# Patient Record
Sex: FEMALE | Race: WHITE | NOT HISPANIC OR LATINO | Employment: PART TIME | URBAN - METROPOLITAN AREA
[De-identification: names, ages, dates, MRNs, and addresses within clinical notes are randomized per-mention and may not be internally consistent; named-entity substitution may affect disease eponyms.]

---

## 2017-01-02 ENCOUNTER — HOSPITAL ENCOUNTER (EMERGENCY)
Facility: HOSPITAL | Age: 37
Discharge: HOME | End: 2017-01-02
Admitting: EMERGENCY MEDICINE
Payer: COMMERCIAL

## 2017-01-02 ENCOUNTER — APPOINTMENT (EMERGENCY)
Dept: CT IMAGING | Facility: HOSPITAL | Age: 37
End: 2017-01-02
Payer: COMMERCIAL

## 2017-01-02 VITALS
WEIGHT: 293 LBS | SYSTOLIC BLOOD PRESSURE: 121 MMHG | HEART RATE: 76 BPM | DIASTOLIC BLOOD PRESSURE: 60 MMHG | TEMPERATURE: 97.6 F | OXYGEN SATURATION: 96 % | RESPIRATION RATE: 16 BRPM

## 2017-01-02 DIAGNOSIS — K29.00 ACUTE GASTRITIS, PRESENCE OF BLEEDING UNSPECIFIED, UNSPECIFIED GASTRITIS TYPE: Primary | ICD-10-CM

## 2017-01-02 LAB
ALBUMIN SERPL BCP-MCNC: 3.1 G/DL (ref 3.5–5)
ALP SERPL-CCNC: 96 U/L (ref 46–116)
ALT SERPL W P-5'-P-CCNC: 30 U/L (ref 12–78)
ANION GAP SERPL CALCULATED.3IONS-SCNC: 8 MMOL/L (ref 4–13)
AST SERPL W P-5'-P-CCNC: 19 U/L (ref 5–45)
BASOPHILS # BLD MANUAL: 0 THOUSAND/UL (ref 0–0.1)
BASOPHILS NFR MAR MANUAL: 0 % (ref 0–1)
BILIRUB SERPL-MCNC: 0.3 MG/DL (ref 0.2–1)
BILIRUB UR QL STRIP: NEGATIVE
BUN SERPL-MCNC: 13 MG/DL (ref 5–25)
CALCIUM SERPL-MCNC: 8.6 MG/DL (ref 8.3–10.1)
CHLORIDE SERPL-SCNC: 104 MMOL/L (ref 100–108)
CLARITY UR: CLEAR
CO2 SERPL-SCNC: 27 MMOL/L (ref 21–32)
COLOR UR: YELLOW
COLOR, POC: YELLOW
CREAT SERPL-MCNC: 0.75 MG/DL (ref 0.6–1.3)
EOSINOPHIL # BLD MANUAL: 0.15 THOUSAND/UL (ref 0–0.4)
EOSINOPHIL NFR BLD MANUAL: 2 % (ref 0–6)
ERYTHROCYTE [DISTWIDTH] IN BLOOD BY AUTOMATED COUNT: 13.9 % (ref 11.6–15.1)
GFR SERPL CREATININE-BSD FRML MDRD: >60 ML/MIN/1.73SQ M
GLUCOSE SERPL-MCNC: 93 MG/DL (ref 65–140)
GLUCOSE UR STRIP-MCNC: NEGATIVE MG/DL
HCG UR QL: NEGATIVE
HCT VFR BLD AUTO: 42.2 % (ref 34.8–46.1)
HGB BLD-MCNC: 13.9 G/DL (ref 11.5–15.4)
HGB UR QL STRIP.AUTO: NEGATIVE
KETONES UR STRIP-MCNC: NEGATIVE MG/DL
LEUKOCYTE ESTERASE UR QL STRIP: NEGATIVE
LIPASE SERPL-CCNC: 86 U/L (ref 73–393)
LYMPHOCYTES # BLD AUTO: 0.98 THOUSAND/UL (ref 0.6–4.47)
LYMPHOCYTES # BLD AUTO: 13 % (ref 14–44)
MCH RBC QN AUTO: 29.2 PG (ref 26.8–34.3)
MCHC RBC AUTO-ENTMCNC: 32.9 G/DL (ref 31.4–37.4)
MCV RBC AUTO: 89 FL (ref 82–98)
MONOCYTES # BLD AUTO: 0.3 THOUSAND/UL (ref 0–1.22)
MONOCYTES NFR BLD: 4 % (ref 4–12)
NEUTROPHILS # BLD MANUAL: 6.12 THOUSAND/UL (ref 1.85–7.62)
NEUTS BAND NFR BLD MANUAL: 1 % (ref 0–8)
NEUTS SEG NFR BLD AUTO: 80 % (ref 43–75)
NITRITE UR QL STRIP: NEGATIVE
NRBC BLD AUTO-RTO: 0 /100 WBCS
PH UR STRIP.AUTO: 6.5 [PH] (ref 4.5–8)
PLATELET # BLD AUTO: 270 THOUSANDS/UL (ref 149–390)
PLATELET BLD QL SMEAR: ADEQUATE
PMV BLD AUTO: 11.2 FL (ref 8.9–12.7)
POTASSIUM SERPL-SCNC: 4.3 MMOL/L (ref 3.5–5.3)
PROT SERPL-MCNC: 6.5 G/DL (ref 6.4–8.2)
PROT UR STRIP-MCNC: NEGATIVE MG/DL
RBC # BLD AUTO: 4.76 MILLION/UL (ref 3.81–5.12)
SODIUM SERPL-SCNC: 139 MMOL/L (ref 136–145)
SP GR UR STRIP.AUTO: 1.02 (ref 1–1.03)
TOTAL CELLS COUNTED SPEC: 100
UROBILINOGEN UR QL STRIP.AUTO: 0.2 E.U./DL
WBC # BLD AUTO: 7.56 THOUSAND/UL (ref 4.31–10.16)

## 2017-01-02 PROCEDURE — 85007 BL SMEAR W/DIFF WBC COUNT: CPT | Performed by: PHYSICIAN ASSISTANT

## 2017-01-02 PROCEDURE — 74177 CT ABD & PELVIS W/CONTRAST: CPT

## 2017-01-02 PROCEDURE — 96374 THER/PROPH/DIAG INJ IV PUSH: CPT

## 2017-01-02 PROCEDURE — 81003 URINALYSIS AUTO W/O SCOPE: CPT

## 2017-01-02 PROCEDURE — 99284 EMERGENCY DEPT VISIT MOD MDM: CPT

## 2017-01-02 PROCEDURE — 83690 ASSAY OF LIPASE: CPT | Performed by: PHYSICIAN ASSISTANT

## 2017-01-02 PROCEDURE — 96361 HYDRATE IV INFUSION ADD-ON: CPT

## 2017-01-02 PROCEDURE — 81025 URINE PREGNANCY TEST: CPT

## 2017-01-02 PROCEDURE — 36415 COLL VENOUS BLD VENIPUNCTURE: CPT | Performed by: PHYSICIAN ASSISTANT

## 2017-01-02 PROCEDURE — 81002 URINALYSIS NONAUTO W/O SCOPE: CPT

## 2017-01-02 PROCEDURE — 80053 COMPREHEN METABOLIC PANEL: CPT | Performed by: PHYSICIAN ASSISTANT

## 2017-01-02 PROCEDURE — 85027 COMPLETE CBC AUTOMATED: CPT | Performed by: PHYSICIAN ASSISTANT

## 2017-01-02 RX ORDER — ONDANSETRON 2 MG/ML
4 INJECTION INTRAMUSCULAR; INTRAVENOUS ONCE
Status: COMPLETED | OUTPATIENT
Start: 2017-01-02 | End: 2017-01-02

## 2017-01-02 RX ORDER — PANTOPRAZOLE SODIUM 40 MG/1
40 TABLET, DELAYED RELEASE ORAL DAILY
Qty: 30 TABLET | Refills: 0 | Status: SHIPPED | OUTPATIENT
Start: 2017-01-02 | End: 2018-02-01 | Stop reason: SDDI

## 2017-01-02 RX ORDER — MAGNESIUM HYDROXIDE/ALUMINUM HYDROXICE/SIMETHICONE 120; 1200; 1200 MG/30ML; MG/30ML; MG/30ML
30 SUSPENSION ORAL EVERY 4 HOURS PRN
Status: DISCONTINUED | OUTPATIENT
Start: 2017-01-02 | End: 2017-01-02 | Stop reason: HOSPADM

## 2017-01-02 RX ADMIN — ALUMINUM HYDROXIDE, MAGNESIUM HYDROXIDE, AND SIMETHICONE 30 ML: 200; 200; 20 SUSPENSION ORAL at 12:12

## 2017-01-02 RX ADMIN — IOHEXOL 100 ML: 350 INJECTION, SOLUTION INTRAVENOUS at 14:03

## 2017-01-02 RX ADMIN — LIDOCAINE HYDROCHLORIDE 15 ML: 20 SOLUTION ORAL; TOPICAL at 12:12

## 2017-01-02 RX ADMIN — ONDANSETRON 4 MG: 2 INJECTION INTRAMUSCULAR; INTRAVENOUS at 12:09

## 2017-01-02 RX ADMIN — IOHEXOL 50 ML: 240 INJECTION, SOLUTION INTRATHECAL; INTRAVASCULAR; INTRAVENOUS; ORAL at 14:03

## 2017-01-02 RX ADMIN — SODIUM CHLORIDE 1000 ML: 0.9 INJECTION, SOLUTION INTRAVENOUS at 12:09

## 2017-06-28 ENCOUNTER — ALLSCRIPTS OFFICE VISIT (OUTPATIENT)
Dept: OTHER | Facility: OTHER | Age: 37
End: 2017-06-28

## 2017-09-17 ENCOUNTER — OFFICE VISIT (OUTPATIENT)
Dept: URGENT CARE | Facility: CLINIC | Age: 37
End: 2017-09-17
Payer: COMMERCIAL

## 2017-09-17 ENCOUNTER — APPOINTMENT (OUTPATIENT)
Dept: RADIOLOGY | Facility: CLINIC | Age: 37
End: 2017-09-17
Payer: COMMERCIAL

## 2017-09-17 DIAGNOSIS — R20.2 PARESTHESIA OF SKIN: ICD-10-CM

## 2017-09-17 DIAGNOSIS — M79.672 PAIN OF LEFT FOOT: ICD-10-CM

## 2017-09-17 PROCEDURE — 99284 EMERGENCY DEPT VISIT MOD MDM: CPT

## 2017-09-17 PROCEDURE — 73630 X-RAY EXAM OF FOOT: CPT

## 2017-09-17 PROCEDURE — 72040 X-RAY EXAM NECK SPINE 2-3 VW: CPT

## 2017-09-17 PROCEDURE — 96372 THER/PROPH/DIAG INJ SC/IM: CPT

## 2017-09-17 PROCEDURE — G0383 LEV 4 HOSP TYPE B ED VISIT: HCPCS

## 2018-01-13 VITALS
BODY MASS INDEX: 51.91 KG/M2 | RESPIRATION RATE: 20 BRPM | HEIGHT: 63 IN | DIASTOLIC BLOOD PRESSURE: 80 MMHG | SYSTOLIC BLOOD PRESSURE: 122 MMHG | TEMPERATURE: 98.1 F | WEIGHT: 293 LBS | HEART RATE: 82 BPM

## 2018-02-01 ENCOUNTER — OFFICE VISIT (OUTPATIENT)
Dept: URGENT CARE | Facility: CLINIC | Age: 38
End: 2018-02-01
Payer: COMMERCIAL

## 2018-02-01 VITALS
SYSTOLIC BLOOD PRESSURE: 141 MMHG | RESPIRATION RATE: 22 BRPM | OXYGEN SATURATION: 97 % | HEART RATE: 86 BPM | DIASTOLIC BLOOD PRESSURE: 71 MMHG | TEMPERATURE: 97.4 F

## 2018-02-01 DIAGNOSIS — J20.9 ACUTE BRONCHITIS, UNSPECIFIED ORGANISM: ICD-10-CM

## 2018-02-01 DIAGNOSIS — H66.91 RIGHT OTITIS MEDIA, UNSPECIFIED OTITIS MEDIA TYPE: Primary | ICD-10-CM

## 2018-02-01 PROCEDURE — 99283 EMERGENCY DEPT VISIT LOW MDM: CPT | Performed by: NURSE PRACTITIONER

## 2018-02-01 PROCEDURE — G0382 LEV 3 HOSP TYPE B ED VISIT: HCPCS | Performed by: NURSE PRACTITIONER

## 2018-02-01 RX ORDER — AZITHROMYCIN 250 MG/1
250 TABLET, FILM COATED ORAL DAILY
Qty: 6 TABLET | Refills: 0 | Status: SHIPPED | OUTPATIENT
Start: 2018-02-01 | End: 2018-02-06

## 2018-02-01 NOTE — PROGRESS NOTES
Assessment/Plan:      Diagnoses and all orders for this visit:    Right otitis media, unspecified otitis media type  -     azithromycin (ZITHROMAX) 250 mg tablet; Take 1 tablet (250 mg total) by mouth daily for 5 days 2 pills today, then one daily for 4 more days  -     dextromethorphan-guaifenesin (MUCINEX DM)  MG per 12 hr tablet; Take 1 tablet by mouth every 12 (twelve) hours    Acute bronchitis, unspecified organism  -     azithromycin (ZITHROMAX) 250 mg tablet; Take 1 tablet (250 mg total) by mouth daily for 5 days 2 pills today, then one daily for 4 more days  -     dextromethorphan-guaifenesin (MUCINEX DM)  MG per 12 hr tablet; Take 1 tablet by mouth every 12 (twelve) hours          Subjective:     Patient ID: Med Koch is a 44 y o  female  Sore Throat    The current episode started yesterday  The problem has been unchanged  Associated symptoms include congestion, coughing, headaches, a hoarse voice, a plugged ear sensation and shortness of breath  Review of Systems   Constitutional: Negative  HENT: Positive for congestion, hoarse voice and sore throat  Eyes: Negative  Respiratory: Positive for cough and shortness of breath  Cardiovascular: Negative  Gastrointestinal: Negative  Genitourinary: Negative  Musculoskeletal: Negative  Neurological: Positive for headaches  Objective:  /71   Pulse 86   Temp (!) 97 4 °F (36 3 °C)   Resp 22   SpO2 97%      Physical Exam   Constitutional: She is oriented to person, place, and time  She appears well-developed and well-nourished  HENT:   Head: Normocephalic and atraumatic  Right Ear: Tympanic membrane is erythematous and bulging  Nose: Mucosal edema and rhinorrhea present  Mouth/Throat: Posterior oropharyngeal erythema present  No oropharyngeal exudate  Neck: Normal range of motion  Cardiovascular: Normal rate, regular rhythm and normal heart sounds      Pulmonary/Chest: Effort normal and breath sounds normal    Abdominal: Soft  Bowel sounds are normal    Lymphadenopathy:     She has cervical adenopathy  Neurological: She is alert and oriented to person, place, and time  She has normal reflexes  Skin: Skin is warm and dry  No rash noted  Psychiatric: She has a normal mood and affect  Nursing note and vitals reviewed  Patient Instructions   Follow up with PCP in 48-72 hours if symptoms do not improve or if they worsen

## 2018-04-03 ENCOUNTER — OFFICE VISIT (OUTPATIENT)
Dept: URGENT CARE | Facility: CLINIC | Age: 38
End: 2018-04-03
Payer: COMMERCIAL

## 2018-04-03 VITALS
HEART RATE: 108 BPM | RESPIRATION RATE: 18 BRPM | OXYGEN SATURATION: 97 % | TEMPERATURE: 98.1 F | DIASTOLIC BLOOD PRESSURE: 92 MMHG | SYSTOLIC BLOOD PRESSURE: 145 MMHG

## 2018-04-03 DIAGNOSIS — R60.0 LEG EDEMA, LEFT: Primary | ICD-10-CM

## 2018-04-03 DIAGNOSIS — M54.41 ACUTE BILATERAL LOW BACK PAIN WITH RIGHT-SIDED SCIATICA: ICD-10-CM

## 2018-04-03 PROCEDURE — 99283 EMERGENCY DEPT VISIT LOW MDM: CPT | Performed by: PHYSICIAN ASSISTANT

## 2018-04-03 PROCEDURE — G0382 LEV 3 HOSP TYPE B ED VISIT: HCPCS | Performed by: PHYSICIAN ASSISTANT

## 2018-04-03 NOTE — PROGRESS NOTES
[facility]    NAME: Silverio Dias is a 44 y o  female  : 1980    MRN: [id number]  DATE: April 3, 2018  TIME: 2:15 PM    Assessment and Plan   Leg edema, left [R60 0]  1  Leg edema, left     2  Acute bilateral low back pain with right-sided sciatica         Patient Instructions     Patient Instructions   Patient referred to emergency room for further evaluation and to rule out DVT  Will go to [address] [place] [address] ED  Chief Complaint     Chief Complaint   Patient presents with    Back Pain     Pt c/o lower back pain since yesterday  History of Present Illness   39-year-old female here with low back pain that started last night  Patient denies any injury  Pain in her lower back that radiates down her right lower extremity  Denies any related numbness, tingling  Nothing has helped to relieve her pain  Pain is worse with trying to stand up and walk  Denies any bowel or bladder symptoms  Patient noticed this morning that her left leg is swollen up past her knee  Denies any pain in her leg except for tightness due to the swelling  No redness or increased warmth of the leg  Review of Systems   Review of Systems   Constitutional: Negative for activity change, appetite change, chills, diaphoresis, fatigue, fever and unexpected weight change  HENT: Negative for congestion, dental problem, hearing loss, sinus pressure, sneezing, sore throat, tinnitus, trouble swallowing and voice change  Eyes: Negative for photophobia, redness and visual disturbance  Respiratory: Negative for apnea, cough, chest tightness, shortness of breath, wheezing and stridor  Cardiovascular: Positive for leg swelling (Left leg)  Negative for chest pain and palpitations  Gastrointestinal: Negative for abdominal distention, abdominal pain, blood in stool, constipation, diarrhea, nausea and vomiting  Endocrine: Negative for cold intolerance, heat intolerance, polydipsia, polyphagia and polyuria  Genitourinary: Negative for difficulty urinating, dysuria, flank pain, frequency, hematuria and urgency  Musculoskeletal: Positive for back pain (With radicular pain down the right leg) and gait problem  Negative for arthralgias, joint swelling, myalgias, neck pain and neck stiffness  Skin: Negative for pallor, rash and wound  Neurological: Negative for dizziness, tremors, seizures, speech difficulty, weakness and headaches  Hematological: Negative for adenopathy  Does not bruise/bleed easily  Psychiatric/Behavioral: Negative for agitation, confusion, dysphoric mood and sleep disturbance  The patient is not nervous/anxious  All other systems reviewed and are negative  Current Medications   No current outpatient prescriptions on file  Current Allergies     Allergies as of 04/03/2018 - Reviewed 04/03/2018   Allergen Reaction Noted    Penicillins Anaphylaxis 03/14/2016    Clindamycin/lincomycin  03/14/2016    Levaquin [levofloxacin] Facial Swelling 02/01/2018          The following portions of the patient's history were reviewed and updated as appropriate: allergies, current medications, past family history, past medical history, past social history, past surgical history and problem list      Objective   /92   Pulse (!) 108   Temp 98 1 °F (36 7 °C)   Resp 18   SpO2 97%      Physical Exam   Physical Exam   Constitutional: She appears well-developed and well-nourished  No distress  Morbidly obese   HENT:   Head: Normocephalic  Right Ear: External ear normal    Left Ear: External ear normal    Nose: Nose normal    Mouth/Throat: Oropharynx is clear and moist  No oropharyngeal exudate  Neck: Normal range of motion  Neck supple  Cardiovascular: Normal rate, regular rhythm, normal heart sounds and intact distal pulses  No murmur heard  Left leg edema noted  Swelling of entire left lower leg up past her left knee  Negative calf pain  Negative Homans    No redness or color changes  Pulmonary/Chest: Effort normal and breath sounds normal  No respiratory distress  She has no wheezes  She has no rales  Abdominal: Soft  Bowel sounds are normal  There is no tenderness  Musculoskeletal:        Lumbar back: She exhibits decreased range of motion (significant lordosis noted ), tenderness, pain and spasm  Lymphadenopathy:     She has no cervical adenopathy  Skin: Skin is warm  No rash noted

## 2018-04-03 NOTE — PATIENT INSTRUCTIONS
Patient referred to emergency room for further evaluation and to rule out DVT  Will go to [address] [place] [address]

## 2018-04-06 ENCOUNTER — OFFICE VISIT (OUTPATIENT)
Dept: URGENT CARE | Facility: CLINIC | Age: 38
End: 2018-04-06
Payer: COMMERCIAL

## 2018-04-06 VITALS
HEART RATE: 126 BPM | TEMPERATURE: 97.8 F | OXYGEN SATURATION: 97 % | DIASTOLIC BLOOD PRESSURE: 83 MMHG | SYSTOLIC BLOOD PRESSURE: 155 MMHG

## 2018-04-06 DIAGNOSIS — J01.40 ACUTE PANSINUSITIS, RECURRENCE NOT SPECIFIED: Primary | ICD-10-CM

## 2018-04-06 PROCEDURE — G0382 LEV 3 HOSP TYPE B ED VISIT: HCPCS | Performed by: FAMILY MEDICINE

## 2018-04-06 PROCEDURE — 99283 EMERGENCY DEPT VISIT LOW MDM: CPT | Performed by: FAMILY MEDICINE

## 2018-04-06 RX ORDER — AZITHROMYCIN 250 MG/1
TABLET, FILM COATED ORAL
Qty: 6 TABLET | Refills: 0 | Status: SHIPPED | OUTPATIENT
Start: 2018-04-06 | End: 2018-04-10

## 2018-04-06 RX ORDER — NAPROXEN 250 MG/1
250 TABLET ORAL 2 TIMES DAILY WITH MEALS
COMMUNITY
End: 2018-08-20

## 2018-04-06 RX ORDER — CYCLOBENZAPRINE HCL 10 MG
10 TABLET ORAL 3 TIMES DAILY PRN
COMMUNITY
End: 2018-08-20

## 2018-04-06 NOTE — PROGRESS NOTES
[facility] - Patient Visit Note  Ethan Seymour 44 y o  female MRN: [id number]      Assessment / Plan:  Acute pansinusitis, recurrence not specified [J01 40]  1  Acute pansinusitis, recurrence not specified  azithromycin (ZITHROMAX) 250 mg tablet     Reason For Visit / Chief Complaint  Chief Complaint   Patient presents with    Facial Pain     pressure with "yellowish-brown" discharge x 2-3 days             Azucena Beaver Discussion:  Patient is aware that she will be given azithromycin which she has tolerated in the past given her multiple antibiotic allergies  She was instructed to continue to use all of the medication  HPI:  Ethan Seymour is a 44 y o  female Patient           Who  Presents with sinus pressure and green brownish drainage anteriorly and the a postnasal drip for several days  She has had no fever per se she is allergic to penicillin clindamycin and Levaquin  She denies sore throat or ear pain at the present time  She has been taking over-the-counter anti-inflammatory with Tylenol with no relief  Historical Information   No past medical history on file  Past Surgical History:   Procedure Laterality Date    ABDOMINAL SURGERY       Social History   History   Alcohol Use No     History   Drug Use No     History   Smoking Status    Former Smoker    Types: Cigarettes   Smokeless Tobacco    Not on file     No family history on file  ALLERGIES:         Allergies   Allergen Reactions    Penicillins Anaphylaxis    Clindamycin/Lincomycin      Mouth ulcers severe  But, tolerates azithromycin      Levaquin [Levofloxacin] Facial Swelling       MEDS:    Current Outpatient Prescriptions:     cyclobenzaprine (FLEXERIL) 10 mg tablet, Take 10 mg by mouth 3 (three) times a day as needed for muscle spasms, Disp: , Rfl:     naproxen (NAPROSYN) 250 mg tablet, Take 250 mg by mouth 2 (two) times a day with meals, Disp: , Rfl:     azithromycin (ZITHROMAX) 250 mg tablet, Take 2 tablets today then 1 tablet daily x 4 days, Disp: 6 tablet, Rfl: 0    FACILITY ADMINISTERED MEDS:        REVIEW OF SYSTEMS    GENERAL: NEGATIVE for:  Generalized Fatigue                             Chills                              Fever                             Myalgias     OPTHALMIC: NEGATIVE for:  Diplopia                            Scotomata                            Visual Changes                            Blurred Vision     ENT:  EARS NEGATIVE for:  Hearing Difficulty                            Tinnitus                            Vertigo                            Dizziness                            Ear Pain                            Ear Drainage               NOSE Positive for:  Nasal Congestion                            Nasal Discharge                            Sinus Pain / Pressure               THROAT NEGATIVE for:  Sore Throat / Throat Pain                            Difficulty Swallowing     RESPIRATORY: NEGATIVE for:  Cough                            Wheezing                            Sputum Production                            Sob / Tachypnea                            Hemoptysis     CARDIOVASCULAR: NEGATIVE for:  Chest Pain                             SOB (cardiac Related)                             Dyspnea on Exertion                             Orthopnea                             PND                             Leg Edema                             Palpitations                               Irregularities/rythym                       CURRENT VITALS:   Blood Pressure: 155/83 (04/06/18 1049)  Pulse: (!) 126 (04/06/18 1049)  Temperature: 97 8 °F (36 6 °C) (04/06/18 1049)  SpO2: 97 % (04/06/18 1049)  /83   Pulse (!) 126   Temp 97 8 °F (36 6 °C)   LMP 03/14/2018 (Exact Date)   SpO2 97%       PHYSICAL EXAM:         General Appearance:    Alert, cooperative, no apparent distress, appears stated age     Oriented x3    Head:    Normocephalic, without obvious abnormality, atraumatic   Eyes:      EOM's intact,      AMELIA,        conjunctiva/corneas clear,          fundi not visualized well   Ears:     Normal external ear canals     Tm right side  Normal     Tm left side    Normal       Nose:    Frontal sinuses are mildly tender however maxillary sinuses are exquisitely tender to light touch   Throat:   Lips, mucosa, and tongue normal       Anterior pharynx   Normal      Posterior pharynx with large amount of postnasal drip which is greenish gray in color  No exudate obvious       Neck:   Supple, symmetrical, trachea midline and moveable    Normal thyroid click present    No carotid bruits appreciated        Lymphatics:     Adenopathy in anterior cervical chain  Normal    Adenopathy in posterior cervical chain   Normal     Lungs:     Clear to auscultation bilaterally    No rales    No ronchi    No wheeze     Heart[de-identified]    Regular rate and rhythm, S1 and S2 normal,     No S3, S4, audible    No murmurs, rubs      Extremities:     Extremities grossly normal     atraumatic,     no cyanosis or edema        Skin:     Skin color, texture, turgor normal, no rashes or lesions                         Follow up at primary care in 2  days    Counseling / Coordination of Care  Total clinic time spent today  15  minutes  Greater than 50% of total time was spent with the patient and / or family counseling and / or coordination of care  Portions of the record may have been created with voice recognition software   Occasional wrong word or "sound a like" substitutions may have occurred due to the inherent limitations of voice recognition software   Read the chart carefully and recognize, using context, where substitutions have occurred

## 2018-04-06 NOTE — PATIENT INSTRUCTIONS
Sinusitis   WHAT YOU NEED TO KNOW:   FOLLOW UP WITH YOUR PRIMARY CARE PHYSICIAN WITHIN 1-2 DAYS    Sinusitis is inflammation or infection of your sinuses  It is most often caused by a virus  Acute sinusitis may last up to 12 weeks  Chronic sinusitis lasts longer than 12 weeks  Recurrent sinusitis means you have 4 or more times in 1 year  DISCHARGE INSTRUCTIONS:   Return to the emergency department if:   · Your eye and eyelid are red, swollen, and painful  · You cannot open your eye  · You have vision changes, such as double vision  · Your eyeball bulges out or you cannot move your eye  · You are more sleepy than normal, or you notice changes in your ability to think, move, or talk  · You have a stiff neck, a fever, or a bad headache  · You have swelling of your forehead or scalp  Contact your healthcare provider if:   · Your symptoms do not improve after 3 days  · Your symptoms do not go away after 10 days  · You have nausea and are vomiting  · Your nose is bleeding  · You have questions or concerns about your condition or care  Medicines: Your symptoms may go away on their own  Your healthcare provider may recommend watchful waiting for up to 10 days before starting antibiotics  You may  need any of the following:  · Acetaminophen  decreases pain and fever  It is available without a doctor's order  Ask how much to take and how often to take it  Follow directions  Read the labels of all other medicines you are using to see if they also contain acetaminophen, or ask your doctor or pharmacist  Acetaminophen can cause liver damage if not taken correctly  Do not use more than 4 grams (4,000 milligrams) total of acetaminophen in one day  · NSAIDs , such as ibuprofen, help decrease swelling, pain, and fever  This medicine is available with or without a doctor's order  NSAIDs can cause stomach bleeding or kidney problems in certain people   If you take blood thinner medicine, always ask your healthcare provider if NSAIDs are safe for you  Always read the medicine label and follow directions  · Nasal steroid sprays  may help decrease inflammation in your nose and sinuses  · Decongestants  help reduce swelling and drain mucus in the nose and sinuses  They may help you breathe easier  · Antihistamines  help dry mucus in the nose and relieve sneezing  · Antibiotics  help treat or prevent a bacterial infection  · Take your medicine as directed  Contact your healthcare provider if you think your medicine is not helping or if you have side effects  Tell him or her if you are allergic to any medicine  Keep a list of the medicines, vitamins, and herbs you take  Include the amounts, and when and why you take them  Bring the list or the pill bottles to follow-up visits  Carry your medicine list with you in case of an emergency  Self-care:   · Rinse your sinuses  Use a sinus rinse device to rinse your nasal passages with a saline (salt water) solution or distilled water  Do not use tap water  This will help thin the mucus in your nose and rinse away pollen and dirt  It will also help reduce swelling so you can breathe normally  Ask your healthcare provider how often to do this  · Breathe in steam   Heat a bowl of water until you see steam  Lean over the bowl and make a tent over your head with a large towel  Breathe deeply for about 20 minutes  Be careful not to get too close to the steam or burn yourself  Do this 3 times a day  You can also breathe deeply when you take a hot shower  · Sleep with your head elevated  Place an extra pillow under your head before you go to sleep to help your sinuses drain  · Drink liquids as directed  Ask your healthcare provider how much liquid to drink each day and which liquids are best for you  Liquids will thin the mucus in your nose and help it drain  Avoid drinks that contain alcohol or caffeine       · Do not smoke, and avoid secondhand smoke  Nicotine and other chemicals in cigarettes and cigars can make your symptoms worse  Ask your healthcare provider for information if you currently smoke and need help to quit  E-cigarettes or smokeless tobacco still contain nicotine  Talk to your healthcare provider before you use these products  Prevent the spread of germs that cause sinusitis:  Wash your hands often with soap and water  Wash your hands after you use the bathroom, change a child's diaper, or sneeze  Wash your hands before you prepare or eat food  Follow up with your healthcare provider as directed: You may be referred to an ear, nose, and throat specialist  Write down your questions so you remember to ask them during your visits  © 2017 [address] [facility] [employer] [facility] [employer] Information is for End User's use only and may not be sold, redistributed or otherwise used for commercial purposes  All illustrations and images included in CareNotes® are the copyrighted property of [employer]  or [address]  The above information is an  only  It is not intended as medical advice for individual conditions or treatments  Talk to your doctor, nurse or pharmacist before following any medical regimen to see if it is safe and effective for you

## 2018-04-30 ENCOUNTER — TELEPHONE (OUTPATIENT)
Dept: INTERNAL MEDICINE CLINIC | Facility: CLINIC | Age: 38
End: 2018-04-30

## 2018-04-30 DIAGNOSIS — S80.00XA PATELLAR CONTUSION, UNSPECIFIED LATERALITY, INITIAL ENCOUNTER: Primary | ICD-10-CM

## 2018-06-19 ENCOUNTER — APPOINTMENT (OUTPATIENT)
Dept: PHYSICAL THERAPY | Facility: CLINIC | Age: 38
End: 2018-06-19
Payer: COMMERCIAL

## 2018-06-19 PROCEDURE — G8979 MOBILITY GOAL STATUS: HCPCS | Performed by: PHYSICAL THERAPIST

## 2018-06-19 PROCEDURE — G8978 MOBILITY CURRENT STATUS: HCPCS | Performed by: PHYSICAL THERAPIST

## 2018-06-21 ENCOUNTER — APPOINTMENT (OUTPATIENT)
Dept: PHYSICAL THERAPY | Facility: CLINIC | Age: 38
End: 2018-06-21
Payer: COMMERCIAL

## 2018-06-25 ENCOUNTER — APPOINTMENT (OUTPATIENT)
Dept: PHYSICAL THERAPY | Facility: CLINIC | Age: 38
End: 2018-06-25
Payer: COMMERCIAL

## 2018-06-26 ENCOUNTER — OFFICE VISIT (OUTPATIENT)
Dept: PHYSICAL THERAPY | Facility: CLINIC | Age: 38
End: 2018-06-26
Payer: COMMERCIAL

## 2018-06-26 DIAGNOSIS — S82.101D CLOSED FRACTURE OF PROXIMAL END OF RIGHT TIBIA WITH ROUTINE HEALING, UNSPECIFIED FRACTURE MORPHOLOGY, SUBSEQUENT ENCOUNTER: Primary | ICD-10-CM

## 2018-06-26 PROCEDURE — 97110 THERAPEUTIC EXERCISES: CPT

## 2018-06-26 NOTE — PROGRESS NOTES
Daily Note     Today's date: 2018  Patient name: Keny Anderson  : 1980  MRN: [id number]  Referring provider: Madhuri Griffiths DO  Dx:   Encounter Diagnosis     ICD-10-CM    1  Closed fracture of proximal end of right tibia with routine healing, unspecified fracture morphology, subsequent encounter S82 101D                   Subjective: Patient rates pain 2/10 today  She reports doing her exercises regularly at home  She is hoping her weight bearing status changes at her next MD visit  Objective: See treatment diary below  Precautions: 50% WB RLE (? Till )  RE EVAL:2018  Specialty Daily Treatment Diary         Exercise Diary  18       nustep s=9 L2x6 min       Sit hs stretch 30 sec x 3       Mirror amb with crutches 25 ft x 2       Up/down stairs with crutches        strap calf stretch R 30 sec hold x3       QS roll knee x20       QS roll heel x20       Heel slide flex x20       Heel slide abd x20       clam shells R x10       SLR flex 2x10       Patella mobs x5 each       Ball squeezes x10       Hip sweeps x10                                                           Patient educated on additional exercises without difficulty  Patient's HEP updated, handout given and explained  Assessment: Tolerated treatment well  Patient exhibited good technique with therapeutic exercises, but continues with need of verbal cues for correct positioning at times  Plan: Continue per plan of care  Progress treatment as tolerated

## 2018-06-28 ENCOUNTER — OFFICE VISIT (OUTPATIENT)
Dept: PHYSICAL THERAPY | Facility: CLINIC | Age: 38
End: 2018-06-28
Payer: COMMERCIAL

## 2018-06-28 DIAGNOSIS — S82.101D CLOSED FRACTURE OF PROXIMAL END OF RIGHT TIBIA WITH ROUTINE HEALING, UNSPECIFIED FRACTURE MORPHOLOGY, SUBSEQUENT ENCOUNTER: Primary | ICD-10-CM

## 2018-06-28 PROCEDURE — 97110 THERAPEUTIC EXERCISES: CPT | Performed by: PHYSICAL THERAPIST

## 2018-06-28 NOTE — PROGRESS NOTES
Daily Note     Today's date: 2018  Patient name: Luz Marina Murillo  : 1980  MRN: [id number]  Referring provider: Jasbir Keith DO  Dx:   Encounter Diagnosis     ICD-10-CM    1  Closed fracture of proximal end of right tibia with routine healing, unspecified fracture morphology, subsequent encounter S89 450D                   Subjective: The patient denies having any pain at her R leg presently  The patient does report neck and R arm pain related to a chronic neck pain and straightening of the lordosis  The patient reports doing well with her exercises at home  The patient admits to having difficulty kneeling on the bed with R knee and also having difficulty getting in/out of their vehicle(expedition)  Objective: See treatment diary below  Precautions: 50% WB RLE (? Till )  RE EVAL:2018  Specialty Daily Treatment Diary         Exercise Diary  18      nustep s=9 L2x6 min L2 x 8 mins      Sit hs stretch 30 sec x 3 3 x :30      Mirror amb with crutches 25 ft x 2       Up/down stairs with crutches        strap calf stretch R 30 sec hold x3 :30 x3      QS roll knee x20 x25      QS roll heel x20 x25      Heel slide flex x20 x25      Heel slide abd x20 x25      clam shells R x10 x25      SLR flex 2x10 x25      Patella mobs x5 each x5 each      Ball squeezes x10 2x10      Hip sweeps x10 2x10      Heelslide strap stretch  4x:20                                                  Assessment:  The patient's posture while walking with her B/L axillary crutches is much improved today  She no longer flexes forward and her step lengths are more equal   Patient was given education on proper exercise technique and activity pacing once she is allowed to [facility]  Patient would benefit from continued PT      Plan: Will add some closed chained activities next session if patient is cleared to [address] [place] [address]

## 2018-07-02 ENCOUNTER — OFFICE VISIT (OUTPATIENT)
Dept: PHYSICAL THERAPY | Facility: CLINIC | Age: 38
End: 2018-07-02
Payer: COMMERCIAL

## 2018-07-02 DIAGNOSIS — S82.101D CLOSED FRACTURE OF PROXIMAL END OF RIGHT TIBIA WITH ROUTINE HEALING, UNSPECIFIED FRACTURE MORPHOLOGY, SUBSEQUENT ENCOUNTER: Primary | ICD-10-CM

## 2018-07-02 PROCEDURE — 97530 THERAPEUTIC ACTIVITIES: CPT | Performed by: PHYSICAL THERAPIST

## 2018-07-02 PROCEDURE — 97110 THERAPEUTIC EXERCISES: CPT | Performed by: PHYSICAL THERAPIST

## 2018-07-02 NOTE — PROGRESS NOTES
Daily Note     Today's date: 2018  Patient name: Sierra Horan  : 1980  MRN: [id number]  Referring provider: Daron Arredondo DO  Dx:   Encounter Diagnosis     ICD-10-CM    1  Closed fracture of proximal end of right tibia with routine healing, unspecified fracture morphology, subsequent encounter S82 179D                   Subjective: Patient saw MD earlier today and was released to B of R LE  X-rays taken, "all looks great"  Patient entered clinic today with 1 axillary crutch  C/o 6/10 pain in right knee today after going up the stairs yesterday without crutches  NDV Aug  13th  Objective: See treatment diary below    Precautions: FWB    RE EVAL:2018  Specialty Daily Treatment Diary      Exercise Diary  18       nustep s=9 L2x6 min L2 x 8 mins  L2 x10 min       Sit hs stretch 30 sec x 3 3 x :30  3 x :30       Mirror amb with crutches 25 ft x 2    np       Up/down stairs with crutches      np       strap calf stretch R 30 sec hold x3 :30 x3  3 x :30       QS roll knee x20 x25 x25       QS roll heel x20 x25 x25       Heel slide flex x20 x25 x25       Heel slide abd x20 x25 x25       clam shells R x10 x25 x25       SLR flex 2x10 x25 x25        Patella mobs x5 each x5 each x5 each       Ball squeezes x10 2x10 x25       Hip sweeps x10 2x10 3x10       Heelslide strap stretch   4x:20 4x :20       Weight Shifts fwd, lat     30x each       standing hip abd     10x                                                       Assessment: Tolerated treatment well  Noted continued antalgic gait with decreased stance time on RLE due to pain  Needs to work on single limb WB for normalization of gait  Educated patient on axillary crutch use with L UE  Patient would benefit from continued PT      Plan: Continue per plan of care

## 2018-07-05 ENCOUNTER — OFFICE VISIT (OUTPATIENT)
Dept: PHYSICAL THERAPY | Facility: CLINIC | Age: 38
End: 2018-07-05
Payer: COMMERCIAL

## 2018-07-05 DIAGNOSIS — S82.101D CLOSED FRACTURE OF PROXIMAL END OF RIGHT TIBIA WITH ROUTINE HEALING, UNSPECIFIED FRACTURE MORPHOLOGY, SUBSEQUENT ENCOUNTER: Primary | ICD-10-CM

## 2018-07-05 PROCEDURE — 97110 THERAPEUTIC EXERCISES: CPT

## 2018-07-05 NOTE — PROGRESS NOTES
Daily Note     Today's date: 2018  Patient name: Silverio Dias  : 1980  MRN: [id number]  Referring provider: Annalise Meyer DO  Dx:   Encounter Diagnosis     ICD-10-CM    1  Closed fracture of proximal end of right tibia with routine healing, unspecified fracture morphology, subsequent encounter S83 795D                   Subjective: Patient reports no pain, just stiffness in her foot today  Patient feels at night is the worst of her discomfort from being on her foot all day  Patient has not had to use any assistive device since change of weight bearing status  Objective: See treatment diary below  Precautions: FWB    RE EVAL:2018  Specialty Daily Treatment Diary      Exercise Diary  18     nustep s=9 L2x6 min L2 x 8 mins  L2 x10 min  L2 x 10 min     Sit hs stretch 30 sec x 3 3 x :30  3 x :30  3 x:30     Mirror amb with crutches 25 ft x 2    np  w/o crutches  25ft x 4     Up/down stairs with crutches      np  w/o crutches x 2     strap calf stretch R 30 sec hold x3 :30 x3  3 x :30  *wedge   3x :30     QS roll knee x20 x25 x25  home ex     QS roll heel x20 x25 x25  home ex     Heel slide flex/abd x20 x25 x25  home ex     Heel-toe amb     10 ft x 4 bars  out bars   clam shells R x10 x25 x25  x30     SLR flex 2x10 x25 x25   x30     Patella mobs x5 each x5 each x5 each(home ex)  *Mini wall squats x10     Ball squeezes x10 2x10 x25  x30     Hip sweeps x10 2x10 3x10  1# x20     Heelslide strap stretch   4x:20 4x :20  3 x :30     Weight Shifts fwd, lat     30x each  *NBS stand EO/EC :30 x2  bl foam   standing hip abd     10x  3 x10      step ups 4"        x10 ea      sidestepping        25 ft x 2      fitter        x 10 ea       Patient was instructed on new  Balance activities  Handout given and explained to update home exercise program     Assessment: Tolerated treatment well   Patient exhibited good technique with therapeutic exercises and was able to perform new exercises without difficulty  Plan: Continue per plan of care  Progress treatment as tolerated

## 2018-07-10 ENCOUNTER — OFFICE VISIT (OUTPATIENT)
Dept: PHYSICAL THERAPY | Facility: CLINIC | Age: 38
End: 2018-07-10
Payer: COMMERCIAL

## 2018-07-10 DIAGNOSIS — S82.101D CLOSED FRACTURE OF PROXIMAL END OF RIGHT TIBIA WITH ROUTINE HEALING, UNSPECIFIED FRACTURE MORPHOLOGY, SUBSEQUENT ENCOUNTER: Primary | ICD-10-CM

## 2018-07-10 PROCEDURE — 97110 THERAPEUTIC EXERCISES: CPT

## 2018-07-10 NOTE — PROGRESS NOTES
Daily Note     Today's date: 7/10/2018  Patient name: Leanne Brown  : 1980  MRN: [id number]  Referring provider: April Smith DO  Dx:   Encounter Diagnosis     ICD-10-CM    1  Closed fracture of proximal end of right tibia with routine healing, unspecified fracture morphology, subsequent encounter S87 025D                   Subjective: Patient rates her pain 6/10 in her right knee (sharp) today because she stepped in a hole outside last night at her mother's and landed on her knee when she fell  She feels that her knee will be fine after a few days  Patient reported heat flashes during exercises, but knee pain stayed the same  She is going home to ice her knee after shopping today        Objective: See treatment diary below  Precautions: [place] RUSHING EVAL:2018  Specialty Daily Treatment Diary      Exercise Diary  6/26/18 6/28/18  7/2/18 7/5/18  7/10/18   nustep s=9 L2x6 min L2 x 8 mins  L2 x10 min  L2 x 10 min  L2 x 10min   Sit hs stretch 30 sec x 3 3 x :30  3 x :30  3 x:30  3x :30   Mirror amb with crutches 25 ft x 2    np  w/o crutches  25ft x 4  25 ft x 4   Up/down stairs with crutches      np  w/o crutches x 2  x 4 sos   strap calf stretch R 30 sec hold x3 :30 x3  3 x :30  *wedge   3x :30  wedge 3x :30   QS roll knee x20 x25 x25  home ex  -   QS roll heel x20 x25 x25  home ex  -   Heel slide flex/abd x20 x25 x25  home ex  -   Heel-toe amb     10 ft x 4 bars  out bars 25x2   clam shells R x10 x25 x25  x30  x30   SLR flex 2x10 x25 x25   x30  1# x20   Patella mobs x5 each x5 each x5 each(home ex)  *Mini wall squats x10  x10   Ball squeezes x10 2x10 x25  x30 x30   Hip sweeps x10 2x10 3x10  1# x20  1# x 30   Heelslide strap stretch   4x:20 4x :20  3 x :30  3x :30   Weight Shifts fwd, lat     30x each  *NBS stand EO/EC :30 x2  bl foam EO/EC :30x2   standing hip abd     10x  3 x10  3x10    step ups 4"        x10 ea  x10 ea    sidestepping        25 ft x 2  25 ft x4    fitter        x 10 ea  x10 ea Patient instructed to see MD if her knee does not improve  Reviewed proper ice and rest technique for pain relief and healing  Assessment: Tolerated treatment fair  Patient demonstrated fatigue post treatment, exhibited good technique with therapeutic exercises and would benefit from continued PT for improved balance  Plan: Continue per plan of care  Progress treatment as tolerated

## 2018-07-12 ENCOUNTER — OFFICE VISIT (OUTPATIENT)
Dept: PHYSICAL THERAPY | Facility: CLINIC | Age: 38
End: 2018-07-12
Payer: COMMERCIAL

## 2018-07-12 DIAGNOSIS — S82.101D CLOSED FRACTURE OF PROXIMAL END OF RIGHT TIBIA WITH ROUTINE HEALING, UNSPECIFIED FRACTURE MORPHOLOGY, SUBSEQUENT ENCOUNTER: Primary | ICD-10-CM

## 2018-07-12 PROCEDURE — 97110 THERAPEUTIC EXERCISES: CPT

## 2018-07-12 NOTE — PROGRESS NOTES
Daily Note     Today's date: 2018  Patient name: Sandi Saini  : 1980  MRN: [id number]  Referring provider: Marcus Augustine DO  Dx:   Encounter Diagnosis     ICD-10-CM    1  Closed fracture of proximal end of right tibia with routine healing, unspecified fracture morphology, subsequent encounter S82 101D                   Subjective: Patient reports her knee pain has subsided and she has no pain today  Patient states she does not know if her balance will ever recover  Objective: See treatment diary below  RE EVAL:2018  Specialty Daily Treatment Diary      Exercise Diary  18       nustep s=9 L3 x10 mins        Sit hs stretch 3x :30        Mirror amb  25 ft x 4        Up/down stairs  X 4 sos        wedge calf stretch  :30 x4                 Wall squats X 20        Hurdles 25 ft x 2        Heel-toe amb 25 ft x 2        clam shells R Green T-band x 20       SLR flex 1# x30        SL stand :15x 2        Ball squeezes x30       Hip sweeps 1# x30        Heelslide strap stretch 3x:30         BL foam EO/EC :30x2        standing hip abd 1# x 20         step ups 4" X 15 ea         sidestepping 25 ft x 4        fitter x15 each          Patient's HEP updated with additional exercises and green theraband issued  Assessment: Tolerated treatment well  Patient exhibited good technique with therapeutic exercises and would benefit from continued PT for improved balance  Plan: Continue per plan of care  Progress treatment as tolerated

## 2018-07-16 ENCOUNTER — EVALUATION (OUTPATIENT)
Dept: PHYSICAL THERAPY | Facility: CLINIC | Age: 38
End: 2018-07-16
Payer: COMMERCIAL

## 2018-07-16 DIAGNOSIS — S82.101D CLOSED FRACTURE OF PROXIMAL END OF RIGHT TIBIA WITH ROUTINE HEALING, UNSPECIFIED FRACTURE MORPHOLOGY, SUBSEQUENT ENCOUNTER: Primary | ICD-10-CM

## 2018-07-16 PROCEDURE — G8979 MOBILITY GOAL STATUS: HCPCS | Performed by: PHYSICAL THERAPIST

## 2018-07-16 PROCEDURE — G8980 MOBILITY D/C STATUS: HCPCS | Performed by: PHYSICAL THERAPIST

## 2018-07-16 PROCEDURE — 97110 THERAPEUTIC EXERCISES: CPT | Performed by: PHYSICAL THERAPIST

## 2018-07-16 NOTE — LETTER
2018    Aarti Kapoor   [address]  [address] [facility] [address]    Patient: Merlin Mealing   YOB: 1980   Date of Visit: 2018     Encounter Diagnosis     ICD-10-CM    1  Closed fracture of proximal end of right tibia with routine healing, unspecified fracture morphology, subsequent encounter S82 101D        Dear Dr Libby Osorio Recipients:    Please review the attached Plan of Care from [address] [place] [address] recent visit  Please verify that you agree therapy should continue by signing the attached document and sending it back to our office  If you have any questions or concerns, please don't hesitate to call  Sincerely,    Mireille Sanon, PT      Referring Provider:      I certify that I have read the below Plan of Care and certify the need for these services furnished under this plan of treatment while under my care  Aarti Kapoor   [address]  [address]  [facility] [address]  VIA Facsimile: [phone]          PT Re-Evaluation  and PT Discharge    Today's date: 2018  Patient name: Merlin Mealing  : 1980  MRN: [id number]  Referring provider: Dr Aarti Kapoor  Dx:   Encounter Diagnosis     ICD-10-CM    1  Closed fracture of proximal end of right tibia with routine healing, unspecified fracture morphology, subsequent encounter S82 101D                   Assessment  Impairments: abnormal gait, abnormal or restricted ROM, activity intolerance, impaired balance, impaired physical strength, lacks appropriate home exercise program, pain with function and weight-bearing intolerance    Assessment details: Merlin Mealing is a 44 y o  female that presents with signs/symptoms consistent with R lateral tibial plateau fracture  The patient has made functional gains since starting therapy  The patient is now able to walk without assistive device without deviation    The patient is back to driving a vehicle and able to walk up/down the steps with step over step techniques  The patient's only impairment at this time is some R quadriceps weakness  This impairment was addressed during today's session with a new home exercise propgram   The patient will be discharged from PT to independent home exercise plan  Understanding of Dx/Px/POC: good   Prognosis: good    Goals  STG: Achieve in 4 weeks  1   R knee/thigh pain @ worst 1-2/10 to improve gait  ACHIEVED  2   R knee flexion > 105 degrees AROM to improve up/down steps  3   R iliopsoas, Quads, Hip abduct, Gastroc > 4+/5 to improve walking tolerance  LTG:  Achieve in 8 weeks  1  Knee FOTO score > 80% function  PARTIALLY ACHIEVED(78%)  2  Patient ambulate up/down the steps without difficulty to sleep upstairs  ACHIEVED  3  Patient return to driving without difficulty  ACHIEVED  4  Independent with home exercises  Plan  Plan details: Discharge outpatient PT at this time to independent home exercise plan  Subjective Evaluation    History of Present Illness  Date of onset: 2018  Mechanism of injury: Please refer to paper chart for mechanism of injury  Patient reports "twisting" her R knee while walking down a gravel walkway, a person ran out in front of the patient causing her to skid/twist knee  She denies any pain presently - just "feels stiff "  Quality of life: excellent    Pain  Current pain ratin  At best pain ratin  At worst pain ratin  Location: R anterior lateral knee joitn  Quality: sharp  Relieving factors: rest and relaxation  Progression: improved    Treatments  Previous treatment: physical therapy  Patient Goals  Patient goals for therapy: increased strength, decreased pain, increased motion, independence with ADLs/IADLs, return to sport/leisure activities and improved balance          Objective     Palpation     Additional Palpation Details  Patient has no areas of tenderness at her R LE      Active Range of Motion     Right Knee   Flexion: 116 degrees Strength/Myotome Testing     Left Hip     Isolated Muscles   Iliopsoas: 5    Right Hip   Planes of Motion   Abduction: 5    Isolated Muscles   Iliopsoas: 5    Right Knee   Flexion: 4+  Extension: 4  Quadriceps contraction: fair    Additional Strength Details  The patient was given new quadriceps strengthening exercises to address this impairment  Patient admits to not being compliant with home quadriceps strengthening  General Comments     Knee Comments  The patient's gait is now WNL without deviation        Flowsheet Rows      Most Recent Value   PT/OT G-Codes   Current Score  78   FOTO information reviewed  Yes   Assessment Type  Discharge   G code set  Mobility: Walking & Moving Around   Mobility: Walking and Moving Around Goal Status ()  CI   Mobility: Walking and Moving Around Discharge Status ()  CI          Precautions: none    RE EVAL:7/17/2018  Specialty Daily Treatment Diary      Exercise Diary  7/12/18 7/16/18      nustep s=9 L3 x10 mins L3 x 10mins       Sit hs stretch 3x :30 3x:30       Mirror amb  25 ft x 4        Up/down stairs  X 4 sos  x 2 sos       wedge calf stretch  :30 x4 :30 x 4       LAQ slow eccentric  #2 x 15       Wall squats X 20 2x10       Hurdles 25 ft x 2 25ft x 2       Heel-toe amb 25 ft x 2 25ft x 2       clam shells R Green T-band x 20       SLR flex 1# x30        SL stand :15x 2 :20 x 2       Ball squeezes x30       Hip sweeps 1# x30        Heelslide strap stretch 3x:30         BL foam EO/EC :30x2        standing hip abd 1# x 20 Quad Sets x15 ea        step ups 4" X 15 ea x25 ea        sidestepping 25 ft x 4 25ft x 2       fitter x15 each

## 2018-07-16 NOTE — PROGRESS NOTES
PT Re-Evaluation  and PT Discharge    Today's date: 2018  Patient name: Merlin Mealing  : 1980  MRN: [id number]  Referring provider: Dr Aarti Kapoor  Dx:   Encounter Diagnosis     ICD-10-CM    1  Closed fracture of proximal end of right tibia with routine healing, unspecified fracture morphology, subsequent encounter S82 101D                   Assessment  Impairments: abnormal gait, abnormal or restricted ROM, activity intolerance, impaired balance, impaired physical strength, lacks appropriate home exercise program, pain with function and weight-bearing intolerance    Assessment details: Merlin Mealing is a 44 y o  female that presents with signs/symptoms consistent with R lateral tibial plateau fracture  The patient has made functional gains since starting therapy  The patient is now able to walk without assistive device without deviation  The patient is back to driving a vehicle and able to walk up/down the steps with step over step techniques  The patient's only impairment at this time is some R quadriceps weakness  This impairment was addressed during today's session with a new home exercise propgram   The patient will be discharged from PT to independent home exercise plan  Understanding of Dx/Px/POC: good   Prognosis: good    Goals  STG: Achieve in 4 weeks  1   R knee/thigh pain @ worst 1-2/10 to improve gait  ACHIEVED  2   R knee flexion > 105 degrees AROM to improve up/down steps  3   R iliopsoas, Quads, Hip abduct, Gastroc > 4+/5 to improve walking tolerance  LTG:  Achieve in 8 weeks  1  Knee FOTO score > 80% function  PARTIALLY ACHIEVED(78%)  2  Patient ambulate up/down the steps without difficulty to sleep upstairs  ACHIEVED  3  Patient return to driving without difficulty  ACHIEVED  4  Independent with home exercises  Plan  Plan details: Discharge outpatient PT at this time to independent home exercise plan          Subjective Evaluation    History of Present Illness  Date of onset: 2018  Mechanism of injury: Please refer to paper chart for mechanism of injury  Patient reports "twisting" her R knee while walking down a gravel walkway, a person ran out in front of the patient causing her to skid/twist knee  She denies any pain presently - just "feels stiff "  Quality of life: excellent    Pain  Current pain ratin  At best pain ratin  At worst pain ratin  Location: R anterior lateral knee joitn  Quality: sharp  Relieving factors: rest and relaxation  Progression: improved    Treatments  Previous treatment: physical therapy  Patient Goals  Patient goals for therapy: increased strength, decreased pain, increased motion, independence with ADLs/IADLs, return to sport/leisure activities and improved balance          Objective     Palpation     Additional Palpation Details  Patient has no areas of tenderness at her R LE  Active Range of Motion     Right Knee   Flexion: 116 degrees     Strength/Myotome Testing     Left Hip     Isolated Muscles   Iliopsoas: 5    Right Hip   Planes of Motion   Abduction: 5    Isolated Muscles   Iliopsoas: 5    Right Knee   Flexion: 4+  Extension: 4  Quadriceps contraction: fair    Additional Strength Details  The patient was given new quadriceps strengthening exercises to address this impairment  Patient admits to not being compliant with home quadriceps strengthening  General Comments     Knee Comments  The patient's gait is now WNL without deviation        Flowsheet Rows      Most Recent Value   PT/OT G-Codes   Current Score  78   FOTO information reviewed  Yes   Assessment Type  Discharge   G code set  Mobility: Walking & Moving Around   Mobility: Walking and Moving Around Goal Status ()  CI   Mobility: Walking and Moving Around Discharge Status ()  CI          Precautions: none    RE EVAL:2018  Specialty Daily Treatment Diary      Exercise Diary  18      nustep s=9 L3 x10 mins L3 x 10mins     Sit hs stretch 3x :30 3x:30       Mirror amb  25 ft x 4        Up/down stairs  X 4 sos  x 2 sos       wedge calf stretch  :30 x4 :30 x 4       LAQ slow eccentric  #2 x 15       Wall squats X 20 2x10       Hurdles 25 ft x 2 25ft x 2       Heel-toe amb 25 ft x 2 25ft x 2       clam shells R Green T-band x 20       SLR flex 1# x30        SL stand :15x 2 :20 x 2       Ball squeezes x30       Hip sweeps 1# x30        Heelslide strap stretch 3x:30         BL foam EO/EC :30x2        standing hip abd 1# x 20 Quad Sets x15 ea        step ups 4" X 15 ea x25 ea        sidestepping 25 ft x 4 25ft x 2       fitter x15 each

## 2018-07-30 ENCOUNTER — TRANSCRIBE ORDERS (OUTPATIENT)
Dept: PHYSICAL THERAPY | Facility: CLINIC | Age: 38
End: 2018-07-30

## 2018-08-20 ENCOUNTER — OFFICE VISIT (OUTPATIENT)
Dept: INTERNAL MEDICINE CLINIC | Facility: CLINIC | Age: 38
End: 2018-08-20
Payer: COMMERCIAL

## 2018-08-20 VITALS
HEIGHT: 63 IN | DIASTOLIC BLOOD PRESSURE: 72 MMHG | WEIGHT: 293 LBS | SYSTOLIC BLOOD PRESSURE: 126 MMHG | HEART RATE: 107 BPM | BODY MASS INDEX: 51.91 KG/M2 | OXYGEN SATURATION: 93 % | RESPIRATION RATE: 20 BRPM | TEMPERATURE: 98.4 F

## 2018-08-20 DIAGNOSIS — Z02.89 ENCOUNTER FOR PHYSICAL EXAMINATION RELATED TO EMPLOYMENT: Primary | ICD-10-CM

## 2018-08-20 DIAGNOSIS — Z87.891 FORMER SMOKER: ICD-10-CM

## 2018-08-20 DIAGNOSIS — Z00.00 WELL ADULT EXAM: ICD-10-CM

## 2018-08-20 DIAGNOSIS — E66.01 CLASS 3 SEVERE OBESITY DUE TO EXCESS CALORIES WITHOUT SERIOUS COMORBIDITY WITH BODY MASS INDEX (BMI) OF 60.0 TO 69.9 IN ADULT (HCC): ICD-10-CM

## 2018-08-20 DIAGNOSIS — Z23 NEED FOR VACCINATION: ICD-10-CM

## 2018-08-20 PROCEDURE — 86580 TB INTRADERMAL TEST: CPT

## 2018-08-20 PROCEDURE — 99395 PREV VISIT EST AGE 18-39: CPT | Performed by: INTERNAL MEDICINE

## 2018-08-20 NOTE — PATIENT INSTRUCTIONS

## 2018-08-20 NOTE — PROGRESS NOTES
Assessment/Plan:    No problem-specific Assessment & Plan notes found for this encounter  Diagnoses and all orders for this visit:    Encounter for physical examination related to employment  -     TB Skin Test    Well adult exam  -     CBC and differential; Future  -     Comprehensive metabolic panel; Future  -     Lipid Panel with Direct LDL reflex; Future  -     TSH, 3rd generation with Free T4 reflex; Future    Need for vaccination    Class 3 severe obesity due to excess calories without serious comorbidity with body mass index (BMI) of 60 0 to 69 9 in adult [facility])    Former smoker      A/P: Pt and co-morbidities are stable  PPD applied and pending  No mental or physical restrictions, but recommend wt loss  Will check her routine labs  No evidence of communicable diseases with PPD pending  Papers filed  RTC six months for routine  Subjective:      Patient ID: Matt Bedolla is a 43 y o  female  WF presents for well exam/work PE  Pt doing well and no c/o's  No recent illnesses  No fever, chills, or sweats  No unexplained wt change  No CP, SOB, or palpitations  No sz or syncope  No changes in bowel or bladder habits  No recent travel  Planning on working as a   PMH unchanged except for a right fx tibia due to trauma that is now healed  No change in PSH, All, Meds, or FHX  SH unchanged except that the pt is off tobacco for two years  Overdue for labs  Denies depression and no suicidal or violent ideations  The following portions of the patient's history were reviewed and updated as appropriate:   She  has a past medical history of Obesity (4/15/2013)  She   Patient Active Problem List    Diagnosis Date Noted    Former smoker 2015    Obesity 04/15/2013     She  has a past surgical history that includes Abdominal surgery;  section; Mouth surgery; and Tubal ligation    Her family history includes Depression in her mother; Diverticulitis in her mother; Hypertension in her father and mother; Irritable bowel syndrome in her mother; Lung disease in her brother; Multiple sclerosis in her brother  She  reports that she has quit smoking  Her smoking use included Cigarettes  She has never used smokeless tobacco  She reports that she does not drink alcohol or use drugs  No current outpatient prescriptions on file  No current facility-administered medications for this visit  Current Outpatient Prescriptions on File Prior to Visit   Medication Sig    [DISCONTINUED] cyclobenzaprine (FLEXERIL) 10 mg tablet Take 10 mg by mouth 3 (three) times a day as needed for muscle spasms    [DISCONTINUED] naproxen (NAPROSYN) 250 mg tablet Take 250 mg by mouth 2 (two) times a day with meals     No current facility-administered medications on file prior to visit  She is allergic to penicillins; clindamycin/lincomycin; and levaquin [levofloxacin]       Review of Systems   Constitutional: Negative for activity change, chills, diaphoresis, fatigue and fever  HENT: Negative  Eyes: Negative for visual disturbance  Respiratory: Negative for cough, chest tightness, shortness of breath and wheezing  Cardiovascular: Negative for chest pain, palpitations and leg swelling  Gastrointestinal: Negative for abdominal pain, constipation, diarrhea, nausea and vomiting  Endocrine: Negative for cold intolerance and heat intolerance  Genitourinary: Negative for difficulty urinating, dysuria and frequency  Musculoskeletal: Negative for arthralgias, gait problem and myalgias  Neurological: Negative for dizziness, seizures, syncope, weakness, light-headedness and headaches  Psychiatric/Behavioral: Negative for confusion, dysphoric mood, sleep disturbance and suicidal ideas  The patient is not nervous/anxious            Objective:      /72 (BP Location: Left arm, Patient Position: Sitting, Cuff Size: Large)   Pulse (!) 107   Temp 98 4 °F (36 9 °C) (Tympanic)   Resp 20   Ht 5' 3" (1 6 m)   Wt (!) 168 kg (370 lb)   SpO2 93%   BMI 65 54 kg/m²          Physical Exam   Constitutional: She is oriented to person, place, and time  She appears well-developed and well-nourished  No distress  HENT:   Head: Normocephalic and atraumatic  Right Ear: External ear normal    Left Ear: External ear normal    Mouth/Throat: Oropharynx is clear and moist    Hearing ok with 15 foot whisper test     Eyes: Conjunctivae and EOM are normal  Pupils are equal, round, and reactive to light  Neck: Normal range of motion  Neck supple  No JVD present  No tracheal deviation present  No thyromegaly present  Cardiovascular: Normal rate and regular rhythm  No murmur heard  Pulmonary/Chest: Effort normal and breath sounds normal  No respiratory distress  She has no wheezes  Abdominal: Soft  Bowel sounds are normal  She exhibits no distension  There is no tenderness  Musculoskeletal: She exhibits edema (Trace bilat leg edema 0-1/4)  Lymphadenopathy:     She has no cervical adenopathy  Neurological: She is alert and oriented to person, place, and time  She has normal reflexes  No cranial nerve deficit  Coordination normal    Skin:   Multiple tats  Psychiatric: She has a normal mood and affect  Her behavior is normal  Judgment and thought content normal    Nursing note and vitals reviewed

## 2018-08-22 ENCOUNTER — APPOINTMENT (OUTPATIENT)
Dept: LAB | Facility: CLINIC | Age: 38
End: 2018-08-22
Payer: COMMERCIAL

## 2018-08-22 ENCOUNTER — CLINICAL SUPPORT (OUTPATIENT)
Dept: INTERNAL MEDICINE CLINIC | Facility: CLINIC | Age: 38
End: 2018-08-22

## 2018-08-22 DIAGNOSIS — Z11.1 ENCOUNTER FOR PPD SKIN TEST READING: Primary | ICD-10-CM

## 2018-08-22 DIAGNOSIS — Z00.00 WELL ADULT EXAM: ICD-10-CM

## 2018-08-22 LAB
ALBUMIN SERPL BCP-MCNC: 3.3 G/DL (ref 3.5–5)
ALP SERPL-CCNC: 85 U/L (ref 46–116)
ALT SERPL W P-5'-P-CCNC: 22 U/L (ref 12–78)
ANION GAP SERPL CALCULATED.3IONS-SCNC: 7 MMOL/L (ref 4–13)
AST SERPL W P-5'-P-CCNC: 13 U/L (ref 5–45)
BASOPHILS # BLD AUTO: 0.04 THOUSANDS/ΜL (ref 0–0.1)
BASOPHILS NFR BLD AUTO: 1 % (ref 0–1)
BILIRUB SERPL-MCNC: 0.39 MG/DL (ref 0.2–1)
BUN SERPL-MCNC: 12 MG/DL (ref 5–25)
CALCIUM SERPL-MCNC: 8.8 MG/DL (ref 8.3–10.1)
CHLORIDE SERPL-SCNC: 104 MMOL/L (ref 100–108)
CHOLEST SERPL-MCNC: 135 MG/DL (ref 50–200)
CO2 SERPL-SCNC: 27 MMOL/L (ref 21–32)
CREAT SERPL-MCNC: 0.71 MG/DL (ref 0.6–1.3)
EOSINOPHIL # BLD AUTO: 0.2 THOUSAND/ΜL (ref 0–0.61)
EOSINOPHIL NFR BLD AUTO: 3 % (ref 0–6)
ERYTHROCYTE [DISTWIDTH] IN BLOOD BY AUTOMATED COUNT: 15 % (ref 11.6–15.1)
GFR SERPL CREATININE-BSD FRML MDRD: 108 ML/MIN/1.73SQ M
GLUCOSE P FAST SERPL-MCNC: 100 MG/DL (ref 65–99)
HCT VFR BLD AUTO: 40.3 % (ref 34.8–46.1)
HDLC SERPL-MCNC: 21 MG/DL (ref 40–60)
HGB BLD-MCNC: 12.3 G/DL (ref 11.5–15.4)
IMM GRANULOCYTES # BLD AUTO: 0.03 THOUSAND/UL (ref 0–0.2)
IMM GRANULOCYTES NFR BLD AUTO: 0 % (ref 0–2)
INDURATION: NORMAL MM
LDLC SERPL CALC-MCNC: 75 MG/DL (ref 0–100)
LYMPHOCYTES # BLD AUTO: 2 THOUSANDS/ΜL (ref 0.6–4.47)
LYMPHOCYTES NFR BLD AUTO: 25 % (ref 14–44)
MCH RBC QN AUTO: 28.9 PG (ref 26.8–34.3)
MCHC RBC AUTO-ENTMCNC: 30.5 G/DL (ref 31.4–37.4)
MCV RBC AUTO: 95 FL (ref 82–98)
MONOCYTES # BLD AUTO: 0.51 THOUSAND/ΜL (ref 0.17–1.22)
MONOCYTES NFR BLD AUTO: 6 % (ref 4–12)
NEUTROPHILS # BLD AUTO: 5.15 THOUSANDS/ΜL (ref 1.85–7.62)
NEUTS SEG NFR BLD AUTO: 65 % (ref 43–75)
NRBC BLD AUTO-RTO: 0 /100 WBCS
PLATELET # BLD AUTO: 289 THOUSANDS/UL (ref 149–390)
PMV BLD AUTO: 12 FL (ref 8.9–12.7)
POTASSIUM SERPL-SCNC: 4.5 MMOL/L (ref 3.5–5.3)
PROT SERPL-MCNC: 7 G/DL (ref 6.4–8.2)
RBC # BLD AUTO: 4.25 MILLION/UL (ref 3.81–5.12)
SODIUM SERPL-SCNC: 138 MMOL/L (ref 136–145)
T4 FREE SERPL-MCNC: 0.88 NG/DL (ref 0.76–1.46)
TB SKIN TEST: NEGATIVE
TRIGL SERPL-MCNC: 197 MG/DL
TSH SERPL DL<=0.05 MIU/L-ACNC: 4.97 UIU/ML (ref 0.36–3.74)
WBC # BLD AUTO: 7.93 THOUSAND/UL (ref 4.31–10.16)

## 2018-08-22 PROCEDURE — 36415 COLL VENOUS BLD VENIPUNCTURE: CPT

## 2018-08-22 PROCEDURE — 80053 COMPREHEN METABOLIC PANEL: CPT

## 2018-08-22 PROCEDURE — 84443 ASSAY THYROID STIM HORMONE: CPT

## 2018-08-22 PROCEDURE — 85025 COMPLETE CBC W/AUTO DIFF WBC: CPT

## 2018-08-22 PROCEDURE — 84439 ASSAY OF FREE THYROXINE: CPT

## 2018-08-22 PROCEDURE — 80061 LIPID PANEL: CPT

## 2018-08-31 ENCOUNTER — OFFICE VISIT (OUTPATIENT)
Dept: INTERNAL MEDICINE CLINIC | Facility: CLINIC | Age: 38
End: 2018-08-31
Payer: COMMERCIAL

## 2018-08-31 VITALS
HEART RATE: 94 BPM | HEIGHT: 63 IN | WEIGHT: 293 LBS | SYSTOLIC BLOOD PRESSURE: 138 MMHG | RESPIRATION RATE: 20 BRPM | DIASTOLIC BLOOD PRESSURE: 82 MMHG | OXYGEN SATURATION: 98 % | TEMPERATURE: 97.9 F | BODY MASS INDEX: 51.91 KG/M2

## 2018-08-31 DIAGNOSIS — M54.50 ACUTE BILATERAL LOW BACK PAIN WITHOUT SCIATICA: ICD-10-CM

## 2018-08-31 DIAGNOSIS — E88.09 SERUM ALBUMIN DECREASED: ICD-10-CM

## 2018-08-31 DIAGNOSIS — E66.01 CLASS 3 SEVERE OBESITY DUE TO EXCESS CALORIES WITHOUT SERIOUS COMORBIDITY WITH BODY MASS INDEX (BMI) OF 60.0 TO 69.9 IN ADULT (HCC): ICD-10-CM

## 2018-08-31 DIAGNOSIS — E78.1 HYPERTRIGLYCERIDEMIA: ICD-10-CM

## 2018-08-31 DIAGNOSIS — R79.89 ABNORMAL THYROID BLOOD TEST: Primary | ICD-10-CM

## 2018-08-31 PROCEDURE — 99214 OFFICE O/P EST MOD 30 MIN: CPT | Performed by: INTERNAL MEDICINE

## 2018-08-31 PROCEDURE — 96372 THER/PROPH/DIAG INJ SC/IM: CPT | Performed by: INTERNAL MEDICINE

## 2018-08-31 RX ORDER — KETOROLAC TROMETHAMINE 30 MG/ML
60 INJECTION, SOLUTION INTRAMUSCULAR; INTRAVENOUS ONCE
Status: COMPLETED | OUTPATIENT
Start: 2018-08-31 | End: 2018-08-31

## 2018-08-31 RX ORDER — CYCLOBENZAPRINE HCL 10 MG
10 TABLET ORAL 3 TIMES DAILY PRN
Qty: 30 TABLET | Refills: 0 | Status: SHIPPED | OUTPATIENT
Start: 2018-08-31 | End: 2018-10-10 | Stop reason: SDUPTHER

## 2018-08-31 RX ORDER — MELOXICAM 15 MG/1
15 TABLET ORAL DAILY
Qty: 30 TABLET | Refills: 0 | Status: SHIPPED | OUTPATIENT
Start: 2018-08-31 | End: 2018-10-10 | Stop reason: SDUPTHER

## 2018-08-31 RX ADMIN — KETOROLAC TROMETHAMINE 60 MG: 30 INJECTION, SOLUTION INTRAMUSCULAR; INTRAVENOUS at 13:32

## 2018-08-31 NOTE — PROGRESS NOTES
Assessment/Plan:    No problem-specific Assessment & Plan notes found for this encounter  Diagnoses and all orders for this visit:    Abnormal thyroid blood test  -     TSH, 3rd generation; Future  -     T4, free; Future  -     T3; Future  -     Thyroid Antibodies Panel; Future    Serum albumin decreased    Hypertriglyceridemia    Class 3 severe obesity due to excess calories without serious comorbidity with body mass index (BMI) of 60 0 to 69 9 in adult [facility])  -     Ambulatory referral to Bariatric Surgery; Future    Acute bilateral low back pain without sciatica  -     Ambulatory referral to Physical Therapy; Future  -     ketorolac (TORADOL) 60 mg/2 mL IM injection 60 mg; Inject 2 mL (60 mg total) into a muscle once   -     cyclobenzaprine (FLEXERIL) 10 mg tablet; Take 1 tablet (10 mg total) by mouth 3 (three) times a day as needed for muscle spasms  -     meloxicam (MOBIC) 15 mg tablet; Take 1 tablet (15 mg total) by mouth daily      A/P: Rest and warm compresses  Will start NSAID's and muscle relaxants  Refer to PT  Discussed labs and will watch her diet for the TG and albumin  Will recheck thyroid labs in six weeks  Discussed at length the requirements for bariatric sx and will refer  RTC two weeks for f/u  Subjective:      Patient ID: Lauretha Najjar is a 43 y o  female  WF presents with a several month h/o progressive LBP  No radiation  No prior problems  Pain worsen since starting to work as a  and standing a lot  No bowel or bladder issues  Pain is a constant 7/10  Recent labs with decrease albumin, elevated TG, and borderline TSH  No h/o thyroid issues  Also, wants to see about bariatric sx  The following portions of the patient's history were reviewed and updated as appropriate:   She  has a past medical history of Obesity (4/15/2013)    She   Patient Active Problem List    Diagnosis Date Noted    Former smoker 06/05/2015    Obesity 04/15/2013     She  has a past surgical history that includes Abdominal surgery;  section; Mouth surgery; Tubal ligation; and Myringotomy  Her family history includes COPD in her family; Depression in her mother; Diverticulitis in her mother; Emphysema in her family; Heart murmur in her mother; Hypertension in her father and mother; Irritable bowel syndrome in her mother; Lung disease in her brother; Multiple sclerosis in her brother; Spina bifida in her father  She  reports that she has quit smoking  Her smoking use included Cigarettes  She has never used smokeless tobacco  She reports that she does not drink alcohol or use drugs  Current Outpatient Prescriptions   Medication Sig Dispense Refill    cyclobenzaprine (FLEXERIL) 10 mg tablet Take 1 tablet (10 mg total) by mouth 3 (three) times a day as needed for muscle spasms 30 tablet 0    meloxicam (MOBIC) 15 mg tablet Take 1 tablet (15 mg total) by mouth daily 30 tablet 0     No current facility-administered medications for this visit  No current outpatient prescriptions on file prior to visit  No current facility-administered medications on file prior to visit  She is allergic to penicillins; clindamycin/lincomycin; and levaquin [levofloxacin]       Review of Systems   Constitutional: Negative for activity change, chills, diaphoresis, fatigue and fever  Respiratory: Negative for cough, chest tightness, shortness of breath and wheezing  Cardiovascular: Negative for chest pain, palpitations and leg swelling  Gastrointestinal: Negative for abdominal pain, constipation, diarrhea, nausea and vomiting  Genitourinary: Negative for difficulty urinating, dysuria and frequency  Musculoskeletal: Positive for back pain and gait problem  Negative for arthralgias and myalgias  Neurological: Negative for light-headedness and headaches  Psychiatric/Behavioral: Negative for confusion  The patient is not nervous/anxious            Objective:      /82 (BP Location: Left arm, Patient Position: Sitting, Cuff Size: Large)   Pulse 94   Temp 97 9 °F (36 6 °C) (Tympanic)   Resp 20   Ht 5' 3" (1 6 m)   Wt (!) 166 kg (366 lb)   SpO2 98%   BMI 64 83 kg/m²          Physical Exam   Constitutional: She is oriented to person, place, and time  She appears well-developed and well-nourished  No distress  HENT:   Head: Normocephalic and atraumatic  Mouth/Throat: Oropharynx is clear and moist    Eyes: Conjunctivae and EOM are normal  Pupils are equal, round, and reactive to light  Neck: Neck supple  No thyromegaly present  Cardiovascular: Normal rate, regular rhythm and normal heart sounds  No murmur heard  Pulmonary/Chest: Effort normal and breath sounds normal  No respiratory distress  She has no wheezes  Musculoskeletal: She exhibits tenderness  She exhibits no deformity  LS spine w/o gross deformity, increase temp, erythema, or swelling  Tenderness with spasms midline L3-S1  ROM decreased mainly in flexion by 50%  LE strength 5/5 with tone/ROM wnl  DTR 2/4  Unable to walk on her toes  Neurological: She is alert and oriented to person, place, and time  Psychiatric: She has a normal mood and affect  Her behavior is normal  Judgment and thought content normal    Nursing note and vitals reviewed

## 2018-09-13 ENCOUNTER — EVALUATION (OUTPATIENT)
Dept: PHYSICAL THERAPY | Facility: CLINIC | Age: 38
End: 2018-09-13
Payer: COMMERCIAL

## 2018-09-13 DIAGNOSIS — M54.50 ACUTE BILATERAL LOW BACK PAIN WITHOUT SCIATICA: Primary | ICD-10-CM

## 2018-09-13 PROCEDURE — G8982 BODY POS GOAL STATUS: HCPCS

## 2018-09-13 PROCEDURE — 97162 PT EVAL MOD COMPLEX 30 MIN: CPT

## 2018-09-13 PROCEDURE — 97110 THERAPEUTIC EXERCISES: CPT

## 2018-09-13 PROCEDURE — G8981 BODY POS CURRENT STATUS: HCPCS

## 2018-09-13 NOTE — PROGRESS NOTES
PT Evaluation     Today's date: 2018  Patient name: Colt Gerard  : 1980  MRN: [id number]  Referring provider: Sanchez De Souza DO  Dx:   Encounter Diagnosis     ICD-10-CM    1  Acute bilateral low back pain without sciatica M54 5                   Assessment  Impairments: abnormal gait, abnormal or restricted ROM, activity intolerance, impaired physical strength, lacks appropriate home exercise program and pain with function  Functional limitations: cannot vacuum sta,nding for dishes,making bed, diff getting OOB, laundry,  Assessment details: Colt Gerard is a 43 y o  female presenting to outpatient physical therapy with diagnosis of Acute bilateral low back pain without sciatica    Patient presents with B low back  Pain which extends into B buttocks, reduced trunk  range of motion, reduced strength of trunk and LEs, reduced postural awareness, and reduced functional activity tolerance  Patient to benefit from skilled outpatient physical therapy to reduce pain, maximize pain free range of motion of trunk, increase strength and stability, and improve functional mobility/functional activity in order to maximize return to prior level of function with reduced limitations  Pt will benefit from weight reduction interventions in order to decrease stress of weight on back and LEs  Thank you for your referral     Barriers to therapy: Obesity, poor activity ara  Understanding of Dx/Px/POC: good   Prognosis: fair    Goals  STGs to be achieved in 4 weeks:  1  Pt to demonstrate reduced subjective pain rating "at worst" by at least 2-3 points from Initial Eval in order to allow for reduced pain with ADLs and improved functional activity tolerance  2  Pt to demonstrate increased AROM of trunk flex  And ext by at least 5-10 degrees in order to allow for greater ease and independence with ADLs and functional mobility     3  Pt to demonstrate increased MMT of R knee ext and B hip flex and abd  by at least 1/2-1 grade in order to improve safety and stability with ADLs and functional mobility  LTGs to be achieved in 6-8 weeks:  1  Pt will be I with HEP in order to continue to improve quality of life and independence and reduce risk for re-injury  2  Pt to demonstrate return to painfree standing and amb without limitations or restrictions  3  Pt to demonstrate improved function as noted by achieving or exceeding predicted score on FOTO outcomes assessment tool  Plan  Patient would benefit from: skilled physical therapy  Planned therapy interventions: therapeutic exercise, strengthening, stretching, home exercise program and self care  Frequency: 2x week  Duration in visits: 16  Duration in weeks: 8  Plan of Care beginning date: 2018  Plan of Care expiration date: 10/25/2018  Treatment plan discussed with: patient        Re-eval Date:10/25/18  Date 18       Visit Count 1       FOTO yes       Pain In 8       Pain Out 8             Subjective Evaluation    History of Present Illness  Date of onset: 2018  Mechanism of injury: Pt with several mo hx of LBP which has been progressively worsening since starting work as a   Constant pain,no rad of sx  Pain is in B buttocks  Pt is unable to play with her 6 children or walk any distances  Diff getting OOB in AM Pt is sched for bariatric surg consult 10/23/18  Not a recurrent problem   Quality of life: poor    Pain  Current pain ratin  At best pain ratin  At worst pain rating: 10  Location: B low back  Quality: sharp and tight (stabbing)  Relieving factors: relaxation, rest and medications  Aggravating factors: standing, walking, sitting and stair climbing  Progression: worsening    Social Support  Steps to enter house: yes (7)  Stairs in house: yes (18)   Lives in: multiple-level home  Lives with: spouse and young children    Employment status: working (, 2 hrs/day)  Hand dominance: left  Exercise history: none  Life stress: 6 children    Treatments  Current treatment: medication and physical therapy  Current treatment comments: ms relaxants, anti inflam      Patient Goals  Patient goals for therapy: decreased pain, increased strength and increased motion          Objective     Postural Observations  Seated posture: poor  Standing posture: fair    Additional Postural Observation Details  Morbidly obese female leans forward , backwd and to the side to relieve pressure on back  Sits with LEs abducted to accomodate large abdomen  Pelvis tiltedd ant in standing  hoolowing of lower thoracic spine  AROM of B hip flex limited by tissue approximation in sitting  Palpation   Left   Tenderness of the lumbar paraspinals  Right Tenderness of the lumbar paraspinals       Neurological Testing     Sensation     Lumbar   Left   Intact: light touch, hot/cold discrimination and proprioception    Right   Intact: hot/cold discrimination and proprioception  Diminished: light touch    Reflexes   Left   Patellar (L4): normal (2+)    Right   Patellar (L4): normal (2+)    Active Range of Motion     Lumbar   Flexion: 30 degrees with pain  Extension: 20 degrees with pain  Left lateral flexion: 17 degrees with pain  Right lateral flexion: 20 degrees with pain  Left rotation: 30 degrees   Right rotation: 30 degrees     Strength/Myotome Testing     Left Hip   Planes of Motion   Flexion: 4-  Extension: 4-  Abduction: 4-  Adduction: 4    Right Hip   Planes of Motion   Flexion: 4-  Extension: 4-  Abduction: 4-  Adduction: 4    Left Knee   Flexion: 4+  Extension: 5    Right Knee   Flexion: 4+  Extension: 4-    Left Ankle/Foot   Dorsiflexion: 5  Plantar flexion: 5    Right Ankle/Foot   Dorsiflexion: 5  Plantar flexion: 5      Flowsheet Rows      Most Recent Value   PT/OT G-Codes   Current Score  40   Projected Score  60   FOTO information reviewed  Yes   Assessment Type  Evaluation   G code set  Changing & Maintaining Body Position   Changing and Maintaining Body Position Current Status ()  CL   Changing and Maintaining Body Position Goal Status ()  CK          Precautions: morbid obesity, holds breath during ex    Daily Treatment Diary     Manual  9/13                                                                                 Exercise Diary  9/13            Standing hip ext 20 B            Standing hip ABD 20 B            Standing trunk ext 10x5 sec            Standing hip flex 20 B            Seated ham stretch B 3 x 20"            Nustep L1 10 min            LTRs             PPT             bridges             SKTC             Standing UBE             Leg press                                                                                                                         Modalities

## 2018-09-18 ENCOUNTER — OFFICE VISIT (OUTPATIENT)
Dept: PHYSICAL THERAPY | Facility: CLINIC | Age: 38
End: 2018-09-18
Payer: COMMERCIAL

## 2018-09-18 DIAGNOSIS — M54.50 ACUTE BILATERAL LOW BACK PAIN WITHOUT SCIATICA: Primary | ICD-10-CM

## 2018-09-18 PROCEDURE — 97110 THERAPEUTIC EXERCISES: CPT

## 2018-09-18 NOTE — PROGRESS NOTES
Daily Note     Today's date: 2018  Patient name: Sandi Saini  : 1980  MRN: [id number]  Referring provider: Jhony Daly DO  Dx:   Encounter Diagnosis     ICD-10-CM    1  Acute bilateral low back pain without sciatica M54 5                 Re-eval Date:10/25/18  Date 18      Visit Count 1 2      FOTO yes       Pain In 8 8      Pain Out 8 8          Subjective: Pt states she has been standing at work for the last 3 hours and pain level is 8/10 due to same  Objective: See treatment diary below      Assessment: Tolerated treatment well but is limited in ex she can perform due to morbid obesity    Patient would benefit from continued PT, appeared fatigued after tx  Plan: Progress treatment as tolerated        Precautions: morbid obesity, holds breath during ex    Daily Treatment Diary     Manual                                                                                   Exercise Diary             Standing hip ext 20 B 30 B           Standing hip ABD 20 B 30 B           Standing trunk ext 10x5 sec 10x5"           Standing hip flex 20 B 30           Seated ham stretch B 3 x 20" 3x20"           Nustep L1 10 min L1 10 min           LTRs  20           PPT  10x5"           bridges  10x3"           SKTC  5x10"           Standing UBE  1 0  5min           Leg press  75#  30                                                                                                                       Modalities

## 2018-09-20 ENCOUNTER — APPOINTMENT (OUTPATIENT)
Dept: LAB | Facility: CLINIC | Age: 38
End: 2018-09-20
Payer: COMMERCIAL

## 2018-09-20 DIAGNOSIS — R79.89 ABNORMAL THYROID BLOOD TEST: ICD-10-CM

## 2018-09-20 LAB
T3 SERPL-MCNC: 1.5 NG/ML (ref 0.6–1.8)
T4 FREE SERPL-MCNC: 0.92 NG/DL (ref 0.76–1.46)
TSH SERPL DL<=0.05 MIU/L-ACNC: 3.62 UIU/ML (ref 0.36–3.74)

## 2018-09-20 PROCEDURE — 86800 THYROGLOBULIN ANTIBODY: CPT

## 2018-09-20 PROCEDURE — 84443 ASSAY THYROID STIM HORMONE: CPT

## 2018-09-20 PROCEDURE — 36415 COLL VENOUS BLD VENIPUNCTURE: CPT

## 2018-09-20 PROCEDURE — 84439 ASSAY OF FREE THYROXINE: CPT

## 2018-09-20 PROCEDURE — 84480 ASSAY TRIIODOTHYRONINE (T3): CPT

## 2018-09-20 PROCEDURE — 86376 MICROSOMAL ANTIBODY EACH: CPT

## 2018-09-21 ENCOUNTER — APPOINTMENT (OUTPATIENT)
Dept: PHYSICAL THERAPY | Facility: CLINIC | Age: 38
End: 2018-09-21
Payer: COMMERCIAL

## 2018-09-21 LAB
THYROGLOB AB SERPL-ACNC: <1 IU/ML (ref 0–0.9)
THYROPEROXIDASE AB SERPL-ACNC: 10 IU/ML (ref 0–34)

## 2018-09-24 ENCOUNTER — OFFICE VISIT (OUTPATIENT)
Dept: URGENT CARE | Facility: CLINIC | Age: 38
End: 2018-09-24
Payer: COMMERCIAL

## 2018-09-24 VITALS
OXYGEN SATURATION: 95 % | BODY MASS INDEX: 51.91 KG/M2 | SYSTOLIC BLOOD PRESSURE: 135 MMHG | TEMPERATURE: 98.1 F | DIASTOLIC BLOOD PRESSURE: 67 MMHG | RESPIRATION RATE: 20 BRPM | WEIGHT: 293 LBS | HEIGHT: 63 IN | HEART RATE: 67 BPM

## 2018-09-24 DIAGNOSIS — J01.01 ACUTE RECURRENT MAXILLARY SINUSITIS: Primary | ICD-10-CM

## 2018-09-24 PROCEDURE — 99283 EMERGENCY DEPT VISIT LOW MDM: CPT | Performed by: NURSE PRACTITIONER

## 2018-09-24 PROCEDURE — G0382 LEV 3 HOSP TYPE B ED VISIT: HCPCS | Performed by: NURSE PRACTITIONER

## 2018-09-24 RX ORDER — AZITHROMYCIN 250 MG/1
TABLET, FILM COATED ORAL
Qty: 6 TABLET | Refills: 0 | Status: SHIPPED | OUTPATIENT
Start: 2018-09-24 | End: 2018-09-29

## 2018-09-24 NOTE — PATIENT INSTRUCTIONS
Discussed viral vs bacterial infection  Zithromax as directed  Continue OTC cough/cold medications as directed  Call or return for problems/concerns

## 2018-09-24 NOTE — PROGRESS NOTES
[facility]        NAME: Al Cevallos is a 43 y o  female  : 1980    MRN: [id number]  DATE: 2018  TIME: 5:34 PM    Assessment and Plan   Acute recurrent maxillary sinusitis [J01 01]  1  Acute recurrent maxillary sinusitis  azithromycin (ZITHROMAX) 250 mg tablet         Patient Instructions     Patient Instructions   Discussed viral vs bacterial infection  Zithromax as directed  Continue OTC cough/cold medications as directed  Call or return for problems/concerns        Follow up with PCP in 3-5 days  Proceed to  ER if symptoms worsen  Chief Complaint     Chief Complaint   Patient presents with    Cold Like Symptoms     cough and nasal congestion x 1week         History of Present Illness       Cough and sinus congestion x 1 week, getting worse  Review of Systems   Review of Systems   Constitutional: Negative for activity change, chills, fatigue and fever  HENT: Positive for congestion, postnasal drip, rhinorrhea, sinus pressure and voice change  Negative for ear pain and sore throat  Eyes: Negative for pain, discharge and redness  Respiratory: Positive for cough  Negative for wheezing  Cardiovascular: Negative for chest pain  Gastrointestinal: Negative for constipation, diarrhea, nausea and vomiting  Musculoskeletal: Negative for myalgias  Skin: Negative for rash  Neurological: Positive for headaches  Negative for dizziness           Current Medications       Current Outpatient Prescriptions:     cyclobenzaprine (FLEXERIL) 10 mg tablet, Take 1 tablet (10 mg total) by mouth 3 (three) times a day as needed for muscle spasms, Disp: 30 tablet, Rfl: 0    meloxicam (MOBIC) 15 mg tablet, Take 1 tablet (15 mg total) by mouth daily, Disp: 30 tablet, Rfl: 0    azithromycin (ZITHROMAX) 250 mg tablet, 2 tablets day 1, then 1 tablet days 2-5, Disp: 6 tablet, Rfl: 0    Current Allergies     Allergies as of 2018 - Reviewed 2018   Allergen Reaction Noted  Penicillins Anaphylaxis 2016    Clindamycin/lincomycin  2016    Levaquin [levofloxacin] Facial Swelling 2018            The following portions of the patient's history were reviewed and updated as appropriate: allergies, current medications, past family history, past medical history, past social history, past surgical history and problem list      Past Medical History:   Diagnosis Date    Obesity 4/15/2013       Past Surgical History:   Procedure Laterality Date    ABDOMINAL SURGERY       SECTION      MOUTH SURGERY      MYRINGOTOMY      TUBAL LIGATION         Family History   Problem Relation Age of Onset    Hypertension Mother     Irritable bowel syndrome Mother     Diverticulitis Mother     Depression Mother     Heart murmur Mother     Hypertension Father     Spina bifida Father     Multiple sclerosis Brother     Lung disease Brother     COPD Family     Emphysema Family          Medications have been verified  Objective   /67   Pulse 67   Temp 98 1 °F (36 7 °C)   Resp 20   Ht 5' 3" (1 6 m)   Wt (!) 161 kg (356 lb)   SpO2 95%   BMI 63 06 kg/m²        Physical Exam     Physical Exam   Constitutional: She is oriented to person, place, and time  She appears well-developed and well-nourished  No distress  HENT:   Head: Normocephalic and atraumatic  Right Ear: Tympanic membrane, external ear and ear canal normal    Left Ear: Tympanic membrane, external ear and ear canal normal    Nose: Rhinorrhea present  No epistaxis  Right sinus exhibits maxillary sinus tenderness  Left sinus exhibits maxillary sinus tenderness  Mouth/Throat: Uvula is midline, oropharynx is clear and moist and mucous membranes are normal    Cardiovascular: Normal rate, regular rhythm and normal heart sounds  Exam reveals no gallop and no friction rub  No murmur heard  Pulmonary/Chest: Effort normal and breath sounds normal  No respiratory distress  She has no wheezes   She has no rales  Abdominal: She exhibits no distension  Musculoskeletal: Normal range of motion  Neurological: She is alert and oriented to person, place, and time  Skin: She is not diaphoretic  Psychiatric: She has a normal mood and affect  Her behavior is normal  Judgment and thought content normal    Nursing note and vitals reviewed

## 2018-09-25 ENCOUNTER — APPOINTMENT (OUTPATIENT)
Dept: PHYSICAL THERAPY | Facility: CLINIC | Age: 38
End: 2018-09-25
Payer: COMMERCIAL

## 2018-09-26 ENCOUNTER — OFFICE VISIT (OUTPATIENT)
Dept: INTERNAL MEDICINE CLINIC | Facility: CLINIC | Age: 38
End: 2018-09-26
Payer: COMMERCIAL

## 2018-09-26 VITALS
WEIGHT: 293 LBS | TEMPERATURE: 97.7 F | BODY MASS INDEX: 51.91 KG/M2 | SYSTOLIC BLOOD PRESSURE: 120 MMHG | HEIGHT: 63 IN | RESPIRATION RATE: 18 BRPM | OXYGEN SATURATION: 93 % | HEART RATE: 114 BPM | DIASTOLIC BLOOD PRESSURE: 82 MMHG

## 2018-09-26 DIAGNOSIS — M54.50 ACUTE BILATERAL LOW BACK PAIN WITHOUT SCIATICA: Primary | ICD-10-CM

## 2018-09-26 DIAGNOSIS — E78.2 MIXED HYPERLIPIDEMIA: ICD-10-CM

## 2018-09-26 DIAGNOSIS — R79.89 ABNORMAL THYROID BLOOD TEST: ICD-10-CM

## 2018-09-26 PROCEDURE — 3008F BODY MASS INDEX DOCD: CPT | Performed by: INTERNAL MEDICINE

## 2018-09-26 PROCEDURE — 96372 THER/PROPH/DIAG INJ SC/IM: CPT | Performed by: INTERNAL MEDICINE

## 2018-09-26 PROCEDURE — 99214 OFFICE O/P EST MOD 30 MIN: CPT | Performed by: INTERNAL MEDICINE

## 2018-09-26 RX ORDER — DEXAMETHASONE SODIUM PHOSPHATE 4 MG/ML
4 INJECTION, SOLUTION INTRA-ARTICULAR; INTRALESIONAL; INTRAMUSCULAR; INTRAVENOUS; SOFT TISSUE ONCE
Status: COMPLETED | OUTPATIENT
Start: 2018-09-26 | End: 2018-09-26

## 2018-09-26 RX ORDER — PREDNISONE 10 MG/1
TABLET ORAL
Qty: 50 TABLET | Refills: 0 | Status: SHIPPED | OUTPATIENT
Start: 2018-09-26 | End: 2018-10-10

## 2018-09-26 RX ADMIN — DEXAMETHASONE SODIUM PHOSPHATE 4 MG: 4 INJECTION, SOLUTION INTRA-ARTICULAR; INTRALESIONAL; INTRAMUSCULAR; INTRAVENOUS; SOFT TISSUE at 16:12

## 2018-09-26 NOTE — PATIENT INSTRUCTIONS
Hyperlipidemia   WHAT YOU NEED TO KNOW:   What is hyperlipidemia? Hyperlipidemia is a high level of lipids (fats) in your blood  These lipids include cholesterol or triglycerides  Lipids are made by your body  They also come from the foods you eat  Your body needs lipids to work properly, but high levels increase your risk for heart disease, heart attack, and stroke  What increases my risk for hyperlipidemia? · Family history of high lipid levels    · Diet high in saturated fats, cholesterol, or calories     · High alcohol intake or smoking    · Lack of regular physical activity    · Medical conditions such as hypothyroidism, obesity, or type 2 diabetes    · Certain medicines, such as blood pressure medicines, hormones, and steroids  How is hyperlipidemia managed and treated? Your healthcare provider may first recommend that you make lifestyle changes to help decrease your lipid levels  You may also need to take medicine to lower your lipid levels  Some of the lifestyle changes you may need to make include the following:  · Maintain a healthy weight  Ask your healthcare provider how much you should weigh  Ask him or her to help you create a weight loss plan if you are overweight  Weight loss can decrease your cholesterol and triglyceride levels  · Exercise as directed  Exercise lowers your cholesterol levels and helps you maintain a healthy weight  Get 40 minutes or more of moderate exercise 3 to 4 days each week  You can split your exercise into four 10-minute workouts instead of 40 minutes at one time  Examples of moderate exercises include walking briskly, swimming, or riding a bike  Work with your healthcare provider to plan the best exercise program for you  · Do not smoke  Nicotine and other chemicals in cigarettes and cigars can increase your risk for a heart attack and stroke  Ask your healthcare provider for information if you currently smoke and need help to quit   E-cigarettes or smokeless tobacco still contain nicotine  Talk to your healthcare provider before you use these products  · Eat heart-healthy foods  Talk to your dietitian about a heart-healthy diet  The following will help you manage hyperlipidemia:     ¨ Decrease the total amount of fat you eat  Choose lean meats, fat-free or 1% fat milk, and low-fat dairy products, such as yogurt and cheese  Limit or do not eat red meat  Red meats are high in fat and cholesterol  ¨ Replace unhealthy fats with healthy fats  Unhealthy fats include saturated fat, trans fat, and cholesterol  Choose soft margarines that are low in saturated fat and have little or no trans fat  Monounsaturated fats are healthy fats  These are found in olive oil, canola oil, avocado, and nuts  Polyunsaturated fats are also healthy  These are found in fish, flaxseed, walnuts, and soybeans  ¨ Eat fruits and vegetables every day  They are low in calories and fat and a good source of essential vitamins  Include dark green, red, and orange vegetables  Examples include spinach, kale, broccoli, and carrots  ¨ Eat foods high in fiber  Choose whole grain, high-fiber foods  Good choices include whole-wheat breads or cereals, beans, peas, fruits, and vegetables  · Ask your healthcare provider if it is safe for you to drink alcohol  Alcohol can increase your cholesterol and triglyceride levels  A drink of alcohol is 12 ounces of beer, 5 ounces of wine, or 1½ ounces of liquor    Call 911 for any of the following:   · You have any of the following signs of a heart attack:      ¨ Squeezing, pressure, or pain in your chest that lasts longer than 5 minutes or returns    ¨ Discomfort or pain in your back, neck, jaw, stomach, or arm     ¨ Trouble breathing    ¨ Nausea or vomiting    ¨ Lightheadedness or a sudden cold sweat, especially with chest pain or trouble breathing    · You have any of the following signs of a stroke:      ¨ Numbness or drooping on one side of your face     ¨ Weakness in an arm or leg    ¨ Confusion or difficulty speaking    ¨ Dizziness, a severe headache, or vision loss  When should I contact my healthcare provider? · You have questions or concerns about your condition or care  CARE AGREEMENT:   You have the right to help plan your care  Learn about your health condition and how it may be treated  Discuss treatment options with your caregivers to decide what care you want to receive  You always have the right to refuse treatment  The above information is an  only  It is not intended as medical advice for individual conditions or treatments  Talk to your doctor, nurse or pharmacist before following any medical regimen to see if it is safe and effective for you  © 2017 [facility] Information is for End User's use only and may not be sold, redistributed or otherwise used for commercial purposes  All illustrations and images included in CareNotes® are the copyrighted property of A [employer]  or [employer] Goodrich

## 2018-09-26 NOTE — PROGRESS NOTES
Assessment/Plan:    No problem-specific Assessment & Plan notes found for this encounter  Diagnoses and all orders for this visit:    Acute bilateral low back pain without sciatica  -     dexamethasone (DECADRON) injection 4 mg; Inject 1 mL (4 mg total) into a muscle once   -     predniSONE 10 mg tablet; 40mg po q d x 5, then 30mg po q d x 5, then 20mg po q d x 5, then 10mg po q d x 5, then d/c  Mixed hyperlipidemia    Abnormal thyroid blood test      A/P: Will try steroid wean and continue muscle relaxant and PT  May need imaging if fails to improve  Told to stop any NSAID's  Discussed TG and declines meds and will try diet  Repeat Thyroid labs were good and will monitor  Encouraged to loose wt and f/u with bariatric department  Declined the flu vaccine  Subjective:      Patient ID: Kingston Stroud is a 43 y o  female  WF RTC for f/u acute LBP with right radicular s/s  Started NSAID, muscle relaxants, and PT, but only a few sessions due to time constraints  Little improvement  Pain still a constant 7/10 with flares depending on the ROM  No bowel or bladder changes  Radicular s/s the same  Labs done and TG are elevated  Repeat thryroid labs were good  The following portions of the patient's history were reviewed and updated as appropriate:   She  has a past medical history of Obesity (4/15/2013)  She   Patient Active Problem List    Diagnosis Date Noted    Mixed hyperlipidemia 2018    Former smoker 2015    Obesity 04/15/2013     She  has a past surgical history that includes Abdominal surgery;  section; Mouth surgery; Tubal ligation; and Myringotomy  Her family history includes COPD in her family; Depression in her mother; Diverticulitis in her mother; Emphysema in her family;  Heart murmur in her mother; Hypertension in her father and mother; Irritable bowel syndrome in her mother; Lung disease in her brother; Multiple sclerosis in her brother; Spina bifida in her father  She  reports that she has quit smoking  Her smoking use included Cigarettes  She has never used smokeless tobacco  She reports that she does not drink alcohol or use drugs  Current Outpatient Prescriptions   Medication Sig Dispense Refill    azithromycin (ZITHROMAX) 250 mg tablet 2 tablets day 1, then 1 tablet days 2-5 6 tablet 0    cyclobenzaprine (FLEXERIL) 10 mg tablet Take 1 tablet (10 mg total) by mouth 3 (three) times a day as needed for muscle spasms 30 tablet 0    meloxicam (MOBIC) 15 mg tablet Take 1 tablet (15 mg total) by mouth daily 30 tablet 0    predniSONE 10 mg tablet 40mg po q d x 5, then 30mg po q d x 5, then 20mg po q d x 5, then 10mg po q d x 5, then d/c  50 tablet 0     Current Facility-Administered Medications   Medication Dose Route Frequency Provider Last Rate Last Dose    dexamethasone (DECADRON) injection 4 mg  4 mg Intramuscular Once Chela Suarez, DO         Current Outpatient Prescriptions on File Prior to Visit   Medication Sig    azithromycin (ZITHROMAX) 250 mg tablet 2 tablets day 1, then 1 tablet days 2-5    cyclobenzaprine (FLEXERIL) 10 mg tablet Take 1 tablet (10 mg total) by mouth 3 (three) times a day as needed for muscle spasms    meloxicam (MOBIC) 15 mg tablet Take 1 tablet (15 mg total) by mouth daily     No current facility-administered medications on file prior to visit  She is allergic to penicillins; clindamycin/lincomycin; and levaquin [levofloxacin]       Review of Systems   Constitutional: Negative for activity change, chills, diaphoresis, fatigue and fever  Respiratory: Negative for cough, chest tightness, shortness of breath and wheezing  Cardiovascular: Negative for chest pain, palpitations and leg swelling  Gastrointestinal: Negative for abdominal pain, constipation, diarrhea, nausea and vomiting  Genitourinary: Negative for difficulty urinating, dysuria and frequency  Musculoskeletal: Positive for back pain and gait problem   Negative for arthralgias and myalgias  Neurological: Negative for light-headedness and headaches  Psychiatric/Behavioral: Negative for confusion  The patient is not nervous/anxious  Objective:      /82 (BP Location: Left arm, Patient Position: Sitting, Cuff Size: Large)   Pulse (!) 114   Temp 97 7 °F (36 5 °C) (Tympanic)   Resp 18   Ht 5' 3" (1 6 m)   Wt (!) 164 kg (362 lb)   SpO2 93%   BMI 64 13 kg/m²          Physical Exam   Constitutional: She is oriented to person, place, and time  She appears well-developed and well-nourished  No distress  HENT:   Head: Normocephalic and atraumatic  Mouth/Throat: Oropharynx is clear and moist    Eyes: Conjunctivae and EOM are normal  Pupils are equal, round, and reactive to light  Musculoskeletal: She exhibits tenderness  LS Spine w/o gross deformity, increase temp, erythema, or swelling  Tenderness midline with spasms L2-S1 ROM decreased 50% all planes  LE strength 5/5 with tone/rom wnl  DTR 2/4  No gait abnormality with walking on toes or heels  Neurological: She is alert and oriented to person, place, and time  Psychiatric: She has a normal mood and affect  Her behavior is normal  Judgment and thought content normal    Nursing note and vitals reviewed

## 2018-09-28 ENCOUNTER — APPOINTMENT (OUTPATIENT)
Dept: PHYSICAL THERAPY | Facility: CLINIC | Age: 38
End: 2018-09-28
Payer: COMMERCIAL

## 2018-10-10 ENCOUNTER — OFFICE VISIT (OUTPATIENT)
Dept: INTERNAL MEDICINE CLINIC | Facility: CLINIC | Age: 38
End: 2018-10-10
Payer: COMMERCIAL

## 2018-10-10 VITALS
WEIGHT: 293 LBS | RESPIRATION RATE: 20 BRPM | TEMPERATURE: 97.8 F | HEART RATE: 90 BPM | SYSTOLIC BLOOD PRESSURE: 128 MMHG | HEIGHT: 63 IN | DIASTOLIC BLOOD PRESSURE: 82 MMHG | BODY MASS INDEX: 51.91 KG/M2

## 2018-10-10 DIAGNOSIS — M54.50 ACUTE BILATERAL LOW BACK PAIN WITHOUT SCIATICA: ICD-10-CM

## 2018-10-10 PROCEDURE — 1036F TOBACCO NON-USER: CPT | Performed by: INTERNAL MEDICINE

## 2018-10-10 PROCEDURE — 99213 OFFICE O/P EST LOW 20 MIN: CPT | Performed by: INTERNAL MEDICINE

## 2018-10-10 RX ORDER — CYCLOBENZAPRINE HCL 10 MG
10 TABLET ORAL 3 TIMES DAILY PRN
Qty: 30 TABLET | Refills: 3 | Status: SHIPPED | OUTPATIENT
Start: 2018-10-10 | End: 2019-12-18

## 2018-10-10 RX ORDER — MELOXICAM 15 MG/1
15 TABLET ORAL DAILY
Qty: 30 TABLET | Refills: 3 | Status: SHIPPED | OUTPATIENT
Start: 2018-10-10 | End: 2019-12-18

## 2018-10-10 NOTE — PROGRESS NOTES
Assessment/Plan:    No problem-specific Assessment & Plan notes found for this encounter  Diagnoses and all orders for this visit:    Acute bilateral low back pain without sciatica  -     cyclobenzaprine (FLEXERIL) 10 mg tablet; Take 1 tablet (10 mg total) by mouth 3 (three) times a day as needed for muscle spasms  -     meloxicam (MOBIC) 15 mg tablet; Take 1 tablet (15 mg total) by mouth daily      A/P: Unchanged, but not going to PT  Finish meds and get back into PT  May  Need imaging or pain management  Refusing flu vaccine  RTC thee weeks for re-eval     Subjective:      Patient ID: Lobito Yuen is a 43 y o  female  WF RTC for f/u LBP with right radicular s/s  Started on muscle relaxants and steroids  Almost done with the steroids and only went to three PT sessions, with the last one about two weeks ago  Minimal improvement with pain still a 7/10 and still goes down the right leg  NO bowel or bladder issues  Back Pain   Pertinent negatives include no abdominal pain, chest pain, dysuria, fever, headaches or weakness  The following portions of the patient's history were reviewed and updated as appropriate:   She  has a past medical history of Obesity (4/15/2013)  She   Patient Active Problem List    Diagnosis Date Noted    Mixed hyperlipidemia 2018    Former smoker 2015    Obesity 04/15/2013     She  has a past surgical history that includes Abdominal surgery;  section; Mouth surgery; Tubal ligation; and Myringotomy  Her family history includes COPD in her family; Depression in her mother; Diverticulitis in her mother; Emphysema in her family; Heart murmur in her mother; Hypertension in her father and mother; Irritable bowel syndrome in her mother; Lung disease in her brother; Multiple sclerosis in her brother; Spina bifida in her father  She  reports that she has quit smoking  Her smoking use included Cigarettes   She has never used smokeless tobacco  She reports that she does not drink alcohol or use drugs  Current Outpatient Prescriptions   Medication Sig Dispense Refill    cyclobenzaprine (FLEXERIL) 10 mg tablet Take 1 tablet (10 mg total) by mouth 3 (three) times a day as needed for muscle spasms 30 tablet 3    meloxicam (MOBIC) 15 mg tablet Take 1 tablet (15 mg total) by mouth daily 30 tablet 3     No current facility-administered medications for this visit  Current Outpatient Prescriptions on File Prior to Visit   Medication Sig    [DISCONTINUED] cyclobenzaprine (FLEXERIL) 10 mg tablet Take 1 tablet (10 mg total) by mouth 3 (three) times a day as needed for muscle spasms    [DISCONTINUED] meloxicam (MOBIC) 15 mg tablet Take 1 tablet (15 mg total) by mouth daily    [DISCONTINUED] predniSONE 10 mg tablet 40mg po q d x 5, then 30mg po q d x 5, then 20mg po q d x 5, then 10mg po q d x 5, then d/c  No current facility-administered medications on file prior to visit  She is allergic to penicillins; clindamycin/lincomycin; and levaquin [levofloxacin]       Review of Systems   Constitutional: Negative for activity change, chills, diaphoresis, fatigue and fever  Respiratory: Negative for cough, chest tightness, shortness of breath and wheezing  Cardiovascular: Negative for chest pain, palpitations and leg swelling  Gastrointestinal: Negative for abdominal pain, constipation, diarrhea, nausea and vomiting  Genitourinary: Negative for difficulty urinating, dysuria and frequency  Musculoskeletal: Positive for back pain and gait problem  Negative for arthralgias, joint swelling and myalgias  Neurological: Negative for weakness, light-headedness and headaches  Psychiatric/Behavioral: Negative for confusion  The patient is not nervous/anxious            Objective:      /82   Pulse 90   Temp 97 8 °F (36 6 °C) (Tympanic)   Resp 20   Ht 5' 3" (1 6 m)   Wt (!) 164 kg (362 lb)   BMI 64 13 kg/m²          Physical Exam   Constitutional: She is oriented to person, place, and time  She appears well-developed and well-nourished  No distress  HENT:   Head: Normocephalic and atraumatic  Mouth/Throat: Oropharynx is clear and moist    Pulmonary/Chest: Effort normal    Abdominal: Soft  Bowel sounds are normal    Musculoskeletal: She exhibits tenderness  She exhibits no deformity  LS spine w/o gross deformity, increase temp, erythema, or swelling  Tenderness right PVM L3-S1  Spasms noted  ROM decreased in flexion by50% and rest of planes decreased 30%  LE strength 5/5 with tone/rom wnl  No gait abnormality  Neurological: She is alert and oriented to person, place, and time  Psychiatric: She has a normal mood and affect  Her behavior is normal  Judgment and thought content normal    Nursing note and vitals reviewed

## 2018-11-20 NOTE — PATIENT INSTRUCTIONS
Follow up with PCP in 48-72 hours if symptoms do not improve or if they worsen  Hospital course: 11/19/2018-elective repeat c/section      Interval History:     She is doing well this morning. She is tolerating a regular diet without nausea or vomiting. She is voiding spontaneously. She is ambulating. She has passed flatus, and has not a BM. Vaginal bleeding is mild. She denies fever or chills. Abdominal pain is mild and controlled with oral medications. She is breastfeeding and supplementing with formula for baby's blood sugars. She desires circumcision for her male baby: yes.    Objective:     Vital Signs (Most Recent):  Temp: 97.8 °F (36.6 °C) (11/20/18 0500)  Pulse: 85 (11/20/18 0500)  Resp: 18 (11/20/18 0500)  BP: 100/67 (11/20/18 0500)  SpO2: 98 % (11/19/18 0900) Vital Signs (24h Range):  Temp:  [97.6 °F (36.4 °C)-98.3 °F (36.8 °C)] 97.8 °F (36.6 °C)  Pulse:  [56-98] 85  Resp:  [18] 18  SpO2:  [98 %-100 %] 98 %  BP: ()/(55-86) 100/67     Weight: 98.9 kg (218 lb)  Body mass index is 38.62 kg/m².      Intake/Output Summary (Last 24 hours) at 11/20/2018 0759  Last data filed at 11/20/2018 0545  Gross per 24 hour   Intake --   Output 3020 ml   Net -3020 ml       Significant Labs:  Lab Results   Component Value Date    GROUPTRH A POS 11/19/2018    HEPBSAG Negative 04/04/2018    STREPBCULT No Group B Streptococcus isolated 10/18/2018     Recent Labs   Lab 11/19/18  0540   HGB 10.8*   HCT 34.9*       I have personallly reviewed all pertinent lab results from the last 24 hours.    Physical Exam:   Constitutional: She is oriented to person, place, and time. She appears well-developed and well-nourished.    HENT:   Head: Normocephalic.     Neck: Normal range of motion. No thyromegaly present.    Cardiovascular: Normal rate and regular rhythm.     Pulmonary/Chest: Effort normal and breath sounds normal.        Abdominal: Soft. Bowel sounds are normal. She exhibits abdominal incision (aquasel dressing clean dry & intact).     Genitourinary:   Genitourinary Comments: Fundus nontender near the  umbilicus             Musculoskeletal: Normal range of motion and moves all extremeties. She exhibits edema (1+ edema bilateral lower extremities).       Neurological: She is alert and oriented to person, place, and time.    Skin: Skin is warm.    Psychiatric: She has a normal mood and affect.

## 2019-12-07 ENCOUNTER — OFFICE VISIT (OUTPATIENT)
Dept: URGENT CARE | Facility: CLINIC | Age: 39
End: 2019-12-07
Payer: COMMERCIAL

## 2019-12-07 VITALS
RESPIRATION RATE: 18 BRPM | SYSTOLIC BLOOD PRESSURE: 147 MMHG | HEART RATE: 82 BPM | TEMPERATURE: 97.9 F | OXYGEN SATURATION: 98 % | DIASTOLIC BLOOD PRESSURE: 70 MMHG

## 2019-12-07 DIAGNOSIS — J01.90 ACUTE SINUSITIS, RECURRENCE NOT SPECIFIED, UNSPECIFIED LOCATION: Primary | ICD-10-CM

## 2019-12-07 PROCEDURE — G0382 LEV 3 HOSP TYPE B ED VISIT: HCPCS | Performed by: PHYSICIAN ASSISTANT

## 2019-12-07 RX ORDER — AZITHROMYCIN 250 MG/1
TABLET, FILM COATED ORAL
Qty: 6 TABLET | Refills: 0 | Status: SHIPPED | OUTPATIENT
Start: 2019-12-07 | End: 2019-12-12

## 2019-12-07 NOTE — PATIENT INSTRUCTIONS
Hydration and rest  Humidifier at night  Flonase OTC  Cold medications as needed  Nasal saline rinses

## 2019-12-07 NOTE — PROGRESS NOTES
[facility]        NAME: Starr Patrick is a 35 y o  female  : 1980    MRN: [id number]  DATE: 2019  TIME: 1:45 PM    Assessment and Plan   Acute sinusitis, recurrence not specified, unspecified location [J01 90]  1  Acute sinusitis, recurrence not specified, unspecified location  azithromycin (ZITHROMAX) 250 mg tablet         Patient Instructions   Hydration and rest  Humidifier at night  Flonase OTC  Cold medications as needed  Nasal saline rinses  Follow up with PCP in 3-5 days  Proceed to  ER if symptoms worsen  Chief Complaint     Chief Complaint   Patient presents with    Sinus Problem     Pt c/o sinus congestion a week ago  History of Present Illness       41yo female presents to clinic with sinus congestion x 1 week  Reports increased sinus pressure and ear pain for the past day  Reports yellow productive sputum  No fever, sob, cp  Tolerating oral hydration and food  Taking otc tylenol sinus with little relief  Review of Systems   Review of Systems   Constitutional: Positive for fatigue  Negative for fever  HENT: Positive for congestion, ear pain, postnasal drip and sinus pressure  Negative for sore throat  Respiratory: Positive for cough (productive)  Negative for shortness of breath and wheezing  Cardiovascular: Positive for chest pain  Gastrointestinal: Negative for diarrhea, nausea and vomiting  Musculoskeletal: Negative for myalgias  Skin: Negative for rash  Neurological: Positive for headaches           Current Medications       Current Outpatient Medications:     azithromycin (ZITHROMAX) 250 mg tablet, Take 2 tablets first day, then 1 tablet daily for 5 days, Disp: 6 tablet, Rfl: 0    cyclobenzaprine (FLEXERIL) 10 mg tablet, Take 1 tablet (10 mg total) by mouth 3 (three) times a day as needed for muscle spasms, Disp: 30 tablet, Rfl: 3    meloxicam (MOBIC) 15 mg tablet, Take 1 tablet (15 mg total) by mouth daily, Disp: 30 tablet, Rfl: 3    Current Allergies     Allergies as of 2019 - Reviewed 2019   Allergen Reaction Noted    Penicillins Anaphylaxis 2016    Clindamycin/lincomycin  2016    Levaquin [levofloxacin] Facial Swelling 2018            The following portions of the patient's history were reviewed and updated as appropriate: allergies, current medications, past family history, past medical history, past social history, past surgical history and problem list      Past Medical History:   Diagnosis Date    Obesity 4/15/2013       Past Surgical History:   Procedure Laterality Date    ABDOMINAL SURGERY       SECTION      MOUTH SURGERY      MYRINGOTOMY      TUBAL LIGATION         Family History   Problem Relation Age of Onset    Hypertension Mother     Irritable bowel syndrome Mother     Diverticulitis Mother     Depression Mother     Heart murmur Mother     Hypertension Father     Spina bifida Father     Multiple sclerosis Brother     Lung disease Brother     COPD Family     Emphysema Family          Medications have been verified  Objective   /70   Pulse 82   Temp 97 9 °F (36 6 °C)   Resp 18   SpO2 98%        Physical Exam     Physical Exam   Constitutional: She appears well-developed and well-nourished  HENT:   Head: Normocephalic and atraumatic  Right Ear: Tympanic membrane and ear canal normal    Left Ear: Tympanic membrane and ear canal normal    Nose: Mucosal edema present  No rhinorrhea  Right sinus exhibits maxillary sinus tenderness and frontal sinus tenderness  Left sinus exhibits maxillary sinus tenderness and frontal sinus tenderness  Mouth/Throat: Uvula is midline and oropharynx is clear and moist  No tonsillar exudate  Eyes: Pupils are equal, round, and reactive to light  Conjunctivae are normal    Cardiovascular: Normal rate, regular rhythm and normal heart sounds     Pulmonary/Chest: Effort normal and breath sounds normal  No respiratory distress  She has no wheezes  Lymphadenopathy:     She has no cervical adenopathy  Skin: Skin is warm and dry  No rash noted

## 2019-12-18 ENCOUNTER — OFFICE VISIT (OUTPATIENT)
Dept: INTERNAL MEDICINE CLINIC | Facility: CLINIC | Age: 39
End: 2019-12-18
Payer: COMMERCIAL

## 2019-12-18 VITALS
DIASTOLIC BLOOD PRESSURE: 82 MMHG | SYSTOLIC BLOOD PRESSURE: 124 MMHG | BODY MASS INDEX: 51.91 KG/M2 | OXYGEN SATURATION: 97 % | WEIGHT: 293 LBS | HEART RATE: 100 BPM | TEMPERATURE: 99.1 F | HEIGHT: 63 IN | RESPIRATION RATE: 20 BRPM

## 2019-12-18 DIAGNOSIS — Z72.0 TOBACCO ABUSE: ICD-10-CM

## 2019-12-18 DIAGNOSIS — J01.11 ACUTE RECURRENT FRONTAL SINUSITIS: Primary | ICD-10-CM

## 2019-12-18 PROCEDURE — 3725F SCREEN DEPRESSION PERFORMED: CPT | Performed by: INTERNAL MEDICINE

## 2019-12-18 PROCEDURE — 3008F BODY MASS INDEX DOCD: CPT | Performed by: INTERNAL MEDICINE

## 2019-12-18 PROCEDURE — 99213 OFFICE O/P EST LOW 20 MIN: CPT | Performed by: INTERNAL MEDICINE

## 2019-12-18 RX ORDER — GUAIFENESIN 600 MG
600 TABLET, EXTENDED RELEASE 12 HR ORAL EVERY 12 HOURS SCHEDULED
Qty: 20 TABLET | Refills: 0 | Status: SHIPPED | OUTPATIENT
Start: 2019-12-18 | End: 2020-01-17 | Stop reason: ALTCHOICE

## 2019-12-18 RX ORDER — SULFAMETHOXAZOLE AND TRIMETHOPRIM 800; 160 MG/1; MG/1
1 TABLET ORAL EVERY 12 HOURS SCHEDULED
Qty: 20 TABLET | Refills: 0 | Status: SHIPPED | OUTPATIENT
Start: 2019-12-18 | End: 2019-12-28

## 2019-12-18 RX ORDER — FLUTICASONE PROPIONATE 50 MCG
1 SPRAY, SUSPENSION (ML) NASAL DAILY
Qty: 16 G | Refills: 0 | Status: SHIPPED | OUTPATIENT
Start: 2019-12-18 | End: 2020-06-15

## 2019-12-18 NOTE — PROGRESS NOTES
Assessment/Plan:  Problem List Items Addressed This Visit     None      Visit Diagnoses     Acute recurrent frontal sinusitis    -  Primary    Relevant Medications    sulfamethoxazole-trimethoprim (BACTRIM DS) 800-160 mg per tablet    guaiFENesin (MUCINEX) 600 mg 12 hr tablet    fluticasone (FLONASE) 50 mcg/act nasal spray    Other Relevant Orders    Mammo screening bilateral w 3d & cad    Tobacco abuse               Diagnoses and all orders for this visit:    Acute recurrent frontal sinusitis  -     Mammo screening bilateral w 3d & cad; Future  -     sulfamethoxazole-trimethoprim (BACTRIM DS) 800-160 mg per tablet; Take 1 tablet by mouth every 12 (twelve) hours for 10 days  -     guaiFENesin (MUCINEX) 600 mg 12 hr tablet; Take 1 tablet (600 mg total) by mouth every 12 (twelve) hours  -     fluticasone (FLONASE) 50 mcg/act nasal spray; 1 spray into each nostril daily    Tobacco abuse        No problem-specific Assessment & Plan notes found for this encounter  A/P: Rest and increase po fluids  OTC PRN motrin or tylenol  Will start abx, INS, and mucinex  RTC as scheduled or PRN  Subjective:  Daily smoking WF presents for a several day h/o URI s/s  Was seen at the  several weeks ago and dx with a sinus infection and given zithromax  No better    No travel  No one else ill  Slight fever/chills  Slight sore throat  Notes PND, nasal congestion, headache, and no cough  No SOB  No wheezing  Patient ID: Niki Perez is a 35 y o  female  HPI    The following portions of the patient's history were reviewed and updated as appropriate:   She has a past medical history of Obesity (4/15/2013)  ,  does not have any pertinent problems on file  ,   has a past surgical history that includes Abdominal surgery;  section; Mouth surgery; Tubal ligation; and Myringotomy  ,  family history includes COPD in her family; Depression in her mother; Diverticulitis in her mother; Emphysema in her family;  Heart murmur in her mother; Hypertension in her father and mother; Irritable bowel syndrome in her mother; Lung disease in her brother; Multiple sclerosis in her brother; Spina bifida in her father  ,   reports that she has been smoking cigarettes  She has never used smokeless tobacco  She reports that she does not drink alcohol or use drugs  ,  is allergic to penicillins; clindamycin/lincomycin; and levaquin [levofloxacin]     Current Outpatient Medications   Medication Sig Dispense Refill    fluticasone (FLONASE) 50 mcg/act nasal spray 1 spray into each nostril daily 16 g 0    guaiFENesin (MUCINEX) 600 mg 12 hr tablet Take 1 tablet (600 mg total) by mouth every 12 (twelve) hours 20 tablet 0    sulfamethoxazole-trimethoprim (BACTRIM DS) 800-160 mg per tablet Take 1 tablet by mouth every 12 (twelve) hours for 10 days 20 tablet 0     No current facility-administered medications for this visit  Review of Systems   Constitutional: Negative for activity change, chills, diaphoresis, fatigue and fever  HENT: Positive for congestion, postnasal drip, rhinorrhea and sinus pressure  Negative for ear discharge, ear pain, facial swelling, sinus pain and sore throat  Respiratory: Negative for cough, chest tightness, shortness of breath and wheezing  Cardiovascular: Negative for chest pain, palpitations and leg swelling  Gastrointestinal: Negative for abdominal pain, constipation, diarrhea, nausea and vomiting  Genitourinary: Negative for difficulty urinating, dysuria and frequency  Musculoskeletal: Negative for arthralgias, gait problem and myalgias  Neurological: Positive for headaches  Negative for light-headedness  Psychiatric/Behavioral: Negative for confusion  The patient is not nervous/anxious          PHQ-9 Depression Screening    PHQ-9:    Frequency of the following problems over the past two weeks:       Little interest or pleasure in doing things:  0 - not at all  Feeling down, depressed, or hopeless: 0 - not at all  PHQ-2 Score:  0        Objective:  Vitals:    12/18/19 1104   BP: 124/82   BP Location: Right arm   Patient Position: Sitting   Cuff Size: Extra-Large   Pulse: 100   Resp: 20   Temp: 99 1 °F (37 3 °C)   SpO2: 97%   Weight: (!) 148 kg (326 lb 9 6 oz)   Height: 5' 3" (1 6 m)     Body mass index is 57 85 kg/m²  Physical Exam   Constitutional: She is oriented to person, place, and time  She appears well-developed and well-nourished  No distress  HENT:   Head: Normocephalic and atraumatic  Mouth/Throat: Oropharynx is clear and moist    Eyes: Pupils are equal, round, and reactive to light  Conjunctivae and EOM are normal    Neck: Neck supple  No JVD present  Cardiovascular: Normal rate, regular rhythm and normal heart sounds  Pulmonary/Chest: Effort normal and breath sounds normal  No respiratory distress  She has no wheezes  She has no rales  Abdominal: Soft  Bowel sounds are normal  She exhibits no distension  There is no tenderness  Neurological: She is alert and oriented to person, place, and time  Psychiatric: She has a normal mood and affect  Her behavior is normal  Judgment and thought content normal    Nursing note and vitals reviewed

## 2019-12-18 NOTE — PATIENT INSTRUCTIONS
Sinusitis   AMBULATORY CARE:   Sinusitis  is inflammation or infection of your sinuses  It is most often caused by a virus  Acute sinusitis may last up to 12 weeks  Chronic sinusitis lasts longer than 12 weeks  Recurrent sinusitis means you have 4 or more times in 1 year  Common symptoms include the following:   · Fever    · Pain, pressure, redness, or swelling around the forehead, cheeks, or eyes    · Thick yellow or green discharge from your nose    · Tenderness when you touch your face over your sinuses    · Dry cough that happens mostly at night or when you lie down    · Headache and face pain that is worse when you lean forward    · Tooth pain, or pain when you chew  Seek care immediately if:   · Your eye and eyelid are red, swollen, and painful  · You cannot open your eye  · You have vision changes, such as double vision  · Your eyeball bulges out or you cannot move your eye  · You are more sleepy than normal, or you notice changes in your ability to think, move, or talk  · You have a stiff neck, a fever, or a bad headache  · You have swelling of your forehead or scalp  Contact your healthcare provider if:   · Your symptoms do not improve after 3 days  · Your symptoms do not go away after 10 days  · You have nausea and are vomiting  · Your nose is bleeding  · You have questions or concerns about your condition or care  Treatment for sinusitis:  Your symptoms may go away on their own  Your healthcare provider may recommend watchful waiting for up to 10 days before starting antibiotics  You may  need any of the following:  · Acetaminophen  decreases pain and fever  It is available without a doctor's order  Ask how much to take and how often to take it  Follow directions  Read the labels of all other medicines you are using to see if they also contain acetaminophen, or ask your doctor or pharmacist  Acetaminophen can cause liver damage if not taken correctly   Do not use more than 4 grams (4,000 milligrams) total of acetaminophen in one day  · NSAIDs , such as ibuprofen, help decrease swelling, pain, and fever  This medicine is available with or without a doctor's order  NSAIDs can cause stomach bleeding or kidney problems in certain people  If you take blood thinner medicine, always ask your healthcare provider if NSAIDs are safe for you  Always read the medicine label and follow directions  · Nasal steroid sprays  may help decrease inflammation in your nose and sinuses  · Decongestants  help reduce swelling and drain mucus in the nose and sinuses  They may help you breathe easier  · Antihistamines  help dry mucus in the nose and relieve sneezing  · Antibiotics  help treat or prevent a bacterial infection  · Take your medicine as directed  Contact your healthcare provider if you think your medicine is not helping or if you have side effects  Tell him or her if you are allergic to any medicine  Keep a list of the medicines, vitamins, and herbs you take  Include the amounts, and when and why you take them  Bring the list or the pill bottles to follow-up visits  Carry your medicine list with you in case of an emergency  Self-care:   · Rinse your sinuses  Use a sinus rinse device to rinse your nasal passages with a saline (salt water) solution or distilled water  Do not use tap water  This will help thin the mucus in your nose and rinse away pollen and dirt  It will also help reduce swelling so you can breathe normally  Ask your healthcare provider how often to do this  · Breathe in steam   Heat a bowl of water until you see steam  Lean over the bowl and make a tent over your head with a large towel  Breathe deeply for about 20 minutes  Be careful not to get too close to the steam or burn yourself  Do this 3 times a day  You can also breathe deeply when you take a hot shower  · Sleep with your head elevated    Place an extra pillow under your head before you go to sleep to help your sinuses drain  · Drink liquids as directed  Ask your healthcare provider how much liquid to drink each day and which liquids are best for you  Liquids will thin the mucus in your nose and help it drain  Avoid drinks that contain alcohol or caffeine  · Do not smoke, and avoid secondhand smoke  Nicotine and other chemicals in cigarettes and cigars can make your symptoms worse  Ask your healthcare provider for information if you currently smoke and need help to quit  E-cigarettes or smokeless tobacco still contain nicotine  Talk to your healthcare provider before you use these products  Prevent the spread of germs that cause sinusitis:  Wash your hands often with soap and water  Wash your hands after you use the bathroom, change a child's diaper, or sneeze  Wash your hands before you prepare or eat food  Follow up with your healthcare provider as directed: You may be referred to an ear, nose, and throat specialist  Write down your questions so you remember to ask them during your visits  © 2017 [facility] Information is for End User's use only and may not be sold, redistributed or otherwise used for commercial purposes  All illustrations and images included in CareNotes® are the copyrighted property of [email or web address] , [employer]  or Rocco Goodrich  The above information is an  only  It is not intended as medical advice for individual conditions or treatments  Talk to your doctor, nurse or pharmacist before following any medical regimen to see if it is safe and effective for you

## 2020-01-14 ENCOUNTER — OFFICE VISIT (OUTPATIENT)
Dept: URGENT CARE | Facility: CLINIC | Age: 40
End: 2020-01-14
Payer: COMMERCIAL

## 2020-01-14 VITALS
OXYGEN SATURATION: 97 % | BODY MASS INDEX: 51.91 KG/M2 | WEIGHT: 293 LBS | HEART RATE: 88 BPM | DIASTOLIC BLOOD PRESSURE: 58 MMHG | HEIGHT: 63 IN | RESPIRATION RATE: 18 BRPM | TEMPERATURE: 97.6 F | SYSTOLIC BLOOD PRESSURE: 123 MMHG

## 2020-01-14 DIAGNOSIS — B34.9 VIRAL ILLNESS: Primary | ICD-10-CM

## 2020-01-14 PROCEDURE — 99283 EMERGENCY DEPT VISIT LOW MDM: CPT | Performed by: PHYSICIAN ASSISTANT

## 2020-01-14 PROCEDURE — G0382 LEV 3 HOSP TYPE B ED VISIT: HCPCS | Performed by: PHYSICIAN ASSISTANT

## 2020-01-14 PROCEDURE — 99213 OFFICE O/P EST LOW 20 MIN: CPT | Performed by: PHYSICIAN ASSISTANT

## 2020-01-14 RX ORDER — BENZONATATE 200 MG/1
200 CAPSULE ORAL 3 TIMES DAILY PRN
Qty: 20 CAPSULE | Refills: 0 | Status: SHIPPED | OUTPATIENT
Start: 2020-01-14 | End: 2020-06-15

## 2020-01-14 NOTE — PROGRESS NOTES
[facility]    NAME: Kevin Aviles is a 35 y o  female  : 1980    MRN: [id number]  DATE: 2020  TIME: 2:46 PM    Assessment and Plan   Viral illness [B34 9]  1  Viral illness  benzonatate (TESSALON) 200 MG capsule       Patient Instructions     Patient Instructions   Stay hydrated by taking small sips of water through out the day  Avoid large amounts of water at one time  Advance diet as tolerated starting with crackers, toast   Can use imodium for diarrhea  If you are unable to hold down fluids and feel dehydrated, go to the emergency room  Can take tylenol or ibuprofen as needed for headache, pain, fever  Probiotics which can be found over the counter in pill form or in yogurt, such as activia, would be beneficial to increase normal gut shanae and help with diarrhea  If symptoms worsen go to the emergency room  Tessalon perles as needed for cough  Chief Complaint     Chief Complaint   Patient presents with    Cough     today    Diarrhea     x 4 days    Vomiting     x 4 days    Fever     x 3 days       History of Present Illness   50-year-old female here with complaint vomiting that started 4 days ago  Is still feeling a little nauseous and had diarrhea since then  Is able to hold down fluids  Low-grade fever  Yesterday started with a dry tickle in her throat and cough  Review of Systems   Review of Systems   Constitutional: Positive for fever  Negative for activity change, appetite change, chills and fatigue  HENT: Negative for congestion, ear pain, postnasal drip, sinus pressure and sore throat  Respiratory: Positive for cough  Cardiovascular: Negative for chest pain  Gastrointestinal: Positive for diarrhea and nausea  Negative for abdominal pain, constipation and vomiting  Genitourinary: Negative for difficulty urinating, dysuria, flank pain, frequency, hematuria and urgency  Musculoskeletal: Negative for back pain and myalgias     All other systems reviewed and are negative        Current Medications     Current Outpatient Medications:     benzonatate (TESSALON) 200 MG capsule, Take 1 capsule (200 mg total) by mouth 3 (three) times a day as needed for cough, Disp: 20 capsule, Rfl: 0    fluticasone (FLONASE) 50 mcg/act nasal spray, 1 spray into each nostril daily (Patient not taking: Reported on 2020), Disp: 16 g, Rfl: 0    guaiFENesin (MUCINEX) 600 mg 12 hr tablet, Take 1 tablet (600 mg total) by mouth every 12 (twelve) hours (Patient not taking: Reported on 2020), Disp: 20 tablet, Rfl: 0    Current Allergies     Allergies as of 2020 - Reviewed 2020   Allergen Reaction Noted    Penicillins Anaphylaxis 2016    Clindamycin/lincomycin  2016    Levaquin [levofloxacin] Facial Swelling 2018          The following portions of the patient's history were reviewed and updated as appropriate: allergies, current medications, past family history, past medical history, past social history, past surgical history and problem list    Past Medical History:   Diagnosis Date    Obesity 4/15/2013     Past Surgical History:   Procedure Laterality Date    ABDOMINAL SURGERY       SECTION      MOUTH SURGERY      MYRINGOTOMY      TUBAL LIGATION       Family History   Problem Relation Age of Onset    Hypertension Mother     Irritable bowel syndrome Mother     Diverticulitis Mother     Depression Mother     Heart murmur Mother     Hypertension Father     Spina bifida Father     Multiple sclerosis Brother     Lung disease Brother     COPD Family     Emphysema Family      Social History     Socioeconomic History    Marital status: /Civil Union     Spouse name: Not on file    Number of children: Not on file    Years of education: Not on file    Highest education level: Not on file   Occupational History    Occupation:    Social Needs    Financial resource strain: Not on file   Iain-Ruthann insecurity:     Worry: Not on file     Inability: Not on file    Transportation needs:     Medical: Not on file     Non-medical: Not on file   Tobacco Use    Smoking status: Current Every Day Smoker     Types: Cigarettes    Smokeless tobacco: Never Used   Substance and Sexual Activity    Alcohol use: No    Drug use: No    Sexual activity: Not on file   Lifestyle    Physical activity:     Days per week: Not on file     Minutes per session: Not on file    Stress: Not on file   Relationships    Social connections:     Talks on phone: Not on file     Gets together: Not on file     Attends Yazidism service: Not on file     Active member of club or organization: Not on file     Attends meetings of clubs or organizations: Not on file     Relationship status: Not on file    Intimate partner violence:     Fear of current or ex partner: Not on file     Emotionally abused: Not on file     Physically abused: Not on file     Forced sexual activity: Not on file   Other Topics Concern    Not on file   Social History Narrative    fhx not asked intriage    EMPLOYED     Medications have been verified  Objective   /58   Pulse 88   Temp 97 6 °F (36 4 °C)   Resp 18   Ht 5' 3" (1 6 m)   Wt (!) 148 kg (326 lb)   SpO2 97%   BMI 57 75 kg/m²      Physical Exam   Physical Exam   Constitutional: She appears well-developed and well-nourished  No distress  HENT:   Head: Normocephalic and atraumatic  Right Ear: Tympanic membrane normal    Left Ear: Tympanic membrane normal    Nose: Nose normal  No mucosal edema or rhinorrhea  Right sinus exhibits no maxillary sinus tenderness and no frontal sinus tenderness  Left sinus exhibits no maxillary sinus tenderness and no frontal sinus tenderness  Mouth/Throat: Oropharynx is clear and moist  No oropharyngeal exudate, posterior oropharyngeal edema or posterior oropharyngeal erythema     Eyes: Conjunctivae are normal    Cardiovascular: Normal rate, regular rhythm and normal heart sounds  No murmur heard  Pulmonary/Chest: Effort normal and breath sounds normal  No respiratory distress  Abdominal: Normal appearance and bowel sounds are normal  There is no tenderness  There is no CVA tenderness  Nursing note and vitals reviewed

## 2020-01-14 NOTE — LETTER
January 14, 2020     Patient: Arpita Fang   YOB: 1980   Date of Visit: 1/14/2020       To Whom It May Concern: It is my medical opinion that Arpita Fang shouldn't return to work until 1/17/2020  If you have any questions or concerns, please don't hesitate to call           Sincerely,        Yann Domínugez PA-C    CC: Arpita Annalisa

## 2020-01-14 NOTE — PATIENT INSTRUCTIONS
Stay hydrated by taking small sips of water through out the day  Avoid large amounts of water at one time  Advance diet as tolerated starting with crackers, toast   Can use imodium for diarrhea  If you are unable to hold down fluids and feel dehydrated, go to the emergency room  Can take tylenol or ibuprofen as needed for headache, pain, fever  Probiotics which can be found over the counter in pill form or in yogurt, such as activia, would be beneficial to increase normal gut shanae and help with diarrhea  If symptoms worsen go to the emergency room  Tessalon perles as needed for cough

## 2020-01-17 ENCOUNTER — OFFICE VISIT (OUTPATIENT)
Dept: INTERNAL MEDICINE CLINIC | Facility: CLINIC | Age: 40
End: 2020-01-17
Payer: COMMERCIAL

## 2020-01-17 VITALS
BODY MASS INDEX: 51.91 KG/M2 | SYSTOLIC BLOOD PRESSURE: 102 MMHG | DIASTOLIC BLOOD PRESSURE: 74 MMHG | WEIGHT: 293 LBS | HEIGHT: 63 IN | OXYGEN SATURATION: 98 % | RESPIRATION RATE: 18 BRPM | TEMPERATURE: 97 F | HEART RATE: 86 BPM

## 2020-01-17 DIAGNOSIS — Z13.29 SCREENING FOR THYROID DISORDER: ICD-10-CM

## 2020-01-17 DIAGNOSIS — M15.9 PRIMARY OSTEOARTHRITIS INVOLVING MULTIPLE JOINTS: ICD-10-CM

## 2020-01-17 DIAGNOSIS — Z13.0 SCREENING FOR DEFICIENCY ANEMIA: ICD-10-CM

## 2020-01-17 DIAGNOSIS — Z11.4 SCREENING FOR HIV (HUMAN IMMUNODEFICIENCY VIRUS): ICD-10-CM

## 2020-01-17 DIAGNOSIS — E78.2 MIXED HYPERLIPIDEMIA: Primary | ICD-10-CM

## 2020-01-17 DIAGNOSIS — Z72.0 TOBACCO ABUSE: ICD-10-CM

## 2020-01-17 DIAGNOSIS — E66.01 MORBID OBESITY WITH BMI OF 50.0-59.9, ADULT (HCC): ICD-10-CM

## 2020-01-17 DIAGNOSIS — R79.89 ABNORMAL THYROID BLOOD TEST: ICD-10-CM

## 2020-01-17 DIAGNOSIS — Z13.1 SCREENING FOR DIABETES MELLITUS (DM): ICD-10-CM

## 2020-01-17 DIAGNOSIS — Z13.220 SCREENING FOR LIPID DISORDERS: ICD-10-CM

## 2020-01-17 DIAGNOSIS — M54.12 CHRONIC CERVICAL RADICULOPATHY: ICD-10-CM

## 2020-01-17 DIAGNOSIS — M54.2 CHRONIC CERVICAL PAIN: ICD-10-CM

## 2020-01-17 DIAGNOSIS — G89.29 CHRONIC CERVICAL PAIN: ICD-10-CM

## 2020-01-17 PROCEDURE — 3008F BODY MASS INDEX DOCD: CPT | Performed by: INTERNAL MEDICINE

## 2020-01-17 PROCEDURE — 99214 OFFICE O/P EST MOD 30 MIN: CPT | Performed by: INTERNAL MEDICINE

## 2020-01-17 RX ORDER — CYCLOBENZAPRINE HCL 10 MG
10 TABLET ORAL 3 TIMES DAILY PRN
Qty: 30 TABLET | Refills: 0 | Status: SHIPPED | OUTPATIENT
Start: 2020-01-17 | End: 2021-08-20

## 2020-01-17 RX ORDER — MELOXICAM 15 MG/1
15 TABLET ORAL DAILY
Qty: 30 TABLET | Refills: 1 | Status: SHIPPED | OUTPATIENT
Start: 2020-01-17 | End: 2021-08-20

## 2020-01-17 NOTE — PROGRESS NOTES
BMI Counseling: Body mass index is 57 89 kg/m²  The BMI is above normal  Nutrition recommendations include decreasing portion sizes, increasing intake of lean protein and reducing intake of saturated and trans fat  Exercise recommendations include moderate physical activity 150 minutes/week  No pharmacotherapy was ordered  Assessment/Plan:  Problem List Items Addressed This Visit        Musculoskeletal and Integument    Primary osteoarthritis involving multiple joints       Other    Mixed hyperlipidemia - Primary    Relevant Orders    Comprehensive metabolic panel    Lipid Panel with Direct LDL reflex    Tobacco abuse    Abnormal thyroid blood test      Other Visit Diagnoses     Screening for diabetes mellitus (DM)        Screening for lipid disorders        Relevant Orders    Lipid Panel with Direct LDL reflex    Screening for thyroid disorder        Relevant Orders    TSH, 3rd generation with Free T4 reflex    Screening for deficiency anemia        Relevant Orders    CBC and differential    Screening for HIV (human immunodeficiency virus)        Relevant Orders    [facility] HIV 1/2 AG-AB combo    Morbid obesity with BMI of 50 0-59 9, adult (HCC)        Chronic cervical radiculopathy        Relevant Medications    meloxicam (MOBIC) 15 mg tablet    cyclobenzaprine (FLEXERIL) 10 mg tablet    Other Relevant Orders    Ambulatory referral to Physical Therapy    Chronic cervical pain        Relevant Medications    meloxicam (MOBIC) 15 mg tablet    cyclobenzaprine (FLEXERIL) 10 mg tablet    Other Relevant Orders    Ambulatory referral to Physical Therapy           Diagnoses and all orders for this visit:    Mixed hyperlipidemia  -     Comprehensive metabolic panel; Future  -     Lipid Panel with Direct LDL reflex;  Future    Tobacco abuse    Abnormal thyroid blood test    Primary osteoarthritis involving multiple joints    Screening for diabetes mellitus (DM)    Screening for lipid disorders  -     Lipid Panel with Direct LDL reflex; Future    Screening for thyroid disorder  -     TSH, 3rd generation with Free T4 reflex; Future    Screening for deficiency anemia  -     CBC and differential; Future    Screening for HIV (human immunodeficiency virus)  -     [facility] HIV 1/2 AG-AB combo; Future    Morbid obesity with BMI of 50 0-59 9, adult (HCC)    Chronic cervical radiculopathy  -     meloxicam (MOBIC) 15 mg tablet; Take 1 tablet (15 mg total) by mouth daily  -     cyclobenzaprine (FLEXERIL) 10 mg tablet; Take 1 tablet (10 mg total) by mouth 3 (three) times a day as needed for muscle spasms  -     Ambulatory referral to Physical Therapy; Future    Chronic cervical pain  -     meloxicam (MOBIC) 15 mg tablet; Take 1 tablet (15 mg total) by mouth daily  -     cyclobenzaprine (FLEXERIL) 10 mg tablet; Take 1 tablet (10 mg total) by mouth 3 (three) times a day as needed for muscle spasms  -     Ambulatory referral to Physical Therapy; Future        No problem-specific Assessment & Plan notes found for this encounter  A/P: Doing ok and will check labs  Discussed BMI and will give information on diet and exercise  Wean tobacco Will start PT, NSAID's, and muscle relaxants for the neck  Discussed flu vaccine and refuses  Continue current treatment and RTC two months for f/u labs and neck  Subjective:      Patient ID: Knox Lesch is a 35 y o  female  WF RTC for f/u hyperlipidemia, tobacco use, etc  Doing ok and no new issues, but chronic C spine pain and right upper arm tingling worsening over the past several months  Was suppose to go to PT, but lost her insurance  Remains active otherwise and no falls  Still smoking  Recent URI that resolving  Due for labs and vaccines  The following portions of the patient's history were reviewed and updated as appropriate:   She has a past medical history of Obesity (4/15/2013) and Primary osteoarthritis involving multiple joints (1/17/2020)  ,  does not have any pertinent problems on file  ,   has a past surgical history that includes Abdominal surgery;  section; Mouth surgery; Tubal ligation; and Myringotomy  ,  family history includes COPD in her family; Depression in her mother; Diverticulitis in her mother; Emphysema in her family; Heart murmur in her mother; Hypertension in her father and mother; Irritable bowel syndrome in her mother; Lung disease in her brother; Multiple sclerosis in her brother; Spina bifida in her father  ,   reports that she has been smoking cigarettes  She has never used smokeless tobacco  She reports that she does not drink alcohol or use drugs  ,  is allergic to penicillins; clindamycin/lincomycin; and levaquin [levofloxacin]     Current Outpatient Medications   Medication Sig Dispense Refill    benzonatate (TESSALON) 200 MG capsule Take 1 capsule (200 mg total) by mouth 3 (three) times a day as needed for cough 20 capsule 0    fluticasone (FLONASE) 50 mcg/act nasal spray 1 spray into each nostril daily 16 g 0    cyclobenzaprine (FLEXERIL) 10 mg tablet Take 1 tablet (10 mg total) by mouth 3 (three) times a day as needed for muscle spasms 30 tablet 0    meloxicam (MOBIC) 15 mg tablet Take 1 tablet (15 mg total) by mouth daily 30 tablet 1     No current facility-administered medications for this visit  Review of Systems   Constitutional: Negative for activity change, chills, diaphoresis, fatigue and fever  HENT: Negative  Eyes: Negative for visual disturbance  Respiratory: Negative for cough, chest tightness, shortness of breath and wheezing  Cardiovascular: Negative for chest pain, palpitations and leg swelling  Gastrointestinal: Negative for abdominal pain, constipation, diarrhea, nausea and vomiting  Endocrine: Negative for cold intolerance and heat intolerance  Genitourinary: Negative for difficulty urinating, dysuria and frequency  Musculoskeletal: Positive for neck pain and neck stiffness   Negative for arthralgias, gait problem and myalgias  Neurological: Positive for numbness  Negative for dizziness, seizures, syncope, weakness, light-headedness and headaches  Psychiatric/Behavioral: Negative for confusion, dysphoric mood and sleep disturbance  The patient is not nervous/anxious  PHQ-9 Depression Screening    PHQ-9:    Frequency of the following problems over the past two weeks:       Little interest or pleasure in doing things:  0 - not at all  Feeling down, depressed, or hopeless:  0 - not at all  PHQ-2 Score:  0        Objective:  Vitals:    01/17/20 0826   BP: 102/74   BP Location: Left arm   Patient Position: Sitting   Cuff Size: Extra-Large   Pulse: 86   Resp: 18   Temp: (!) 97 °F (36 1 °C)   TempSrc: Tympanic   SpO2: 98%   Weight: (!) 148 kg (326 lb 12 8 oz)   Height: 5' 3" (1 6 m)     Body mass index is 57 89 kg/m²  Physical Exam   Constitutional: She is oriented to person, place, and time  She appears well-developed and well-nourished  No distress  HENT:   Head: Normocephalic and atraumatic  Mouth/Throat: Oropharynx is clear and moist    Eyes: Pupils are equal, round, and reactive to light  Conjunctivae and EOM are normal    Neck: Neck supple  No JVD present  Cardiovascular: Normal rate, regular rhythm and normal heart sounds  Pulmonary/Chest: Effort normal and breath sounds normal  No respiratory distress  She has no wheezes  She has no rales  Abdominal: Soft  Bowel sounds are normal  She exhibits no distension  There is no tenderness  Musculoskeletal: She exhibits no edema  C Spine w/o gross deformities, increase temp, erythema, swelling, or lesions  Tenderness over the midline C3-C6 and along the right trap  ROM wnl  Spasms noted  UE strength 5/5 with tone/ROM WNL  DTR 2/4  Neurological: She is alert and oriented to person, place, and time  Psychiatric: She has a normal mood and affect   Her behavior is normal  Judgment and thought content normal    Nursing note and vitals reviewed  BMI Counseling: Body mass index is 57 89 kg/m²  The BMI is above normal  Nutrition recommendations include reducing portion sizes, decreasing overall calorie intake, reducing intake of saturated fat and trans fat and reducing intake of cholesterol  Exercise recommendations include moderate aerobic physical activity for 150 minutes/week  Tobacco Cessation Counseling: Tobacco cessation counseling and education was provided  The patient is sincerely urged to quit consumption of tobacco  She is not ready to quit tobacco  The numerous health risks of tobacco consumption were discussed  If she decides to quit, there are a number of helpful adjunctive aids, and she can see me to discuss nicotine replacement therapy, chantix, or bupropion anytime in the future

## 2020-01-17 NOTE — PATIENT INSTRUCTIONS

## 2020-01-21 ENCOUNTER — EVALUATION (OUTPATIENT)
Dept: PHYSICAL THERAPY | Facility: CLINIC | Age: 40
End: 2020-01-21
Payer: COMMERCIAL

## 2020-01-21 DIAGNOSIS — G89.29 CHRONIC CERVICAL PAIN: ICD-10-CM

## 2020-01-21 DIAGNOSIS — M54.2 CHRONIC CERVICAL PAIN: ICD-10-CM

## 2020-01-21 DIAGNOSIS — M54.12 CHRONIC CERVICAL RADICULOPATHY: ICD-10-CM

## 2020-01-21 PROCEDURE — 97162 PT EVAL MOD COMPLEX 30 MIN: CPT

## 2020-01-21 NOTE — PROGRESS NOTES
PT Evaluation     Today's date: 2020  Patient name: Yolanda Tobar  : 1980  MRN: [id number]  Referring provider: Rahat Vasquez DO  Dx:   Encounter Diagnosis     ICD-10-CM    1  Chronic cervical radiculopathy M54 12 Ambulatory referral to Physical Therapy   2  Chronic cervical pain M54 2 Ambulatory referral to Physical Therapy    G89 29                   Assessment  Assessment details: Yolanda Tobar is a 35 y o  female presenting to outpatient physical therapy with diagnosis of Chronic cervical radiculopathy  Chronic cervical pain  Patient's current impairments include cerv and upper thoracic pain, impaired soft tissue mobility, reduced cerv and B shld  range of motion, reduced cerv and B shld  strength, reduced postural awareness, and reduced activity tolerance  Patient's present functional limitations include difficulty with ADLs, reliance on medication and/or modalities for pain relief, poor tolerance for functional mobility and activity, and difficulty completing work/HH  responsibilities  Patient to benefit from skilled outpatient physical therapy 2x/week for 6  weeks in order to reduce pain, maximize pain free range of motion, increase strength and stability, and improve functional mobility/functional activity in order to maximize return to prior level of function with reduced limitations  Thank you for your referral     Impairments: abnormal or restricted ROM, activity intolerance, impaired physical strength and lacks appropriate home exercise program    Symptom irritability: highBarriers to therapy: Significantly obese  Understanding of Dx/Px/POC: good   Prognosis: fair    Goals  STGs to be achieved in 4 weeks:  1  Pt to demonstrate reduced subjective pain rating "at worst" by at least 2-3 points from Initial Eval in order to allow for reduced pain with ADLs and improved functional activity tolerance     2  Pt to demonstrate increased AROM of c-spine  by at least 5-10 degrees in order to allow for greater ease and independence with ADLs and functional mobility  3  Pt to demonstrate full PROM of c-spine in order to maximize joint mobility and function and allow for progression of exercise program and achievement of goals  4  Pt to demonstrate increased MMT of neck and B shldrs by at least 1/2-1 grade in order to improve safety and stability with ADLs and functional mobility  LTGs to be achieved in 6-8 weeks:  1  Pt will be I with HEP in order to continue to improve quality of life and independence and reduce risk for re-injury  2  Pt to demonstrate return to [facility] chores and  Driving   without limitations or restrictions  3  Pt to demonstrate improved function as noted by achieving or exceeding predicted score on FOTO outcomes assessment tool  Plan  Patient would benefit from: skilled physical therapy  Planned modality interventions: thermotherapy: hydrocollator packs  Planned therapy interventions: manual therapy, strengthening, stretching, therapeutic exercise and home exercise program  Frequency: 2x week  Duration in weeks: 6  Plan of Care beginning date: 1/21/2020  Plan of Care expiration date: 3/3/2020  Treatment plan discussed with: patient        Subjective Evaluation    History of Present Illness  Mechanism of injury: Pt states she has had cerv pain for 5-6 years and has been told she has straightening of the cerv pain  Pt states she gets shock sensation down the R arm and neck and at its worst her R arm "gets paralyzed"  Current symptoms include rhomboid pain up into neck, numbness and tingling down arm into 2nd thru 5th digits, also has pressure posterior neck and head  Symptoms are relieved by meds at night and allow her to sleep, but does not take meds during the day bc the,y make her groggy so so symptoms are worse during the day  Pain limits pt from playing with kids, cannot reach above head for more than several seconds, diff lifting, diff driving,diff moving furniture to clean, effects sleep-wakes 3-4x per night w/o meds, diff carrying groceries  Recurrent probem    Quality of life: fair    Pain  Current pain ratin  At best pain ratin  At worst pain rating: 10  Quality: sharp, radiating, pressure, dull ache, burning and needle-like  Relieving factors: medications, relaxation, rest, change in position and support  Aggravating factors: lifting and overhead activity (turning head too quick, staying in one position for too long)  Progression: worsening    Social Support  Steps to enter house: yes  Stairs in house: yes   Lives in: multiple-level home  Lives with: spouse and young children    Employment status: working ( at [employer], substitute list as )  Hand dominance: left    Treatments  Previous treatment: physical therapy and medication  Current treatment: medication and physical therapy  Patient Goals  Patient goals for therapy: decreased pain, increased motion and increased strength  Patient goal: reduce pain level        Objective     Concurrent Complaints  Positive for disturbed sleep, dizziness, headaches and visual change (eye mvmts when looking at something quickly  ?  nystagmus)  Postural Observations  Seated posture: good  Standing posture: good    Additional Postural Observation Details  Slight fwd head, pt reports pain increases with slouching    Palpation   Left   Tenderness of the cervical interspinals, cervical paraspinals, levator scapulae, rhomboids, sternocleidomastoid, suboccipitals and upper trapezius  Right   Tenderness of the cervical interspinals, cervical paraspinals, levator scapulae, rhomboids, sternocleidomastoid, suboccipitals and upper trapezius  Tenderness   Cervical Spine   Tenderness in the spinous process and right scapula       Neurological Testing     Sensation   Cervical/Thoracic   Left   Intact: light touch, hot/cold discrimination and proprioception    Right   Intact: hot/cold discrimination and proprioception  Paresthesia: light touch    Active Range of Motion   Cervical/Thoracic Spine       Cervical    Flexion: 20 degrees  Restriction level: moderate  Extension: 25 degrees     Restriction level: moderate  Left lateral flexion: 17 degrees     Restriction level: moderate  Right lateral flexion: 25 degrees     Restriction level moderate  Left rotation: 50 degrees Restriction level: moderate  Right rotation: 45 degrees    Restriction level: moderate  Left Shoulder   Flexion: 140 degrees   Abduction: 110 degrees   External rotation 0°: WFL  Internal rotation 0°: WFL    Right Shoulder   Flexion: 140 degrees   Abduction: 110 degrees   External rotation 0°: WFL  Internal rotation 0°: WFL  Mechanical Assessment    Cervical    Seated Flexion:  repeated movements  Pain location: peripheralized  Seated Extension: repeated movements  Pain location: no change  Pain intensity: worse    Thoracic      Lumbar      Strength/Myotome Testing   Cervical Spine   Neck extension: 3+  Neck flexion: 3+    Left   Neck lateral flexion (C3): 4    Right   Neck lateral flexion (C3): 4    Left Shoulder     Planes of Motion   Flexion: 4+   Extension: 4+   Abduction: 4+   External rotation at 0°: 5   Internal rotation at 0°: 5     Right Shoulder     Planes of Motion   Flexion: 4+   Extension: 4+   Abduction: 4+   External rotation at 0°: 5   Internal rotation at 0°: 5     Left Elbow   Flexion: 5    Right Elbow   Flexion: 5    Left Wrist/Hand   Wrist extension: 5  Wrist flexion: 5    Right Wrist/Hand   Wrist extension: 5  Wrist flexion: 5    Tests   Cervical   Positive vertical compression and lumbar distraction test     Right Shoulder   Positive Jesus  Lumbar   Positive vertical compression   General Comments:    Upper quarter screen   Elbow: unremarkable  Hand/wrist: unremarkable    Shoulder Comments   End range shldr limitations due to pain  Neuro Exam:     Headaches   Patient reports headaches: Yes  Precautions: none      Re-eval Date:     Date 1/21       Visit Count 1       FOTO completed       Pain In        Pain Out              Manual              Manual txn             MFR             cerv PROM                                         Date            Aerobic            UT stretch            Levator stretch            Cervical AROM -> end range stretch            Chin tucks -> chick tuck stabilizer            Deep neck flexor endurance            Scap retraction            Shoulder raises with cervical stabilization            B ER with cervical stabilization            Wipers            Cervical isometrics             Scap clocks             MTP/LTP            BUCKY cervical extension with Tband            Prayer stretch            Prone I-T-Y            Serratus Punch            Thoracic extension            Bent over rows            Pec Stretch            Cat-n-camel            Scalene Stretch            Wall push ups            Iron Neck/Iron Helmet            Self Snags            Postural correction activities            Prone cervical extension with chin tuck                    Modalities              HP to c-spine

## 2020-01-22 ENCOUNTER — OFFICE VISIT (OUTPATIENT)
Dept: PHYSICAL THERAPY | Facility: CLINIC | Age: 40
End: 2020-01-22
Payer: COMMERCIAL

## 2020-01-22 DIAGNOSIS — M54.2 CHRONIC CERVICAL PAIN: ICD-10-CM

## 2020-01-22 DIAGNOSIS — G89.29 CHRONIC CERVICAL PAIN: ICD-10-CM

## 2020-01-22 DIAGNOSIS — M54.12 CHRONIC CERVICAL RADICULOPATHY: Primary | ICD-10-CM

## 2020-01-22 PROCEDURE — 97140 MANUAL THERAPY 1/> REGIONS: CPT

## 2020-01-22 PROCEDURE — 97110 THERAPEUTIC EXERCISES: CPT

## 2020-01-22 NOTE — PROGRESS NOTES
Daily Note     Today's date: 2020  Patient name: Milton Gonzalez  : 1980  MRN: [id number]  Referring provider: Leonidas Valero DO  Dx:   Encounter Diagnosis     ICD-10-CM    1  Chronic cervical radiculopathy M54 12    2  Chronic cervical pain M54 2     G89 29                   Subjective: Pt reports 7/10 pain level upon arrival and reported same pain level upon departure  Objective: See treatment diary below      Assessment: Tolerated treatment well  Patient exhibited good technique with therapeutic exercises and would benefit from continued PT  Pt noted soreness and some discomfort with introduction of ther ex and manual therapy  Plan: Continue per plan of care        Precautions: none      Re-eval Date:     Date       Visit Count 1 2      FOTO completed       Pain In  7      Pain Out  7            Manual         Manual txn  10x10"      MFR  8'      cerv PROM  5'              total  15 min        Date       Aerobic   UBE w/MHP        UT stretch        Levator stretch        Cervical AROM -> end range stretch  5x5"      Chin tucks -> chick tuck stabilizer  10x5"      shldr shrugs/rolls  20 ea      Scap retraction        Shoulder raises with cervical stabilization        B ER with cervical stabilization        Wipers        Cervical isometrics         Scap clocks         MTP/LTP        BUCKY cervical extension with Tband        Prayer stretch        Prone I-T-Y        Serratus Punch        Thoracic extension        Bent over rows        Pec Stretch        Cat-n-camel        Scalene Stretch        Wall push ups        Iron Neck/Iron Helmet        Self Snags        Postural correction activities        Prone cervical extension with chin tuck                Modalities         HP to c-spine  10' on NS

## 2020-01-28 ENCOUNTER — OFFICE VISIT (OUTPATIENT)
Dept: PHYSICAL THERAPY | Facility: CLINIC | Age: 40
End: 2020-01-28
Payer: COMMERCIAL

## 2020-01-28 DIAGNOSIS — M54.12 CHRONIC CERVICAL RADICULOPATHY: Primary | ICD-10-CM

## 2020-01-28 PROCEDURE — 97110 THERAPEUTIC EXERCISES: CPT

## 2020-01-28 PROCEDURE — 97140 MANUAL THERAPY 1/> REGIONS: CPT

## 2020-01-28 NOTE — PROGRESS NOTES
Daily Note     Today's date: 2020  Patient name: Loco Daly  : 1980  MRN: [id number]  Referring provider: Alfredo Tolliver DO  Dx:   Encounter Diagnosis     ICD-10-CM    1  Chronic cervical radiculopathy M54 12                   Subjective:  Pt  Reports "6"/10 pain level today  States she did not tolerate manual therapy well last treatment session  Says it was too intense, and feels maybe she was not relaxed enough  Objective: See treatment diary below      Assessment: Tolerated treatment Fair+ to Fairly Well with performance of ther exer, and Fair/Fair+ with tolerance to manual therapy even with a light, gentle, and gingerly applic  Plan: Continue per plan of care           Precautions: none      Re-eval Date:     Date  20     Visit Count 1 2 3     FOTO completed       Pain In  7 6/10     Pain Out  7 5-6/10           Manual    20     Manual txn  10x10" 5x/5"  Light, gentle     MFR  8' 6 min  Light, gentle, gingerly       cerv PROM  5' 5 min  Light, gentle, gingerly               total  15 min        Date  20     Aerobic   UBE w/MHP   NV     UT stretch        Levator stretch        Cervical AROM -> end range stretch  5x5" 5x5"     Chin tucks -> chick tuck stabilizer  10x5" 10x5"     shldr shrugs/rolls  20 ea 20 ea     Scap retraction        Shoulder raises with cervical stabilization   **10x     B ER with cervical stabilization        Wipers        Cervical isometrics         Scap clocks         MTP/LTP        BUCKY cervical extension with Tband        Prayer stretch        Prone I-T-Y        Serratus Punch        Thoracic extension        Bent over rows        Pec Stretch        Cat-n-camel        Scalene Stretch        Wall push ups   NV     Iron Neck/Iron Helmet        Self Snags        Postural correction activities        Prone cervical extension with chin tuck                Modalities    20     HP to c-spine  10' on NS 10 min pre-ex

## 2020-01-29 ENCOUNTER — APPOINTMENT (OUTPATIENT)
Dept: PHYSICAL THERAPY | Facility: CLINIC | Age: 40
End: 2020-01-29
Payer: COMMERCIAL

## 2020-02-08 ENCOUNTER — OFFICE VISIT (OUTPATIENT)
Dept: URGENT CARE | Facility: CLINIC | Age: 40
End: 2020-02-08
Payer: COMMERCIAL

## 2020-02-08 VITALS
OXYGEN SATURATION: 98 % | TEMPERATURE: 97.4 F | RESPIRATION RATE: 18 BRPM | HEIGHT: 63 IN | BODY MASS INDEX: 51.91 KG/M2 | HEART RATE: 74 BPM | DIASTOLIC BLOOD PRESSURE: 92 MMHG | WEIGHT: 293 LBS | SYSTOLIC BLOOD PRESSURE: 169 MMHG

## 2020-02-08 DIAGNOSIS — J04.0 ACUTE LARYNGITIS: ICD-10-CM

## 2020-02-08 DIAGNOSIS — J02.9 ACUTE PHARYNGITIS, UNSPECIFIED ETIOLOGY: Primary | ICD-10-CM

## 2020-02-08 PROCEDURE — 99214 OFFICE O/P EST MOD 30 MIN: CPT | Performed by: NURSE PRACTITIONER

## 2020-02-08 PROCEDURE — 99284 EMERGENCY DEPT VISIT MOD MDM: CPT | Performed by: NURSE PRACTITIONER

## 2020-02-08 PROCEDURE — G0383 LEV 4 HOSP TYPE B ED VISIT: HCPCS | Performed by: NURSE PRACTITIONER

## 2020-02-08 RX ORDER — AZITHROMYCIN 250 MG/1
TABLET, FILM COATED ORAL
Qty: 6 TABLET | Refills: 0 | Status: SHIPPED | OUTPATIENT
Start: 2020-02-08 | End: 2020-02-12

## 2020-02-08 NOTE — PROGRESS NOTES
[facility]        NAME: Milton Gonzalez is a 35 y o  female  : 1980    MRN: [id number]  DATE: 2020  TIME: 1:14 PM    Assessment and Plan   Acute pharyngitis, unspecified etiology [J02 9]  1  Acute pharyngitis, unspecified etiology  POCT rapid strepA    azithromycin (ZITHROMAX) 250 mg tablet   2  Acute laryngitis  POCT rapid strepA    azithromycin (ZITHROMAX) 250 mg tablet         Patient Instructions       Follow up with PCP in 3-5 days  Proceed to  ER if symptoms worsen  You have been prescribed azithromycin - take as prescsribed  Take tylenol or motrin as needed for pain or fever  You are to continue warm salt water gargles  Suck on lozenges  You are to follow up with your PCP  If symptoms continue see ENT  Go to the ED if symptoms worsen          Chief Complaint     Chief Complaint   Patient presents with    Sore Throat     x 10 days         History of Present Illness       This is a 35year old morbidly obese female who states for the last 10 days has had laryngitis with it now being worse  She states it feels like her throat is on fire  She states she had done SWS w/o relief and using cough drops  Denies fevers, chills, n/v/d  Sore Throat    Associated symptoms include trouble swallowing  Review of Systems   Review of Systems   HENT: Positive for sore throat, trouble swallowing and voice change  All other systems reviewed and are negative          Current Medications       Current Outpatient Medications:     cyclobenzaprine (FLEXERIL) 10 mg tablet, Take 1 tablet (10 mg total) by mouth 3 (three) times a day as needed for muscle spasms, Disp: 30 tablet, Rfl: 0    meloxicam (MOBIC) 15 mg tablet, Take 1 tablet (15 mg total) by mouth daily, Disp: 30 tablet, Rfl: 1    azithromycin (ZITHROMAX) 250 mg tablet, Take 2 tablets today then 1 tablet daily x 4 days, Disp: 6 tablet, Rfl: 0    benzonatate (TESSALON) 200 MG capsule, Take 1 capsule (200 mg total) by mouth 3 (three) times a day as needed for cough (Patient not taking: Reported on 2020), Disp: 20 capsule, Rfl: 0    fluticasone (FLONASE) 50 mcg/act nasal spray, 1 spray into each nostril daily (Patient not taking: Reported on 2020), Disp: 16 g, Rfl: 0    Current Allergies     Allergies as of 2020 - Reviewed 2020   Allergen Reaction Noted    Penicillins Anaphylaxis 2016    Clindamycin/lincomycin  2016    Levaquin [levofloxacin] Facial Swelling 2018            The following portions of the patient's history were reviewed and updated as appropriate: allergies, current medications, past family history, past medical history, past social history, past surgical history and problem list      Past Medical History:   Diagnosis Date    Obesity 4/15/2013    Primary osteoarthritis involving multiple joints 2020       Past Surgical History:   Procedure Laterality Date    ABDOMINAL SURGERY       SECTION      MOUTH SURGERY      MYRINGOTOMY      TUBAL LIGATION         Family History   Problem Relation Age of Onset    Hypertension Mother     Irritable bowel syndrome Mother     Diverticulitis Mother     Depression Mother     Heart murmur Mother     Hypertension Father     Spina bifida Father     Multiple sclerosis Brother     Lung disease Brother     COPD Family     Emphysema Family          Medications have been verified  Objective   /92   Pulse 74   Temp (!) 97 4 °F (36 3 °C)   Resp 18   Ht 5' 3" (1 6 m)   Wt (!) 148 kg (326 lb)   SpO2 98%   BMI 57 75 kg/m²        Physical Exam     Physical Exam   Constitutional: She is oriented to person, place, and time  She appears well-developed and well-nourished  Non-toxic appearance  She does not appear ill  No distress  HENT:   Head: Normocephalic and atraumatic  Right Ear: Hearing normal    Left Ear: Hearing normal    Mouth/Throat: Mucous membranes are normal  Uvula swelling present   Posterior oropharyngeal edema and posterior oropharyngeal erythema present  No oropharyngeal exudate or tonsillar abscesses  Tonsils are 2+ on the right  Tonsils are 2+ on the left  Eyes: Pupils are equal, round, and reactive to light  EOM are normal    Neck: Normal range of motion  Cardiovascular: Normal rate, regular rhythm and normal heart sounds  Pulmonary/Chest: Effort normal and breath sounds normal    Abdominal: Soft  Bowel sounds are normal    Neurological: She is alert and oriented to person, place, and time  Skin: Skin is warm and dry  Capillary refill takes less than 2 seconds  Psychiatric: She has a normal mood and affect  Her behavior is normal    Nursing note and vitals reviewed      Rapid a strep negative

## 2020-02-08 NOTE — PATIENT INSTRUCTIONS
You have been prescribed azithromycin - take as prescsribed  Take tylenol or motrin as needed for pain or fever  You are to continue warm salt water gargles  Suck on lozenges  You are to follow up with your PCP  If symptoms continue see ENT  Go to the ED if symptoms worsen    Laryngitis   WHAT YOU NEED TO KNOW:   Laryngitis is when your larynx is swollen because of an infection or irritation  The larynx is also called the voice box because it contains your vocal cords  Your vocal cords also swell and change shape, which can cause your voice to sound different  DISCHARGE INSTRUCTIONS:   Take your medicine as directed  Contact your healthcare provider if you think your medicine is not helping or if you have side effects  Tell him of her if you are allergic to any medicine  Keep a list of the medicines, vitamins, and herbs you take  Include the amounts, and when and why you take them  Bring the list or the pill bottles to follow-up visits  Carry your medicine list with you in case of an emergency  Prevent laryngitis:   · Rest your voice:  Do not shout or scream if you get laryngitis often  This will help prevent swelling and irritation of your larynx  · Avoid irritants and harmful substances:  Do not breathe in chemicals or allergens, such as pollen  Alcohol and tobacco can also irritate your larynx  · Avoid foods and liquids that can cause acid reflux: These may include carbonated drinks, spicy foods and sauces, citrus fruit, peppermint, and chocolate  · Avoid the spread of germs:        [facility] your hands often with soap and water  Carry germ-killing gel with you  You can use the gel to clean your hands when there is no soap and water available  ¨ Do not touch your eyes, nose, or mouth unless you have washed your hands first     ¨ Always cover your mouth when you cough  Cough into a tissue or your shirtsleeve so you do not spread germs from your hands      ¨ Try to avoid people who have a cold or the flu  If you are sick, stay away from others as much as possible  Follow up with your healthcare provider as directed:  Write down your questions so you remember to ask them during your visits  Contact your healthcare provider if:   · You have a fever  · You feel large, tender lumps in your neck  · You are hoarse for more than 7 days  · You have new or increased throat pain  · You have questions about your condition or care  Return to the emergency department if:   · Your throat is bleeding  · You are hoarse for more than 7 days and your chest feels tight  · You are drooling and have trouble swallowing  · You have trouble breathing  © 2017 [employer]  Information is for End User's use only and may not be sold, redistributed or otherwise used for commercial purposes  All illustrations and images included in CareNotes® are the copyrighted property of [employer]  or [employer]  The above information is an  only  It is not intended as medical advice for individual conditions or treatments  Talk to your doctor, nurse or pharmacist before following any medical regimen to see if it is safe and effective for you  Pharyngitis   WHAT YOU NEED TO KNOW:   Pharyngitis, or sore throat, is inflammation of the tissues and structures in your pharynx (throat)  Pharyngitis is most often caused by bacteria  It may also be caused by a cold or flu virus  Other causes include smoking, allergies, or acid reflux  DISCHARGE INSTRUCTIONS:   Call 911 for any of the following:   · You have trouble breathing or swallowing because your throat is swollen or sore  Return to the emergency department if:   · You are drooling because it hurts too much to swallow  · Your fever is higher than 102? F (39?C) or lasts longer than 3 days  · You are confused  · You taste blood in your throat  Contact your healthcare provider if:   · Your throat pain gets worse      · You have a painful lump in your throat that does not go away after 5 days  · Your symptoms do not improve after 5 days  · You have questions or concerns about your condition or care  Medicines:  Viral pharyngitis will go away on its own without treatment  Your sore throat should start to feel better in 3 to 5 days for both viral and bacterial infections  You may need any of the following:  · Antibiotics  treat a bacterial infection  · NSAIDs , such as ibuprofen, help decrease swelling, pain, and fever  NSAIDs can cause stomach bleeding or kidney problems in certain people  If you take blood thinner medicine, always ask your healthcare provider if NSAIDs are safe for you  Always read the medicine label and follow directions  · Acetaminophen  decreases pain and fever  It is available without a doctor's order  Ask how much to take and how often to take it  Follow directions  Acetaminophen can cause liver damage if not taken correctly  · Take your medicine as directed  Contact your healthcare provider if you think your medicine is not helping or if you have side effects  Tell him or her if you are allergic to any medicine  Keep a list of the medicines, vitamins, and herbs you take  Include the amounts, and when and why you take them  Bring the list or the pill bottles to follow-up visits  Carry your medicine list with you in case of an emergency  Manage your symptoms:   · Gargle salt water  Mix ¼ teaspoon salt in an 8 ounce glass of warm water and gargle  This may help decrease swelling in your throat  · Drink liquids as directed  You may need to drink more liquids than usual  Liquids may help soothe your throat and prevent dehydration  Ask how much liquid to drink each day and which liquids are best for you  · Use a cool-steam humidifier  to help moisten the air in your room and calm your cough  · Soothe your throat  with cough drops, ice, soft foods, or popsicles    Prevent the spread of pharyngitis:  Cover your mouth and nose when you cough or sneeze  Do not share food or drinks  Wash your hands often  Use soap and water  If soap and water are unavailable, use an alcohol based hand   Follow up with your healthcare provider as directed:  Write down your questions so you remember to ask them during your visits  © 2017 [employer] Information is for End User's use only and may not be sold, redistributed or otherwise used for commercial purposes  All illustrations and images included in CareNotes® are the copyrighted property of [employer]  or [employer]  The above information is an  only  It is not intended as medical advice for individual conditions or treatments  Talk to your doctor, nurse or pharmacist before following any medical regimen to see if it is safe and effective for you

## 2020-03-08 ENCOUNTER — OFFICE VISIT (OUTPATIENT)
Dept: URGENT CARE | Facility: CLINIC | Age: 40
End: 2020-03-08
Payer: COMMERCIAL

## 2020-03-08 VITALS
HEART RATE: 109 BPM | BODY MASS INDEX: 51.91 KG/M2 | WEIGHT: 293 LBS | TEMPERATURE: 97 F | OXYGEN SATURATION: 97 % | SYSTOLIC BLOOD PRESSURE: 124 MMHG | DIASTOLIC BLOOD PRESSURE: 71 MMHG | HEIGHT: 63 IN | RESPIRATION RATE: 20 BRPM

## 2020-03-08 DIAGNOSIS — J20.8 ACUTE BACTERIAL BRONCHITIS: Primary | ICD-10-CM

## 2020-03-08 DIAGNOSIS — B96.89 ACUTE BACTERIAL BRONCHITIS: Primary | ICD-10-CM

## 2020-03-08 DIAGNOSIS — R49.1 LOSS OF VOICE: ICD-10-CM

## 2020-03-08 PROCEDURE — 99203 OFFICE O/P NEW LOW 30 MIN: CPT | Performed by: NURSE PRACTITIONER

## 2020-03-08 PROCEDURE — 99283 EMERGENCY DEPT VISIT LOW MDM: CPT | Performed by: NURSE PRACTITIONER

## 2020-03-08 PROCEDURE — G0382 LEV 3 HOSP TYPE B ED VISIT: HCPCS | Performed by: NURSE PRACTITIONER

## 2020-03-08 RX ORDER — METHYLPREDNISOLONE 4 MG/1
TABLET ORAL
Qty: 21 TABLET | Refills: 0 | Status: SHIPPED | OUTPATIENT
Start: 2020-03-08 | End: 2020-06-15

## 2020-03-08 RX ORDER — DOXYCYCLINE 100 MG/1
100 TABLET ORAL 2 TIMES DAILY
Qty: 20 TABLET | Refills: 0 | Status: SHIPPED | OUTPATIENT
Start: 2020-03-08 | End: 2020-03-18

## 2020-03-08 NOTE — PROGRESS NOTES
[facility]        NAME: Roe Salazar is a 35 y o  female  : 1980    MRN: [id number]  DATE: 2020  TIME: 11:24 AM    Assessment and Plan   Acute bacterial bronchitis [J20 8, B96 89]  1  Acute bacterial bronchitis  methylprednisolone (MEDROL) 4 mg tablet    doxycycline (ADOXA) 100 MG tablet   2  Loss of voice  Ambulatory Referral to Otolaryngology         Patient Instructions     Patient Instructions     Rest and drink extra fluids  Start antibiotic  Take probiotic  Medrol dose pack as prescribed  This may also help with you laryngitis  If no improvement with voice I would recommend you follow up with ENT  Referral placed  Tylenol as needed for pain  Cool mist humidification can be helpful  Follow up with PCP if no improvement  Go to ER with worsening symptoms, chest pain, difficultly breathing  Chief Complaint     Chief Complaint   Patient presents with    Earache     Both ears blocked     Sore Throat     since last night    Facial Pain         History of Present Illness   Roe Salazar presents to the clinic c/o    This is a 41-year-old female here today with complaints of earache, sore throat, facial pressure, sinus congestion, chest tightness  She states she has had loss of voice for about month and half  She states she usually treated with a Z-Reed for pharyngitis and her voice never returned  She states 5 days ago she did start cough  She has not noticed any wheezing  She has had some chest tightness and some heaviness  Pain is worse with coughing  She states yesterday she started with a severe sore throat  She feels as though she swelling needles  She has been eating and drinking  She has not had her influenza vaccine  She has not had any recent travel  She is a smoker  Review of Systems   Review of Systems   Constitutional: Positive for activity change, chills and fatigue  Negative for fever     HENT: Positive for congestion, sinus pressure, sinus pain, sore throat and voice change  Respiratory: Positive for cough  Cardiovascular: Negative  Gastrointestinal: Negative  Skin: Negative  Neurological: Negative  Psychiatric/Behavioral: Negative  Current Medications     Long-Term Medications   Medication Sig Dispense Refill    cyclobenzaprine (FLEXERIL) 10 mg tablet Take 1 tablet (10 mg total) by mouth 3 (three) times a day as needed for muscle spasms 30 tablet 0    fluticasone (FLONASE) 50 mcg/act nasal spray 1 spray into each nostril daily 16 g 0    meloxicam (MOBIC) 15 mg tablet Take 1 tablet (15 mg total) by mouth daily 30 tablet 1       Current Allergies     Allergies as of 03/08/2020 - Reviewed 03/08/2020   Allergen Reaction Noted    Penicillins Anaphylaxis 03/14/2016    Clindamycin/lincomycin  03/14/2016    Levaquin [levofloxacin] Facial Swelling 02/01/2018            The following portions of the patient's history were reviewed and updated as appropriate: allergies, current medications, past family history, past medical history, past social history, past surgical history and problem list     Objective   /71   Pulse (!) 109   Temp (!) 97 °F (36 1 °C) (Tympanic)   Resp 20   Ht 5' 3" (1 6 m)   Wt (!) 151 kg (332 lb 6 4 oz)   SpO2 97%   BMI 58 88 kg/m²        Physical Exam     Physical Exam   Constitutional: She is oriented to person, place, and time  She appears well-developed and well-nourished  She does not appear ill  HENT:   Right Ear: Tympanic membrane normal    Left Ear: Tympanic membrane normal    Mouth/Throat: Uvula is midline and oropharynx is clear and moist  No oropharyngeal exudate, posterior oropharyngeal edema or posterior oropharyngeal erythema  Tonsils are 1+ on the right  Tonsils are 1+ on the left  Neck: Normal range of motion  Neck supple  Pulmonary/Chest: Effort normal and breath sounds normal  No stridor  No respiratory distress  She has no wheezes  She has no rhonchi   She has no rales  She exhibits no tenderness  Neurological: She is alert and oriented to person, place, and time  Psychiatric: She has a normal mood and affect  Her behavior is normal    Nursing note and vitals reviewed

## 2020-03-12 NOTE — PROGRESS NOTES
PT Discharge    Today's date: 3/12/2020  Patient name: Arpita Fang  : 1980  MRN: [id number]  Referring provider: Shanta Correia DO  Dx:   Encounter Diagnosis     ICD-10-CM    1  Chronic cervical radiculopathy M54 12        Start Time: 0800  Stop Time: 0850  Total time in clinic (min): 50 minutes    Assessment  Assessment details: Arpita Fang is a 35 y o  female presenting to outpatient physical therapy with diagnosis of Chronic cervical radiculopathy  Chronic cervical pain  Patient's current impairments include cerv and upper thoracic pain, impaired soft tissue mobility, reduced cerv and B shld  range of motion, reduced cerv and B shld  strength, reduced postural awareness, and reduced activity tolerance  Patient's present functional limitations include difficulty with ADLs, reliance on medication and/or modalities for pain relief, poor tolerance for functional mobility and activity, and difficulty completing work/HH  responsibilities  Patient to benefit from skilled outpatient physical therapy 2x/week for 6  weeks in order to reduce pain, maximize pain free range of motion, increase strength and stability, and improve functional mobility/functional activity in order to maximize return to prior level of function with reduced limitations  Thank you for your referral   3/12/20 UPDATE: Pt attended only 3 PT sessions and then self discharged  Pt did not achieve goals or show progress due to brevity of course of PT  Will DC PT at this time  Impairments: abnormal or restricted ROM, activity intolerance, impaired physical strength and lacks appropriate home exercise program    Symptom irritability: highBarriers to therapy: Significantly obese  Understanding of Dx/Px/POC: good   Prognosis: fair    Goals  STGs to be achieved in 4 weeks:  1   Pt to demonstrate reduced subjective pain rating "at worst" by at least 2-3 points from Initial Eval in order to allow for reduced pain with ADLs and improved functional activity tolerance  2  Pt to demonstrate increased AROM of c-spine  by at least 5-10 degrees in order to allow for greater ease and independence with ADLs and functional mobility  3  Pt to demonstrate full PROM of c-spine in order to maximize joint mobility and function and allow for progression of exercise program and achievement of goals  4  Pt to demonstrate increased MMT of neck and B shldrs by at least 1/2-1 grade in order to improve safety and stability with ADLs and functional mobility  LTGs to be achieved in 6-8 weeks:  1  Pt will be I with HEP in order to continue to improve quality of life and independence and reduce risk for re-injury  2  Pt to demonstrate return to [facility] chores and  Driving   without limitations or restrictions  3  Pt to demonstrate improved function as noted by achieving or exceeding predicted score on FOTO outcomes assessment tool  3/12/20: Pt did not achieve goals due to self DC after only 3 visits  Plan  Plan details: 3/12/20  DC PT        Subjective Evaluation    History of Present Illness  Mechanism of injury: Pt states she has had cerv pain for 5-6 years and has been told she has straightening of the cerv pain  Pt states she gets shock sensation down the R arm and neck and at its worst her R arm "gets paralyzed"  Current symptoms include rhomboid pain up into neck, numbness and tingling down arm into 2nd thru 5th digits, also has pressure posterior neck and head  Symptoms are relieved by meds at night and allow her to sleep, but does not take meds during the day bc the,y make her groggy so so symptoms are worse during the day  Pain limits pt from playing with kids, cannot reach above head for more than several seconds, diff lifting, diff driving,diff moving furniture to clean, effects sleep-wakes 3-4x per night w/o meds, diff carrying groceries            Recurrent probem    Quality of life: fair    Pain  Current pain ratin  At best pain rating: 4  At worst pain rating: 10  Quality: sharp, radiating, pressure, dull ache, burning and needle-like  Relieving factors: medications, relaxation, rest, change in position and support  Aggravating factors: lifting and overhead activity (turning head too quick, staying in one position for too long)  Progression: worsening    Social Support  Steps to enter house: yes  Stairs in house: yes   Lives in: multiple-level home  Lives with: spouse and young children    Employment status: working ( at [employer], substitute list as )  Hand dominance: left    Treatments  Previous treatment: physical therapy and medication  Current treatment: medication and physical therapy  Patient Goals  Patient goals for therapy: decreased pain, increased motion and increased strength  Patient goal: reduce pain level        Objective     Concurrent Complaints  Positive for disturbed sleep, dizziness, headaches and visual change (eye mvmts when looking at something quickly  ?  nystagmus)  Postural Observations  Seated posture: good  Standing posture: good    Additional Postural Observation Details  Slight fwd head, pt reports pain increases with slouching    Palpation   Left   Tenderness of the cervical interspinals, cervical paraspinals, levator scapulae, rhomboids, sternocleidomastoid, suboccipitals and upper trapezius  Right   Tenderness of the cervical interspinals, cervical paraspinals, levator scapulae, rhomboids, sternocleidomastoid, suboccipitals and upper trapezius  Tenderness   Cervical Spine   Tenderness in the spinous process and right scapula       Neurological Testing     Sensation   Cervical/Thoracic   Left   Intact: light touch, hot/cold discrimination and proprioception    Right   Intact: hot/cold discrimination and proprioception  Paresthesia: light touch    Active Range of Motion   Cervical/Thoracic Spine       Cervical    Flexion: 20 degrees  Restriction level: moderate  Extension: 25 degrees     Restriction level: moderate  Left lateral flexion: 17 degrees     Restriction level: moderate  Right lateral flexion: 25 degrees     Restriction level moderate  Left rotation: 50 degrees Restriction level: moderate  Right rotation: 45 degrees    Restriction level: moderate  Left Shoulder   Flexion: 140 degrees   Abduction: 110 degrees   External rotation 0°: WFL  Internal rotation 0°: WFL    Right Shoulder   Flexion: 140 degrees   Abduction: 110 degrees   External rotation 0°: WFL  Internal rotation 0°: WFL  Mechanical Assessment    Cervical    Seated Flexion:  repeated movements  Pain location: peripheralized  Seated Extension: repeated movements  Pain location: no change  Pain intensity: worse    Thoracic      Lumbar      Strength/Myotome Testing   Cervical Spine   Neck extension: 3+  Neck flexion: 3+    Left   Neck lateral flexion (C3): 4    Right   Neck lateral flexion (C3): 4    Left Shoulder     Planes of Motion   Flexion: 4+   Extension: 4+   Abduction: 4+   External rotation at 0°: 5   Internal rotation at 0°: 5     Right Shoulder     Planes of Motion   Flexion: 4+   Extension: 4+   Abduction: 4+   External rotation at 0°: 5   Internal rotation at 0°: 5     Left Elbow   Flexion: 5    Right Elbow   Flexion: 5    Left Wrist/Hand   Wrist extension: 5  Wrist flexion: 5    Right Wrist/Hand   Wrist extension: 5  Wrist flexion: 5    Tests   Cervical   Positive vertical compression and lumbar distraction test     Right Shoulder   Positive Jesus  Lumbar   Positive vertical compression   General Comments:    Upper quarter screen   Elbow: unremarkable  Hand/wrist: unremarkable    Shoulder Comments   End range shldr limitations due to pain  Neuro Exam:     Headaches   Patient reports headaches: Yes         Flowsheet Rows      Most Recent Value   PT/OT G-Codes   Current Score  39   Projected Score  53             Precautions: none      Re-eval Date:     Date 1/21       Visit Count 1       FOTO completed Pain In        Pain Out              Manual              Manual txn             MFR             cerv PROM                                         Date            Aerobic            UT stretch            Levator stretch            Cervical AROM -> end range stretch            Chin tucks -> chick tuck stabilizer            Deep neck flexor endurance            Scap retraction            Shoulder raises with cervical stabilization            B ER with cervical stabilization            Wipers            Cervical isometrics             Scap clocks             MTP/LTP            BUCKY cervical extension with Tband            Prayer stretch            Prone I-T-Y            Serratus Punch            Thoracic extension            Bent over rows            Pec Stretch            Cat-n-camel            Scalene Stretch            Wall push ups            Iron Neck/Iron Helmet            Self Snags            Postural correction activities            Prone cervical extension with chin tuck                    Modalities              HP to c-spine

## 2020-06-15 ENCOUNTER — OFFICE VISIT (OUTPATIENT)
Dept: INTERNAL MEDICINE CLINIC | Facility: CLINIC | Age: 40
End: 2020-06-15
Payer: COMMERCIAL

## 2020-06-15 VITALS
WEIGHT: 293 LBS | DIASTOLIC BLOOD PRESSURE: 88 MMHG | HEIGHT: 63 IN | HEART RATE: 108 BPM | BODY MASS INDEX: 51.91 KG/M2 | OXYGEN SATURATION: 96 % | SYSTOLIC BLOOD PRESSURE: 120 MMHG | RESPIRATION RATE: 18 BRPM | TEMPERATURE: 98.2 F

## 2020-06-15 DIAGNOSIS — M15.9 PRIMARY OSTEOARTHRITIS INVOLVING MULTIPLE JOINTS: ICD-10-CM

## 2020-06-15 DIAGNOSIS — R79.89 ABNORMAL THYROID BLOOD TEST: ICD-10-CM

## 2020-06-15 DIAGNOSIS — E78.2 MIXED HYPERLIPIDEMIA: Primary | ICD-10-CM

## 2020-06-15 DIAGNOSIS — N91.2 AMENORRHEA: ICD-10-CM

## 2020-06-15 DIAGNOSIS — Z72.0 TOBACCO ABUSE: ICD-10-CM

## 2020-06-15 DIAGNOSIS — Z11.4 SCREENING FOR HIV (HUMAN IMMUNODEFICIENCY VIRUS): ICD-10-CM

## 2020-06-15 PROCEDURE — 4004F PT TOBACCO SCREEN RCVD TLK: CPT | Performed by: INTERNAL MEDICINE

## 2020-06-15 PROCEDURE — 99214 OFFICE O/P EST MOD 30 MIN: CPT | Performed by: INTERNAL MEDICINE

## 2020-06-15 PROCEDURE — 3008F BODY MASS INDEX DOCD: CPT | Performed by: INTERNAL MEDICINE

## 2020-06-16 ENCOUNTER — APPOINTMENT (OUTPATIENT)
Dept: LAB | Facility: CLINIC | Age: 40
End: 2020-06-16
Payer: COMMERCIAL

## 2020-06-16 DIAGNOSIS — E78.2 MIXED HYPERLIPIDEMIA: ICD-10-CM

## 2020-06-16 DIAGNOSIS — R79.89 ABNORMAL THYROID BLOOD TEST: ICD-10-CM

## 2020-06-16 DIAGNOSIS — Z11.4 SCREENING FOR HIV (HUMAN IMMUNODEFICIENCY VIRUS): ICD-10-CM

## 2020-06-16 DIAGNOSIS — N91.2 AMENORRHEA: ICD-10-CM

## 2020-06-16 LAB
ALBUMIN SERPL BCP-MCNC: 3 G/DL (ref 3.5–5)
ALP SERPL-CCNC: 83 U/L (ref 46–116)
ALT SERPL W P-5'-P-CCNC: 22 U/L (ref 12–78)
ANION GAP SERPL CALCULATED.3IONS-SCNC: 5 MMOL/L (ref 4–13)
AST SERPL W P-5'-P-CCNC: 11 U/L (ref 5–45)
B-HCG SERPL-ACNC: <2 MIU/ML
BASOPHILS # BLD AUTO: 0.04 THOUSANDS/ΜL (ref 0–0.1)
BASOPHILS NFR BLD AUTO: 1 % (ref 0–1)
BILIRUB SERPL-MCNC: 0.31 MG/DL (ref 0.2–1)
BUN SERPL-MCNC: 11 MG/DL (ref 5–25)
CALCIUM SERPL-MCNC: 9.1 MG/DL (ref 8.3–10.1)
CHLORIDE SERPL-SCNC: 107 MMOL/L (ref 100–108)
CHOLEST SERPL-MCNC: 142 MG/DL (ref 50–200)
CO2 SERPL-SCNC: 26 MMOL/L (ref 21–32)
CREAT SERPL-MCNC: 0.72 MG/DL (ref 0.6–1.3)
EOSINOPHIL # BLD AUTO: 0.25 THOUSAND/ΜL (ref 0–0.61)
EOSINOPHIL NFR BLD AUTO: 4 % (ref 0–6)
ERYTHROCYTE [DISTWIDTH] IN BLOOD BY AUTOMATED COUNT: 16.7 % (ref 11.6–15.1)
FSH SERPL-ACNC: 1.2 MIU/ML
GFR SERPL CREATININE-BSD FRML MDRD: 105 ML/MIN/1.73SQ M
GLUCOSE P FAST SERPL-MCNC: 90 MG/DL (ref 65–99)
HCT VFR BLD AUTO: 41.4 % (ref 34.8–46.1)
HDLC SERPL-MCNC: 26 MG/DL
HGB BLD-MCNC: 13.1 G/DL (ref 11.5–15.4)
IMM GRANULOCYTES # BLD AUTO: 0.04 THOUSAND/UL (ref 0–0.2)
IMM GRANULOCYTES NFR BLD AUTO: 1 % (ref 0–2)
LDLC SERPL CALC-MCNC: 86 MG/DL (ref 0–100)
LYMPHOCYTES # BLD AUTO: 1.41 THOUSANDS/ΜL (ref 0.6–4.47)
LYMPHOCYTES NFR BLD AUTO: 21 % (ref 14–44)
MCH RBC QN AUTO: 28.2 PG (ref 26.8–34.3)
MCHC RBC AUTO-ENTMCNC: 31.6 G/DL (ref 31.4–37.4)
MCV RBC AUTO: 89 FL (ref 82–98)
MONOCYTES # BLD AUTO: 0.54 THOUSAND/ΜL (ref 0.17–1.22)
MONOCYTES NFR BLD AUTO: 8 % (ref 4–12)
NEUTROPHILS # BLD AUTO: 4.5 THOUSANDS/ΜL (ref 1.85–7.62)
NEUTS SEG NFR BLD AUTO: 65 % (ref 43–75)
NRBC BLD AUTO-RTO: 0 /100 WBCS
PLATELET # BLD AUTO: 240 THOUSANDS/UL (ref 149–390)
PMV BLD AUTO: 12.5 FL (ref 8.9–12.7)
POTASSIUM SERPL-SCNC: 4.3 MMOL/L (ref 3.5–5.3)
PROLACTIN SERPL-MCNC: 12.6 NG/ML
PROT SERPL-MCNC: 6.6 G/DL (ref 6.4–8.2)
RBC # BLD AUTO: 4.64 MILLION/UL (ref 3.81–5.12)
SODIUM SERPL-SCNC: 138 MMOL/L (ref 136–145)
TRIGL SERPL-MCNC: 149 MG/DL
TSH SERPL DL<=0.05 MIU/L-ACNC: 3.36 UIU/ML (ref 0.36–3.74)
WBC # BLD AUTO: 6.78 THOUSAND/UL (ref 4.31–10.16)

## 2020-06-16 PROCEDURE — 84702 CHORIONIC GONADOTROPIN TEST: CPT

## 2020-06-16 PROCEDURE — 83520 IMMUNOASSAY QUANT NOS NONAB: CPT

## 2020-06-16 PROCEDURE — 84403 ASSAY OF TOTAL TESTOSTERONE: CPT

## 2020-06-16 PROCEDURE — 82672 ASSAY OF ESTROGEN: CPT

## 2020-06-16 PROCEDURE — 80053 COMPREHEN METABOLIC PANEL: CPT

## 2020-06-16 PROCEDURE — 84402 ASSAY OF FREE TESTOSTERONE: CPT

## 2020-06-16 PROCEDURE — 84146 ASSAY OF PROLACTIN: CPT

## 2020-06-16 PROCEDURE — 84443 ASSAY THYROID STIM HORMONE: CPT

## 2020-06-16 PROCEDURE — 83001 ASSAY OF GONADOTROPIN (FSH): CPT

## 2020-06-16 PROCEDURE — 80061 LIPID PANEL: CPT

## 2020-06-16 PROCEDURE — 87389 HIV-1 AG W/HIV-1&-2 AB AG IA: CPT

## 2020-06-16 PROCEDURE — 36415 COLL VENOUS BLD VENIPUNCTURE: CPT

## 2020-06-16 PROCEDURE — 85025 COMPLETE CBC W/AUTO DIFF WBC: CPT

## 2020-06-17 LAB
HIV 1+2 AB+HIV1 P24 AG SERPL QL IA: NORMAL
TESTOST FREE SERPL-MCNC: 2.2 PG/ML (ref 0–4.2)
TESTOST SERPL-MCNC: 24 NG/DL (ref 8–48)

## 2020-06-18 LAB
ESTROGEN SERPL-MCNC: 283 PG/ML
TSH RECEP AB SER-ACNC: <1.1 IU/L (ref 0–1.75)

## 2020-07-15 ENCOUNTER — OFFICE VISIT (OUTPATIENT)
Dept: INTERNAL MEDICINE CLINIC | Facility: CLINIC | Age: 40
End: 2020-07-15
Payer: COMMERCIAL

## 2020-07-15 VITALS
HEIGHT: 63 IN | HEART RATE: 104 BPM | OXYGEN SATURATION: 97 % | WEIGHT: 293 LBS | RESPIRATION RATE: 18 BRPM | BODY MASS INDEX: 51.91 KG/M2 | DIASTOLIC BLOOD PRESSURE: 80 MMHG | TEMPERATURE: 97.7 F | SYSTOLIC BLOOD PRESSURE: 122 MMHG

## 2020-07-15 DIAGNOSIS — N92.6 ABNORMAL MENSTRUAL CYCLE: ICD-10-CM

## 2020-07-15 DIAGNOSIS — Z12.39 SCREENING FOR BREAST CANCER: ICD-10-CM

## 2020-07-15 DIAGNOSIS — N91.2 AMENORRHEA: Primary | ICD-10-CM

## 2020-07-15 PROCEDURE — 3008F BODY MASS INDEX DOCD: CPT | Performed by: INTERNAL MEDICINE

## 2020-07-15 PROCEDURE — 99213 OFFICE O/P EST LOW 20 MIN: CPT | Performed by: INTERNAL MEDICINE

## 2020-07-15 PROCEDURE — 4004F PT TOBACCO SCREEN RCVD TLK: CPT | Performed by: INTERNAL MEDICINE

## 2020-07-15 NOTE — PATIENT INSTRUCTIONS
Amenorrhea   WHAT YOU NEED TO KNOW:   What is amenorrhea? Amenorrhea is the absence of menstruation (your monthly period)  Primary amenorrhea means your period did not start by age 12  This is usually because of a lack of reproductive organs, such as a uterus  Breasts and other signs of puberty that usually start to develop by age 15 do not develop  Secondary amenorrhea means you stopped having regular periods for at least 3 months or irregular periods for 6 months  Amenorrhea may be a sign of a serious medical problem that needs to be treated  What causes amenorrhea? Your period may stop while you are breastfeeding or under more stress than usual  Birth control pills may prevent your period from starting for a few months after you stop taking them  The following may also cause amenorrhea:  · Scarring in your uterus, an abnormally shaped vagina, or a narrow cervix    · Medicines such as those used to treat depression, high blood pressure, or pain    · A hormone imbalance from a condition such as PCOS, thyroid problems, or a pituitary tumor    · Premature menopause, or ovarian failure from treatments such as chemotherapy    · Low body weight or too much exercise    · Not enough calories because of an eating disorder, such as anorexia, or a low-calorie diet    · Bulimia (bingeing and purging to control weight)    · Large amount of weight gained or lost  What other signs or symptoms may I have with amenorrhea? · Hair growth on your face or over your breastbone, or bald patches    · Acne    · Pelvic pain    · Headaches    · Vision changes    · Bruises or patches of dark skin  How is the cause of amenorrhea diagnosed and treated? · Your healthcare provider may use blood tests, an ultrasound, or an MRI to find the cause  Do not enter the MRI room with anything metal  Metal can cause serious damage  Tell the healthcare provider if you have any metal in or on your body   Your healthcare provider may use a scope to check your uterus and cervix during a procedure called hysteroscopy  · Treatment will depend on what is causing your amenorrhea  You may be given birth control pills to restart and regulate your periods  You may need medicines to treat medical conditions such as a thyroid or pituitary disorder, or PCOS  Surgery may fix a problem that is preventing blood from flowing through your vagina, or to remove a tumor  What can I do to prevent or manage amenorrhea? · Maintain a healthy weight  Low body weight, overweight, or obesity can all affect your period  Your healthcare provider can help you create a plan to reach and maintain a healthy weight  · Eat healthy foods  Healthy foods include fruits, vegetables, whole-grain breads, low-fat dairy products, beans, lean meats, and fish  You may need to have more calcium and vitamin D if your amenorrhea is caused by low estrogen levels  Low estrogen can affect bone strength  Calcium and vitamin D work together to help build bone  Your healthcare provider or a dietitian can help you create a meal plan that has the right number of calories and nutrients for you  · Exercise as directed  Exercise can help you build or maintain bone  Exercise can also help you lose weight if you are overweight  Ask your healthcare provider how much to exercise and which exercises are best for you  Do not exercise more than your healthcare provider recommends  Too much exercise can cause your period to stop  · Keep a record of your monthly periods  Record the dates your period started and stopped  Also record any pain or other problems during your period  · Manage stress  Try new ways to relax, such as deep breathing  Ask your healthcare provider for more information on stress management  What are the risks of amenorrhea? Amenorrhea may lead to infertility (not able to have children)  You may also develop osteoporosis   Osteoporosis is a serious condition that causes bones to become weak, brittle, and easily fractured  When should I contact my healthcare provider? · You notice changes in your menstrual cycle, such as periods that start and stop a few times  · Your female child is 15, has not started menstruating, and is not developing signs of puberty  · Your female child is 12 and has developed signs of puberty, but she has not started menstruating  · You have questions or concerns about your condition or care  CARE AGREEMENT:   You have the right to help plan your care  Learn about your health condition and how it may be treated  Discuss treatment options with your caregivers to decide what care you want to receive  You always have the right to refuse treatment  The above information is an  only  It is not intended as medical advice for individual conditions or treatments  Talk to your doctor, nurse or pharmacist before following any medical regimen to see if it is safe and effective for you  © 2017 [employer] Information is for End User's use only and may not be sold, redistributed or otherwise used for commercial purposes  All illustrations and images included in CareNotes® are the copyrighted property of [employer]  or [employer]

## 2020-07-15 NOTE — PROGRESS NOTES
Assessment/Plan:  Problem List Items Addressed This Visit     None      Visit Diagnoses     Amenorrhea    -  Primary    Abnormal menstrual cycle        Relevant Orders    Ambulatory referral to Obstetrics / Gynecology    Screening for breast cancer        Relevant Orders    Mammo screening bilateral w 3d & cad           Diagnoses and all orders for this visit:    Amenorrhea    Abnormal menstrual cycle  -     Ambulatory referral to Obstetrics / Gynecology; Future    Screening for breast cancer  -     Mammo screening bilateral w 3d & cad; Future        No problem-specific Assessment & Plan notes found for this encounter  A/P: Doing better, but uncertain if she will become regular  Due for a pap and mammo  Will refer to gyn  Discussed labs  Has a h/o ovarian cysts  ??PCOS or early agustin  Continue current treatment and RTC six months for routine  Subjective:      Patient ID: Alba Starr is a 35 y o  female  WF RTC for f/u labs and amenorrhea of uncertain etiology  Labs were good  Finally got her menses after three months  No new issues  The following portions of the patient's history were reviewed and updated as appropriate:   She has a past medical history of Obesity (4/15/2013) and Primary osteoarthritis involving multiple joints (2020)  ,  does not have any pertinent problems on file  ,   has a past surgical history that includes Abdominal surgery;  section; Mouth surgery; Tubal ligation; and Myringotomy  ,  family history includes COPD in her family; Depression in her mother; Diverticulitis in her mother; Emphysema in her family; Heart murmur in her mother; Hypertension in her father and mother; Irritable bowel syndrome in her mother; Lung disease in her brother; Multiple sclerosis in her brother; Spina bifida in her father  ,   reports that she has been smoking cigarettes  She has never used smokeless tobacco  She reports that she does not drink alcohol or use drugs  ,  is allergic to penicillins; clindamycin/lincomycin; and levaquin [levofloxacin]     Current Outpatient Medications   Medication Sig Dispense Refill    cyclobenzaprine (FLEXERIL) 10 mg tablet Take 1 tablet (10 mg total) by mouth 3 (three) times a day as needed for muscle spasms 30 tablet 0    meloxicam (MOBIC) 15 mg tablet Take 1 tablet (15 mg total) by mouth daily 30 tablet 1     No current facility-administered medications for this visit  Review of Systems   Constitutional: Negative for activity change, chills, diaphoresis, fatigue and fever  HENT: Negative  Eyes: Negative for visual disturbance  Respiratory: Negative for cough, chest tightness, shortness of breath and wheezing  Cardiovascular: Negative for chest pain, palpitations and leg swelling  Gastrointestinal: Negative for abdominal pain, constipation, diarrhea, nausea and vomiting  Endocrine: Negative for cold intolerance and heat intolerance  Genitourinary: Positive for menstrual problem  Negative for difficulty urinating, dysuria, frequency, pelvic pain, vaginal bleeding, vaginal discharge and vaginal pain  Musculoskeletal: Positive for arthralgias  Negative for gait problem and myalgias  Neurological: Negative for dizziness, light-headedness and headaches  Psychiatric/Behavioral: Negative for confusion, dysphoric mood and sleep disturbance  The patient is not nervous/anxious  PHQ-9 Depression Screening    PHQ-9:    Frequency of the following problems over the past two weeks:             Objective:  Vitals:    07/15/20 1256   BP: 122/80   BP Location: Left arm   Patient Position: Sitting   Cuff Size: Large   Pulse: 104   Resp: 18   Temp: 97 7 °F (36 5 °C)   TempSrc: Temporal   SpO2: 97%   Weight: (!) 158 kg (349 lb)   Height: 5' 3" (1 6 m)     Body mass index is 61 82 kg/m²  Physical Exam   Constitutional: She is oriented to person, place, and time  She appears well-developed and well-nourished  No distress     HENT:   Head: Normocephalic and atraumatic  Mouth/Throat: Oropharynx is clear and moist    Eyes: Pupils are equal, round, and reactive to light  Conjunctivae and EOM are normal    Neck: Neck supple  No JVD present  Cardiovascular: Normal rate, regular rhythm and normal heart sounds  Pulmonary/Chest: Effort normal and breath sounds normal  No respiratory distress  She has no wheezes  She has no rales  Abdominal: Soft  Bowel sounds are normal  She exhibits no distension  There is no tenderness  Musculoskeletal: She exhibits no edema  Neurological: She is alert and oriented to person, place, and time  Psychiatric: She has a normal mood and affect  Her behavior is normal  Judgment and thought content normal    Nursing note and vitals reviewed

## 2020-07-23 ENCOUNTER — HOSPITAL ENCOUNTER (OUTPATIENT)
Dept: MAMMOGRAPHY | Facility: HOSPITAL | Age: 40
Discharge: HOME | End: 2020-07-23
Payer: COMMERCIAL

## 2020-07-23 VITALS — HEIGHT: 63 IN | WEIGHT: 293 LBS | BODY MASS INDEX: 51.91 KG/M2

## 2020-07-23 DIAGNOSIS — Z12.39 SCREENING FOR BREAST CANCER: ICD-10-CM

## 2020-07-23 PROCEDURE — 77063 BREAST TOMOSYNTHESIS BI: CPT

## 2020-07-23 PROCEDURE — 77067 SCR MAMMO BI INCL CAD: CPT

## 2020-08-26 ENCOUNTER — OFFICE VISIT (OUTPATIENT)
Dept: OBGYN CLINIC | Facility: MEDICAL CENTER | Age: 40
End: 2020-08-26
Payer: COMMERCIAL

## 2020-08-26 VITALS — SYSTOLIC BLOOD PRESSURE: 118 MMHG | BODY MASS INDEX: 61.42 KG/M2 | WEIGHT: 293 LBS | DIASTOLIC BLOOD PRESSURE: 74 MMHG

## 2020-08-26 DIAGNOSIS — N93.9 ABNORMAL UTERINE BLEEDING: Primary | ICD-10-CM

## 2020-08-26 PROCEDURE — 88342 IMHCHEM/IMCYTCHM 1ST ANTB: CPT | Performed by: PATHOLOGY

## 2020-08-26 PROCEDURE — 88305 TISSUE EXAM BY PATHOLOGIST: CPT | Performed by: PATHOLOGY

## 2020-08-26 PROCEDURE — 58100 BIOPSY OF UTERUS LINING: CPT | Performed by: STUDENT IN AN ORGANIZED HEALTH CARE EDUCATION/TRAINING PROGRAM

## 2020-08-26 PROCEDURE — 4004F PT TOBACCO SCREEN RCVD TLK: CPT | Performed by: STUDENT IN AN ORGANIZED HEALTH CARE EDUCATION/TRAINING PROGRAM

## 2020-08-26 PROCEDURE — G0145 SCR C/V CYTO,THINLAYER,RESCR: HCPCS | Performed by: STUDENT IN AN ORGANIZED HEALTH CARE EDUCATION/TRAINING PROGRAM

## 2020-08-26 PROCEDURE — 87624 HPV HI-RISK TYP POOLED RSLT: CPT | Performed by: STUDENT IN AN ORGANIZED HEALTH CARE EDUCATION/TRAINING PROGRAM

## 2020-08-26 PROCEDURE — 99203 OFFICE O/P NEW LOW 30 MIN: CPT | Performed by: STUDENT IN AN ORGANIZED HEALTH CARE EDUCATION/TRAINING PROGRAM

## 2020-08-26 NOTE — PROGRESS NOTES
Endometrial biopsy    Date/Time: 8/26/2020 11:33 AM  Performed by: Prakash Marques MD  Authorized by: Prakash Marques MD     Consent:     Consent obtained:  Written    Consent given by:  Patient    Procedural risks discussed:  Bleeding, damage to other organs and repeat procedure    Patient questions answered: yes      Patient agrees, verbalizes understanding, and wants to proceed: yes      Educational handouts given: yes      Instructions and paperwork completed: yes    Indication:     Indications:  Other disorder of menstruation and other abnormal bleeding from female genital tract    Procedure:     Procedure: endometrial biopsy with Pipelle      A bivalve speculum was placed in the vagina: yes      Cervix cleaned and prepped: yes      A paracervical block was performed: yes      An intracervical block was performed: yes      The cervix was dilated: yes      Uterus sounded: yes      Uterus sound depth (cm):  8    Specimen collected: specimen collected and sent to pathology      Patient tolerated procedure well with no complications: yes    Findings:     Uterus size:  Non-gravid    Cervix: normal      Adnexa: normal        Marisa Monson MD  [address] [place] [address]  8/26/2020 11:35 AM

## 2020-08-26 NOTE — ASSESSMENT & PLAN NOTE
- Today we discussed diagnosis and management of abnormal uterine bleeding   - Endometrial biopsy and Pap performed today  - Complete Pelvic Ultrasound ordered  - Return to clinic in 3 weeks for follow up and further discussion of management of abnormal uterine bleeding  - Patient Education: Provided [employer] pamphlets on AUB

## 2020-08-26 NOTE — PROGRESS NOTES
[facility] of [address] [place] [address]    Assessment/Plan:  Abnormal uterine bleeding  - Today we discussed diagnosis and management of abnormal uterine bleeding   - Endometrial biopsy and Pap performed today  - Complete Pelvic Ultrasound ordered  - Return to clinic in 3 weeks for follow up and further discussion of management of abnormal uterine bleeding  - Patient Education: Provided [employer] pamphlets on AUB  Diagnoses and all orders for this visit:    Abnormal uterine bleeding  -     US pelvis complete non OB; Future  -     Tissue Exam  -     Liquid-based pap, screening          Subjective:   Patient ID: Hector March is a 38 y o   female  She presents today for management of abnormal uterine bleeding  She admits that she hasn't seen a gynecologist in quite sometime (~8 years) and has had increasingly heavy periods over that time  Periods are often monthly, but tend to last from 7-12 days, often passing large clots, and changing a large maxi-pad every hour  She denies dizziness, lightheadedness, fatigue, or cold intolerance  She hasn't had a pap smear in several years, reports a remote history of abnormal and the most recent was normal   She desires definitive surgical management of abnormal uterine bleeding and is not      The following portions of the patient's history were reviewed and updated as appropriate: allergies, current medications, past family history, past medical history, obstetric history, gynecologic history, past social history, past surgical history and problem list     Review of Systems   Constitutional: Negative for appetite change, fatigue, fever and unexpected weight change  HENT: Negative for congestion and rhinorrhea  Eyes: Negative for visual disturbance  Respiratory: Negative for cough and shortness of breath  Cardiovascular: Negative for chest pain and palpitations     Gastrointestinal: Negative for abdominal distention, abdominal pain, blood in stool, constipation, diarrhea, nausea and vomiting  Endocrine: Negative for cold intolerance and heat intolerance  Genitourinary: Positive for menstrual problem, pelvic pain and vaginal bleeding  Negative for dysuria, flank pain, hematuria, urgency and vaginal discharge  Musculoskeletal: Negative for back pain and myalgias  Skin: Negative for rash  Neurological: Negative for weakness and headaches  Hematological: Negative for adenopathy  Does not bruise/bleed easily  Psychiatric/Behavioral: Negative for dysphoric mood and suicidal ideas  The patient is not nervous/anxious  Objective:  /74   Wt (!) 157 kg (346 lb 11 2 oz)   LMP 08/25/2020 (Exact Date)   BMI 61 42 kg/m²    Physical Exam  Exam conducted with a chaperone present  Constitutional:       Appearance: Normal appearance  HENT:      Head: Normocephalic and atraumatic  Cardiovascular:      Rate and Rhythm: Normal rate  Pulmonary:      Effort: Pulmonary effort is normal    Abdominal:      General: There is no distension  Tenderness: There is no abdominal tenderness  There is no guarding  Genitourinary:     Exam position: Lithotomy position  Pubic Area: No rash  Labia:         Right: No rash, tenderness or lesion  Left: No rash, tenderness or lesion  Urethra: No prolapse, urethral swelling or urethral lesion  Vagina: Bleeding present  No vaginal discharge, erythema, tenderness or lesions  Cervix: No cervical motion tenderness, discharge, friability or erythema  Uterus: Not enlarged, not tender and no uterine prolapse  Adnexa:         Right: No mass, tenderness or fullness  Left: No mass, tenderness or fullness  Lymphadenopathy:      Lower Body: No right inguinal adenopathy  No left inguinal adenopathy  Neurological:      Mental Status: She is alert             Ying Parks MD  [facility]  [address] [place] [address] Network  8/26/2020 11:32 AM

## 2020-08-27 LAB
HPV HR 12 DNA CVX QL NAA+PROBE: NEGATIVE
HPV16 DNA CVX QL NAA+PROBE: NEGATIVE
HPV18 DNA CVX QL NAA+PROBE: NEGATIVE

## 2020-09-02 LAB
LAB AP GYN PRIMARY INTERPRETATION: NORMAL
LAB AP LMP: NORMAL
Lab: NORMAL

## 2020-09-16 NOTE — PROGRESS NOTES
[facility] [address] [place] [address]    Assessment/Plan:  Abnormal uterine bleeding  - Today we discussed the results of her endometrial biopsy and her Pap  - She is scheduled for pelvic ultrasound today  - We again discussed the conservative, medical, and surgical options for her abnormal bleeding  The patient is sure of her decision to proceed with hysterectomy  - We discussed the mode of surgery; I recommend Da anamaria assisted total laparoscopic hysterectomy with bilateral salpingectomy and cystoscopy with ovarian conservation (pending normal ovaries on Pelvic US today)  I recommend Da anamaria because of her BMI 61 and her prior CS x 3  The patient is amenable to this  - We discussed the risks of hysterectomy including bleeding, damage to pelvic/abdominal nearby structures, ureteral injury, bladder injury, bowel injury, anesthesia complications, and vaginal cuff complications  - We discussed the anticipated recovery; including that the procedure is typically a same day outpatient surgery but is still consider major surgery and the recommendation to take a few weeks off work  - We discussed the recommended lifting restrictions after surgery and the recommendation to avoid sexual intercourse for 8 weeks postop  - Pre-operative physical examination completed today today   - The patient gave written informed consent to proceed with surgery  - I sent a request for prior authorization and surgical scheduling request for Da anamaria assisted [facility] BS cysto on Dr Berlin Carballo time (as previously discussed with him)      Diagnoses and all orders for this visit:    Abnormal uterine bleeding    History of 3  sections    BMI 60 0-69 9, adult ([facility] [address] )          Subjective:   Kory Franco is a 38 y o   female  CC: Heavy menstrual bleeding     HPI: She was last seen by me on 20, where she presented as a new patient for management of abnormal uterine bleeding   At that time she admited that she hasn't seen a gynecologist in quite sometime (~8 years) and has had increasingly heavy periods over that time  Periods are often monthly, but tend to last from 7-12 days, often passing large clots, and changing a large maxi-pad every hour  She denies dizziness, lightheadedness, fatigue, or cold intolerance  She hasn't had a pap smear in several years, reports a remote history of abnormal and the most recent was normal   She desires definitive surgical management of abnormal uterine bleeding and is not interested in hormonal therapy or IUD  She had an EMB in my office that same day which showed the following:  A  Endometrium, Biopsy:  - Inactive to proliferative endometrium with extensive stromal and glandular breakdown   - No endometrial hyperplasia or carcinoma identified, supported by p53 immunostain  A Pap with co-testing was also completed and was normal     A pelvic ultrasound is scheduled for this afternoon  She presents today for follow up to discuss planning for definitive surgical management  She reports no changes to her symptoms  ROS: Review of Systems   Constitutional: Negative for appetite change, fatigue, fever and unexpected weight change  HENT: Negative for congestion and rhinorrhea  Eyes: Negative for visual disturbance  Respiratory: Negative for cough and shortness of breath  Cardiovascular: Negative for chest pain and palpitations  Gastrointestinal: Negative for abdominal distention, abdominal pain, blood in stool, constipation, diarrhea, nausea and vomiting  Endocrine: Negative for cold intolerance and heat intolerance  Genitourinary: Positive for menstrual problem, pelvic pain (with intercourse) and vaginal bleeding  Negative for dysuria, flank pain, hematuria, urgency and vaginal discharge  Musculoskeletal: Negative for back pain and myalgias  Skin: Negative for rash  Neurological: Negative for weakness and headaches  Hematological: Negative for adenopathy  Does not bruise/bleed easily  Psychiatric/Behavioral: Negative for dysphoric mood and suicidal ideas  The patient is not nervous/anxious  PFSH: The following portions of the patient's history were reviewed and updated as appropriate: allergies, current medications, past family history, past medical history, obstetric history, gynecologic history, past social history, past surgical history and problem list        Objective:  /80   Ht 5' 3" (1 6 m)   Wt (!) 157 kg (347 lb)   LMP 08/25/2020 (Exact Date)   BMI 61 47 kg/m²    Physical Exam  Constitutional:       Appearance: Normal appearance  She is obese  HENT:      Head: Normocephalic and atraumatic  Mouth/Throat:      Mouth: Mucous membranes are moist       Pharynx: Oropharynx is clear  No oropharyngeal exudate or posterior oropharyngeal erythema  Neck:      Musculoskeletal: Normal range of motion and neck supple  No muscular tenderness  Cardiovascular:      Rate and Rhythm: Normal rate and regular rhythm  Pulses: Normal pulses  Heart sounds: Normal heart sounds  No murmur  No friction rub  No gallop  Pulmonary:      Effort: Pulmonary effort is normal  No respiratory distress  Breath sounds: Normal breath sounds  Abdominal:      General: Abdomen is flat  Genitourinary:     Comments: Deferred  See pelvic exam from previous encounter  Musculoskeletal: Normal range of motion  Skin:     General: Skin is warm and dry  Neurological:      General: No focal deficit present  Mental Status: She is alert and oriented to person, place, and time     Psychiatric:         Mood and Affect: Mood normal          Behavior: Behavior normal            Olinda Parker MD  [address] [place] [address]  9/17/2020 10:03 AM

## 2020-09-17 ENCOUNTER — PREP FOR PROCEDURE (OUTPATIENT)
Dept: OBGYN CLINIC | Facility: MEDICAL CENTER | Age: 40
End: 2020-09-17

## 2020-09-17 ENCOUNTER — OFFICE VISIT (OUTPATIENT)
Dept: OBGYN CLINIC | Facility: MEDICAL CENTER | Age: 40
End: 2020-09-17
Payer: COMMERCIAL

## 2020-09-17 ENCOUNTER — TELEPHONE (OUTPATIENT)
Dept: OBGYN CLINIC | Facility: MEDICAL CENTER | Age: 40
End: 2020-09-17

## 2020-09-17 ENCOUNTER — HOSPITAL ENCOUNTER (OUTPATIENT)
Dept: ULTRASOUND IMAGING | Facility: HOSPITAL | Age: 40
Discharge: HOME | End: 2020-09-17
Payer: COMMERCIAL

## 2020-09-17 VITALS
WEIGHT: 293 LBS | SYSTOLIC BLOOD PRESSURE: 110 MMHG | DIASTOLIC BLOOD PRESSURE: 80 MMHG | BODY MASS INDEX: 51.91 KG/M2 | HEIGHT: 63 IN

## 2020-09-17 DIAGNOSIS — Z98.891 HISTORY OF 3 CESAREAN SECTIONS: ICD-10-CM

## 2020-09-17 DIAGNOSIS — N93.9 ABNORMAL UTERINE BLEEDING: Primary | ICD-10-CM

## 2020-09-17 DIAGNOSIS — N93.9 ABNORMAL UTERINE BLEEDING: ICD-10-CM

## 2020-09-17 PROCEDURE — 99214 OFFICE O/P EST MOD 30 MIN: CPT | Performed by: STUDENT IN AN ORGANIZED HEALTH CARE EDUCATION/TRAINING PROGRAM

## 2020-09-17 PROCEDURE — 4004F PT TOBACCO SCREEN RCVD TLK: CPT | Performed by: STUDENT IN AN ORGANIZED HEALTH CARE EDUCATION/TRAINING PROGRAM

## 2020-09-17 PROCEDURE — 76856 US EXAM PELVIC COMPLETE: CPT

## 2020-09-17 PROCEDURE — 76830 TRANSVAGINAL US NON-OB: CPT

## 2020-09-17 RX ORDER — CLINDAMYCIN PHOSPHATE 900 MG/50ML
900 INJECTION INTRAVENOUS ONCE
Status: CANCELLED | OUTPATIENT
Start: 2020-09-17 | End: 2020-09-17

## 2020-09-17 RX ORDER — GENTAMICIN SULFATE 80 MG/50ML
1.5 INJECTION, SOLUTION INTRAVENOUS ONCE
Status: CANCELLED | OUTPATIENT
Start: 2020-09-17 | End: 2020-09-17

## 2020-09-17 NOTE — ASSESSMENT & PLAN NOTE
- Today we discussed the results of her endometrial biopsy and her Pap  - She is scheduled for pelvic ultrasound today  - We again discussed the conservative, medical, and surgical options for her abnormal bleeding  The patient is sure of her decision to proceed with hysterectomy  - We discussed the mode of surgery; I recommend Da anamaria assisted total laparoscopic hysterectomy with bilateral salpingectomy and cystoscopy with ovarian conservation (pending normal ovaries on Pelvic US today)  I recommend Da anamaria because of her BMI 61 and her prior CS x 3  The patient is amenable to this  - We discussed the risks of hysterectomy including bleeding, damage to pelvic/abdominal nearby structures, ureteral injury, bladder injury, bowel injury, anesthesia complications, and vaginal cuff complications  - We discussed the anticipated recovery; including that the procedure is typically a same day outpatient surgery but is still consider major surgery and the recommendation to take a few weeks off work    - We discussed the recommended lifting restrictions after surgery and the recommendation to avoid sexual intercourse for 8 weeks postop  - Pre-operative physical examination completed today today   - The patient gave written informed consent to proceed with surgery  - I sent a request for prior authorization and surgical scheduling request for Da anamaria assisted [facility] BS cysto on Dr Veronica Bernabe time (as previously discussed with him)

## 2020-09-17 NOTE — TELEPHONE ENCOUNTER
----- Message from Kelsi Ram sent at 9/17/2020  3:35 PM EDT -----  Regarding: RE: Surgery Request  Spoke with Nancy Gerber at Dr Nimco Burch office  Pt is scheduled for 10/8/20 at 0730   Pt is aware    ----- Message -----  From: Edd Tesfaye MD  Sent: 9/17/2020   8:21 AM EDT  To: Kelsi Ram  Subject: Surgery Request                                  Surgery Request:    Procedure: [place] Purchase assisted total laparoscopic hysterectomy with bilateral salpingectomy and cystoscopy  Time: 3 hours or whatever is Dr Alfred Arrington standard block time for Yoel Purchase -- Dr Nayeli Saucedo said I could book it on his block time any time      Chacha Ramachandran MD  [address] [place] [address]  9/17/2020 8:22 AM

## 2020-09-17 NOTE — H&P
[facility] [address] [place] [address]    Assessment/Plan:  Abnormal uterine bleeding  - Today we discussed the results of her endometrial biopsy and her Pap  - She is scheduled for pelvic ultrasound today  - We again discussed the conservative, medical, and surgical options for her abnormal bleeding  The patient is sure of her decision to proceed with hysterectomy  - We discussed the mode of surgery; I recommend Da anamaria assisted total laparoscopic hysterectomy with bilateral salpingectomy and cystoscopy with ovarian conservation (pending normal ovaries on Pelvic US today)  I recommend Da anamaria because of her BMI 61 and her prior CS x 3  The patient is amenable to this  - We discussed the risks of hysterectomy including bleeding, damage to pelvic/abdominal nearby structures, ureteral injury, bladder injury, bowel injury, anesthesia complications, and vaginal cuff complications  - We discussed the anticipated recovery; including that the procedure is typically a same day outpatient surgery but is still consider major surgery and the recommendation to take a few weeks off work  - We discussed the recommended lifting restrictions after surgery and the recommendation to avoid sexual intercourse for 8 weeks postop  - Pre-operative physical examination completed today today   - The patient gave written informed consent to proceed with surgery  - I sent a request for prior authorization and surgical scheduling request for Da anamaria assisted [facility] BS cysto on Dr Nimco Burch time (as previously discussed with him)      Diagnoses and all orders for this visit:    Abnormal uterine bleeding    History of 3  sections    BMI 60 0-69 9, adult ([facility] [address] )          Subjective:   Kaiden Pichardo is a 38 y o   female  CC: Heavy menstrual bleeding     HPI: She was last seen by me on 20, where she presented as a new patient for management of abnormal uterine bleeding   At that time she admited that she hasn't seen a gynecologist in quite sometime (~8 years) and has had increasingly heavy periods over that time  Periods are often monthly, but tend to last from 7-12 days, often passing large clots, and changing a large maxi-pad every hour  She denies dizziness, lightheadedness, fatigue, or cold intolerance  She hasn't had a pap smear in several years, reports a remote history of abnormal and the most recent was normal   She desires definitive surgical management of abnormal uterine bleeding and is not interested in hormonal therapy or IUD  She had an EMB in my office that same day which showed the following:  A  Endometrium, Biopsy:  - Inactive to proliferative endometrium with extensive stromal and glandular breakdown   - No endometrial hyperplasia or carcinoma identified, supported by p53 immunostain  A Pap with co-testing was also completed and was normal     A pelvic ultrasound is scheduled for this afternoon  She presents today for follow up to discuss planning for definitive surgical management  She reports no changes to her symptoms  ROS: Review of Systems   Constitutional: Negative for appetite change, fatigue, fever and unexpected weight change  HENT: Negative for congestion and rhinorrhea  Eyes: Negative for visual disturbance  Respiratory: Negative for cough and shortness of breath  Cardiovascular: Negative for chest pain and palpitations  Gastrointestinal: Negative for abdominal distention, abdominal pain, blood in stool, constipation, diarrhea, nausea and vomiting  Endocrine: Negative for cold intolerance and heat intolerance  Genitourinary: Positive for menstrual problem, pelvic pain (with intercourse) and vaginal bleeding  Negative for dysuria, flank pain, hematuria, urgency and vaginal discharge  Musculoskeletal: Negative for back pain and myalgias  Skin: Negative for rash  Neurological: Negative for weakness and headaches  Hematological: Negative for adenopathy  Does not bruise/bleed easily  Psychiatric/Behavioral: Negative for dysphoric mood and suicidal ideas  The patient is not nervous/anxious  PFSH: The following portions of the patient's history were reviewed and updated as appropriate: allergies, current medications, past family history, past medical history, obstetric history, gynecologic history, past social history, past surgical history and problem list        Objective:  /80   Ht 5' 3" (1 6 m)   Wt (!) 157 kg (347 lb)   LMP 08/25/2020 (Exact Date)   BMI 61 47 kg/m²    Physical Exam  Constitutional:       Appearance: Normal appearance  She is obese  HENT:      Head: Normocephalic and atraumatic  Mouth/Throat:      Mouth: Mucous membranes are moist       Pharynx: Oropharynx is clear  No oropharyngeal exudate or posterior oropharyngeal erythema  Neck:      Musculoskeletal: Normal range of motion and neck supple  No muscular tenderness  Cardiovascular:      Rate and Rhythm: Normal rate and regular rhythm  Pulses: Normal pulses  Heart sounds: Normal heart sounds  No murmur  No friction rub  No gallop  Pulmonary:      Effort: Pulmonary effort is normal  No respiratory distress  Breath sounds: Normal breath sounds  Abdominal:      General: Abdomen is flat  Genitourinary:     Comments: Deferred  See pelvic exam from previous encounter  Musculoskeletal: Normal range of motion  Skin:     General: Skin is warm and dry  Neurological:      General: No focal deficit present  Mental Status: She is alert and oriented to person, place, and time     Psychiatric:         Mood and Affect: Mood normal          Behavior: Behavior normal            Marisa Monson MD  [address] [place] [address]  9/17/2020 10:28 AM

## 2020-09-18 ENCOUNTER — TELEPHONE (OUTPATIENT)
Dept: OBGYN CLINIC | Facility: MEDICAL CENTER | Age: 40
End: 2020-09-18

## 2020-09-29 ENCOUNTER — TELEPHONE (OUTPATIENT)
Dept: OBGYN CLINIC | Facility: MEDICAL CENTER | Age: 40
End: 2020-09-29

## 2020-09-29 NOTE — TELEPHONE ENCOUNTER
----- Message from Lonnie Gil sent at 9/29/2020  8:27 AM EDT -----  PA faxed   ----- Message -----  From: Lonnie Gil  Sent: 9/22/2020   9:39 AM EDT  To: Maria Victoria Paul    [id number] needs a PA through [employer]  Please submit on 9/28 as Ángel Gray was showing non par

## 2020-10-05 ENCOUNTER — APPOINTMENT (OUTPATIENT)
Dept: LAB | Facility: CLINIC | Age: 40
End: 2020-10-05
Payer: COMMERCIAL

## 2020-10-05 ENCOUNTER — ANESTHESIA EVENT (OUTPATIENT)
Dept: PERIOP | Facility: HOSPITAL | Age: 40
End: 2020-10-05
Payer: COMMERCIAL

## 2020-10-05 ENCOUNTER — TELEPHONE (OUTPATIENT)
Dept: OBGYN CLINIC | Facility: MEDICAL CENTER | Age: 40
End: 2020-10-05

## 2020-10-05 ENCOUNTER — TRANSCRIBE ORDERS (OUTPATIENT)
Dept: LAB | Facility: CLINIC | Age: 40
End: 2020-10-05

## 2020-10-05 DIAGNOSIS — Z01.818 PREOPERATIVE CLEARANCE: ICD-10-CM

## 2020-10-05 DIAGNOSIS — Z01.818 PREOPERATIVE CLEARANCE: Primary | ICD-10-CM

## 2020-10-05 DIAGNOSIS — Z91.89 RISK FACTORS FOR OBSTRUCTIVE SLEEP APNEA: Primary | ICD-10-CM

## 2020-10-05 DIAGNOSIS — E66.01 MORBID OBESITY (HCC): ICD-10-CM

## 2020-10-05 LAB
ALBUMIN SERPL BCP-MCNC: 3.6 G/DL (ref 3.5–5)
ALP SERPL-CCNC: 91 U/L (ref 46–116)
ALT SERPL W P-5'-P-CCNC: 30 U/L (ref 12–78)
ANION GAP SERPL CALCULATED.3IONS-SCNC: 4 MMOL/L (ref 4–13)
AST SERPL W P-5'-P-CCNC: 11 U/L (ref 5–45)
BILIRUB SERPL-MCNC: 0.2 MG/DL (ref 0.2–1)
BUN SERPL-MCNC: 14 MG/DL (ref 5–25)
CALCIUM SERPL-MCNC: 9.5 MG/DL (ref 8.3–10.1)
CHLORIDE SERPL-SCNC: 108 MMOL/L (ref 100–108)
CO2 SERPL-SCNC: 29 MMOL/L (ref 21–32)
CREAT SERPL-MCNC: 0.82 MG/DL (ref 0.6–1.3)
ERYTHROCYTE [DISTWIDTH] IN BLOOD BY AUTOMATED COUNT: 14.9 % (ref 11.6–15.1)
EST. AVERAGE GLUCOSE BLD GHB EST-MCNC: 111 MG/DL
GFR SERPL CREATININE-BSD FRML MDRD: 89 ML/MIN/1.73SQ M
GLUCOSE SERPL-MCNC: 91 MG/DL (ref 65–140)
HBA1C MFR BLD: 5.5 %
HCT VFR BLD AUTO: 43.4 % (ref 34.8–46.1)
HGB BLD-MCNC: 14.1 G/DL (ref 11.5–15.4)
MCH RBC QN AUTO: 29.1 PG (ref 26.8–34.3)
MCHC RBC AUTO-ENTMCNC: 32.5 G/DL (ref 31.4–37.4)
MCV RBC AUTO: 90 FL (ref 82–98)
PLATELET # BLD AUTO: 322 THOUSANDS/UL (ref 149–390)
PMV BLD AUTO: 12 FL (ref 8.9–12.7)
POTASSIUM SERPL-SCNC: 4.4 MMOL/L (ref 3.5–5.3)
PROT SERPL-MCNC: 7.3 G/DL (ref 6.4–8.2)
RBC # BLD AUTO: 4.84 MILLION/UL (ref 3.81–5.12)
SODIUM SERPL-SCNC: 141 MMOL/L (ref 136–145)
WBC # BLD AUTO: 10.76 THOUSAND/UL (ref 4.31–10.16)

## 2020-10-05 PROCEDURE — 36415 COLL VENOUS BLD VENIPUNCTURE: CPT

## 2020-10-05 PROCEDURE — 83036 HEMOGLOBIN GLYCOSYLATED A1C: CPT

## 2020-10-05 PROCEDURE — 80053 COMPREHEN METABOLIC PANEL: CPT

## 2020-10-05 PROCEDURE — 85027 COMPLETE CBC AUTOMATED: CPT

## 2020-10-05 RX ORDER — SODIUM CHLORIDE, SODIUM LACTATE, POTASSIUM CHLORIDE, CALCIUM CHLORIDE 600; 310; 30; 20 MG/100ML; MG/100ML; MG/100ML; MG/100ML
50 INJECTION, SOLUTION INTRAVENOUS CONTINUOUS
Status: CANCELLED | OUTPATIENT
Start: 2020-10-05

## 2020-10-08 ENCOUNTER — ANESTHESIA (OUTPATIENT)
Dept: PERIOP | Facility: HOSPITAL | Age: 40
End: 2020-10-08
Payer: COMMERCIAL

## 2020-10-08 ENCOUNTER — HOSPITAL ENCOUNTER (OUTPATIENT)
Facility: HOSPITAL | Age: 40
Setting detail: OUTPATIENT SURGERY
Discharge: HOME | End: 2020-10-08
Attending: STUDENT IN AN ORGANIZED HEALTH CARE EDUCATION/TRAINING PROGRAM | Admitting: STUDENT IN AN ORGANIZED HEALTH CARE EDUCATION/TRAINING PROGRAM
Payer: COMMERCIAL

## 2020-10-08 VITALS
WEIGHT: 293 LBS | RESPIRATION RATE: 18 BRPM | OXYGEN SATURATION: 93 % | HEIGHT: 63 IN | TEMPERATURE: 98.2 F | DIASTOLIC BLOOD PRESSURE: 84 MMHG | SYSTOLIC BLOOD PRESSURE: 139 MMHG | BODY MASS INDEX: 51.91 KG/M2 | HEART RATE: 92 BPM

## 2020-10-08 VITALS — HEART RATE: 94 BPM

## 2020-10-08 DIAGNOSIS — Z90.710 S/P HYSTERECTOMY: Primary | ICD-10-CM

## 2020-10-08 DIAGNOSIS — N93.9 ABNORMAL UTERINE BLEEDING: ICD-10-CM

## 2020-10-08 DIAGNOSIS — Z98.891 HISTORY OF 3 CESAREAN SECTIONS: ICD-10-CM

## 2020-10-08 PROBLEM — E66.01 MORBID OBESITY WITH BODY MASS INDEX (BMI) OF 60.0 TO 69.9 IN ADULT (HCC): Status: ACTIVE | Noted: 2020-10-08

## 2020-10-08 LAB
ABO GROUP BLD: NORMAL
EXT PREGNANCY TEST URINE: NEGATIVE
EXT. CONTROL: NORMAL
RH BLD: POSITIVE

## 2020-10-08 PROCEDURE — 88307 TISSUE EXAM BY PATHOLOGIST: CPT | Performed by: PATHOLOGY

## 2020-10-08 PROCEDURE — 58571 TLH W/T/O 250 G OR LESS: CPT | Performed by: STUDENT IN AN ORGANIZED HEALTH CARE EDUCATION/TRAINING PROGRAM

## 2020-10-08 PROCEDURE — NC001 PR NO CHARGE: Performed by: PHYSICIAN ASSISTANT

## 2020-10-08 PROCEDURE — 81025 URINE PREGNANCY TEST: CPT | Performed by: ANESTHESIOLOGY

## 2020-10-08 RX ORDER — SODIUM CHLORIDE 9 MG/ML
125 INJECTION, SOLUTION INTRAVENOUS CONTINUOUS
Status: DISCONTINUED | OUTPATIENT
Start: 2020-10-08 | End: 2020-10-08

## 2020-10-08 RX ORDER — SODIUM CHLORIDE, SODIUM LACTATE, POTASSIUM CHLORIDE, CALCIUM CHLORIDE 600; 310; 30; 20 MG/100ML; MG/100ML; MG/100ML; MG/100ML
INJECTION, SOLUTION INTRAVENOUS CONTINUOUS PRN
Status: DISCONTINUED | OUTPATIENT
Start: 2020-10-08 | End: 2020-10-08

## 2020-10-08 RX ORDER — MIDAZOLAM HYDROCHLORIDE 2 MG/2ML
INJECTION, SOLUTION INTRAMUSCULAR; INTRAVENOUS AS NEEDED
Status: DISCONTINUED | OUTPATIENT
Start: 2020-10-08 | End: 2020-10-08

## 2020-10-08 RX ORDER — LIDOCAINE HYDROCHLORIDE 10 MG/ML
INJECTION, SOLUTION EPIDURAL; INFILTRATION; INTRACAUDAL; PERINEURAL AS NEEDED
Status: DISCONTINUED | OUTPATIENT
Start: 2020-10-08 | End: 2020-10-08

## 2020-10-08 RX ORDER — SUCCINYLCHOLINE/SOD CL,ISO/PF 100 MG/5ML
SYRINGE (ML) INTRAVENOUS AS NEEDED
Status: DISCONTINUED | OUTPATIENT
Start: 2020-10-08 | End: 2020-10-08

## 2020-10-08 RX ORDER — ACETAMINOPHEN 325 MG/1
975 TABLET ORAL EVERY 6 HOURS PRN
Status: DISCONTINUED | OUTPATIENT
Start: 2020-10-08 | End: 2020-10-08 | Stop reason: HOSPADM

## 2020-10-08 RX ORDER — OXYCODONE HYDROCHLORIDE 5 MG/1
5 TABLET ORAL EVERY 4 HOURS PRN
Status: DISCONTINUED | OUTPATIENT
Start: 2020-10-08 | End: 2020-10-08 | Stop reason: HOSPADM

## 2020-10-08 RX ORDER — ESMOLOL HYDROCHLORIDE 10 MG/ML
INJECTION INTRAVENOUS AS NEEDED
Status: DISCONTINUED | OUTPATIENT
Start: 2020-10-08 | End: 2020-10-08

## 2020-10-08 RX ORDER — ONDANSETRON 2 MG/ML
4 INJECTION INTRAMUSCULAR; INTRAVENOUS ONCE AS NEEDED
Status: DISCONTINUED | OUTPATIENT
Start: 2020-10-08 | End: 2020-10-08 | Stop reason: HOSPADM

## 2020-10-08 RX ORDER — DEXAMETHASONE SODIUM PHOSPHATE 10 MG/ML
INJECTION, SOLUTION INTRAMUSCULAR; INTRAVENOUS AS NEEDED
Status: DISCONTINUED | OUTPATIENT
Start: 2020-10-08 | End: 2020-10-08

## 2020-10-08 RX ORDER — HEPARIN SODIUM 5000 [USP'U]/ML
5000 INJECTION, SOLUTION INTRAVENOUS; SUBCUTANEOUS ONCE
Status: COMPLETED | OUTPATIENT
Start: 2020-10-08 | End: 2020-10-08

## 2020-10-08 RX ORDER — KETOROLAC TROMETHAMINE 30 MG/ML
INJECTION, SOLUTION INTRAMUSCULAR; INTRAVENOUS AS NEEDED
Status: DISCONTINUED | OUTPATIENT
Start: 2020-10-08 | End: 2020-10-08

## 2020-10-08 RX ORDER — ALBUTEROL SULFATE 90 UG/1
AEROSOL, METERED RESPIRATORY (INHALATION) AS NEEDED
Status: DISCONTINUED | OUTPATIENT
Start: 2020-10-08 | End: 2020-10-08

## 2020-10-08 RX ORDER — ONDANSETRON 2 MG/ML
INJECTION INTRAMUSCULAR; INTRAVENOUS AS NEEDED
Status: DISCONTINUED | OUTPATIENT
Start: 2020-10-08 | End: 2020-10-08

## 2020-10-08 RX ORDER — OXYCODONE HYDROCHLORIDE 5 MG/1
5 TABLET ORAL EVERY 4 HOURS PRN
Qty: 10 TABLET | Refills: 0 | Status: SHIPPED | OUTPATIENT
Start: 2020-10-08 | End: 2020-10-15

## 2020-10-08 RX ORDER — MAGNESIUM HYDROXIDE 1200 MG/15ML
LIQUID ORAL AS NEEDED
Status: DISCONTINUED | OUTPATIENT
Start: 2020-10-08 | End: 2020-10-08 | Stop reason: HOSPADM

## 2020-10-08 RX ORDER — HYDROMORPHONE HCL/PF 1 MG/ML
0.5 SYRINGE (ML) INJECTION
Status: DISCONTINUED | OUTPATIENT
Start: 2020-10-08 | End: 2020-10-08 | Stop reason: HOSPADM

## 2020-10-08 RX ORDER — IBUPROFEN 600 MG/1
600 TABLET ORAL EVERY 6 HOURS PRN
Qty: 30 TABLET | Refills: 0 | Status: SHIPPED | OUTPATIENT
Start: 2020-10-08 | End: 2021-08-20

## 2020-10-08 RX ORDER — FENTANYL CITRATE/PF 50 MCG/ML
50 SYRINGE (ML) INJECTION
Status: DISCONTINUED | OUTPATIENT
Start: 2020-10-08 | End: 2020-10-08 | Stop reason: HOSPADM

## 2020-10-08 RX ORDER — LIDOCAINE HYDROCHLORIDE 10 MG/ML
0.5 INJECTION, SOLUTION EPIDURAL; INFILTRATION; INTRACAUDAL; PERINEURAL ONCE AS NEEDED
Status: DISCONTINUED | OUTPATIENT
Start: 2020-10-08 | End: 2020-10-08

## 2020-10-08 RX ORDER — BUPIVACAINE HYDROCHLORIDE 5 MG/ML
INJECTION, SOLUTION EPIDURAL; INTRACAUDAL AS NEEDED
Status: DISCONTINUED | OUTPATIENT
Start: 2020-10-08 | End: 2020-10-08 | Stop reason: HOSPADM

## 2020-10-08 RX ORDER — ROCURONIUM BROMIDE 10 MG/ML
INJECTION, SOLUTION INTRAVENOUS AS NEEDED
Status: DISCONTINUED | OUTPATIENT
Start: 2020-10-08 | End: 2020-10-08

## 2020-10-08 RX ORDER — METOPROLOL TARTRATE 5 MG/5ML
INJECTION INTRAVENOUS AS NEEDED
Status: DISCONTINUED | OUTPATIENT
Start: 2020-10-08 | End: 2020-10-08

## 2020-10-08 RX ORDER — LABETALOL 20 MG/4 ML (5 MG/ML) INTRAVENOUS SYRINGE
10
Status: DISCONTINUED | OUTPATIENT
Start: 2020-10-08 | End: 2020-10-08 | Stop reason: HOSPADM

## 2020-10-08 RX ORDER — SODIUM CHLORIDE, SODIUM LACTATE, POTASSIUM CHLORIDE, CALCIUM CHLORIDE 600; 310; 30; 20 MG/100ML; MG/100ML; MG/100ML; MG/100ML
125 INJECTION, SOLUTION INTRAVENOUS CONTINUOUS
Status: DISCONTINUED | OUTPATIENT
Start: 2020-10-08 | End: 2020-10-08 | Stop reason: HOSPADM

## 2020-10-08 RX ORDER — PROPOFOL 10 MG/ML
INJECTION, EMULSION INTRAVENOUS AS NEEDED
Status: DISCONTINUED | OUTPATIENT
Start: 2020-10-08 | End: 2020-10-08

## 2020-10-08 RX ORDER — ONDANSETRON 2 MG/ML
4 INJECTION INTRAMUSCULAR; INTRAVENOUS EVERY 6 HOURS PRN
Status: DISCONTINUED | OUTPATIENT
Start: 2020-10-08 | End: 2020-10-08 | Stop reason: HOSPADM

## 2020-10-08 RX ORDER — SENNOSIDES 8.6 MG
650 CAPSULE ORAL EVERY 8 HOURS PRN
Qty: 30 TABLET | Refills: 0 | Status: SHIPPED | OUTPATIENT
Start: 2020-10-08

## 2020-10-08 RX ORDER — FENTANYL CITRATE 50 UG/ML
INJECTION, SOLUTION INTRAMUSCULAR; INTRAVENOUS AS NEEDED
Status: DISCONTINUED | OUTPATIENT
Start: 2020-10-08 | End: 2020-10-08

## 2020-10-08 RX ADMIN — FENTANYL CITRATE 50 MCG: 50 INJECTION, SOLUTION INTRAMUSCULAR; INTRAVENOUS at 12:50

## 2020-10-08 RX ADMIN — ROCURONIUM BROMIDE 45 MG: 10 INJECTION, SOLUTION INTRAVENOUS at 10:54

## 2020-10-08 RX ADMIN — KETOROLAC TROMETHAMINE 30 MG: 30 INJECTION, SOLUTION INTRAMUSCULAR at 15:00

## 2020-10-08 RX ADMIN — ROCURONIUM BROMIDE 50 MG: 10 INJECTION, SOLUTION INTRAVENOUS at 12:28

## 2020-10-08 RX ADMIN — FENTANYL CITRATE 50 MCG: 50 INJECTION, SOLUTION INTRAMUSCULAR; INTRAVENOUS at 14:30

## 2020-10-08 RX ADMIN — Medication 140 MG: at 10:50

## 2020-10-08 RX ADMIN — METOROPROLOL TARTRATE 1 MG: 5 INJECTION, SOLUTION INTRAVENOUS at 12:14

## 2020-10-08 RX ADMIN — HEPARIN SODIUM 5000 UNITS: 5000 INJECTION INTRAVENOUS; SUBCUTANEOUS at 10:05

## 2020-10-08 RX ADMIN — LIDOCAINE HYDROCHLORIDE 50 MG: 10 INJECTION, SOLUTION EPIDURAL; INFILTRATION; INTRACAUDAL; PERINEURAL at 14:39

## 2020-10-08 RX ADMIN — FENTANYL CITRATE 100 MCG: 50 INJECTION, SOLUTION INTRAMUSCULAR; INTRAVENOUS at 11:06

## 2020-10-08 RX ADMIN — ONDANSETRON 4 MG: 2 INJECTION INTRAMUSCULAR; INTRAVENOUS at 11:16

## 2020-10-08 RX ADMIN — FENTANYL CITRATE 50 MCG: 50 INJECTION, SOLUTION INTRAMUSCULAR; INTRAVENOUS at 10:49

## 2020-10-08 RX ADMIN — FENTANYL CITRATE 50 MCG: 50 INJECTION INTRAMUSCULAR; INTRAVENOUS at 15:31

## 2020-10-08 RX ADMIN — ESMOLOL HYDROCHLORIDE 50 MG: 10 INJECTION, SOLUTION INTRAVENOUS at 11:52

## 2020-10-08 RX ADMIN — FENTANYL CITRATE 50 MCG: 50 INJECTION, SOLUTION INTRAMUSCULAR; INTRAVENOUS at 10:55

## 2020-10-08 RX ADMIN — SODIUM CHLORIDE, SODIUM LACTATE, POTASSIUM CHLORIDE, AND CALCIUM CHLORIDE: .6; .31; .03; .02 INJECTION, SOLUTION INTRAVENOUS at 12:52

## 2020-10-08 RX ADMIN — ALBUTEROL SULFATE 2 PUFF: 90 AEROSOL, METERED RESPIRATORY (INHALATION) at 14:39

## 2020-10-08 RX ADMIN — MIDAZOLAM 2 MG: 1 INJECTION INTRAMUSCULAR; INTRAVENOUS at 10:43

## 2020-10-08 RX ADMIN — PHENYLEPHRINE HYDROCHLORIDE 100 MCG: 10 INJECTION INTRAVENOUS at 11:39

## 2020-10-08 RX ADMIN — FENTANYL CITRATE 50 MCG: 50 INJECTION, SOLUTION INTRAMUSCULAR; INTRAVENOUS at 14:28

## 2020-10-08 RX ADMIN — Medication 3000 MG: at 10:36

## 2020-10-08 RX ADMIN — METOROPROLOL TARTRATE 2 MG: 5 INJECTION, SOLUTION INTRAVENOUS at 12:18

## 2020-10-08 RX ADMIN — Medication 3000 MG: at 14:23

## 2020-10-08 RX ADMIN — ESMOLOL HYDROCHLORIDE 50 MG: 10 INJECTION, SOLUTION INTRAVENOUS at 11:30

## 2020-10-08 RX ADMIN — FENTANYL CITRATE 50 MCG: 50 INJECTION INTRAMUSCULAR; INTRAVENOUS at 15:52

## 2020-10-08 RX ADMIN — LIDOCAINE HYDROCHLORIDE 50 MG: 10 INJECTION, SOLUTION EPIDURAL; INFILTRATION; INTRACAUDAL; PERINEURAL at 10:49

## 2020-10-08 RX ADMIN — FENTANYL CITRATE 50 MCG: 50 INJECTION, SOLUTION INTRAMUSCULAR; INTRAVENOUS at 14:31

## 2020-10-08 RX ADMIN — SUGAMMADEX 630 MG: 100 INJECTION, SOLUTION INTRAVENOUS at 14:25

## 2020-10-08 RX ADMIN — METOROPROLOL TARTRATE 2 MG: 5 INJECTION, SOLUTION INTRAVENOUS at 11:06

## 2020-10-08 RX ADMIN — DEXAMETHASONE SODIUM PHOSPHATE 10 MG: 10 INJECTION, SOLUTION INTRAMUSCULAR; INTRAVENOUS at 11:16

## 2020-10-08 RX ADMIN — PROPOFOL 200 MG: 10 INJECTION, EMULSION INTRAVENOUS at 10:49

## 2020-10-08 RX ADMIN — ROCURONIUM BROMIDE 5 MG: 10 INJECTION, SOLUTION INTRAVENOUS at 10:49

## 2020-10-08 RX ADMIN — FENTANYL CITRATE 50 MCG: 50 INJECTION, SOLUTION INTRAMUSCULAR; INTRAVENOUS at 13:10

## 2020-10-08 RX ADMIN — FENTANYL CITRATE 50 MCG: 50 INJECTION, SOLUTION INTRAMUSCULAR; INTRAVENOUS at 14:32

## 2020-10-08 RX ADMIN — ROCURONIUM BROMIDE 50 MG: 10 INJECTION, SOLUTION INTRAVENOUS at 11:24

## 2020-10-08 RX ADMIN — SODIUM CHLORIDE 125 ML/HR: 0.9 INJECTION, SOLUTION INTRAVENOUS at 09:11

## 2020-10-08 RX ADMIN — LABETALOL 20 MG/4 ML (5 MG/ML) INTRAVENOUS SYRINGE 10 MG: at 15:07

## 2020-10-09 ENCOUNTER — TELEPHONE (OUTPATIENT)
Dept: OBGYN CLINIC | Facility: MEDICAL CENTER | Age: 40
End: 2020-10-09

## 2020-10-20 ENCOUNTER — OFFICE VISIT (OUTPATIENT)
Dept: OBGYN CLINIC | Facility: MEDICAL CENTER | Age: 40
End: 2020-10-20

## 2020-10-20 VITALS
SYSTOLIC BLOOD PRESSURE: 110 MMHG | BODY MASS INDEX: 51.91 KG/M2 | DIASTOLIC BLOOD PRESSURE: 70 MMHG | HEIGHT: 63 IN | TEMPERATURE: 97.3 F | WEIGHT: 293 LBS

## 2020-10-20 DIAGNOSIS — E66.01 MORBID OBESITY WITH BODY MASS INDEX (BMI) OF 60.0 TO 69.9 IN ADULT (HCC): ICD-10-CM

## 2020-10-20 DIAGNOSIS — Z90.710 S/P HYSTERECTOMY: Primary | ICD-10-CM

## 2020-10-20 PROBLEM — N93.9 ABNORMAL UTERINE BLEEDING: Status: RESOLVED | Noted: 2020-08-26 | Resolved: 2020-10-20

## 2020-10-20 PROCEDURE — 99024 POSTOP FOLLOW-UP VISIT: CPT | Performed by: STUDENT IN AN ORGANIZED HEALTH CARE EDUCATION/TRAINING PROGRAM

## 2020-10-26 ENCOUNTER — TELEPHONE (OUTPATIENT)
Dept: INTERNAL MEDICINE CLINIC | Facility: CLINIC | Age: 40
End: 2020-10-26

## 2020-10-26 DIAGNOSIS — G47.33 OSA (OBSTRUCTIVE SLEEP APNEA): Primary | ICD-10-CM

## 2020-10-28 ENCOUNTER — APPOINTMENT (OUTPATIENT)
Dept: LAB | Facility: CLINIC | Age: 40
End: 2020-10-28
Payer: COMMERCIAL

## 2020-10-28 ENCOUNTER — OFFICE VISIT (OUTPATIENT)
Dept: OBGYN CLINIC | Facility: CLINIC | Age: 40
End: 2020-10-28

## 2020-10-28 VITALS
SYSTOLIC BLOOD PRESSURE: 120 MMHG | DIASTOLIC BLOOD PRESSURE: 75 MMHG | BODY MASS INDEX: 51.91 KG/M2 | WEIGHT: 293 LBS | HEIGHT: 63 IN

## 2020-10-28 DIAGNOSIS — L03.311 CELLULITIS OF ABDOMINAL WALL: ICD-10-CM

## 2020-10-28 DIAGNOSIS — L03.311 CELLULITIS OF ABDOMINAL WALL: Primary | ICD-10-CM

## 2020-10-28 DIAGNOSIS — T81.89XA INCISIONAL IRRITATION, INITIAL ENCOUNTER: ICD-10-CM

## 2020-10-28 LAB
BASOPHILS # BLD AUTO: 0.07 THOUSANDS/ΜL (ref 0–0.1)
BASOPHILS NFR BLD AUTO: 1 % (ref 0–1)
EOSINOPHIL # BLD AUTO: 0.43 THOUSAND/ΜL (ref 0–0.61)
EOSINOPHIL NFR BLD AUTO: 4 % (ref 0–6)
ERYTHROCYTE [DISTWIDTH] IN BLOOD BY AUTOMATED COUNT: 14.9 % (ref 11.6–15.1)
HCT VFR BLD AUTO: 42.9 % (ref 34.8–46.1)
HGB BLD-MCNC: 13.7 G/DL (ref 11.5–15.4)
IMM GRANULOCYTES # BLD AUTO: 0.05 THOUSAND/UL (ref 0–0.2)
IMM GRANULOCYTES NFR BLD AUTO: 1 % (ref 0–2)
LYMPHOCYTES # BLD AUTO: 1.7 THOUSANDS/ΜL (ref 0.6–4.47)
LYMPHOCYTES NFR BLD AUTO: 17 % (ref 14–44)
MCH RBC QN AUTO: 29 PG (ref 26.8–34.3)
MCHC RBC AUTO-ENTMCNC: 31.9 G/DL (ref 31.4–37.4)
MCV RBC AUTO: 91 FL (ref 82–98)
MONOCYTES # BLD AUTO: 0.64 THOUSAND/ΜL (ref 0.17–1.22)
MONOCYTES NFR BLD AUTO: 7 % (ref 4–12)
NEUTROPHILS # BLD AUTO: 7.03 THOUSANDS/ΜL (ref 1.85–7.62)
NEUTS SEG NFR BLD AUTO: 70 % (ref 43–75)
NRBC BLD AUTO-RTO: 0 /100 WBCS
PLATELET # BLD AUTO: 347 THOUSANDS/UL (ref 149–390)
PMV BLD AUTO: 11.7 FL (ref 8.9–12.7)
RBC # BLD AUTO: 4.72 MILLION/UL (ref 3.81–5.12)
WBC # BLD AUTO: 9.92 THOUSAND/UL (ref 4.31–10.16)

## 2020-10-28 PROCEDURE — 99024 POSTOP FOLLOW-UP VISIT: CPT | Performed by: STUDENT IN AN ORGANIZED HEALTH CARE EDUCATION/TRAINING PROGRAM

## 2020-10-28 PROCEDURE — 36415 COLL VENOUS BLD VENIPUNCTURE: CPT

## 2020-10-28 PROCEDURE — 85025 COMPLETE CBC W/AUTO DIFF WBC: CPT

## 2020-10-28 RX ORDER — SULFAMETHOXAZOLE AND TRIMETHOPRIM 800; 160 MG/1; MG/1
1 TABLET ORAL EVERY 12 HOURS SCHEDULED
Qty: 10 TABLET | Refills: 0 | Status: SHIPPED | OUTPATIENT
Start: 2020-10-28 | End: 2020-10-30 | Stop reason: SDUPTHER

## 2020-10-28 RX ORDER — BACITRACIN, NEOMYCIN, POLYMYXIN B 400; 3.5; 5 [USP'U]/G; MG/G; [USP'U]/G
OINTMENT TOPICAL 2 TIMES DAILY
Qty: 15 G | Refills: 0 | Status: SHIPPED | OUTPATIENT
Start: 2020-10-28 | End: 2020-12-10 | Stop reason: ALTCHOICE

## 2020-10-29 ENCOUNTER — TRANSCRIBE ORDERS (OUTPATIENT)
Dept: SLEEP CENTER | Facility: CLINIC | Age: 40
End: 2020-10-29

## 2020-10-29 ENCOUNTER — OFFICE VISIT (OUTPATIENT)
Dept: SLEEP CENTER | Facility: CLINIC | Age: 40
End: 2020-10-29
Payer: COMMERCIAL

## 2020-10-29 VITALS
BODY MASS INDEX: 51.91 KG/M2 | WEIGHT: 293 LBS | SYSTOLIC BLOOD PRESSURE: 122 MMHG | TEMPERATURE: 97.4 F | HEIGHT: 63 IN | HEART RATE: 100 BPM | DIASTOLIC BLOOD PRESSURE: 78 MMHG | OXYGEN SATURATION: 97 %

## 2020-10-29 DIAGNOSIS — Z91.89 AT RISK FOR OBSTRUCTIVE SLEEP APNEA: ICD-10-CM

## 2020-10-29 DIAGNOSIS — E66.01 MORBID OBESITY WITH BODY MASS INDEX (BMI) OF 60.0 TO 69.9 IN ADULT (HCC): ICD-10-CM

## 2020-10-29 DIAGNOSIS — G47.33 OSA (OBSTRUCTIVE SLEEP APNEA): Primary | ICD-10-CM

## 2020-10-29 DIAGNOSIS — Z91.89 RISK FACTORS FOR OBSTRUCTIVE SLEEP APNEA: ICD-10-CM

## 2020-10-29 DIAGNOSIS — R06.83 LOUD SNORING: ICD-10-CM

## 2020-10-29 PROCEDURE — 99244 OFF/OP CNSLTJ NEW/EST MOD 40: CPT | Performed by: PSYCHIATRY & NEUROLOGY

## 2020-10-30 ENCOUNTER — HOSPITAL ENCOUNTER (EMERGENCY)
Facility: HOSPITAL | Age: 40
Discharge: HOME | End: 2020-10-30
Attending: EMERGENCY MEDICINE | Admitting: STUDENT IN AN ORGANIZED HEALTH CARE EDUCATION/TRAINING PROGRAM
Payer: COMMERCIAL

## 2020-10-30 ENCOUNTER — APPOINTMENT (EMERGENCY)
Dept: CT IMAGING | Facility: HOSPITAL | Age: 40
End: 2020-10-30
Payer: COMMERCIAL

## 2020-10-30 ENCOUNTER — TELEPHONE (OUTPATIENT)
Dept: OTHER | Facility: OTHER | Age: 40
End: 2020-10-30

## 2020-10-30 VITALS
BODY MASS INDEX: 59.09 KG/M2 | OXYGEN SATURATION: 96 % | TEMPERATURE: 97.5 F | WEIGHT: 293 LBS | RESPIRATION RATE: 18 BRPM | SYSTOLIC BLOOD PRESSURE: 123 MMHG | DIASTOLIC BLOOD PRESSURE: 65 MMHG | HEART RATE: 77 BPM

## 2020-10-30 DIAGNOSIS — L03.311 CELLULITIS OF ABDOMINAL WALL: ICD-10-CM

## 2020-10-30 DIAGNOSIS — S30.1XXA ABDOMINAL WALL SEROMA, INITIAL ENCOUNTER: Primary | ICD-10-CM

## 2020-10-30 LAB
ALBUMIN SERPL BCP-MCNC: 3.4 G/DL (ref 3.5–5)
ALP SERPL-CCNC: 98 U/L (ref 46–116)
ALT SERPL W P-5'-P-CCNC: 25 U/L (ref 12–78)
ANION GAP SERPL CALCULATED.3IONS-SCNC: 8 MMOL/L (ref 4–13)
AST SERPL W P-5'-P-CCNC: 13 U/L (ref 5–45)
BASOPHILS # BLD AUTO: 0.03 THOUSANDS/ΜL (ref 0–0.1)
BASOPHILS NFR BLD AUTO: 0 % (ref 0–1)
BILIRUB SERPL-MCNC: 0.49 MG/DL (ref 0.2–1)
BUN SERPL-MCNC: 12 MG/DL (ref 5–25)
CALCIUM ALBUM COR SERPL-MCNC: 10 MG/DL (ref 8.3–10.1)
CALCIUM SERPL-MCNC: 9.5 MG/DL (ref 8.3–10.1)
CHLORIDE SERPL-SCNC: 104 MMOL/L (ref 100–108)
CO2 SERPL-SCNC: 28 MMOL/L (ref 21–32)
CREAT SERPL-MCNC: 0.81 MG/DL (ref 0.6–1.3)
EOSINOPHIL # BLD AUTO: 0.4 THOUSAND/ΜL (ref 0–0.61)
EOSINOPHIL NFR BLD AUTO: 5 % (ref 0–6)
ERYTHROCYTE [DISTWIDTH] IN BLOOD BY AUTOMATED COUNT: 14.8 % (ref 11.6–15.1)
GFR SERPL CREATININE-BSD FRML MDRD: 90 ML/MIN/1.73SQ M
GLUCOSE SERPL-MCNC: 97 MG/DL (ref 65–140)
HCT VFR BLD AUTO: 42.4 % (ref 34.8–46.1)
HGB BLD-MCNC: 13.5 G/DL (ref 11.5–15.4)
IMM GRANULOCYTES # BLD AUTO: 0.02 THOUSAND/UL (ref 0–0.2)
IMM GRANULOCYTES NFR BLD AUTO: 0 % (ref 0–2)
LYMPHOCYTES # BLD AUTO: 1.36 THOUSANDS/ΜL (ref 0.6–4.47)
LYMPHOCYTES NFR BLD AUTO: 17 % (ref 14–44)
MCH RBC QN AUTO: 29.2 PG (ref 26.8–34.3)
MCHC RBC AUTO-ENTMCNC: 31.8 G/DL (ref 31.4–37.4)
MCV RBC AUTO: 92 FL (ref 82–98)
MONOCYTES # BLD AUTO: 0.52 THOUSAND/ΜL (ref 0.17–1.22)
MONOCYTES NFR BLD AUTO: 6 % (ref 4–12)
NEUTROPHILS # BLD AUTO: 5.75 THOUSANDS/ΜL (ref 1.85–7.62)
NEUTS SEG NFR BLD AUTO: 72 % (ref 43–75)
NRBC BLD AUTO-RTO: 0 /100 WBCS
PLATELET # BLD AUTO: 325 THOUSANDS/UL (ref 149–390)
PMV BLD AUTO: 11.8 FL (ref 8.9–12.7)
POTASSIUM SERPL-SCNC: 4.4 MMOL/L (ref 3.5–5.3)
PROT SERPL-MCNC: 7.4 G/DL (ref 6.4–8.2)
RBC # BLD AUTO: 4.63 MILLION/UL (ref 3.81–5.12)
SODIUM SERPL-SCNC: 140 MMOL/L (ref 136–145)
WBC # BLD AUTO: 8.08 THOUSAND/UL (ref 4.31–10.16)

## 2020-10-30 PROCEDURE — 87205 SMEAR GRAM STAIN: CPT | Performed by: OBSTETRICS & GYNECOLOGY

## 2020-10-30 PROCEDURE — 99024 POSTOP FOLLOW-UP VISIT: CPT | Performed by: STUDENT IN AN ORGANIZED HEALTH CARE EDUCATION/TRAINING PROGRAM

## 2020-10-30 PROCEDURE — 74177 CT ABD & PELVIS W/CONTRAST: CPT

## 2020-10-30 PROCEDURE — G1004 CDSM NDSC: HCPCS

## 2020-10-30 PROCEDURE — 80053 COMPREHEN METABOLIC PANEL: CPT | Performed by: OBSTETRICS & GYNECOLOGY

## 2020-10-30 PROCEDURE — 87186 SC STD MICRODIL/AGAR DIL: CPT | Performed by: OBSTETRICS & GYNECOLOGY

## 2020-10-30 PROCEDURE — 87147 CULTURE TYPE IMMUNOLOGIC: CPT | Performed by: OBSTETRICS & GYNECOLOGY

## 2020-10-30 PROCEDURE — 99284 EMERGENCY DEPT VISIT MOD MDM: CPT

## 2020-10-30 PROCEDURE — 96365 THER/PROPH/DIAG IV INF INIT: CPT

## 2020-10-30 PROCEDURE — 85025 COMPLETE CBC W/AUTO DIFF WBC: CPT | Performed by: OBSTETRICS & GYNECOLOGY

## 2020-10-30 PROCEDURE — 87070 CULTURE OTHR SPECIMN AEROBIC: CPT | Performed by: OBSTETRICS & GYNECOLOGY

## 2020-10-30 PROCEDURE — 96361 HYDRATE IV INFUSION ADD-ON: CPT

## 2020-10-30 PROCEDURE — 36415 COLL VENOUS BLD VENIPUNCTURE: CPT | Performed by: OBSTETRICS & GYNECOLOGY

## 2020-10-30 PROCEDURE — NC001 PR NO CHARGE

## 2020-10-30 PROCEDURE — 96366 THER/PROPH/DIAG IV INF ADDON: CPT

## 2020-10-30 RX ORDER — LIDOCAINE HYDROCHLORIDE 10 MG/ML
10 INJECTION, SOLUTION EPIDURAL; INFILTRATION; INTRACAUDAL; PERINEURAL ONCE
Status: COMPLETED | OUTPATIENT
Start: 2020-10-30 | End: 2020-10-30

## 2020-10-30 RX ORDER — SULFAMETHOXAZOLE AND TRIMETHOPRIM 800; 160 MG/1; MG/1
1 TABLET ORAL EVERY 12 HOURS SCHEDULED
Qty: 14 TABLET | Refills: 0 | Status: SHIPPED | OUTPATIENT
Start: 2020-10-30 | End: 2020-11-06

## 2020-10-30 RX ORDER — SODIUM CHLORIDE, SODIUM LACTATE, POTASSIUM CHLORIDE, CALCIUM CHLORIDE 600; 310; 30; 20 MG/100ML; MG/100ML; MG/100ML; MG/100ML
125 INJECTION, SOLUTION INTRAVENOUS CONTINUOUS
Status: DISCONTINUED | OUTPATIENT
Start: 2020-10-30 | End: 2020-10-30 | Stop reason: HOSPADM

## 2020-10-30 RX ADMIN — SODIUM CHLORIDE, SODIUM LACTATE, POTASSIUM CHLORIDE, AND CALCIUM CHLORIDE 125 ML/HR: .6; .31; .03; .02 INJECTION, SOLUTION INTRAVENOUS at 10:27

## 2020-10-30 RX ADMIN — LIDOCAINE HYDROCHLORIDE 10 ML: 10 INJECTION, SOLUTION EPIDURAL; INFILTRATION; INTRACAUDAL; PERINEURAL at 14:36

## 2020-10-30 RX ADMIN — IOHEXOL 100 ML: 350 INJECTION, SOLUTION INTRAVENOUS at 12:43

## 2020-10-30 RX ADMIN — VANCOMYCIN HYDROCHLORIDE 1750 MG: 1 INJECTION, POWDER, LYOPHILIZED, FOR SOLUTION INTRAVENOUS at 10:42

## 2020-11-02 ENCOUNTER — OFFICE VISIT (OUTPATIENT)
Dept: OBGYN CLINIC | Facility: MEDICAL CENTER | Age: 40
End: 2020-11-02

## 2020-11-02 VITALS
TEMPERATURE: 97.3 F | BODY MASS INDEX: 51.91 KG/M2 | DIASTOLIC BLOOD PRESSURE: 68 MMHG | WEIGHT: 293 LBS | HEIGHT: 63 IN | SYSTOLIC BLOOD PRESSURE: 108 MMHG

## 2020-11-02 DIAGNOSIS — L03.311 CELLULITIS OF ABDOMINAL WALL: ICD-10-CM

## 2020-11-02 DIAGNOSIS — S30.1XXD ABDOMINAL WALL SEROMA, SUBSEQUENT ENCOUNTER: Primary | ICD-10-CM

## 2020-11-02 LAB
BACTERIA WND AEROBE CULT: ABNORMAL
GRAM STN SPEC: ABNORMAL
GRAM STN SPEC: ABNORMAL

## 2020-11-02 PROCEDURE — 99024 POSTOP FOLLOW-UP VISIT: CPT | Performed by: STUDENT IN AN ORGANIZED HEALTH CARE EDUCATION/TRAINING PROGRAM

## 2020-11-04 ENCOUNTER — OFFICE VISIT (OUTPATIENT)
Dept: OBGYN CLINIC | Facility: CLINIC | Age: 40
End: 2020-11-04

## 2020-11-04 VITALS
TEMPERATURE: 98.9 F | DIASTOLIC BLOOD PRESSURE: 82 MMHG | HEIGHT: 63 IN | SYSTOLIC BLOOD PRESSURE: 120 MMHG | WEIGHT: 293 LBS | BODY MASS INDEX: 51.91 KG/M2

## 2020-11-04 DIAGNOSIS — L03.311 CELLULITIS OF ABDOMINAL WALL: ICD-10-CM

## 2020-11-04 DIAGNOSIS — S30.1XXD ABDOMINAL WALL SEROMA, SUBSEQUENT ENCOUNTER: Primary | ICD-10-CM

## 2020-11-04 PROCEDURE — 99024 POSTOP FOLLOW-UP VISIT: CPT | Performed by: STUDENT IN AN ORGANIZED HEALTH CARE EDUCATION/TRAINING PROGRAM

## 2020-11-05 ENCOUNTER — HOSPITAL ENCOUNTER (OUTPATIENT)
Dept: SLEEP CENTER | Facility: CLINIC | Age: 40
Discharge: HOME | End: 2020-11-05
Payer: COMMERCIAL

## 2020-11-05 ENCOUNTER — CONSULT (OUTPATIENT)
Dept: BARIATRICS | Facility: CLINIC | Age: 40
End: 2020-11-05
Payer: COMMERCIAL

## 2020-11-05 VITALS
BODY MASS INDEX: 51.91 KG/M2 | TEMPERATURE: 99.1 F | HEIGHT: 63 IN | DIASTOLIC BLOOD PRESSURE: 70 MMHG | SYSTOLIC BLOOD PRESSURE: 128 MMHG | RESPIRATION RATE: 20 BRPM | HEART RATE: 97 BPM | WEIGHT: 293 LBS

## 2020-11-05 DIAGNOSIS — G47.33 OSA (OBSTRUCTIVE SLEEP APNEA): ICD-10-CM

## 2020-11-05 DIAGNOSIS — E66.01 MORBID OBESITY (HCC): Primary | ICD-10-CM

## 2020-11-05 PROCEDURE — 99244 OFF/OP CNSLTJ NEW/EST MOD 40: CPT | Performed by: PHYSICIAN ASSISTANT

## 2020-11-05 PROCEDURE — 95810 POLYSOM 6/> YRS 4/> PARAM: CPT

## 2020-11-05 PROCEDURE — 4004F PT TOBACCO SCREEN RCVD TLK: CPT | Performed by: PHYSICIAN ASSISTANT

## 2020-11-06 ENCOUNTER — OFFICE VISIT (OUTPATIENT)
Dept: OBGYN CLINIC | Facility: MEDICAL CENTER | Age: 40
End: 2020-11-06

## 2020-11-06 VITALS
HEIGHT: 63 IN | SYSTOLIC BLOOD PRESSURE: 125 MMHG | TEMPERATURE: 97.8 F | BODY MASS INDEX: 51.91 KG/M2 | WEIGHT: 293 LBS | DIASTOLIC BLOOD PRESSURE: 70 MMHG

## 2020-11-06 DIAGNOSIS — S30.1XXD ABDOMINAL WALL SEROMA, SUBSEQUENT ENCOUNTER: Primary | ICD-10-CM

## 2020-11-06 DIAGNOSIS — G47.33 OSA (OBSTRUCTIVE SLEEP APNEA): Primary | ICD-10-CM

## 2020-11-06 PROCEDURE — 99024 POSTOP FOLLOW-UP VISIT: CPT | Performed by: STUDENT IN AN ORGANIZED HEALTH CARE EDUCATION/TRAINING PROGRAM

## 2020-11-13 ENCOUNTER — OFFICE VISIT (OUTPATIENT)
Dept: OBGYN CLINIC | Facility: MEDICAL CENTER | Age: 40
End: 2020-11-13

## 2020-11-13 ENCOUNTER — TELEPHONE (OUTPATIENT)
Dept: SLEEP CENTER | Facility: CLINIC | Age: 40
End: 2020-11-13

## 2020-11-13 VITALS
SYSTOLIC BLOOD PRESSURE: 118 MMHG | TEMPERATURE: 97.3 F | HEIGHT: 63 IN | DIASTOLIC BLOOD PRESSURE: 76 MMHG | WEIGHT: 293 LBS | BODY MASS INDEX: 51.91 KG/M2

## 2020-11-13 DIAGNOSIS — Z20.822 ENCOUNTER FOR PREPROCEDURE SCREENING LABORATORY TESTING FOR COVID-19: Primary | ICD-10-CM

## 2020-11-13 DIAGNOSIS — S30.1XXD ABDOMINAL WALL SEROMA, SUBSEQUENT ENCOUNTER: Primary | ICD-10-CM

## 2020-11-13 DIAGNOSIS — Z01.812 ENCOUNTER FOR PREPROCEDURE SCREENING LABORATORY TESTING FOR COVID-19: Primary | ICD-10-CM

## 2020-11-13 PROCEDURE — 3008F BODY MASS INDEX DOCD: CPT | Performed by: PHYSICIAN ASSISTANT

## 2020-11-13 PROCEDURE — 99024 POSTOP FOLLOW-UP VISIT: CPT | Performed by: STUDENT IN AN ORGANIZED HEALTH CARE EDUCATION/TRAINING PROGRAM

## 2020-11-16 ENCOUNTER — TELEPHONE (OUTPATIENT)
Dept: SLEEP CENTER | Facility: CLINIC | Age: 40
End: 2020-11-16

## 2020-12-04 ENCOUNTER — TELEPHONE (OUTPATIENT)
Dept: BARIATRICS | Facility: CLINIC | Age: 40
End: 2020-12-04

## 2020-12-10 ENCOUNTER — TELEMEDICINE (OUTPATIENT)
Dept: BARIATRICS | Facility: CLINIC | Age: 40
End: 2020-12-10
Payer: COMMERCIAL

## 2020-12-10 DIAGNOSIS — G47.33 OSA (OBSTRUCTIVE SLEEP APNEA): ICD-10-CM

## 2020-12-10 DIAGNOSIS — E66.01 MORBID OBESITY (HCC): Primary | ICD-10-CM

## 2020-12-10 PROCEDURE — 99214 OFFICE O/P EST MOD 30 MIN: CPT | Performed by: PHYSICIAN ASSISTANT

## 2020-12-18 DIAGNOSIS — Z01.812 ENCOUNTER FOR PREPROCEDURE SCREENING LABORATORY TESTING FOR COVID-19: ICD-10-CM

## 2020-12-18 DIAGNOSIS — Z20.822 ENCOUNTER FOR PREPROCEDURE SCREENING LABORATORY TESTING FOR COVID-19: ICD-10-CM

## 2020-12-18 PROCEDURE — U0003 INFECTIOUS AGENT DETECTION BY NUCLEIC ACID (DNA OR RNA); SEVERE ACUTE RESPIRATORY SYNDROME CORONAVIRUS 2 (SARS-COV-2) (CORONAVIRUS DISEASE [COVID-19]), AMPLIFIED PROBE TECHNIQUE, MAKING USE OF HIGH THROUGHPUT TECHNOLOGIES AS DESCRIBED BY CMS-2020-01-R: HCPCS | Performed by: INTERNAL MEDICINE

## 2020-12-20 LAB — SARS-COV-2 RNA SPEC QL NAA+PROBE: NOT DETECTED

## 2020-12-21 ENCOUNTER — HOSPITAL ENCOUNTER (OUTPATIENT)
Dept: SLEEP CENTER | Facility: CLINIC | Age: 40
Discharge: HOME | End: 2020-12-21
Payer: COMMERCIAL

## 2020-12-21 DIAGNOSIS — G47.33 OSA (OBSTRUCTIVE SLEEP APNEA): ICD-10-CM

## 2020-12-21 PROCEDURE — 95811 POLYSOM 6/>YRS CPAP 4/> PARM: CPT

## 2020-12-23 ENCOUNTER — TELEPHONE (OUTPATIENT)
Dept: SLEEP CENTER | Facility: CLINIC | Age: 40
End: 2020-12-23

## 2020-12-27 DIAGNOSIS — G47.33 OSA (OBSTRUCTIVE SLEEP APNEA): Primary | ICD-10-CM

## 2020-12-28 ENCOUNTER — TELEPHONE (OUTPATIENT)
Dept: SLEEP CENTER | Facility: CLINIC | Age: 40
End: 2020-12-28

## 2021-01-13 ENCOUNTER — TELEPHONE (OUTPATIENT)
Dept: SLEEP CENTER | Facility: CLINIC | Age: 41
End: 2021-01-13

## 2021-02-26 ENCOUNTER — LAB (OUTPATIENT)
Dept: LAB | Facility: CLINIC | Age: 41
End: 2021-02-26
Payer: COMMERCIAL

## 2021-02-26 ENCOUNTER — OFFICE VISIT (OUTPATIENT)
Dept: INTERNAL MEDICINE CLINIC | Facility: CLINIC | Age: 41
End: 2021-02-26
Payer: COMMERCIAL

## 2021-02-26 VITALS
WEIGHT: 293 LBS | BODY MASS INDEX: 51.91 KG/M2 | TEMPERATURE: 97.4 F | HEART RATE: 94 BPM | SYSTOLIC BLOOD PRESSURE: 128 MMHG | RESPIRATION RATE: 16 BRPM | HEIGHT: 63 IN | OXYGEN SATURATION: 98 % | DIASTOLIC BLOOD PRESSURE: 76 MMHG

## 2021-02-26 DIAGNOSIS — M54.2 CHRONIC CERVICAL PAIN: ICD-10-CM

## 2021-02-26 DIAGNOSIS — Z13.31 NEGATIVE DEPRESSION SCREENING: ICD-10-CM

## 2021-02-26 DIAGNOSIS — G47.33 OSA (OBSTRUCTIVE SLEEP APNEA): ICD-10-CM

## 2021-02-26 DIAGNOSIS — M15.9 PRIMARY OSTEOARTHRITIS INVOLVING MULTIPLE JOINTS: ICD-10-CM

## 2021-02-26 DIAGNOSIS — G89.4 CHRONIC PAIN SYNDROME: ICD-10-CM

## 2021-02-26 DIAGNOSIS — E78.2 MIXED HYPERLIPIDEMIA: Primary | ICD-10-CM

## 2021-02-26 DIAGNOSIS — G89.29 CHRONIC CERVICAL PAIN: ICD-10-CM

## 2021-02-26 DIAGNOSIS — R79.89 ABNORMAL THYROID BLOOD TEST: ICD-10-CM

## 2021-02-26 DIAGNOSIS — E78.2 MIXED HYPERLIPIDEMIA: ICD-10-CM

## 2021-02-26 PROBLEM — L03.311 CELLULITIS OF ABDOMINAL WALL: Status: RESOLVED | Noted: 2020-10-28 | Resolved: 2021-02-26

## 2021-02-26 PROBLEM — S30.1XXA ABDOMINAL WALL SEROMA: Status: RESOLVED | Noted: 2020-10-30 | Resolved: 2021-02-26

## 2021-02-26 PROBLEM — R06.83 LOUD SNORING: Status: RESOLVED | Noted: 2020-10-29 | Resolved: 2021-02-26

## 2021-02-26 LAB
LDLC SERPL DIRECT ASSAY-MCNC: 99 MG/DL (ref 0–100)
TRIGL SERPL-MCNC: 172 MG/DL
TSH SERPL DL<=0.05 MIU/L-ACNC: 3.51 UIU/ML (ref 0.36–3.74)

## 2021-02-26 PROCEDURE — 83721 ASSAY OF BLOOD LIPOPROTEIN: CPT

## 2021-02-26 PROCEDURE — 84478 ASSAY OF TRIGLYCERIDES: CPT

## 2021-02-26 PROCEDURE — 84443 ASSAY THYROID STIM HORMONE: CPT

## 2021-02-26 PROCEDURE — 36415 COLL VENOUS BLD VENIPUNCTURE: CPT

## 2021-02-26 PROCEDURE — 99214 OFFICE O/P EST MOD 30 MIN: CPT | Performed by: INTERNAL MEDICINE

## 2021-02-26 PROCEDURE — 3725F SCREEN DEPRESSION PERFORMED: CPT | Performed by: INTERNAL MEDICINE

## 2021-02-26 NOTE — PROGRESS NOTES
BMI Counseling: There is no height or weight on file to calculate BMI  The BMI is above normal  Nutrition recommendations include decreasing portion sizes and encouraging healthy choices of fruits and vegetables  Exercise recommendations include moderate physical activity 150 minutes/week  No pharmacotherapy was ordered  Assessment/Plan:  Problem List Items Addressed This Visit        Respiratory    CHESTER (obstructive sleep apnea)       Musculoskeletal and Integument    Primary osteoarthritis involving multiple joints    Relevant Orders    Ambulatory referral to Pain Management       Other    Abnormal thyroid blood test    Chronic cervical pain    Relevant Orders    Ambulatory referral to Pain Management    Chronic pain syndrome    Relevant Orders    Ambulatory referral to Pain Management    Mixed hyperlipidemia - Primary    Relevant Orders    LDL cholesterol, direct    Triglycerides    TSH, 3rd generation      Other Visit Diagnoses     Negative depression screening               Diagnoses and all orders for this visit:    Mixed hyperlipidemia  -     LDL cholesterol, direct; Future  -     Triglycerides; Future  -     TSH, 3rd generation; Future    Abnormal thyroid blood test    Primary osteoarthritis involving multiple joints  -     Ambulatory referral to Pain Management; Future    CHESTER (obstructive sleep apnea)    Chronic pain syndrome  -     Ambulatory referral to Pain Management; Future    Negative depression screening    Chronic cervical pain  -     Ambulatory referral to Pain Management; Future        No problem-specific Assessment & Plan notes found for this encounter  A/P: Doing well and will check labs  Will refer to pain management for the neck issues as PT didn't help and is having issues with the meds  Discussed BMI and will check labs  Continue current treatment and RTC six months for routine  Subjective:      Patient ID: Narendra Stauffer is a 38 y o  female      WF RTC for f/u hyperlipidemia, DJD, etc  Doing well and no new issues  Remains active w/o difficulty and no falls  Still smoking  CHESTER is controlled  DJD pain is manageable,but meds make her drowsey    Due for labs  The following portions of the patient's history were reviewed and updated as appropriate:   She has a past medical history of Kidney stone, Obesity (4/15/2013), Primary osteoarthritis involving multiple joints (2020), Wears dentures, and Wears glasses  ,  does not have any pertinent problems on file  ,   has a past surgical history that includes Mouth surgery;  section; Tubal ligation (); Back surgery; pr laparoscopy w tot hysterectuterus <=250 gram  w tube/ovary (N/A, 10/8/2020); pr cystourethroscopy (N/A, 10/8/2020); and Hysterectomy  ,  family history includes COPD in her family; Depression in her mother; Diverticulitis in her mother; Emphysema in her family; Heart murmur in her mother; Hypertension in her father and mother; Irritable bowel syndrome in her mother; Lung disease in her brother; Multiple sclerosis in her brother; No Known Problems in her daughter, daughter, daughter, maternal aunt, and maternal aunt; Obesity in her father, mother, and paternal grandmother; Spina bifida in her father; Stroke in her maternal grandmother and paternal grandmother  ,   reports that she has been smoking cigarettes  She has a 15 00 pack-year smoking history  She has never used smokeless tobacco  She reports current alcohol use  She reports that she does not use drugs  ,  is allergic to clindamycin/lincomycin; levaquin [levofloxacin]; and penicillins     Current Outpatient Medications   Medication Sig Dispense Refill    acetaminophen (TYLENOL) 650 mg CR tablet Take 1 tablet (650 mg total) by mouth every 8 (eight) hours as needed for mild pain 30 tablet 0    cyclobenzaprine (FLEXERIL) 10 mg tablet Take 1 tablet (10 mg total) by mouth 3 (three) times a day as needed for muscle spasms 30 tablet 0    meloxicam (MOBIC) 15 mg tablet Take 1 tablet (15 mg total) by mouth daily 30 tablet 1    ibuprofen (MOTRIN) 600 mg tablet Take 1 tablet (600 mg total) by mouth every 6 (six) hours as needed for moderate pain (Patient not taking: Reported on 2/26/2021) 30 tablet 0     No current facility-administered medications for this visit  Review of Systems   Constitutional: Negative for activity change, chills, diaphoresis, fatigue and fever  HENT: Negative  Eyes: Negative for visual disturbance  Respiratory: Negative for cough, chest tightness, shortness of breath and wheezing  Cardiovascular: Negative for chest pain, palpitations and leg swelling  Gastrointestinal: Negative for abdominal pain, constipation, diarrhea, nausea and vomiting  Endocrine: Negative for cold intolerance and heat intolerance  Genitourinary: Negative for difficulty urinating, dysuria and frequency  Musculoskeletal: Positive for neck pain and neck stiffness  Negative for arthralgias, gait problem and myalgias  Neurological: Negative for dizziness, seizures, syncope, weakness, light-headedness, numbness and headaches  Psychiatric/Behavioral: Negative for confusion and sleep disturbance  The patient is not nervous/anxious  PHQ-9 Depression Screening    PHQ-9:   Frequency of the following problems over the past two weeks:      Little interest or pleasure in doing things: 0 - not at all  Feeling down, depressed, or hopeless: 0 - not at all  PHQ-2 Score: 0        Objective:  Vitals:    02/26/21 0823   BP: 128/76   BP Location: Left arm   Patient Position: Sitting   Cuff Size: Extra-Large   Pulse: 94   Resp: 16   Temp: (!) 97 4 °F (36 3 °C)   SpO2: 98%   Weight: (!) 158 kg (348 lb 3 2 oz)   Height: 5' 3" (1 6 m)     Body mass index is 61 68 kg/m²  Physical Exam  Vitals signs and nursing note reviewed  Constitutional:       General: She is not in acute distress  Appearance: Normal appearance  She is not ill-appearing     HENT:      Head: Normocephalic and atraumatic  Eyes:      Extraocular Movements: Extraocular movements intact  Conjunctiva/sclera: Conjunctivae normal       Pupils: Pupils are equal, round, and reactive to light  Neck:      Musculoskeletal: Neck supple  Vascular: No carotid bruit  Cardiovascular:      Rate and Rhythm: Normal rate and regular rhythm  Heart sounds: Normal heart sounds  Pulmonary:      Effort: Pulmonary effort is normal  No respiratory distress  Breath sounds: Normal breath sounds  No wheezing or rales  Abdominal:      General: Bowel sounds are normal  There is no distension  Palpations: Abdomen is soft  Tenderness: There is no abdominal tenderness  Neurological:      General: No focal deficit present  Mental Status: She is alert and oriented to person, place, and time  Mental status is at baseline  Psychiatric:         Mood and Affect: Mood normal          Behavior: Behavior normal          Thought Content:  Thought content normal          Judgment: Judgment normal

## 2021-02-26 NOTE — PATIENT INSTRUCTIONS
Hyperlipidemia   WHAT YOU NEED TO KNOW:   What is hyperlipidemia? Hyperlipidemia is a high level of lipids (fats) in your blood  These lipids include cholesterol or triglycerides  Lipids are made by your body  They also come from the foods you eat  Your body needs lipids to work properly, but high levels increase your risk for heart disease, heart attack, and stroke  What increases my risk for hyperlipidemia? · Family history of high lipid levels    · Diet high in saturated fats, cholesterol, or calories     · High alcohol intake or smoking    · Lack of regular physical activity    · Medical conditions such as hypothyroidism, obesity, or type 2 diabetes    · Certain medicines, such as blood pressure medicines, hormones, and steroids    How is hyperlipidemia managed and treated? Your healthcare provider may first recommend that you make lifestyle changes to help decrease your lipid levels  You may also need to take medicine to lower your lipid levels  Some of the lifestyle changes you may need to make include the following:  · Maintain a healthy weight  Ask your healthcare provider how much you should weigh  Ask him or her to help you create a weight loss plan if you are overweight  Weight loss can decrease your cholesterol and triglyceride levels  · Exercise as directed  Exercise lowers your cholesterol levels and helps you maintain a healthy weight  Get 30 minutes or more of aerobic exercise 4 to 6 days each week  You can split your exercise into four 10-minute workouts instead of 30 minutes at one time  Examples of aerobic exercises include walking briskly, swimming, or riding a bike  Work with your healthcare provider to plan the best exercise program for you  · Do not smoke  Nicotine and other chemicals in cigarettes and cigars can increase your risk for a heart attack and stroke  Ask your healthcare provider for information if you currently smoke and need help to quit   E-cigarettes or smokeless tobacco still contain nicotine  Talk to your healthcare provider before you use these products  · Eat heart-healthy foods  Talk to your dietitian about a heart-healthy diet  The following will help you manage hyperlipidemia:     ? Decrease the total amount of fat you eat  Choose lean meats, fat-free or 1% fat milk, and low-fat dairy products, such as yogurt and cheese  Limit or do not eat red meat  Red meats are high in fat and cholesterol  ? Replace unhealthy fats with healthy fats  Unhealthy fats include saturated fat, trans fat, and cholesterol  Choose soft margarines that are low in saturated fat and have little or no trans fat  Monounsaturated fats are healthy fats  These are found in olive oil, canola oil, avocado, and nuts  Polyunsaturated fats are also healthy  These are found in fish, flaxseed, walnuts, and soybeans  ? Eat 5 or more servings of fruits and vegetables every day  They are low in calories and fat, and a good source of essential vitamins  Include dark green, red, and orange vegetables  Examples include spinach, kale, broccoli, and carrots  ? Eat foods high in fiber  Fiber can help lower your cholesterol levels  Choose whole grain, high-fiber foods  Good choices include whole-wheat breads or cereals, beans, peas, fruits, and vegetables  · Ask your healthcare provider if it is safe for you to drink alcohol  Alcohol can increase your cholesterol and triglyceride levels  A drink of alcohol is 12 ounces of beer, 5 ounces of wine, or 1½ ounces of liquor  Call 911 for any of the following:   · You have any of the following signs of a heart attack:      ? Squeezing, pressure, or pain in your chest    ? You may  also have any of the following:     ? Discomfort or pain in your back, neck, jaw, stomach, or arm    ? Shortness of breath    ? Nausea or vomiting    ?  Lightheadedness or a sudden cold sweat    · You have any of the following signs of a stroke:      ? Numbness or drooping on one side of your face     ? Weakness in an arm or leg    ? Confusion or difficulty speaking    ? Dizziness, a severe headache, or vision loss    When should I contact my healthcare provider? · You have questions or concerns about your condition or care  CARE AGREEMENT:   You have the right to help plan your care  Learn about your health condition and how it may be treated  Discuss treatment options with your healthcare providers to decide what care you want to receive  You always have the right to refuse treatment  The above information is an  only  It is not intended as medical advice for individual conditions or treatments  Talk to your doctor, nurse or pharmacist before following any medical regimen to see if it is safe and effective for you  © Copyright [address] Information is for End User's use only and may not be sold, redistributed or otherwise used for commercial purposes   All illustrations and images included in CareNotes® are the copyrighted property of [employer]  or [address] [place] [place] [address]

## 2021-03-08 ENCOUNTER — APPOINTMENT (OUTPATIENT)
Dept: RADIOLOGY | Facility: CLINIC | Age: 41
End: 2021-03-08
Payer: COMMERCIAL

## 2021-03-08 ENCOUNTER — CONSULT (OUTPATIENT)
Dept: PAIN MEDICINE | Facility: CLINIC | Age: 41
End: 2021-03-08
Payer: COMMERCIAL

## 2021-03-08 VITALS
HEIGHT: 63 IN | WEIGHT: 293 LBS | DIASTOLIC BLOOD PRESSURE: 86 MMHG | SYSTOLIC BLOOD PRESSURE: 124 MMHG | BODY MASS INDEX: 51.91 KG/M2

## 2021-03-08 DIAGNOSIS — M54.12 CERVICAL RADICULOPATHY: ICD-10-CM

## 2021-03-08 DIAGNOSIS — G89.4 CHRONIC PAIN SYNDROME: ICD-10-CM

## 2021-03-08 DIAGNOSIS — M54.9 MID BACK PAIN: ICD-10-CM

## 2021-03-08 DIAGNOSIS — M54.12 CERVICAL RADICULOPATHY: Primary | ICD-10-CM

## 2021-03-08 PROCEDURE — 72072 X-RAY EXAM THORAC SPINE 3VWS: CPT

## 2021-03-08 PROCEDURE — 99244 OFF/OP CNSLTJ NEW/EST MOD 40: CPT | Performed by: ANESTHESIOLOGY

## 2021-03-08 PROCEDURE — 72050 X-RAY EXAM NECK SPINE 4/5VWS: CPT

## 2021-03-08 RX ORDER — AMITRIPTYLINE HYDROCHLORIDE 25 MG/1
25 TABLET, FILM COATED ORAL
Qty: 30 TABLET | Refills: 1 | Status: SHIPPED | OUTPATIENT
Start: 2021-03-08 | End: 2021-04-21

## 2021-03-08 NOTE — PROGRESS NOTES
Assessment:  1  Cervical radiculopathy    2  Mid back pain    3  Chronic pain syndrome        Plan:  Patient is a 79-year-old female complaints of neck pain, bilateral arm pain, midback pain presents office for initial consultation  From patient history physical exam patient appears to have cervical radiculopathy however we need more diagnostic imaging to better assess the discogenic pathology correlate patient's current presentation  1  We will provide patient with physical therapy for cervical and thoracic spine strengthening exercises  2  We will trial amitriptyline 25 mg p o  q h s  for radicular symptoms  3  Patient will obtain x-rays of the cervical and thoracic spine to assess the degenerative changes that correlate patient's current presentation  4  We will follow up in 6 weeks for assessment to medication in addition to reviewing and diagnostic imaging    History of Present Illness: The patient is a 38 y o  female who presents for consultation in regards to Headache, Neck Pain, Shoulder Pain, Arm Pain, Hand Pain, Hip Pain, and Back Pain  Symptoms have been present for Six years  Symptoms began without any precipitating injury or trauma  Pain is reported to be 7 on the numeric rating scale  Symptoms are felt nearly constantly and worst in the no typical pattern  Symptoms are characterized as burning, shooting, sharp, cutting, numbing, tingling, pressure-like and throbbing  Symptoms are associated with bilateral arm weakness and bilateral leg weakness  Aggravating factors include standing, bending, leaning forward, leaning bckward, sitting, walking and exercise  Relieving factors include nothing  No change in symptoms with kneeling, lying down, relaxation, coughing/sneezing and bowel movements  Treatments that have been helpful include prior injections including TPI  physical therapy, home exercise and heat/ice have provided no relief    Medications to relieve symptoms include  Tylenol, ibuprofen, meloxicam, Flexeril  Review of Systems:    Review of Systems   Constitutional: Positive for unexpected weight change  Eyes: Positive for visual disturbance  Cardiovascular: Positive for leg swelling  Musculoskeletal: Positive for arthralgias and myalgias  Neurological: Positive for dizziness, numbness and headaches  Psychiatric/Behavioral: Positive for decreased concentration  The patient is nervous/anxious  All other systems reviewed and are negative          Past Medical History:   Diagnosis Date    Kidney stone     Obesity 4/15/2013    Primary osteoarthritis involving multiple joints 2020    Wears dentures     Wears glasses        Past Surgical History:   Procedure Laterality Date    BACK SURGERY      lump removed near shoulder on right side     SECTION      x3, 2005,,     HYSTERECTOMY      MOUTH SURGERY      21 teeth removed    CA CYSTOURETHROSCOPY N/A 10/8/2020    Procedure: CYSTOSCOPY;  Surgeon: Mai Tillman MD;  Location: [facility];  Service: Gynecology    CA LAPAROSCOPY W TOT HYSTERECTUTERUS <=250 Reita Albe  W TUBE/OVARY N/A 10/8/2020    Procedure: ROBOTIC TOTAL HYSTERECTOMY; BILATERAL SALPINGECTOMY;  Surgeon: Mai Tillman MD;  Location[facility];  Service: Gynecology    TUBAL LIGATION         Family History   Problem Relation Age of Onset    Hypertension Mother     Irritable bowel syndrome Mother     Diverticulitis Mother     Depression Mother     Heart murmur Mother     Obesity Mother     Hypertension Father     Spina bifida Father     Obesity Father     Multiple sclerosis Brother     Lung disease Brother     COPD Family     Emphysema Family     No Known Problems Daughter     Stroke Maternal Grandmother     Obesity Paternal Grandmother     Stroke Paternal Grandmother     No Known Problems Daughter     No Known Problems Daughter     No Known Problems Maternal Aunt     No Known Problems Maternal Aunt        Social History     Occupational History    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   Tobacco Use    Smoking status: Current Every Day Smoker     Packs/day: 0 50     Years: 30 00     Pack years: 15 00     Types: Cigarettes    Smokeless tobacco: Never Used   Substance and Sexual Activity    Alcohol use: Yes     Frequency: Monthly or less     Drinks per session: 1 or 2     Binge frequency: Never     Comment: 3x per year will have 1-2 drinks    Drug use: No    Sexual activity: Not Currently         Current Outpatient Medications:     acetaminophen (TYLENOL) 650 mg CR tablet, Take 1 tablet (650 mg total) by mouth every 8 (eight) hours as needed for mild pain, Disp: 30 tablet, Rfl: 0    cyclobenzaprine (FLEXERIL) 10 mg tablet, Take 1 tablet (10 mg total) by mouth 3 (three) times a day as needed for muscle spasms, Disp: 30 tablet, Rfl: 0    ibuprofen (MOTRIN) 600 mg tablet, Take 1 tablet (600 mg total) by mouth every 6 (six) hours as needed for moderate pain (Patient not taking: Reported on 2/26/2021), Disp: 30 tablet, Rfl: 0    meloxicam (MOBIC) 15 mg tablet, Take 1 tablet (15 mg total) by mouth daily, Disp: 30 tablet, Rfl: 1    Allergies   Allergen Reactions    Clindamycin/Lincomycin      Mouth ulcers severe  But, tolerates azithromycin   Levaquin [Levofloxacin] Throat Swelling     Facial swelling leading to throat swelling    Penicillins Anaphylaxis       Physical Exam:    /86 (BP Location: Other (Comment), Patient Position: Sitting, Cuff Size: Large) Comment (BP Location): Right Wrist  Ht 5' 3" (1 6 m)   Wt (!) 157 kg (345 lb 9 6 oz)   LMP 08/25/2020   BMI 61 22 kg/m²     Constitutional: normal, well developed, well nourished, alert, in no distress and non-toxic and no overt pain behavior   and obese  Eyes: anicteric  HEENT: grossly intact  Neck: supple, symmetric, trachea midline and no masses   Pulmonary:even and unlabored  Cardiovascular:No edema or pitting edema present  Skin:Normal without rashes or lesions and well hydrated  Psychiatric:Mood and affect appropriate  Neurologic:Cranial Nerves II-XII grossly intact  Musculoskeletal:antalgic     Cervical Spine examination demonstrates  Decreased ROM secondary to pain with lateral rotation to the left/right and bending to the left/right, in addition to neck flexion  4/5 upper extremity strength in all muscle groups bilaterally  Negative Spurling's maneuver to the b/l Ue, sensitivity to light touch intact b/l Ue  Lumbar/Sacral Spine examination demonstrates  Full range of motion lumbar spine with pain upon: flexion, lateral rotation to the left/right, and bending to the left/right  Bilateral lumbar paraspinals tender to palpation  Muscle spasms noted in the lumbar area bilaterally  4/5 lower extremity strength in all muscle groups bilaterally  Positive seated straight leg raise for bilateral lower extremities  Sensitivity to light touch intact bilateral lower extremities  2+ reflexes in the patella and Achilles  No ankle clonus         Imaging  No orders to display       No orders of the defined types were placed in this encounter

## 2021-03-08 NOTE — PATIENT INSTRUCTIONS
Amitriptyline (By mouth)   Amitriptyline ([id number]-TRIP-ti-yamileth)  Treats depression  This medicine is a TCA  Brand Name(s): Elavil   There may be other brand names for this medicine  When This Medicine Should Not Be Used: This medicine is not right for everyone  Do not use if you had an allergic reaction to amitriptyline  How to Use This Medicine:   Tablet  · Take your medicine as directed  Your dose may need to be changed several times to find what works best for you  · This medicine should come with a Medication Guide  Ask your pharmacist for a copy if you do not have one  · Store the medicine in a closed container at room temperature, away from heat, moisture, and direct light  · Missed dose: Take a dose as soon as you remember  If it is almost time for your next dose, wait until then and take a regular dose  Do not take extra medicine to make up for a missed dose  Drugs and Foods to Avoid:   Ask your doctor or pharmacist before using any other medicine, including over-the-counter medicines, vitamins, and herbal products  · Do not use this medicine with cisapride  Do not use this medicine and an MAO inhibitor (MAOI) within 14 days of each other  · Some medicines and foods can affect how amitriptyline works  Tell your doctor if you are using cimetidine, disulfiram, or guanethidine  Tell your doctor if you are using other medicine for depression (such as fluoxetine, paroxetine, sertraline), a phenothiazine medicine (such as promethazine, chlorpromazine), medicine for heart rhythm problems (such as flecainide, propafenone, quinidine), or thyroid medicine  · Tell your doctor if you use anything else that makes you sleepy  Some examples are allergy medicine, narcotic pain medicine, and alcohol    Warnings While Using This Medicine:   · Tell your doctor if you are pregnant or breastfeeding, or if you have liver disease, heart disease, diabetes, narrow-angle glaucoma, a seizure disorder, a thyroid problem, or trouble urinating  · For some children, teenagers, and young adults, this medicine may increase mental or emotional problems  This may lead to thoughts of suicide and violence  Talk with your doctor right away if you have any thoughts or behavior changes that concern you  Tell your doctor if you or anyone in your family has a history of bipolar disorder or suicide attempts  · This medicine may cause the following problems:   ? Heart rhythm problems  ? High or low blood sugar levels  · This medicine may make you dizzy or drowsy  Do not drive or do anything else that could be dangerous until you know how this medicine affects you  · Do not stop using this medicine suddenly  Your doctor will need to slowly decrease your dose before you stop it completely  · Keep all medicine out of the reach of children  Never share your medicine with anyone  Possible Side Effects While Using This Medicine:   Call your doctor right away if you notice any of these side effects:  · Allergic reaction: Itching or hives, swelling in your face or hands, swelling or tingling in your mouth or throat, chest tightness, trouble breathing  · Anxiety, restlessness, seeing or hearing things that are not there  · Chest pain, trouble breathing  · Fast, pounding, or uneven heartbeat  · Feeling more excited or energetic than usual, racing thoughts, trouble sleeping  · Lightheadedness, dizziness, or fainting  · Seizures  · Thoughts of hurting yourself or others, unusual behavior  · Unusual bleeding or bruising  If you notice these less serious side effects, talk with your doctor:   · Blurred vision, dry mouth, fever  · Change in how much or how often you urinate  · Constipation, diarrhea, nausea, vomiting  · Drowsiness, sleepiness  · Sexual problems  If you notice other side effects that you think are caused by this medicine, tell your doctor  Call your doctor for medical advice about side effects   You may report side effects to FDA at 1-800-FDA-1088  © Copyright [address] [place] [address] Information is for End User's use only and may not be sold, redistributed or otherwise used for commercial purposes  The above information is an  only  It is not intended as medical advice for individual conditions or treatments  Talk to your doctor, nurse or pharmacist before following any medical regimen to see if it is safe and effective for you

## 2021-03-10 NOTE — PROGRESS NOTES
PT Evaluation     Today's date: 3/11/21  Patient name: Emery Lamar  : 1980  MRN: [id number]  Referring provider: Dean Trujillo MD  Dx:   Encounter Diagnosis     ICD-10-CM    1  Cervical radiculopathy  M54 12    2  Mid back pain  M54 9    3  Chronic pain syndrome  G89 4                   Assessment  Assessment details: Emery Lamar is a 38 y o  female presenting to outpatient physical therapy with diagnosis of Cervical radiculopathy  (primary encounter diagnosis)  Mid back pain  Chronic pain syndrome  Patient's current impairments include B cerv and UT, MT pain, impaired soft tissue mobility, reduced  cerv range of motion, reduced cerv and RUE  strength, reduced postural awareness, and reduced activity tolerance  Patient's present functional limitations include difficulty with ADLs with increased need for assistance, reliance on medication and/or modalities for pain relief, poor tolerance for functional mobility and activity, and difficulty completing self care and [facility]  responsibilities  Patient to benefit from skilled outpatient physical therapy 2x/week for 4-6  weeks in order to reduce pain, maximize pain free range of motion, increase strength and stability, and improve functional mobility/functional activity in order to maximize return to prior level of function with reduced limitations  Thank you for your referral     Impairments: abnormal or restricted ROM, activity intolerance, impaired physical strength, lacks appropriate home exercise program and pain with function  Barriers to therapy: Chronicity of pain  Understanding of Dx/Px/POC: good   Prognosis: fair    Goals  STGs to be achieved in 4 weeks:  1  Pt to demonstrate reduced subjective pain rating "at worst" by at least 2-3 points from Initial Eval in order to allow for reduced pain with ADLs and improved functional activity tolerance     2  Pt to demonstrate increased AROM of cerv spine  by at least 5-10 degrees in order to allow for greater ease and independence with ADLs and functional mobility  3  Pt to demonstrate full PROM of c-spine in order to maximize joint mobility and function and allow for progression of exercise program and achievement of goals  4  Pt to demonstrate increased MMT of RUE  And c-spine by at least 1/2-1 grade in order to improve safety and stability with ADLs and functional mobility  LTGs to be achieved in 6-8 weeks:  1  Pt will be I with HEP in order to continue to improve quality of life and independence and reduce risk for re-injury  2  Pt to demonstrate return to [facility] chores and self care without limitations or restrictions  3  Pt to demonstrate improved function as noted by achieving or exceeding predicted score on FOTO outcomes assessment tool  Plan  Patient would benefit from: skilled physical therapy  Planned therapy interventions: manual therapy, patient education, therapeutic exercise, stretching, strengthening, home exercise program and neuromuscular re-education  Frequency: 2x week  Duration in weeks: 4  Plan of Care beginning date: 3/11/2021  Plan of Care expiration date: 4/10/2021  Treatment plan discussed with: patient        Subjective Evaluation    History of Present Illness  Mechanism of injury: Patient is a 62-year-old female complaints of neck pain, R arm pain, midback pain presents to pain mgmt office for initial consultation  From patient history and physical exam patient appears to have cervical radiculopathy however phys recs more diagnostic imaging to better assess the discogenic pathology correlate patient's current presentation Pt had xrays and must complete PT prior to having an MRI as per insu alycia  Pt notes constant neck pain and upper back pain  Taking RX meds for same with no relief  Had new med prescribed by Dr Gabe brannon and able to perform job duties  [facility] she has not started  Pt has had this pain for > 20 years with worsening over past 6 years   Pt has diff with dressing due to dizzy spells, bending forward, reaching overhead, walking, cleaning,cooking, turning head to drive  Pt employed as  Notes burning between scapulae  Recurrent probem    Quality of life: fair    Pain  Current pain ratin  At best pain ratin  At worst pain rating: 10  Quality: radiating, sharp, throbbing, dull ache and burning  Alleviating factors: nothing  Aggravating factors: overhead activity, walking, standing and lifting (bending)  Progression: worsening    Social Support  Lives in: multiple-level home  Lives with: spouse and young children    Employment status: working ()  Hand dominance: left      Diagnostic Tests  Abnormal x-ray: taken 3/  Treatments  Previous treatment: physical therapy and medication  Current treatment: medication and physical therapy  Patient Goals  Patient goals for therapy: decreased pain, increased motion, increased strength, independence with ADLs/IADLs and return to sport/leisure activities  Patient goal: fishing, camping,hiking        Objective     Concurrent Complaints  Positive for disturbed sleep, dizziness, headaches (~3x/week) and visual change  Postural Observations    Additional Postural Observation Details  R shld sl elevated  Decreased thoracic kyphosis    Palpation   Left   Tenderness of the cervical paraspinals, levator scapulae, middle trapezius, rhomboids, scalenes, suboccipitals and upper trapezius  Right   Tenderness of the cervical paraspinals, deltoid, levator scapulae, middle trapezius, rhomboids, scalenes, suboccipitals and upper trapezius  Tenderness     Right Shoulder  No tenderness in the clavicle       Neurological Testing     Sensation   Cervical/Thoracic   Left   Intact: hot/cold discrimination    Right   Intact: hot/cold discrimination  Diminished: light touch    Additional Neurological Details  Tingling and stinging down RUE with numbness of 2nd thru 4th digits    Active Range of Motion   Cervical/Thoracic Spine       Cervical    Flexion: 33 degrees  Restriction level: minimal  Extension: 20 degrees     Restriction level: moderate  Left lateral flexion: 17 degrees     with pain Restriction level: maximal  Right lateral flexion: 25 degrees     with pain Restriction level moderate  Left rotation: 40 degrees Restriction level: moderate  Right rotation: 37 degrees    with pain Restriction level: moderate    Strength/Myotome Testing   Cervical Spine   Neck extension: 4-  Neck flexion: 3+    Left   Interossei strength (t1): 3+    Right   Interossei strength (t1): 3+    Left Shoulder     Planes of Motion   Flexion: 4   Extension: 5   Abduction: 5   External rotation at 0°: 4+   Internal rotation at 0°: 5     Right Shoulder     Planes of Motion   Flexion: 4-   Extension: 4   Abduction: 4   External rotation at 0°: 4   Internal rotation at 0°: 4+     Left Elbow   Flexion: 5    Right Elbow   Flexion: 4+    Left Wrist/Hand   Wrist extension: 5  Wrist flexion: 5    Right Wrist/Hand   Wrist extension: 4+  Wrist flexion: 4+    Tests   Cervical   Positive vertical compression and lumbar distraction test   Negative repeated extension and repeated flexion  Right Shoulder   Positive Jesus  Lumbar   Positive vertical compression   Additional Tests Details  Pain with full can and empty can B strength 4+/5  B    General Comments:    Upper quarter screen   Elbow: unremarkable  Hand/wrist: unremarkable    Shoulder Comments   AROM WNLs but R shld is painful  Neuro Exam:     Headaches   Patient reports headaches: Yes (~3x/week)                Precautions: Cervical spine And thoracic spine strengthening exercises    Re-eval Date: 4/10/21    Date 3/11       Visit Count 1       FOTO completed       Pain In See IE       Pain Out See IE               Manuals 3/11       UT stretch        Scalene stretch        cerv PROM                Neuro Re-Ed        Ball on wall Ther Ex        cerv  AROM 5 x 5"       shld shrugs/rolls 15 ea       pulleys        UBE        Prone I,T,Y        MTPs/LTPs        SA punch        Wall pushups        scap retraction 10 x 5"       90/90 at wall        Prone rows                                                Ther Activity                        Gait Training                        Modalities

## 2021-03-11 ENCOUNTER — EVALUATION (OUTPATIENT)
Dept: PHYSICAL THERAPY | Facility: CLINIC | Age: 41
End: 2021-03-11
Payer: COMMERCIAL

## 2021-03-11 DIAGNOSIS — G89.4 CHRONIC PAIN SYNDROME: ICD-10-CM

## 2021-03-11 DIAGNOSIS — M54.9 MID BACK PAIN: ICD-10-CM

## 2021-03-11 DIAGNOSIS — M54.12 CERVICAL RADICULOPATHY: Primary | ICD-10-CM

## 2021-03-11 PROCEDURE — 97162 PT EVAL MOD COMPLEX 30 MIN: CPT

## 2021-03-11 PROCEDURE — 97110 THERAPEUTIC EXERCISES: CPT

## 2021-03-15 ENCOUNTER — OFFICE VISIT (OUTPATIENT)
Dept: PHYSICAL THERAPY | Facility: CLINIC | Age: 41
End: 2021-03-15
Payer: COMMERCIAL

## 2021-03-15 DIAGNOSIS — M54.12 CERVICAL RADICULOPATHY: Primary | ICD-10-CM

## 2021-03-15 DIAGNOSIS — M54.9 MID BACK PAIN: ICD-10-CM

## 2021-03-15 DIAGNOSIS — G89.4 CHRONIC PAIN SYNDROME: ICD-10-CM

## 2021-03-15 PROCEDURE — 97140 MANUAL THERAPY 1/> REGIONS: CPT

## 2021-03-15 PROCEDURE — 97110 THERAPEUTIC EXERCISES: CPT

## 2021-03-15 NOTE — PROGRESS NOTES
Daily Note     Today's date: 3/15/2021  Patient name: Russella Baumgarten  : 1980  MRN: [id number]  Referring provider: Jono Rincon MD  Dx:   Encounter Diagnosis     ICD-10-CM    1  Cervical radiculopathy  M54 12    2  Mid back pain  M54 9    3  Chronic pain syndrome  G89 4                   Subjective:  Pt  reports "8"/10 pain level @ cervical/thoracic regions  Objective: See treatment diary below      Assessment: Tolerated treatment Poor+ with performance of ther exer due to pain  B UT's, cervical, and thoracic regions all noted with signif tightness and mild tenderness to palpation  Received MHP and Massage applications Well  Plan: Con't services 2x/week as per POC/Goals  Precautions: Cervical spine And thoracic spine strengthening exercises    Re-eval Date: 4/10/21    Date 3/11 3 15 21      Visit Count 1 2      FOTO completed       Pain In See IE 8/10      Pain Out See IE 6-7/10              Manuals 3/11 3 15 21      UT stretch  NV      Scalene stretch  NV      cerv PROM  NV      Massage  **DTM/STM  B UT and   Cerv/thor  20 min        Neuro Re-Ed        Ball on wall                                                        Ther Ex  3 15 21      cerv  AROM 5 x 5" 5 x 5"        shld shrugs/rolls 15 ea 15x ea      pulleys  NV        UBE        Prone I,T,Y        MTPs/LTPs        SA punch        Wall pushups        scap retraction 10 x 5" 10 x 5"  **Cerv retxn  5x/5"      90/90 at wall        Prone rows                                                Ther Activity                        Gait Training                        Modalities  3 15 21      MHP  **cerv/thor  15 min  *Extra toweling applied    no adverse reaction

## 2021-03-17 ENCOUNTER — OFFICE VISIT (OUTPATIENT)
Dept: PHYSICAL THERAPY | Facility: CLINIC | Age: 41
End: 2021-03-17
Payer: COMMERCIAL

## 2021-03-17 DIAGNOSIS — M54.12 CERVICAL RADICULOPATHY: Primary | ICD-10-CM

## 2021-03-17 PROCEDURE — 97140 MANUAL THERAPY 1/> REGIONS: CPT

## 2021-03-17 PROCEDURE — 97110 THERAPEUTIC EXERCISES: CPT

## 2021-03-17 NOTE — PROGRESS NOTES
Daily Note     Today's date: 3/17/2021  Patient name: Ramy Rodriguez  : 1980  MRN: [id number]  Referring provider: Papito Holman MD  Dx:   Encounter Diagnosis     ICD-10-CM    1  Cervical radiculopathy  M54 12                   Subjective: Pt  states pain level is down to "4"/10 (vs initial 8/10) at this present time  Also says she got a better night's sleep last night  Objective: See treatment diary below      Assessment: Tolerated treatment Fair with performance of ther exer  Tolerated Manual Therapy fairly well @ lower cervical, Thor , and B UT regions, however did not tolerated Manuals @ upper cerv and especially sub occiput regions  Pt  stated she felt dizzy and nauseous with touch to subocciput region  Plan:  Con't services 2x/week          Precautions: Cervical spine And thoracic spine strengthening exercises    Re-eval Date: 4/10/21    Date 3/11 3 15 21 3 17 21     Visit Count 1 2 3     FOTO completed       Pain In See IE 8/10 4/10     Pain Out See IE -7/10 same             Manuals 3/11 3 15 21 3 17 21     UT stretch  NV **3x/20"  *instructed for self, as well     Scalene stretch  NV **3x/20"  *instructed for self, as well     cerv PROM        Massage  **DTM/STM  B UT and   Cerv/thor  20 min   **DTM/STM  B UT and   Cerv/thor  20 min     Neuro Re-Ed        Ball on wall                                                        Ther Ex  3 15 21 3 17 21     cerv  AROM 5 x 5" 5 x 5"   5 x 5"  All planes     shld shrugs/rolls 15 ea 15x ea 20x ea     pulleys     NV     UBE        Prone I,T,Y        MTPs/LTPs        SA punch        Wall pushups   NV     scap retraction 10 x 5" 10 x 5"  **Cerv retxn  5x/5" 10 x 5"  Cerv retxn  5x/5"     90/90 at wall        Prone rows                                                Ther Activity                        Gait Training                        Modalities  3 15 21 3 17 21     MHP  **cerv/thor  15 min  *Extra toweling applied    no adverse reaction  cerv/thor  15 min  *Extra toweling applied    no adverse reaction

## 2021-03-23 ENCOUNTER — OFFICE VISIT (OUTPATIENT)
Dept: PHYSICAL THERAPY | Facility: CLINIC | Age: 41
End: 2021-03-23
Payer: COMMERCIAL

## 2021-03-23 ENCOUNTER — OFFICE VISIT (OUTPATIENT)
Dept: SLEEP CENTER | Facility: CLINIC | Age: 41
End: 2021-03-23
Payer: COMMERCIAL

## 2021-03-23 VITALS
WEIGHT: 293 LBS | BODY MASS INDEX: 51.91 KG/M2 | SYSTOLIC BLOOD PRESSURE: 120 MMHG | DIASTOLIC BLOOD PRESSURE: 70 MMHG | HEIGHT: 63 IN

## 2021-03-23 DIAGNOSIS — E66.01 MORBID OBESITY (HCC): ICD-10-CM

## 2021-03-23 DIAGNOSIS — G47.33 OBSTRUCTIVE SLEEP APNEA TREATED WITH CONTINUOUS POSITIVE AIRWAY PRESSURE (CPAP): Primary | ICD-10-CM

## 2021-03-23 DIAGNOSIS — G89.4 CHRONIC PAIN SYNDROME: ICD-10-CM

## 2021-03-23 DIAGNOSIS — M54.9 MID BACK PAIN: ICD-10-CM

## 2021-03-23 DIAGNOSIS — M54.12 CERVICAL RADICULOPATHY: Primary | ICD-10-CM

## 2021-03-23 DIAGNOSIS — Z99.89 OBSTRUCTIVE SLEEP APNEA TREATED WITH CONTINUOUS POSITIVE AIRWAY PRESSURE (CPAP): Primary | ICD-10-CM

## 2021-03-23 PROCEDURE — 3008F BODY MASS INDEX DOCD: CPT | Performed by: ANESTHESIOLOGY

## 2021-03-23 PROCEDURE — 97110 THERAPEUTIC EXERCISES: CPT

## 2021-03-23 PROCEDURE — 97014 ELECTRIC STIMULATION THERAPY: CPT

## 2021-03-23 PROCEDURE — 99214 OFFICE O/P EST MOD 30 MIN: CPT | Performed by: NURSE PRACTITIONER

## 2021-03-23 NOTE — PROGRESS NOTES
Daily Note     Today's date: 3/23/2021  Patient name: Obed Chapa  : 1980  MRN: [id number]  Referring provider: Alejandro Werner MD  Dx:   Encounter Diagnosis     ICD-10-CM    1  Cervical radiculopathy  M54 12    2  Mid back pain  M54 9    3  Chronic pain syndrome  G89 4                   Subjective: Pt states she notices no change since starting PT  Pt states she gets a scratchy throat after performing cerv retractions lasting 1/2 to 1 hour  Objective: See treatment diary below      Assessment: Tolerated treatment fair  Patient demonstrated fatigue post treatment, exhibited good technique with therapeutic exercises and would benefit from continued PT  Initiated several new ex today which pt tolerated fair  Pt noted some pain and fatigue with ex  Pt rec'd IFC ES with HP to B UT and rhomboid areas  Plan: Continue per plan of care        Precautions: Cervical spine And thoracic spine strengthening exercises    Re-eval Date: 4/10/21    Date 3/11 3 15 21 3 17 21 3/23    Visit Count 1 2 3 4    FOTO completed       Pain In See IE 8/10 4/10 4/10    Pain Out See IE -7/10 same             Manuals 3/11 3 15 21 3 17 21 3/23    UT stretch  NV **3x/20"  *instructed for self, as well     Scalene stretch  NV **3x/20"  *instructed for self, as well     cerv PROM        Massage  **DTM/STM  B UT and   Cerv/thor  20 min   **DTM/STM  B UT and   Cerv/thor  20 min     Neuro Re-Ed        Ball on wall                                                        Ther Ex  3 15 21 3 17 21 3/23    cerv  AROM 5 x 5" 5 x 5"   5 x 5"  All planes 5 x 5"  All planes    shld shrugs/rolls 15 ea 15x ea 20x ea 20 ea    pulleys     NV 2 min flex/HA    UBE        Prone I,T,Y        MTPs/LTPs                Wall pushups    NV    scap retraction 10 x 5" 10 x 5"  **Cerv retxn  5x/5" 10 x 5"  Cerv retxn  5x/5" 10 x 5"  Supine cerv retraction  10x5"    90/90 at wall        Prone rows        SA punch     20    ER w/TB    Green  20 Ther Activity                        Gait Training                        Modalities  3 15 21 3 17 21 3/23    [facility]  **cerv/thor  15 min  *Extra toweling applied    no adverse reaction  cerv/thor  15 min  *Extra toweling applied    no adverse reaction   With IFC ES x15' to B UT and rhomboid areas

## 2021-03-23 NOTE — PROGRESS NOTES
Progress Note - [address] 38 y o  female   :1980, MRN: [id number]  3/23/2021          Follow Up Evaluation / Problem:  Severe Obstructive Sleep Apnea  Obesity      Thank you for the opportunity of participating in the evaluation and care of this patient in the Sleep Clinic at [facility]  HPI: Donte Rhoades is a 38y o  year old female  The patient presents for follow up of severe obstructive sleep apnea  She has a long history of loud snoring and frequent night time awakenings for repositioning  She also experiences some symptoms consistent with restless legs syndrome when she gets into bed  She completed a diagnostic sleep study, followed by a CPAP titration study  She began the use of CPAP equipment  She presents to review results of testing, as well as compliance with use of equipment and effectiveness of treatment      Review of Systems      Genitourinary none   Cardiology ankle/leg swelling   Gastrointestinal none   Neurology frequent headaches, need to move extremities, muscle weakness, numbness/tingling of an extremity and balance problems   Constitutional none   Integumentary rash or dry skin   Psychiatry anxiety and aggressiveness or irritability   Musculoskeletal joint pain, muscle aches, back pain and legs twitching/jerking   Pulmonary chest tightness   ENT none   Endocrine none   Hematological none       Current Outpatient Medications:     acetaminophen (TYLENOL) 650 mg CR tablet, Take 1 tablet (650 mg total) by mouth every 8 (eight) hours as needed for mild pain, Disp: 30 tablet, Rfl: 0    amitriptyline (ELAVIL) 25 mg tablet, Take 1 tablet (25 mg total) by mouth daily at bedtime, Disp: 30 tablet, Rfl: 1    cyclobenzaprine (FLEXERIL) 10 mg tablet, Take 1 tablet (10 mg total) by mouth 3 (three) times a day as needed for muscle spasms, Disp: 30 tablet, Rfl: 0    meloxicam (MOBIC) 15 mg tablet, Take 1 tablet (15 mg total) by mouth daily, Disp: 30 tablet, Rfl: 1    ibuprofen (MOTRIN) 600 mg tablet, Take 1 tablet (600 mg total) by mouth every 6 (six) hours as needed for moderate pain (Patient not taking: Reported on 2/26/2021), Disp: 30 tablet, Rfl: 0    [place] Sleepiness Scale  Sitting and reading: High chance of dozing  Watching TV: Moderate chance of dozing  Sitting, inactive in a public place (e g  a theatre or a meeting): Slight chance of dozing  As a passenger in a car for an hour without a break: Slight chance of dozing  Lying down to rest in the afternoon when circumstances permit: Slight chance of dozing  Sitting and talking to someone: Would never doze  Sitting quietly after a lunch without alcohol: Slight chance of dozing  In a car, while stopped for a few minutes in traffic: Would never doze  Total score: 9              Vitals:    03/23/21 0856   BP: 120/70   Weight: (!) 155 kg (341 lb)   Height: 5' 3" (1 6 m)       Body mass index is 60 41 kg/m²  EPWORTH SLEEPINESS SCORE  Total score: 9      Past History Since Last Sleep Center Visit:   She denies any significant changes to her health since her last visit  She is seeing pain management for neck and back pain  She is taking amitriptyline at bedtime, which helps a little with her pain, but makes her feel very groggy in the morning when she wakes up  She doesn't take it every day because of the grogginess and reports that it can take over an hour for it to resolve  She reports that she has been using the CPAP equipment almost every day  She feels the full face DreamWear mask is comfortable  The pressure is comfortable at the current setting  She is not sure why, but she finds that she removes the mask in her sleep and finds it on her pillow, on the floor and wrapped around her arm  She does not awaken during the night unless she feels that mask leaking air around the nasal area    The patient reports that she cleans the equipment appropriately  Results of the diagnostic sleep study, completed on 11/5/20, are as follows:  AHI 30 9, AHI in supine sleep was 37 2 and 81 2 in REM sleep  Lowest SpO2 was noted to be 80% with 8 minutes less than 90%  IMPRESSION:   1  Severe obstructive sleep apnea   2  Moderate periodic limb movements of sleep  3  Moderate sleep fragmentation     Based on the results of this study, a follow-up study to titrate positive airway pressure is strongly recommended  Results of the CPAP titration study, completed on 12/22/20, are as follows:  IMPRESSION:     1  Previously confirmed sleep disordered breathing that is successfully treated with positive airway pressure at an optimal setting of 11 cm H2O    2  Short sleep and REM latencies with high sleep efficiency suggesting sleep deprivation     Based on the results of this study, continued use of positive airway pressure at the above described setting is recommended  It is noted that no PLMs were noted with use of CPAP  The review of systems and following portions of the patient's history were reviewed and updated as appropriate: allergies, current medications, past family history, past medical history, past social history, past surgical history, and problem list         OBJECTIVE    PAP Pressure: Nasal AutoPAP using a lower limit of 10 cm and an upper limit of 11 cm of water pressure  (pressure change from 11cm, due to unexplained removal of mask with very low AHI of 0 9)  Type of mask used: full face  DME Provider:  [facility]    Physical Exam:     General Appearance:   Alert, cooperative, no distress, appears stated age, morbidly obese     Head:   Normocephalic, without obvious abnormality, atraumatic     Eyes:   PERRL, conjunctiva/corneas clear, EOM's intact          Nose:  Nares normal, septum midline, no drainage or sinus tenderness           Throat:  Lips, teeth and gums normal; tongue normal size and  shape and midline mucosa moist with crowded oropharyngeal airway, uvula large, tonsils +1-2/4, Mallampati class 4       Neck:  Supple, symmetrical, trachea midline, no adenopathy; Thyroid: No enlargement, tenderness or nodules; no carotid bruit or JVD     Lungs:      Clear to auscultation bilaterally, respirations unlabored     Heart:   Regular rate and rhythm, S1 and S2 normal, no murmur, rub or gallop       Extremities:  Extremities normal, atraumatic, no cyanosis and +1 edema in LE bilaterally       Skin:  Skin color, texture, turgor normal, no rashes or lesions       Neurologic:  No focal deficits noted       ASSESSMENT / PLAN    1  Obstructive sleep apnea treated with continuous positive airway pressure (CPAP)  PAP DME Resupply/Reorder    PAP DME Pressure Change   2  Morbid obesity ([facility] [address] )             Counseling / Coordination of Care  Total clinic time spent today 30 minutes  Greater than 50% of total time was spent with the patient and / or family counseling and / or coordination of care  A description of the counseling / coordination of care:     Impressions, Diagnostic results, Prognosis, Instructions for management, Risks and benefits of treatment, Patient and family education, Risk factor reductions and Importance of compliance with treatment    Today I reviewed the patient's compliance data  she has been able to use the equipment 100% of all days recorded  Average usage was 4 or more hours 70% of all days recorded  The estimated AHI is 0 9 abnormal breathing events per hour  Pressure will be changed to 10-11cm, due to unexplained removal of the mask during sleep  Heat and humidity settings will be changed to heat of 1 and humidity of 3 to further improve comfort  The patient feels they benefit from the use of PAP equipment and would like to continue PAP therapy  Response to treatment has been very good  A prescription for new supplies for the next year was given today    We discussed that if she continues to remove the mask during sleep, she should call [facility] for a mask fitting  She will continue using this equipment at the settings noted above for the next 6 months  At that timeshe will then return for a routine follow-up evaluation  I have asked the patient to contact the [address] if she encounters any difficulties prior to that time  The following instructions have been given to the patient today:    Patient Instructions   1  Continue use of CPAP equipment nightly  2  Continue to clean your equipment, as discussed  3  Contact the [address] with any questions or concerns prior to your next visit, as needed  4  Schedule visit for follow-up in 6 months      Nursing Support:  When: Monday through Friday 7A-5PM except holidays  Where: Our direct line is [phone]  If you are having a true emergency please call 911  In the event that the line is busy or it is after hours please leave a voice message and we will return your call  Please speak clearly, leaving your full name, birth date, best number to reach you and the reason for your call  Medication refills: We will need the name of the medication, the dosage, the ordering provider, whether you get a 30 or 90 day refill, and the pharmacy name and address  Medications will be ordered by the provider only  Nurses cannot call in prescriptions  Please allow 7 days for medication refills  Physician requested updates: If your provider requested that you call with an update after starting medication, please be ready to provide us the medication and dosage, what time you take your medication, the time you attempt to fall asleep, time you fall asleep, when you wake up, and what time you get out of bed  Sleep Study Results: We will contact you with sleep study results and/or next steps after the physician has reviewed your testing          Lukasz Izquierdo, [address] [place] [address]

## 2021-03-23 NOTE — PATIENT INSTRUCTIONS
1   Continue use of CPAP equipment nightly  2  Continue to clean your equipment, as discussed  3  Contact the Sleep [address] [place] [address] with any questions or concerns prior to your next visit, as needed  4  Schedule visit for follow-up in 6 months      Nursing Support:  When: Monday through Friday 7A-5PM except holidays  Where: Our direct line is [phone]  If you are having a true emergency please call 911  In the event that the line is busy or it is after hours please leave a voice message and we will return your call  Please speak clearly, leaving your full name, birth date, best number to reach you and the reason for your call  Medication refills: We will need the name of the medication, the dosage, the ordering provider, whether you get a 30 or 90 day refill, and the pharmacy name and address  Medications will be ordered by the provider only  Nurses cannot call in prescriptions  Please allow 7 days for medication refills  Physician requested updates: If your provider requested that you call with an update after starting medication, please be ready to provide us the medication and dosage, what time you take your medication, the time you attempt to fall asleep, time you fall asleep, when you wake up, and what time you get out of bed  Sleep Study Results: We will contact you with sleep study results and/or next steps after the physician has reviewed your testing

## 2021-03-24 ENCOUNTER — OFFICE VISIT (OUTPATIENT)
Dept: PHYSICAL THERAPY | Facility: CLINIC | Age: 41
End: 2021-03-24
Payer: COMMERCIAL

## 2021-03-24 ENCOUNTER — TELEPHONE (OUTPATIENT)
Dept: SLEEP CENTER | Facility: CLINIC | Age: 41
End: 2021-03-24

## 2021-03-24 DIAGNOSIS — M54.12 CERVICAL RADICULOPATHY: Primary | ICD-10-CM

## 2021-03-24 DIAGNOSIS — M54.9 MID BACK PAIN: ICD-10-CM

## 2021-03-24 DIAGNOSIS — G89.4 CHRONIC PAIN SYNDROME: ICD-10-CM

## 2021-03-24 PROCEDURE — 97014 ELECTRIC STIMULATION THERAPY: CPT

## 2021-03-24 PROCEDURE — 97110 THERAPEUTIC EXERCISES: CPT

## 2021-03-24 NOTE — PROGRESS NOTES
Daily Note     Today's date: 3/24/2021  Patient name: Alejandra Garcia  : 1980  MRN: [id number]  Referring provider: Svitlana Curran MD  Dx:   Encounter Diagnosis     ICD-10-CM    1  Cervical radiculopathy  M54 12    2  Mid back pain  M54 9    3  Chronic pain syndrome  G89 4                   Subjective: Pt n rated 10 otes she is sore this morning      Objective: See treatment diary below      Assessment: Tolerated treatment well  Patient exhibited good technique with therapeutic exercises and would benefit from continued PT  Held cerv retraction due to it causing scratchy throat in sitting and supine positions  Added several new ex as seen on flow chart  Good tolerance to same  Plan: Continue per plan of care  2x/week       Precautions: Cervical spine And thoracic spine strengthening exercises    Re-eval Date: 4/10/21    Date 3/11 3 15 21 3 17 21 3/23 3/24   Visit Count 1 2 3 4 5   FOTO completed       Pain In See IE 8/10 4/10 4/10    Pain Out See IE -7/10 same             Manuals 3/11 3 15 21 3 17 21 3/23 3/24   UT stretch  NV **3x/20"  *instructed for self, as well     Scalene stretch  NV **3x/20"  *instructed for self, as well     cerv PROM        Massage  **DTM/STM  B UT and   Cerv/thor  20 min   **DTM/STM  B UT and   Cerv/thor  20 min     Neuro Re-Ed        Ball on wall     Green B    30 ea                                                   Ther Ex  3 15 21 3 17 21 3/23 3/24   cerv  AROM 5 x 5" 5 x 5"   5 x 5"  All planes 5 x 5"  All planes 5 x 5"  All planes   shld shrugs/rolls 15 ea 15x ea 20x ea 20 ea 20 ea   pulleys     NV 2 min flex/HA 2 min flex/HA   UBE     100 rpm   6'  alt   Prone I,T,Y        MTPs/LTPs    Red 20 ea Red 20 ea           Wall pushups    NV 2 x 10   scap retraction 10 x 5" 10 x 5"  **Cerv retxn  5x/5" 10 x 5"  Cerv retxn  5x/5" 10 x 5"  Supine cerv retraction  10x5" 20 x 5"    Hold cerv retraction   90/90 at wall        Prone rows        SA punch     20 20   ER w/TB Green  20 Green 20                           Ther Activity                        Gait Training                        Modalities  3 15 21 3 17 21 3/23 3/24   MHP  **cerv/thor  15 min  *Extra toweling applied    no adverse reaction  cerv/thor  15 min  *Extra toweling applied    no adverse reaction   With [facility] ES x15' to B UT and rhomboid areas With [facility] ES x15' to B UT and rhomboid areas

## 2021-03-24 NOTE — TELEPHONE ENCOUNTER
Rx for PAP resupply faxed to [facility]  Rx for pressure change emailed to [facility]

## 2021-03-30 ENCOUNTER — OFFICE VISIT (OUTPATIENT)
Dept: PHYSICAL THERAPY | Facility: CLINIC | Age: 41
End: 2021-03-30
Payer: COMMERCIAL

## 2021-03-30 DIAGNOSIS — M54.9 MID BACK PAIN: ICD-10-CM

## 2021-03-30 DIAGNOSIS — M54.12 CERVICAL RADICULOPATHY: Primary | ICD-10-CM

## 2021-03-30 DIAGNOSIS — G89.4 CHRONIC PAIN SYNDROME: ICD-10-CM

## 2021-03-30 PROCEDURE — 97110 THERAPEUTIC EXERCISES: CPT

## 2021-03-30 PROCEDURE — 97014 ELECTRIC STIMULATION THERAPY: CPT

## 2021-03-30 NOTE — PROGRESS NOTES
Daily Note     Today's date: 3/30/2021  Patient name: Roe Salazar  : 1980  MRN: [id number]  Referring provider: Jamil Degroot MD  Dx:   Encounter Diagnosis     ICD-10-CM    1  Cervical radiculopathy  M54 12    2  Mid back pain  M54 9    3  Chronic pain syndrome  G89 4                   Subjective: Pt states she worked a few more hours than usual and pain is slightly flared up rated 6/10  Objective: See treatment diary below      Assessment: Tolerated treatment well  Patient demonstrated fatigue post treatment, exhibited good technique with therapeutic exercises and would benefit from continued PT  Pt responding well to ES for  B UT ms spasm reduction and would benefit from TENS unit  Plan: Continue per plan of care        Precautions: Cervical spine And thoracic spine strengthening exercises    Re-eval Date: 4/10/21    Date 3/30       Visit Count 1       FOTO Due NV       Pain In 6       Pain Out                Manuals 3/30       UT stretch        Scalene stretch        cerv PROM        Massage        Neuro Re-Ed        Ball on wall Green  30x                                                       Ther Ex 3/30       cerv  AROM 5 x 5"       shld shrugs/rolls 20 ea       pulleys  2 min flex/abd        rpm  8' alt       Prone I,T,Y        MTPs/LTPs Red  20ea               Wall pushups 15x       scap retraction 20 x 5"       90/90 at wall        Prone rows        SA punch  20       ER w/TB Green  30                               Ther Activity                        Gait Training                        Modalities 3/30       MHP IFC ES x 15 min  No heat per pt request

## 2021-04-06 ENCOUNTER — OFFICE VISIT (OUTPATIENT)
Dept: PHYSICAL THERAPY | Facility: CLINIC | Age: 41
End: 2021-04-06
Payer: COMMERCIAL

## 2021-04-06 DIAGNOSIS — M54.12 CERVICAL RADICULOPATHY: Primary | ICD-10-CM

## 2021-04-06 DIAGNOSIS — G89.4 CHRONIC PAIN SYNDROME: ICD-10-CM

## 2021-04-06 DIAGNOSIS — M54.9 MID BACK PAIN: ICD-10-CM

## 2021-04-06 PROCEDURE — 97014 ELECTRIC STIMULATION THERAPY: CPT

## 2021-04-06 PROCEDURE — 97110 THERAPEUTIC EXERCISES: CPT

## 2021-04-06 NOTE — PROGRESS NOTES
Daily Note     Today's date: 2021  Patient name: Josh Riley  : 1980  MRN: [id number]  Referring provider: Scott Fuentes MD  Dx:   Encounter Diagnosis     ICD-10-CM    1  Cervical radiculopathy  M54 12    2  Mid back pain  M54 9    3  Chronic pain syndrome  G89 4                   Subjective: Pt states her pain is exceptionally bad today rated 8/10  Has no idea why it's worse  Did nothing out of the ordinary today or over the weekend  Objective: See treatment diary below      Assessment: Tolerated treatment fair  Patient would benefit from continued PT  Pt given paperwork to fill out for TENS unit  FOTO score shows decline since [facility] most likely due to pt having increased symptoms today  Plan: Continue per plan of care        Precautions: Cervical spine And thoracic spine strengthening exercises    Re-eval Date: 4/10/21    Date 3/30 4/6      Visit Count 6 7      FOTO Due NV completed      Pain In 6 8      Pain Out                Manuals 3/30 4/6      UT stretch        Scalene stretch        cerv PROM        Massage        Neuro Re-Ed        Ball on wall Green  30x Green 30x                                                      Ther Ex 3/30 4/6      cerv  AROM 5 x 5"       shld shrugs/rolls 20 ea 20 ea      pulleys  2 min flex/abd  2 min flex/abd       rpm  8' alt 100 rpm  8' alt      Prone I,T,Y        MTPs/LTPs Red  20ea  red 20x ea              Wall pushups 15x       scap retraction 20 x 5" 20 x 5"      90/90 at wall        Prone rows        SA punch  20       ER w/TB Green  30 Green 30                              Ther Activity                        Gait Training                        Modalities 3/30 4/6      MHP IFC ES x 15 min  No heat per pt request IFC ES x 15 min  No heat per pt request

## 2021-04-08 ENCOUNTER — OFFICE VISIT (OUTPATIENT)
Dept: PHYSICAL THERAPY | Facility: CLINIC | Age: 41
End: 2021-04-08
Payer: COMMERCIAL

## 2021-04-08 DIAGNOSIS — M54.12 CERVICAL RADICULOPATHY: Primary | ICD-10-CM

## 2021-04-08 PROCEDURE — 97014 ELECTRIC STIMULATION THERAPY: CPT

## 2021-04-08 PROCEDURE — 97110 THERAPEUTIC EXERCISES: CPT

## 2021-04-08 NOTE — PROGRESS NOTES
Daily Note     Today's date: 2021  Patient name: Nkechi Carter  : 1980  MRN: [id number]  Referring provider: Hortensia Hernandez MD  Dx:   Encounter Diagnosis     ICD-10-CM    1  Cervical radiculopathy  M54 12                   Subjective: Pt  reports 3/10 pain level today @ cerv/thor regions  Malick Po "Not too bad today"  Objective: See treatment diary below      Assessment: Tolerated treatment Fairly Well overall with performance of ther exer  Received ES applic well  Plan: Con't services 2x/week       Precautions: Cervical spine And thoracic spine strengthening exercises    Re-eval Date: 4/10/21    Date 3/30 4/6 4 8 21     Visit Count 6 7 8     FOTO Due NV completed      Pain In  8 3/10     Pain Out   1-2/10             Manuals 3/30 4/6 4 8 21     UT stretch        Scalene stretch        cerv PROM        Massage        Neuro Re-Ed        Ball on wall Green  30x Green 30x Green 30x                                                     Ther Ex 3/30 4/6 4 8 21     cerv  AROM 5 x 5"  5 x 5"     shld shrugs/rolls 20 ea 20 ea 25x     pulleys  2 min flex/abd  2 min flex/abd 2 min flex/abd      rpm  8' alt 100 rpm  8' alt 100 rpm  8' alt     Prone I,T,Y        MTPs/LTPs Red  20ea  red 20x ea  red 25x ea             Wall pushups 15x       scap retraction 20 x 5" 20 x 5" 20 x 5"     90/90 at wall        Prone rows        SA punch  20       ER w/TB Green  30 Green 30 Green 30                             Ther Activity                        Gait Training                        Modalities 3/30 4/6 4 8 21     MHP IFC ES x 15 min  No heat per pt request IFC ES x 15 min  No heat per pt request IFC ES x 15 min  No heat per pt request

## 2021-04-13 ENCOUNTER — OFFICE VISIT (OUTPATIENT)
Dept: PHYSICAL THERAPY | Facility: CLINIC | Age: 41
End: 2021-04-13
Payer: COMMERCIAL

## 2021-04-13 DIAGNOSIS — M54.12 CERVICAL RADICULOPATHY: Primary | ICD-10-CM

## 2021-04-13 DIAGNOSIS — G89.4 CHRONIC PAIN SYNDROME: ICD-10-CM

## 2021-04-13 DIAGNOSIS — M54.9 MID BACK PAIN: ICD-10-CM

## 2021-04-13 PROCEDURE — 97140 MANUAL THERAPY 1/> REGIONS: CPT

## 2021-04-13 PROCEDURE — 97110 THERAPEUTIC EXERCISES: CPT

## 2021-04-13 NOTE — PROGRESS NOTES
Daily Note     Today's date: 2021  Patient name: Maranda Bumpers  : 1980  MRN: [id number]  Referring provider: Abe Carson MD  Dx: No diagnosis found  Start Time:           Subjective: Pt reports that her LB has been hurting her since this morning  Pt notes that her neck is sore and hurting her  Objective: See treatment diary below      Assessment:  Pt with tenderness to palpation in R C-spine noted with manual therapy  Pt with upper trap tightness  Pt with decreased vertebral glides  Pt with decreased L rotation  Pt does well with there ex  Pt would benefit from continued therapy for strengthening for improving functional mobility  Plan: Continue per plan of care        Precautions: Cervical spine And thoracic spine strengthening exercises    Re-eval Date: 4/10/21    Date 3/30 4/6 4 8 21 21    Visit Count 6 7 8 9    FOTO Due NV completed      Pain In 6 8 3/10     Pain Out   1-2/10             Manuals 3/30 4/6 4 8 21 21    UT stretch        Scalene stretch        cerv PROM    23 with vertebral glides MFR    Massage        Neuro Re-Ed        Ball on wall Green  30x Green 30x Green 30x                                                     Ther Ex 3/30 4/6 4 8 21 21    cerv  AROM 5 x 5"  5 x 5" 5x5"    shld shrugs/rolls 20 ea 20 ea 25x 25x    pulleys  2 min flex/abd  2 min flex/abd 2 min flex/abd 2 min flex/abd     rpm  8' alt 100 rpm  8' alt 100 rpm  8' alt 10' alt    Prone I,T,Y        MTPs/LTPs Red  20ea  red 20x ea  red 25x ea             Wall pushups 15x       scap retraction 20 x 5" 20 x 5" 20 x 5" 20x5"    90/90 at wall        Prone rows        SA punch  20       ER w/TB Green  30 Green 30 Green 30 Green 30                            Ther Activity                        Gait Training                        Modalities 3/30 4/ 4 8 21     MHP IFC ES x 15 min  No heat per pt request IFC ES x 15 min  No heat per pt request IFC ES x 15 min  No heat per pt request

## 2021-04-15 ENCOUNTER — OFFICE VISIT (OUTPATIENT)
Dept: PHYSICAL THERAPY | Facility: CLINIC | Age: 41
End: 2021-04-15
Payer: COMMERCIAL

## 2021-04-15 DIAGNOSIS — M54.12 CERVICAL RADICULOPATHY: Primary | ICD-10-CM

## 2021-04-15 DIAGNOSIS — G89.4 CHRONIC PAIN SYNDROME: ICD-10-CM

## 2021-04-15 DIAGNOSIS — M54.9 MID BACK PAIN: ICD-10-CM

## 2021-04-15 PROCEDURE — 97014 ELECTRIC STIMULATION THERAPY: CPT

## 2021-04-15 PROCEDURE — 97110 THERAPEUTIC EXERCISES: CPT

## 2021-04-15 NOTE — PROGRESS NOTES
Daily Note     Today's date: 4/15/2021  Patient name: Ricci Nava  : 1980  MRN: [id number]  Referring provider: Narda Law MD  Dx:   Encounter Diagnosis     ICD-10-CM    1  Cervical radiculopathy  M54 12    2  Mid back pain  M54 9    3  Chronic pain syndrome  G89 4                   Subjective: Pt states she has increased pain and pressure at her B suboccipital area  L>R since manual therapy last session  States she had radiation of numbness and pain down the RUE  Objective: See treatment diary below      Assessment: Tolerated treatment poor  Patient unable to complete ex due to pain and tx adjusted according to pt tolerance  Pt is willing to try ther ex but any UE mvmt or scap mvmt or cerv mvmt is causing more pain  Pt was able to complete shldr rolls and shrugs  Treated with IFC ES to B base of neck and thoracic paraspinal area  Pt noted no signif change in pain following tx  Pt hoping to have MRI after completion of tx req'd by insurance  Plan: Continue per plan of care  Pt to have one more PT session and will then ret to pain mgmt        Precautions: Cervical spine And thoracic spine strengthening exercises    Re-eval Date: 4/10/21    Date 3/30 4/6 4 8 21 4/13/21 4/15   Visit Count 6 7 8 9 10   FOTO Due NV completed      Pain In 6 8 3/10  7/10   Pain Out   1-2/10             Manuals 3/30 4/6 4 8 21 4/13/21 4/15   UT stretch        Scalene stretch        cerv PROM    23 with vertebral glides MFR    Massage        Neuro Re-Ed        Ball on wall Green  30x Green 30x Green 30x                                                     Ther Ex 3/30 4/6 4 8 21 4/13/21 4/15   cerv  AROM 5 x 5"  5 x 5" 5x5"    shld shrugs/rolls 20 ea 20 ea 25x 25x 20 ea   pulleys  2 min flex/abd  2 min flex/abd 2 min flex/abd 2 min flex/abd     rpm  8' alt 100 rpm  8' alt 100 rpm  8' alt 10' alt    Prone I,T,Y        MTPs/LTPs Red  20ea  red 20x ea  red 25x ea             Wall pushups 15x       scap retraction 20 x 5" 20 x 5" 20 x 5" 20x5"    90/90 at wall        Prone rows        SA punch  20       ER w/TB Green  30 Green 30 Green 30 Green 30                            Ther Activity                        Gait Training                        Modalities 3/30 4/6 4 8 21  4/15   MHP IFC ES x 15 min  No heat per pt request IFC ES x 15 min  No heat per pt request IFC ES x 15 min  No heat per pt request  IFC ES x 15 min w/HP

## 2021-04-20 ENCOUNTER — APPOINTMENT (OUTPATIENT)
Dept: PHYSICAL THERAPY | Facility: CLINIC | Age: 41
End: 2021-04-20
Payer: COMMERCIAL

## 2021-04-21 ENCOUNTER — OFFICE VISIT (OUTPATIENT)
Dept: PAIN MEDICINE | Facility: CLINIC | Age: 41
End: 2021-04-21
Payer: COMMERCIAL

## 2021-04-21 VITALS
WEIGHT: 293 LBS | HEIGHT: 63 IN | DIASTOLIC BLOOD PRESSURE: 101 MMHG | BODY MASS INDEX: 51.91 KG/M2 | SYSTOLIC BLOOD PRESSURE: 156 MMHG | HEART RATE: 89 BPM

## 2021-04-21 DIAGNOSIS — M54.12 CERVICAL RADICULOPATHY: ICD-10-CM

## 2021-04-21 DIAGNOSIS — G89.4 CHRONIC PAIN SYNDROME: Primary | ICD-10-CM

## 2021-04-21 DIAGNOSIS — M54.2 CHRONIC CERVICAL PAIN: ICD-10-CM

## 2021-04-21 DIAGNOSIS — M54.6 THORACIC SPINE PAIN: ICD-10-CM

## 2021-04-21 DIAGNOSIS — G89.29 CHRONIC CERVICAL PAIN: ICD-10-CM

## 2021-04-21 PROCEDURE — 99214 OFFICE O/P EST MOD 30 MIN: CPT | Performed by: NURSE PRACTITIONER

## 2021-04-21 PROCEDURE — 3008F BODY MASS INDEX DOCD: CPT | Performed by: ANESTHESIOLOGY

## 2021-04-21 RX ORDER — GABAPENTIN 100 MG/1
CAPSULE ORAL
Qty: 90 CAPSULE | Refills: 1 | Status: SHIPPED | OUTPATIENT
Start: 2021-04-21 | End: 2021-06-18 | Stop reason: SDUPTHER

## 2021-04-21 NOTE — PATIENT INSTRUCTIONS
Gabapentin (By mouth)   Gabapentin (juan-a-PEN-tin)  Treats seizures and pain caused by shingles  Brand Name(s): FusePaq Fanatrex, Gralise, Neurontin   There may be other brand names for this medicine  When This Medicine Should Not Be Used: This medicine is not right for everyone  Do not use it if you had an allergic reaction to gabapentin  How to Use This Medicine:   Capsule, Liquid, Tablet  · Take your medicine as directed  Your dose may need to be changed several times to find what works best for you  If you have epilepsy, do not allow more than 12 hours to pass between doses  · Capsule: Swallow the capsule whole with plenty of water  Do not open, crush, or chew it  · Gralise® tablet: Swallow the tablet whole   Do not crush, break, or chew it  · Neurontin® tablet: If you break a tablet into 2 pieces, use the second half as your next dose  Do not use the half-tablet if the whole tablet has been cut or broken after 28 days  · Oral liquid: Measure the oral liquid medicine with a marked measuring spoon, oral syringe, or medicine cup  · This medicine should come with a Medication Guide  Ask your pharmacist for a copy if you do not have one  · Missed dose: Take a dose as soon as you remember  If it is almost time for your next dose, wait until then and take a regular dose  Do not take extra medicine to make up for a missed dose  · Store the medicine in a closed container at room temperature, away from heat, moisture, and direct light  Store the Neurontin® oral liquid in the refrigerator  Do not freeze  Drugs and Foods to Avoid:   Ask your doctor or pharmacist before using any other medicine, including over-the-counter medicines, vitamins, and herbal products  · Some medicines can affect how gabapentin works  Tell your doctor if you also using hydrocodone or morphine  · If you take an antacid, wait at least 2 hours before you take gabapentin    · Do not drink alcohol while you are using this medicine  · Tell your doctor if you use anything else that makes you sleepy  Some examples are allergy medicine, narcotic pain medicine, and alcohol  Tell your doctor if you are also using lorazepam, oxycodone, or zolpidem  Warnings While Using This Medicine:   · Tell your doctor if you are pregnant or breastfeeding, or if you have kidney problems (including patients receiving dialysis) or lung problems  Tell your doctor if you have a history of depression or mental health problems  · This medicine may cause the following problems:  ? Drug reaction with eosinophilia and systemic symptoms (DRESS) or multiorgan hypersensitivity, which may damage the liver, kidney, blood, heart, or muscles  ? Changes in mood or behavior, including suicidal thoughts or behavior  ? Respiratory depression (serious breathing problem that can be life-threatening), when used with narcotic pain medicines  · Do not stop using this medicine suddenly  Your doctor will need to slowly decrease your dose before you stop it completely  · This medicine may make you dizzy or drowsy  Do not drive or do anything else that could be dangerous until you know how this medicine affects you  · Tell any doctor or dentist who treats you that you are using this medicine  This medicine may affect certain medical test results  · Your doctor will check your progress and the effects of this medicine at regular visits  Keep all appointments  · Keep all medicine out of the reach of children  Never share your medicine with anyone    Possible Side Effects While Using This Medicine:   Call your doctor right away if you notice any of these side effects:  · Allergic reaction: Itching or hives, swelling in your face or hands, swelling or tingling in your mouth or throat, chest tightness, trouble breathing  · Behavior problems, aggression, restlessness, trouble concentrating, moodiness (especially in children)  · Blistering, peeling, red skin rash  · Blue lips, fingernails, or skin, chest pain, fast heartbeat, trouble breathing  · Change in how much or how often you urinate, bloody or cloudy urine  · Dark urine or pale stools, nausea, vomiting, loss of appetite, stomach pain, yellow skin or eyes  · Fever, chills, cough, sore throat, body aches  · Problems with coordination, shakiness, unsteadiness, unusual eye movement  · Rapid weight gain, swelling in your hands, ankles, or feet  · Rash, swollen or tender glands in the neck, armpit, or groin  · Unusual moods or behaviors, thoughts of hurting yourself, feeling depressed  If you notice these less serious side effects, talk with your doctor:   · Dizziness, drowsiness, sleepiness, tiredness  If you notice other side effects that you think are caused by this medicine, tell your doctor  Call your doctor for medical advice about side effects  You may report side effects to FDA at 7-461-FDA-4164  © Copyright [facility] [address] Information is for End User's use only and may not be sold, redistributed or otherwise used for commercial purposes  The above information is an  only  It is not intended as medical advice for individual conditions or treatments  Talk to your doctor, nurse or pharmacist before following any medical regimen to see if it is safe and effective for you

## 2021-04-21 NOTE — PROGRESS NOTES
Assessment:  1  Chronic pain syndrome    2  Chronic cervical pain    3  Cervical radiculopathy    4  Thoracic spine pain        Plan:   While the patient was in the office today, I did have a thorough conversation regarding their chronic pain syndrome, medication management, and treatment plan options  Patient is a 30-year-old female with chronic pain syndrome related to chronic neck pain, cervical radiculopathy, midback pain  She was initially seen here on 03/08/2021  X-rays of her cervical and thoracic spine were ordered  X-ray of the thoracic spine reveals degenerative disc disease at C6-7 which is slightly worse since the previous exam performed on 09/17/2017  X-ray of the thoracic spine reveals mild discogenic degenerative changes of the thoracic spine and mild thoracolumbar scoliosis  No acute findings  X-ray results were reviewed with patient during today's visit  Patient has been participating in physical therapy since 03/08/2021  Unfortunately, she denies any improvement with physical therapy  In fact, she states that her pain is worse after she leaves physical therapy  At this point I advised her to hold on physical therapy  patient was started on Elavil 25 mg at bedtime  She discontinued it after a couple days due to significant daytime drowsiness  At this point, we will order an MRI of her cervical and thoracic spine to determine if there is any intraspinal pathology that would contribute to her current symptoms  She is aware that we will call her with results of the MRI when they are available  We may consider epidural steroid injections or surgical referral based on the results  This was discussed with her during today's visit  I discussed with the patient that I feel a medication such as Neurontin would be helpful in treating her pain    I discussed with patient the type of medication it is, however works, and that it requires a titration process that is specific to each individual   I reviewed with the patient that it may take 3-4 weeks for the medications side effects to be noticed and it should never be abruptly stop  Possible side effects include, but are not limited to:  Vertigo, lethargy, nausea, and edema of the extremities  Advised the patient to call our office if he experience any side effects  The patient verbalized an understanding  Trial gabapentin titrating to 100 mg 3 times daily  Prescription was sent electronically to her pharmacy        History of Present Illness: The patient is a 38 y o  female who presents for a follow up office visit in regards to Back Pain, Hand Pain, Arm Pain, Shoulder Pain, and Neck Pain  The patients current symptoms include  Complaints of neck and bilateral upper extremity pain  Mid back pain that can radiate to the chest at times  Current pain level 6/10  Quality pain is described as sharp, cramping, pressure-like, numb, pins and needles  Current pain medications includes:  Mobic 15 mg as needed, Flexeril 10 mg as needed, both prescribed by her PCP    The patient reports that this regimen is providing 10-15% pain relief  The patient is reporting no side effects from this pain medication regimen  I have personally reviewed and/or updated the patient's past medical history, past surgical history, family history, social history, current medications, allergies, and vital signs today  Review of Systems  Review of Systems   Constitutional: Negative for fatigue  HENT: Negative for ear pain and hearing loss  Eyes: Negative for redness  Respiratory: Negative for cough  Cardiovascular: Negative for leg swelling  Gastrointestinal: Negative for anal bleeding and rectal pain  Endocrine: Negative for polydipsia  Genitourinary: Negative for hematuria  Musculoskeletal: Positive for arthralgias, back pain, gait problem, joint swelling (Neck), myalgias (arms down to fingers) and neck pain     Skin: Negative for rash    Neurological: Positive for dizziness and weakness  Negative for headaches  Hematological: Does not bruise/bleed easily  Psychiatric/Behavioral: Negative for behavioral problems and hallucinations           Past Medical History:   Diagnosis Date    Kidney stone     Obesity 4/15/2013    Primary osteoarthritis involving multiple joints 2020    Wears dentures     Wears glasses        Past Surgical History:   Procedure Laterality Date    BACK SURGERY      lump removed near shoulder on right side     SECTION      x3, 2005,,     HYSTERECTOMY      MOUTH SURGERY      21 teeth removed    NM CYSTOURETHROSCOPY N/A 10/8/2020    Procedure: CYSTOSCOPY;  Surgeon: Prakash Marques MD;  Location: [facility];  Service: Gynecology    NM LAPAROSCOPY W TOT HYSTERECTUTERUS <=250 [address]  W TUBE/OVARY N/A 10/8/2020    Procedure: ROBOTIC TOTAL HYSTERECTOMY; BILATERAL SALPINGECTOMY;  Surgeon: Prakash Marques MD;  Location[facility];  Service: Gynecology    TUBAL LIGATION         Family History   Problem Relation Age of Onset    Hypertension Mother     Irritable bowel syndrome Mother     Diverticulitis Mother     Depression Mother     Heart murmur Mother     Obesity Mother     Hypertension Father     Spina bifida Father     Obesity Father     Multiple sclerosis Brother     Lung disease Brother     COPD Family     Emphysema Family     No Known Problems Daughter     Stroke Maternal Grandmother     Obesity Paternal Grandmother     Stroke Paternal Grandmother     No Known Problems Daughter     No Known Problems Daughter     No Known Problems Maternal Aunt     No Known Problems Maternal Aunt        Social History     Occupational History    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   Tobacco Use    Smoking status: Current Every Day Smoker     Packs/day: 0 50     Years: 30 00     Pack years: 15 00     Types: Cigarettes    Smokeless tobacco: Never Used   Substance and Sexual Activity  Alcohol use: Yes     Frequency: Monthly or less     Drinks per session: 1 or 2     Binge frequency: Never     Comment: 3x per year will have 1-2 drinks    Drug use: No    Sexual activity: Not Currently         Current Outpatient Medications:     acetaminophen (TYLENOL) 650 mg CR tablet, Take 1 tablet (650 mg total) by mouth every 8 (eight) hours as needed for mild pain, Disp: 30 tablet, Rfl: 0    cyclobenzaprine (FLEXERIL) 10 mg tablet, Take 1 tablet (10 mg total) by mouth 3 (three) times a day as needed for muscle spasms, Disp: 30 tablet, Rfl: 0    meloxicam (MOBIC) 15 mg tablet, Take 1 tablet (15 mg total) by mouth daily, Disp: 30 tablet, Rfl: 1    gabapentin (NEURONTIN) 100 mg capsule, 1 PO QHS x 1 day, then 1 PO BID x 1 day, then 1 PO TID, Disp: 90 capsule, Rfl: 1    ibuprofen (MOTRIN) 600 mg tablet, Take 1 tablet (600 mg total) by mouth every 6 (six) hours as needed for moderate pain (Patient not taking: Reported on 2/26/2021), Disp: 30 tablet, Rfl: 0    Allergies   Allergen Reactions    Clindamycin/Lincomycin      Mouth ulcers severe  But, tolerates azithromycin   Levaquin [Levofloxacin] Throat Swelling     Facial swelling leading to throat swelling    Penicillins Anaphylaxis       Physical Exam:    BP (!) 156/101 (BP Location: Left arm, Patient Position: Sitting, Cuff Size: Standard) Comment (BP Location): forearm  Pulse 89   Ht 5' 3" (1 6 m)   Wt (!) 157 kg (346 lb)   LMP 08/25/2020   BMI 61 29 kg/m²     Constitutional:normal, well developed, well nourished, alert, in no distress and non-toxic and no overt pain behavior   and obese  Eyes:anicteric  HEENT:grossly intact  Neck:supple, symmetric, trachea midline and no masses   Pulmonary:even and unlabored  Cardiovascular:No edema or pitting edema present  Skin:Normal without rashes or lesions and well hydrated  Psychiatric:Mood and affect appropriate  Neurologic:Cranial Nerves II-XII grossly intact  Musculoskeletal:normal    Imaging  Study Result    CERVICAL SPINE  INDICATION:   M54 12: Radiculopathy, cervical region  G89 4: Chronic pain syndrome  COMPARISON:  9/17/2017  VIEWS:  XR SPINE CERVICAL COMPLETE 4 OR 5 VW NON INJURY   FINDINGS:  No fracture  Normal alignment without subluxation  Degenerative disc disease at C6-C7 has slightly worsened since the prior study  Mild bilateral foraminal encroachment secondary to uncovertebral joint spurring at C6-C7  The prevertebral soft tissues are within normal limits  The lung apices are clear  IMPRESSION:  Degenerative disc disease at C6-C7 with mild worsening since previous examination  Workstation performed: [id number]     Study Result    THORACIC SPINE  INDICATION:   M54 12: Radiculopathy, cervical region  M54 9: Dorsalgia, unspecified  G89 4: Chronic pain syndrome  COMPARISON:  4/15/2013  VIEWS:  XR SPINE THORACIC 3 VW  FINDINGS:  There is no fracture or pathologic bone lesion  Alignment is unchanged from the prior study, with mild degree of thoracolumbar scoliosis  Mild degree of mid thoracic spine discogenic degenerative change, slightly worse than prior  There is no displacement of the paraspinal line  The pedicles appear intact    IMPRESSION:  Mild discogenic degenerative changes of the thoracic spine and mild thoracolumbar scoliosis  No compression fracture or other acute osseous abnormality identified  Workstation performed: [id number]       MRI cervical spine without contrast    (Results Pending)   MRI thoracic spine without contrast    (Results Pending)       Orders Placed This Encounter   Procedures    MRI cervical spine without contrast    MRI thoracic spine without contrast

## 2021-04-28 ENCOUNTER — HOSPITAL ENCOUNTER (OUTPATIENT)
Dept: MRI IMAGING | Facility: HOSPITAL | Age: 41
Discharge: HOME | End: 2021-04-28
Payer: COMMERCIAL

## 2021-04-28 DIAGNOSIS — G89.4 CHRONIC PAIN SYNDROME: ICD-10-CM

## 2021-04-28 DIAGNOSIS — G89.29 CHRONIC CERVICAL PAIN: ICD-10-CM

## 2021-04-28 DIAGNOSIS — M54.6 THORACIC SPINE PAIN: ICD-10-CM

## 2021-04-28 DIAGNOSIS — M54.2 CHRONIC CERVICAL PAIN: ICD-10-CM

## 2021-04-28 DIAGNOSIS — M54.12 CERVICAL RADICULOPATHY: ICD-10-CM

## 2021-04-28 PROCEDURE — 72141 MRI NECK SPINE W/O DYE: CPT

## 2021-04-28 PROCEDURE — G1004 CDSM NDSC: HCPCS

## 2021-04-28 PROCEDURE — 72146 MRI CHEST SPINE W/O DYE: CPT

## 2021-05-04 ENCOUNTER — TELEPHONE (OUTPATIENT)
Dept: PAIN MEDICINE | Facility: CLINIC | Age: 41
End: 2021-05-04

## 2021-05-04 DIAGNOSIS — M54.2 CHRONIC CERVICAL PAIN: Primary | ICD-10-CM

## 2021-05-04 DIAGNOSIS — G89.29 CHRONIC CERVICAL PAIN: Primary | ICD-10-CM

## 2021-05-04 DIAGNOSIS — M54.12 CERVICAL RADICULOPATHY: ICD-10-CM

## 2021-05-04 DIAGNOSIS — G89.4 CHRONIC PAIN SYNDROME: ICD-10-CM

## 2021-05-04 NOTE — TELEPHONE ENCOUNTER
JAISON Hart  [facility]             Please call patient and let her know that the MRI of her thoracic spine was unremarkable   There are no acute findings   Please that her know that the MRI of her cervical spine did reveal some degenerative changes and mild disc bulging at C6-7   There is mild central and right foraminal narrowing at C6-7   These findings could be consistent with her complaints of neck and upper extremity pain   If she is interested, we can schedule her for a C7-T1 interlaminar epidural steroid injection   If she would like to discuss in more detail, have her schedule follow-up visit with me

## 2021-05-04 NOTE — TELEPHONE ENCOUNTER
RN s/w pt and advised of same  Pt verbalized understanding and is agreeable to C7-T1 ILESI  Pt requested to cancel her OV tomorrow since she is aware of her MRI result

## 2021-05-05 ENCOUNTER — TELEPHONE (OUTPATIENT)
Dept: PAIN MEDICINE | Facility: CLINIC | Age: 41
End: 2021-05-05

## 2021-05-05 NOTE — TELEPHONE ENCOUNTER
S/w pt and scheduled her procedure for 5/21  Went over pre procedure instructions and mailed out instruction form as well  No eating/drinking 1 hour prior  Appropriate clothing   is required  Take meds as usual - pt confirmed no blood thinners/Aspirin/NSAIDS - pt advised if she were to take an NSAID that they have to be held 4 days prior  If you get sick or abx, call  No vaccines 2 weeks before/after  Pt verbalized understanding and is also scheduled for a 1m f/u with Alexandrea on 6/18

## 2021-05-21 ENCOUNTER — HOSPITAL ENCOUNTER (OUTPATIENT)
Dept: RADIOLOGY | Facility: HOSPITAL | Age: 41
Discharge: HOME | End: 2021-05-21
Admitting: ANESTHESIOLOGY
Payer: COMMERCIAL

## 2021-05-21 VITALS
RESPIRATION RATE: 18 BRPM | DIASTOLIC BLOOD PRESSURE: 101 MMHG | HEART RATE: 95 BPM | SYSTOLIC BLOOD PRESSURE: 159 MMHG | OXYGEN SATURATION: 100 %

## 2021-05-21 DIAGNOSIS — M54.2 CHRONIC CERVICAL PAIN: ICD-10-CM

## 2021-05-21 DIAGNOSIS — G89.29 CHRONIC CERVICAL PAIN: ICD-10-CM

## 2021-05-21 DIAGNOSIS — G89.4 CHRONIC PAIN SYNDROME: ICD-10-CM

## 2021-05-21 DIAGNOSIS — M54.12 CERVICAL RADICULOPATHY: ICD-10-CM

## 2021-05-21 RX ORDER — LIDOCAINE HYDROCHLORIDE 10 MG/ML
1 INJECTION, SOLUTION EPIDURAL; INFILTRATION; INTRACAUDAL; PERINEURAL ONCE
Status: COMPLETED | OUTPATIENT
Start: 2021-05-21 | End: 2021-05-21

## 2021-05-21 RX ORDER — PAPAVERINE HCL 150 MG
10 CAPSULE, EXTENDED RELEASE ORAL ONCE
Status: COMPLETED | OUTPATIENT
Start: 2021-05-21 | End: 2021-05-21

## 2021-05-21 RX ADMIN — DEXAMETHASONE SODIUM PHOSPHATE 10 MG: 10 INJECTION, SOLUTION INTRAMUSCULAR; INTRAVENOUS at 10:06

## 2021-05-21 RX ADMIN — IOHEXOL 5 ML: 300 INJECTION, SOLUTION INTRAVENOUS at 10:04

## 2021-05-21 RX ADMIN — LIDOCAINE HYDROCHLORIDE 2 ML: 10 INJECTION, SOLUTION EPIDURAL; INFILTRATION; INTRACAUDAL; PERINEURAL at 10:03

## 2021-05-21 NOTE — DISCHARGE INSTRUCTIONS
Epidural Steroid Injection   WHAT YOU NEED TO KNOW:   An epidural steroid injection (JOSE M) is a procedure to inject steroid medicine into the epidural space  The epidural space is between your spinal cord and vertebrae  Steroids reduce inflammation and fluid buildup in your spine that may be causing pain  You may be given pain medicine along with the steroids  ACTIVITY  · Do not drive or operate machinery today  · No strenuous activity today - bending, lifting, etc   · You may resume normal activites starting tomorrow - start slowly and as tolerated  · You may shower today, but no tub baths or hot tubs  · You may have numbness for several hours from the local anesthetic  Please use caution and common sense, especially with weight-bearing activities  CARE OF THE INJECTION SITE  · If you have soreness or pain, apply ice to the area today (20 minutes on/20 minutes off)  · Starting tomorrow, you may use warm, moist heat or ice if needed  · You may have an increase or change in your discomfort for 36-48 hours after your treatment  · Apply ice and continue with any pain medication you have been prescribed  · [facility]ify [facility] if you have any of the following: redness, drainage, swelling, headache, stiff neck or fever above 100°F     SPECIAL INSTRUCTIONS  · Our office will contact you in approximately 7 days for a progress report  MEDICATIONS  · Continue to take all routine medications  · Our office may have instructed you to hold some medications  As no general anesthesia was used in today's procedure, you should not experience any side effects related to anesthesia  If you have a problem specifically related to your procedure, please call our office at [phone]  Problems not related to your procedure should be directed to your primary care physician

## 2021-05-21 NOTE — H&P
Assessment:  1  Chronic cervical pain  FL spine and pain procedure    FL spine and pain procedure   2  Chronic pain syndrome  FL spine and pain procedure    FL spine and pain procedure   3  Cervical radiculopathy  FL spine and pain procedure    FL spine and pain procedure       Plan:  Shelia Olmstead is a 38 y o  female with complaints of neck pain presents to surgical center for procedure  1  We will perform a C7-T1 ILESI  2  2  Follow-up 1 month after injection    Complete risks and benefits including bleeding, infection, tissue reaction, nerve injury and allergic reaction were discussed  The approach was demonstrated using models and literature was provided  Verbal and written consent was obtained  My impressions and treatment recommendations were discussed in detail with the patient who verbalized understanding and had no further questions  Discharge instructions were provided  I personally saw and examined the patient and I agree with the above discussed plan of care  Orders Placed This Encounter   Procedures    FL spine and pain procedure     Standing Status:   Standing     Number of Occurrences:   1     Order Specific Question:   Reason for Exam:     Answer:   C7-T1 IESI     Order Specific Question:   Is the patient pregnant? Answer:   Unknown     Order Specific Question:   Anticoagulant hold needed? Answer:   takes Mobic     New Medications Ordered This Visit   Medications    dexamethasone (PF) (DECADRON) injection 10 mg    iohexol (OMNIPAQUE) 300 mg/mL injection 1 mL    lidocaine (PF) (XYLOCAINE-MPF) 1 % injection 1 mL       History of Present Illness:  Shelia Olmstead is a 38 y o  female who presents for a follow up office visit in regards to neck pain  The patients current symptoms include 5/10 constant dull/aching, cramping, burning w/o any particular pain pattern         I have personally reviewed and/or updated the patient's past medical history, past surgical history, family history, social history, current medications, allergies, and vital signs today       Review of Systems    Patient Active Problem List   Diagnosis    Obesity    Mixed hyperlipidemia    Tobacco abuse    Abnormal thyroid blood test    Primary osteoarthritis involving multiple joints    Morbid obesity ([address] )    Wears dentures    S/P hysterectomy    Obstructive sleep apnea treated with continuous positive airway pressure (CPAP)    Chronic cervical pain    Chronic pain syndrome    Cervical radiculopathy       Past Medical History:   Diagnosis Date    Kidney stone     Obesity 4/15/2013    Primary osteoarthritis involving multiple joints 2020    Wears dentures     Wears glasses        Past Surgical History:   Procedure Laterality Date    BACK SURGERY      lump removed near shoulder on right side     SECTION      x3, 2005,,     HYSTERECTOMY      MOUTH SURGERY      21 teeth removed    MI CYSTOURETHROSCOPY N/A 10/8/2020    Procedure: CYSTOSCOPY;  Surgeon: Marilu Torres MD;  [facility];  Service: Gynecology    MI LAPAROSCOPY W TOT HYSTERECTUTERUS <=[address]  W TUBE/OVARY N/A 10/8/2020    Procedure: ROBOTIC TOTAL HYSTERECTOMY; BILATERAL SALPINGECTOMY;  Surgeon: Marilu Torres MD;  [facility];  Service: Gynecology    TUBAL LIGATION         Family History   Problem Relation Age of Onset    Hypertension Mother     Irritable bowel syndrome Mother     Diverticulitis Mother     Depression Mother     Heart murmur Mother     Obesity Mother     Hypertension Father     Spina bifida Father     Obesity Father     Multiple sclerosis Brother     Lung disease Brother     COPD Family     Emphysema Family     No Known Problems Daughter     Stroke Maternal Grandmother     Obesity Paternal Grandmother     Stroke Paternal Grandmother     No Known Problems Daughter     No Known Problems Daughter     No Known Problems Maternal Aunt     No Known Problems Maternal Aunt        Social History     Occupational History    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   Tobacco Use    Smoking status: Current Every Day Smoker     Packs/day: 0 50     Years: 30 00     Pack years: 15 00     Types: Cigarettes    Smokeless tobacco: Never Used   Substance and Sexual Activity    Alcohol use: Yes     Frequency: Monthly or less     Drinks per session: 1 or 2     Binge frequency: Never     Comment: 3x per year will have 1-2 drinks    Drug use: No    Sexual activity: Not Currently       Current Outpatient Medications on File Prior to Encounter   Medication Sig    acetaminophen (TYLENOL) 650 mg CR tablet Take 1 tablet (650 mg total) by mouth every 8 (eight) hours as needed for mild pain    cyclobenzaprine (FLEXERIL) 10 mg tablet Take 1 tablet (10 mg total) by mouth 3 (three) times a day as needed for muscle spasms    gabapentin (NEURONTIN) 100 mg capsule 1 PO QHS x 1 day, then 1 PO BID x 1 day, then 1 PO TID    ibuprofen (MOTRIN) 600 mg tablet Take 1 tablet (600 mg total) by mouth every 6 (six) hours as needed for moderate pain (Patient not taking: Reported on 2/26/2021)    meloxicam (MOBIC) 15 mg tablet Take 1 tablet (15 mg total) by mouth daily     No current facility-administered medications on file prior to encounter  Allergies   Allergen Reactions    Clindamycin/Lincomycin      Mouth ulcers severe  But, tolerates azithromycin   Levaquin [Levofloxacin] Throat Swelling     Facial swelling leading to throat swelling    Penicillins Anaphylaxis       Physical Exam:    /93   Pulse 80   Resp 18   LMP 08/25/2020   SpO2 96%     Constitutional:normal, well developed, well nourished, alert, in no distress and non-toxic and no overt pain behavior   and obese  Eyes:anicteric  HEENT:grossly intact  Neck:supple, symmetric, trachea midline and no masses   Pulmonary:even and unlabored  Cardiovascular:No edema or pitting edema present  Skin:Normal without rashes or lesions and well hydrated  Psychiatric:Mood and affect appropriate  Neurologic:Cranial Nerves II-XII grossly intact  Musculoskeletal:normal

## 2021-05-24 ENCOUNTER — TELEPHONE (OUTPATIENT)
Dept: PAIN MEDICINE | Facility: MEDICAL CENTER | Age: 41
End: 2021-05-24

## 2021-05-24 NOTE — TELEPHONE ENCOUNTER
Pt called stating that she is not felling well and would liek to now if this could be from the procedure   Pt can be reached at [phone]

## 2021-05-24 NOTE — PROGRESS NOTES
PT Discharge    Today's date: 3/11/21  Patient name: Raciel Prajapati  : 1980  MRN: [id number]  Referring provider: Miesha Hobbs MD  Dx:   Encounter Diagnosis     ICD-10-CM    1  Cervical radiculopathy  M54 12    2  Mid back pain  M54 9    3  Chronic pain syndrome  G89 4        Start Time: 1515  Stop Time: 1600  Total time in clinic (min): 45 minutes    Assessment  Assessment details: Roseann Laboy will be discharged from outpatient physical therapy care due to expiration of plan of care  No objective measures updated, Roseann Laboy is not present at time of discharge  Impairments: abnormal or restricted ROM, activity intolerance, impaired physical strength, lacks appropriate home exercise program and pain with function  Barriers to therapy: Chronicity of pain  Understanding of Dx/Px/POC: good   Prognosis: fair    Goals  STGs to be achieved in 4 weeks:  1  Pt to demonstrate reduced subjective pain rating "at worst" by at least 2-3 points from Initial Eval in order to allow for reduced pain with ADLs and improved functional activity tolerance  2  Pt to demonstrate increased AROM of cerv spine  by at least 5-10 degrees in order to allow for greater ease and independence with ADLs and functional mobility  3  Pt to demonstrate full PROM of c-spine in order to maximize joint mobility and function and allow for progression of exercise program and achievement of goals  4  Pt to demonstrate increased MMT of RUE  And c-spine by at least 1/2-1 grade in order to improve safety and stability with ADLs and functional mobility  LTGs to be achieved in 6-8 weeks:  1  Pt will be I with HEP in order to continue to improve quality of life and independence and reduce risk for re-injury  2  Pt to demonstrate return to [facility] chores and self care without limitations or restrictions  3  Pt to demonstrate improved function as noted by achieving or exceeding predicted score on FOTO outcomes assessment tool       Pt goals not formally addressed due to self Dc from PT  Please see last progress note for status  Plan  Plan details: DC PT        Subjective Evaluation    History of Present Illness  Mechanism of injury: Patient is a 43-year-old female complaints of neck pain, R arm pain, midback pain presents to pain mgmt office for initial consultation  From patient history and physical exam patient appears to have cervical radiculopathy however phys recs more diagnostic imaging to better assess the discogenic pathology correlate patient's current presentation Pt had xrays and must complete PT prior to having an MRI as per insu alycia  Pt notes constant neck pain and upper back pain  Taking RX meds for same with no relief  Had new med prescribed by Dr Brett brannon and able to perform job duties  hich she has not started  Pt has had this pain for > 20 years with worsening over past 6 years  Pt has diff with dressing due to dizzy spells, bending forward, reaching overhead, walking, cleaning,cooking, turning head to drive  Pt employed as  Notes burning between scapulae  Recurrent probem    Quality of life: fair    Pain  Current pain ratin  At best pain ratin  At worst pain rating: 10  Quality: radiating, sharp, throbbing, dull ache and burning  Alleviating factors: nothing  Aggravating factors: overhead activity, walking, standing and lifting (bending)  Progression: worsening    Social Support  Lives in: multiple-level home  Lives with: spouse and young children    Employment status: working ()  Hand dominance: left      Diagnostic Tests  Abnormal x-ray: taken 3/7    Treatments  Previous treatment: physical therapy and medication  Current treatment: medication and physical therapy  Patient Goals  Patient goals for therapy: decreased pain, increased motion, increased strength, independence with ADLs/IADLs and return to sport/leisure activities  Patient goal: fishing, camping,hiking        Objective     Concurrent Complaints  Positive for disturbed sleep, dizziness, headaches (~3x/week) and visual change  Postural Observations    Additional Postural Observation Details  R shld sl elevated  Decreased thoracic kyphosis    Palpation   Left   Tenderness of the cervical paraspinals, levator scapulae, middle trapezius, rhomboids, scalenes, suboccipitals and upper trapezius  Right   Tenderness of the cervical paraspinals, deltoid, levator scapulae, middle trapezius, rhomboids, scalenes, suboccipitals and upper trapezius  Tenderness     Right Shoulder  No tenderness in the clavicle       Neurological Testing     Sensation   Cervical/Thoracic   Left   Intact: hot/cold discrimination    Right   Intact: hot/cold discrimination  Diminished: light touch    Additional Neurological Details  Tingling and stinging down RUE with numbness of 2nd thru 4th digits    Active Range of Motion   Cervical/Thoracic Spine       Cervical    Flexion: 33 degrees  Restriction level: minimal  Extension: 20 degrees     Restriction level: moderate  Left lateral flexion: 17 degrees     with pain Restriction level: maximal  Right lateral flexion: 25 degrees     with pain Restriction level moderate  Left rotation: 40 degrees Restriction level: moderate  Right rotation: 37 degrees    with pain Restriction level: moderate    Strength/Myotome Testing   Cervical Spine   Neck extension: 4-  Neck flexion: 3+    Left   Interossei strength (t1): 3+    Right   Interossei strength (t1): 3+    Left Shoulder     Planes of Motion   Flexion: 4   Extension: 5   Abduction: 5   External rotation at 0°: 4+   Internal rotation at 0°: 5     Right Shoulder     Planes of Motion   Flexion: 4-   Extension: 4   Abduction: 4   External rotation at 0°: 4   Internal rotation at 0°: 4+     Left Elbow   Flexion: 5    Right Elbow   Flexion: 4+    Left Wrist/Hand   Wrist extension: 5  Wrist flexion: 5    Right Wrist/Hand   Wrist extension: 4+  Wrist flexion: 4+    Tests   Cervical   Positive vertical compression and lumbar distraction test   Negative repeated extension and repeated flexion  Right Shoulder   Positive Jesus  Lumbar   Positive vertical compression   Additional Tests Details  Pain with full can and empty can B strength 4+/5  B    General Comments:    Upper quarter screen   Elbow: unremarkable  Hand/wrist: unremarkable    Shoulder Comments   AROM WNLs but R shld is painful  Neuro Exam:     Headaches   Patient reports headaches: Yes (~3x/week)                Precautions: Cervical spine And thoracic spine strengthening ex

## 2021-05-24 NOTE — TELEPHONE ENCOUNTER
S/w pt, she is s/p KWABENA on Friday and she said since yesterday she has been feeling lightheaded, sleeping all day long and feels like she is in a fog  Pt said she has only been awake 45 mins in the last 24 hours  Pt wants to know if this could be a s/e from the injection? Advise pt to contact PCP?

## 2021-05-28 ENCOUNTER — TELEPHONE (OUTPATIENT)
Dept: PAIN MEDICINE | Facility: CLINIC | Age: 41
End: 2021-05-28

## 2021-06-18 ENCOUNTER — OFFICE VISIT (OUTPATIENT)
Dept: PAIN MEDICINE | Facility: CLINIC | Age: 41
End: 2021-06-18
Payer: COMMERCIAL

## 2021-06-18 VITALS
TEMPERATURE: 98.6 F | BODY MASS INDEX: 51.91 KG/M2 | HEIGHT: 63 IN | HEART RATE: 88 BPM | WEIGHT: 293 LBS | SYSTOLIC BLOOD PRESSURE: 142 MMHG | DIASTOLIC BLOOD PRESSURE: 81 MMHG

## 2021-06-18 DIAGNOSIS — M54.6 THORACIC SPINE PAIN: ICD-10-CM

## 2021-06-18 DIAGNOSIS — G89.4 CHRONIC PAIN SYNDROME: Primary | ICD-10-CM

## 2021-06-18 DIAGNOSIS — G89.29 CHRONIC CERVICAL PAIN: ICD-10-CM

## 2021-06-18 DIAGNOSIS — M54.2 CHRONIC CERVICAL PAIN: ICD-10-CM

## 2021-06-18 DIAGNOSIS — M54.12 CERVICAL RADICULOPATHY: ICD-10-CM

## 2021-06-18 PROCEDURE — 3008F BODY MASS INDEX DOCD: CPT | Performed by: NURSE PRACTITIONER

## 2021-06-18 PROCEDURE — 4004F PT TOBACCO SCREEN RCVD TLK: CPT | Performed by: NURSE PRACTITIONER

## 2021-06-18 PROCEDURE — 99214 OFFICE O/P EST MOD 30 MIN: CPT | Performed by: NURSE PRACTITIONER

## 2021-06-18 RX ORDER — GABAPENTIN 300 MG/1
CAPSULE ORAL
Qty: 90 CAPSULE | Refills: 1 | Status: SHIPPED | OUTPATIENT
Start: 2021-06-18 | End: 2021-07-23

## 2021-06-18 NOTE — PROGRESS NOTES
Assessment:  1  Chronic pain syndrome    2  Chronic cervical pain    3  Cervical radiculopathy    4  Thoracic spine pain        Plan:   While the patient was in the office today, I did have a thorough conversation regarding their chronic pain syndrome, medication management, and treatment plan options  Patient is being seen for follow-up visit she underwent a C7-T1 interlaminar epidural steroid injection on 05/21/2021  Unfortunately, she denies any improvement with the injection  She continues with neck pain that radiates to the left elbow and down the entire right arm to her fingers  She reports numbness and tingling in the right upper extremity and hand  An MRI of her cervical spine does reveal a right-sided disc protrusion at C6-7 which is noncompressive  There is mild central stenosis and mild right foraminal narrowing  MRI results were reviewed with patient again during today's visit  Advised her that even though she did not do well with the epidural injection I do not believe she would be a surgical candidate because there is a noncompressive disc protrusion  Also, her BMI is greater than 60  Generally surgery is not recommended with a BMI greater than 40      patient has participated in physical therapy in the past without improvement  she was involved in weight management before the COVID pandemic started  She states that she stopped going during the height of COVID and has not returned  I encouraged her to follow up with weight management not necessarily for her neck pain, but for overall wellness  Will increase gabapentin to 300 mg 3 times daily  Prescription was sent electronically to her pharmacy with refills  The patient will follow-up in 1 month for medication prescription refill and reevaluation  The patient was advised to contact the office should their symptoms worsen in the interim  The patient was agreeable and verbalized an understanding          History of Present Illness: The patient is a 38 y o  female who presents for a follow up office visit in regards to Headache, Neck Pain, Shoulder Pain, Arm Pain, Hand Pain, Wrist Pain, Hip Pain, Back Pain, and Leg Pain  The patients current symptoms include Complaints neck and upper extremity pain, right worse than left, low back pain  Current pain level is a 7/10  Quality pain is described as burning, sharp, throbbing, pressure-like, shooting, numb, pins and needles  Current pain medications includes:  Gabapentin 100 mg 3 times daily    The patient reports that this regimen is providing 0% pain relief  The patient is reporting no side effects from this pain medication regimen  I have personally reviewed and/or updated the patient's past medical history, past surgical history, family history, social history, current medications, allergies, and vital signs today  Review of Systems  Review of Systems   Respiratory: Negative for shortness of breath  Cardiovascular: Negative for chest pain  Gastrointestinal: Negative for constipation, diarrhea, nausea and vomiting  Musculoskeletal: Positive for arthralgias, gait problem, joint swelling (spot on neck swelled for 3 days) and myalgias  Decreased range of motion  Joint stiffness  Pain in extremity - down right arm   Neurological: Positive for dizziness  Negative for seizures and weakness  All other systems reviewed and are negative          Past Medical History:   Diagnosis Date    Kidney stone     Obesity 4/15/2013    Primary osteoarthritis involving multiple joints 2020    Wears dentures     Wears glasses        Past Surgical History:   Procedure Laterality Date    BACK SURGERY      lump removed near shoulder on right side     SECTION      x3, 2005,,     HYSTERECTOMY      MOUTH SURGERY      21 teeth removed    FL CYSTOURETHROSCOPY N/A 10/8/2020    Procedure: CYSTOSCOPY;  Surgeon: Hussain Kim MD;  Location: [facility] OR;  Service: Gynecology    GA LAPAROSCOPY W TOT HYSTERECTUTERUS <=250 Cherylene Freud TUBE/OVARY N/A 10/8/2020    Procedure: ROBOTIC TOTAL HYSTERECTOMY; BILATERAL SALPINGECTOMY;  Surgeon: Isaiah Dial MD;  [facility];  Service: Gynecology    TUBAL LIGATION  2012       Family History   Problem Relation Age of Onset    Hypertension Mother     Irritable bowel syndrome Mother     Diverticulitis Mother     Depression Mother     Heart murmur Mother     Obesity Mother     Hypertension Father    [facility] Spina bifida Father     Obesity Father     Multiple sclerosis Brother     Lung disease Brother     COPD Family     Emphysema Family     No Known Problems Daughter     Stroke Maternal Grandmother     Obesity Paternal [de-identified]     Stroke Paternal Grandmother     No Known Problems Daughter     No Known Problems Daughter     No Known Problems Maternal Aunt     No Known Problems Maternal Aunt        Social History     Occupational History    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   Tobacco Use    Smoking status: Current Every Day Smoker     Packs/day: 0 50     Years: 30 00     Pack years: 15 00     Types: Cigarettes    Smokeless tobacco: Never Used   Vaping Use    Vaping Use: Never used   Substance and Sexual Activity    Alcohol use: Yes     Comment: 3x per year will have 1-2 drinks    Drug use: No    Sexual activity: Not Currently         Current Outpatient Medications:     acetaminophen (TYLENOL) 650 mg CR tablet, Take 1 tablet (650 mg total) by mouth every 8 (eight) hours as needed for mild pain, Disp: 30 tablet, Rfl: 0    cyclobenzaprine (FLEXERIL) 10 mg tablet, Take 1 tablet (10 mg total) by mouth 3 (three) times a day as needed for muscle spasms, Disp: 30 tablet, Rfl: 0    gabapentin (NEURONTIN) 300 mg capsule, 1 PO QHS x 1 day, then 1 PO BID x 1 day, then 1 PO TID, Disp: 90 capsule, Rfl: 1    meloxicam (MOBIC) 15 mg tablet, Take 1 tablet (15 mg total) by mouth daily, Disp: 30 tablet, Rfl: 1    ibuprofen (MOTRIN) 600 mg tablet, Take 1 tablet (600 mg total) by mouth every 6 (six) hours as needed for moderate pain (Patient not taking: Reported on 2/26/2021), Disp: 30 tablet, Rfl: 0    Allergies   Allergen Reactions    Clindamycin/Lincomycin      Mouth ulcers severe  But, tolerates azithromycin   Levaquin [Levofloxacin] Throat Swelling     Facial swelling leading to throat swelling    Penicillins Anaphylaxis       Physical Exam:    /81 (BP Location: Other (Comment), Patient Position: Sitting, Cuff Size: Large) Comment (BP Location): left wrist  Pulse 88   Temp 98 6 °F (37 °C)   Ht 5' 3" (1 6 m)   Wt (!) 156 kg (344 lb)   LMP 08/25/2020   BMI 60 94 kg/m²     Constitutional:normal, well developed, well nourished, alert, in no distress and non-toxic and no overt pain behavior  and obese  Eyes:anicteric  HEENT:grossly intact  Neck:supple, symmetric, trachea midline and no masses   Pulmonary:even and unlabored  Cardiovascular:No edema or pitting edema present  Skin:Normal without rashes or lesions and well hydrated  Psychiatric:Mood and affect appropriate  Neurologic:Cranial Nerves II-XII grossly intact  Musculoskeletal:normal    Imaging  No orders to display       No orders of the defined types were placed in this encounter

## 2021-07-23 ENCOUNTER — OFFICE VISIT (OUTPATIENT)
Dept: PAIN MEDICINE | Facility: CLINIC | Age: 41
End: 2021-07-23
Payer: COMMERCIAL

## 2021-07-23 VITALS
SYSTOLIC BLOOD PRESSURE: 157 MMHG | WEIGHT: 293 LBS | BODY MASS INDEX: 51.91 KG/M2 | DIASTOLIC BLOOD PRESSURE: 99 MMHG | HEIGHT: 63 IN | HEART RATE: 97 BPM

## 2021-07-23 DIAGNOSIS — M54.2 CHRONIC CERVICAL PAIN: ICD-10-CM

## 2021-07-23 DIAGNOSIS — G89.29 CHRONIC CERVICAL PAIN: ICD-10-CM

## 2021-07-23 DIAGNOSIS — M15.9 PRIMARY OSTEOARTHRITIS INVOLVING MULTIPLE JOINTS: ICD-10-CM

## 2021-07-23 DIAGNOSIS — M54.12 CERVICAL RADICULOPATHY: ICD-10-CM

## 2021-07-23 DIAGNOSIS — G89.4 CHRONIC PAIN SYNDROME: Primary | ICD-10-CM

## 2021-07-23 PROCEDURE — 3008F BODY MASS INDEX DOCD: CPT | Performed by: NURSE PRACTITIONER

## 2021-07-23 PROCEDURE — 99214 OFFICE O/P EST MOD 30 MIN: CPT | Performed by: NURSE PRACTITIONER

## 2021-07-23 PROCEDURE — 4004F PT TOBACCO SCREEN RCVD TLK: CPT | Performed by: NURSE PRACTITIONER

## 2021-07-23 RX ORDER — GABAPENTIN 600 MG/1
600 TABLET ORAL 3 TIMES DAILY
Qty: 90 TABLET | Refills: 1 | Status: SHIPPED | OUTPATIENT
Start: 2021-07-23 | End: 2021-08-20 | Stop reason: SDUPTHER

## 2021-07-23 NOTE — PROGRESS NOTES
Assessment:  1  Chronic pain syndrome    2  Chronic cervical pain    3  Cervical radiculopathy    4  Primary osteoarthritis involving multiple joints        Plan:   While the patient was in the office today, I did have a thorough conversation regarding their chronic pain syndrome, medication management, and treatment plan options  Patient is being seen for a follow-up visit  She was last seen here on 06/18/2021 at which time her gabapentin was increased to 300 mg 3 times daily  Unfortunately, she denies any significant improvement with the increased medication  At this point, we will increase the gabapentin to 600 mg 3 times daily  I reviewed titration schedule with her  A new prescription was sent to her pharmacy  During her last visit, we discussed weight management  Patient states she is not sure if she is interested in pursuing weight management at this time  The patient will follow-up in 1 month for medication prescription refill and reevaluation  The patient was advised to contact the office should their symptoms worsen in the interim  The patient was agreeable and verbalized an understanding  History of Present Illness: The patient is a 38 y o  female who presents for a follow up office visit in regards to Arm Pain, Abdominal Pain, Back Pain, Buttocks Pain, Neck Pain, Jaw Pain, and Chest Pain  The patients current symptoms include   Complaints of neck and bilateral upper extremity pain  Current pain level is a 7/10  Quality pain is described as burning, sharp, throbbing, pressure-like, shooting, numb, pins and needles  Current pain medications includes:  Gabapentin 300 mg 3 times daily    The patient reports that this regimen is providing  0% pain relief  The patient is reporting no side effects from this pain medication regimen      I have personally reviewed and/or updated the patient's past medical history, past surgical history, family history, social history, current medications, allergies, and vital signs today  Review of Systems  Review of Systems   Constitutional: Negative for fatigue  HENT: Negative for ear pain and hearing loss  Eyes: Negative for redness  Respiratory: Negative for cough  Cardiovascular: Negative for leg swelling  Gastrointestinal: Negative for anal bleeding and rectal pain  Endocrine: Negative for polydipsia  Genitourinary: Negative for hematuria  Musculoskeletal: Positive for arthralgias, back pain, gait problem, joint swelling (neck and back), myalgias (right arm) and neck pain  Skin: Negative for rash  Neurological: Positive for dizziness and weakness  Negative for headaches  Hematological: Does not bruise/bleed easily  Psychiatric/Behavioral: Negative for behavioral problems and hallucinations           Past Medical History:   Diagnosis Date    Kidney stone     Obesity 4/15/2013    Primary osteoarthritis involving multiple joints 2020    Wears dentures     Wears glasses        Past Surgical History:   Procedure Laterality Date    BACK SURGERY      lump removed near shoulder on right side     SECTION      x3, 2005,,     HYSTERECTOMY      MOUTH SURGERY      21 teeth removed    [place] CYSTOURETHROSCOPY N/A 10/8/2020    Procedure: CYSTOSCOPY;  Surgeon: Williams Coleman MD;  [facility];  Service: Gynecology    [place] LAPAROSCOPY W TOT HYSTERECTUTERUS <=250 Marikay Query  W TUBE/OVARY N/A 10/8/2020    Procedure: ROBOTIC TOTAL HYSTERECTOMY; BILATERAL SALPINGECTOMY;  Surgeon: Williams Coleman MD;  [facility];  Service: Gynecology    TUBAL LIGATION         Family History   Problem Relation Age of Onset    Hypertension Mother     Irritable bowel syndrome Mother     Diverticulitis Mother     Depression Mother     Heart murmur Mother     Obesity Mother     Hypertension Father     Spina bifida Father     Obesity Father     Multiple sclerosis Brother     Lung disease Brother  COPD Family     Emphysema Family     No Known Problems Daughter     Stroke Maternal Grandmother     Obesity Paternal [de-identified]     Stroke Paternal Grandmother     No Known Problems Daughter     No Known Problems Daughter     No Known Problems Maternal Aunt     No Known Problems Maternal Aunt        Social History     Occupational History    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   Tobacco Use    Smoking status: Current Every Day Smoker     Packs/day: 0 50     Years: 30 00     Pack years: 15 00     Types: Cigarettes    Smokeless tobacco: Never Used   Vaping Use    Vaping Use: Never used   Substance and Sexual Activity    Alcohol use: Yes     Comment: 3x per year will have 1-2 drinks    Drug use: No    Sexual activity: Not Currently         Current Outpatient Medications:     acetaminophen (TYLENOL) 650 mg CR tablet, Take 1 tablet (650 mg total) by mouth every 8 (eight) hours as needed for mild pain, Disp: 30 tablet, Rfl: 0    cyclobenzaprine (FLEXERIL) 10 mg tablet, Take 1 tablet (10 mg total) by mouth 3 (three) times a day as needed for muscle spasms (Patient not taking: Reported on 7/23/2021), Disp: 30 tablet, Rfl: 0    gabapentin (NEURONTIN) 600 MG tablet, Take 1 tablet (600 mg total) by mouth 3 (three) times a day, Disp: 90 tablet, Rfl: 1    ibuprofen (MOTRIN) 600 mg tablet, Take 1 tablet (600 mg total) by mouth every 6 (six) hours as needed for moderate pain (Patient not taking: Reported on 2/26/2021), Disp: 30 tablet, Rfl: 0    meloxicam (MOBIC) 15 mg tablet, Take 1 tablet (15 mg total) by mouth daily (Patient not taking: Reported on 7/23/2021), Disp: 30 tablet, Rfl: 1    Allergies   Allergen Reactions    Clindamycin/Lincomycin      Mouth ulcers severe  But, tolerates azithromycin      Levaquin [Levofloxacin] Throat Swelling     Facial swelling leading to throat swelling    Penicillins Anaphylaxis       Physical Exam:    /99 (BP Location: Left arm, Patient Position: Sitting, Cuff Size: Standard) Comment (BP Location): forearm  Pulse 97   Ht 5' 3" (1 6 m)   Wt (!) 157 kg (347 lb)   LMP 08/25/2020   BMI 61 47 kg/m²     Constitutional:normal, well developed, well nourished, alert, in no distress and non-toxic and no overt pain behavior  Eyes:anicteric  HEENT:grossly intact  Neck:supple, symmetric, trachea midline and no masses   Pulmonary:even and unlabored  Cardiovascular:No edema or pitting edema present  Skin:Normal without rashes or lesions and well hydrated  Psychiatric:Mood and affect appropriate  Neurologic:Cranial Nerves II-XII grossly intact  Musculoskeletal:antalgic range of motion of the cervical spine is limited in all planes  There are cervical paraspinal muscle spasms present    Imaging  No orders to display       No orders of the defined types were placed in this encounter  denies pain/discomfort

## 2021-08-20 ENCOUNTER — OFFICE VISIT (OUTPATIENT)
Dept: PAIN MEDICINE | Facility: CLINIC | Age: 41
End: 2021-08-20
Payer: COMMERCIAL

## 2021-08-20 VITALS
WEIGHT: 293 LBS | HEIGHT: 63 IN | BODY MASS INDEX: 51.91 KG/M2 | DIASTOLIC BLOOD PRESSURE: 94 MMHG | TEMPERATURE: 98.5 F | SYSTOLIC BLOOD PRESSURE: 155 MMHG | HEART RATE: 97 BPM

## 2021-08-20 DIAGNOSIS — M54.12 CERVICAL RADICULOPATHY: ICD-10-CM

## 2021-08-20 DIAGNOSIS — G89.4 CHRONIC PAIN SYNDROME: Primary | ICD-10-CM

## 2021-08-20 DIAGNOSIS — M54.2 CHRONIC CERVICAL PAIN: ICD-10-CM

## 2021-08-20 DIAGNOSIS — M15.9 PRIMARY OSTEOARTHRITIS INVOLVING MULTIPLE JOINTS: ICD-10-CM

## 2021-08-20 DIAGNOSIS — G89.29 CHRONIC CERVICAL PAIN: ICD-10-CM

## 2021-08-20 PROCEDURE — 99214 OFFICE O/P EST MOD 30 MIN: CPT | Performed by: NURSE PRACTITIONER

## 2021-08-20 RX ORDER — GABAPENTIN 600 MG/1
600 TABLET ORAL 4 TIMES DAILY
Qty: 120 TABLET | Refills: 1 | Status: SHIPPED | OUTPATIENT
Start: 2021-08-20 | End: 2021-12-03 | Stop reason: SDUPTHER

## 2021-08-20 NOTE — PROGRESS NOTES
Assessment:  1  Chronic pain syndrome    2  Chronic cervical pain    3  Cervical radiculopathy    4  Primary osteoarthritis involving multiple joints        Plan:    While the patient was in the office today, I did have a thorough conversation regarding their chronic pain syndrome, medication management, and treatment plan options  Patient is being seen for follow-up visit  She was last seen here on 07/23/2021 at which time her gabapentin was increased to 600 mg 3 times daily  Overall she reports that the gabapentin has been helpful  She reports about 50% reduction in her pain with the use of gabapentin  She denies side effects with gabapentin  At this point, we will increase the gabapentin to 600 mg 4 times daily  A prescription was sent electronically to her pharmacy with refills  The patient will follow-up in 2 months for medication prescription refill and reevaluation  The patient was advised to contact the office should their symptoms worsen in the interim  The patient was agreeable and verbalized an understanding  The patient will follow-up in 2 months for medication prescription refill and reevaluation  The patient was advised to contact the office should their symptoms worsen in the interim  The patient was agreeable and verbalized an understanding  History of Present Illness: The patient is a 42 y o  female who presents for a follow up office visit in regards to Shoulder Pain, Neck Pain, Arm Pain, Hand Pain, Wrist Pain, and Back Pain  The patients current symptoms include Complaints of neck pain, right upper extremity pain, upper back pain, low back pain  Current pain level is a 5/10  Quality pain is described as burning, sharp, throbbing, pressure-like, shooting, numb, pins and needles  Current pain medications includes:  Gabapentin 600 mg 3 times daily    The patient reports that this regimen is providing  50% pain relief    The patient is reporting no side effects from this pain medication regimen  I have personally reviewed and/or updated the patient's past medical history, past surgical history, family history, social history, current medications, allergies, and vital signs today  Review of Systems  Review of Systems   Respiratory: Negative for shortness of breath  Cardiovascular: Negative for chest pain  Gastrointestinal: Negative for constipation, diarrhea, nausea and vomiting  Musculoskeletal: Positive for arthralgias, gait problem and myalgias  Joint swelling: base of head/neck/right shoulder  Decreased range of motion  Joint stiffness  Pain in extremity - down right arm   Neurological: Positive for dizziness  Negative for seizures and weakness  All other systems reviewed and are negative          Past Medical History:   Diagnosis Date    Kidney stone     Obesity 4/15/2013    Primary osteoarthritis involving multiple joints 2020    Wears dentures     Wears glasses        Past Surgical History:   Procedure Laterality Date    BACK SURGERY      lump removed near shoulder on right side     SECTION      x3, 2005,,     HYSTERECTOMY      MOUTH SURGERY      21 teeth removed    FL CYSTOURETHROSCOPY N/A 10/8/2020    Procedure: CYSTOSCOPY;  Surgeon: Jaden Pa MD;  Location: [facility];  Service: Gynecology    FL LAPAROSCOPY W TOT HYSTERECTUTERUS <=[address] [place]  W TUBE/OVARY N/A 10/8/2020    Procedure: ROBOTIC TOTAL HYSTERECTOMY; BILATERAL SALPINGECTOMY;  Surgeon: Jaden Pa MD;  Location[facility];  Service: Gynecology    TUBAL LIGATION         Family History   Problem Relation Age of Onset    Hypertension Mother     Irritable bowel syndrome Mother     Diverticulitis Mother     Depression Mother     Heart murmur Mother     Obesity Mother     Hypertension Father     Spina bifida Father     Obesity Father     Multiple sclerosis Brother     Lung disease Brother     COPD Family     Emphysema Family  No Known Problems Daughter     Stroke Maternal Grandmother     Obesity Paternal Grandmother     Stroke Paternal Grandmother     No Known Problems Daughter     No Known Problems Daughter     No Known Problems Maternal Aunt     No Known Problems Maternal Aunt        Social History     Occupational History    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   Tobacco Use    Smoking status: Current Every Day Smoker     Packs/day: 0 50     Years: 30 00     Pack years: 15 00     Types: Cigarettes    Smokeless tobacco: Never Used   Vaping Use    Vaping Use: Never used   Substance and Sexual Activity    Alcohol use: Yes     Comment: 3x per year will have 1-2 drinks    Drug use: No    Sexual activity: Not Currently         Current Outpatient Medications:     acetaminophen (TYLENOL) 650 mg CR tablet, Take 1 tablet (650 mg total) by mouth every 8 (eight) hours as needed for mild pain, Disp: 30 tablet, Rfl: 0    gabapentin (NEURONTIN) 600 MG tablet, Take 1 tablet (600 mg total) by mouth 4 (four) times a day, Disp: 120 tablet, Rfl: 1    Allergies   Allergen Reactions    Clindamycin/Lincomycin      Mouth ulcers severe  But, tolerates azithromycin   Levaquin [Levofloxacin] Throat Swelling     Facial swelling leading to throat swelling    Penicillins Anaphylaxis       Physical Exam:    /94 (BP Location: Other (Comment), Patient Position: Sitting, Cuff Size: Standard) Comment (BP Location): left wrist  Pulse 97   Temp 98 5 °F (36 9 °C)   Ht 5' 3" (1 6 m)   Wt (!) 158 kg (347 lb 9 6 oz)   LMP 08/25/2020   BMI 61 57 kg/m²     Constitutional:normal, well developed, well nourished, alert, in no distress and non-toxic and no overt pain behavior   and obese  Eyes:anicteric  HEENT:grossly intact  Neck:supple, symmetric, trachea midline and no masses   Pulmonary:even and unlabored  Cardiovascular:No edema or pitting edema present  Skin:Normal without rashes or lesions and well hydrated  Psychiatric:Mood and affect appropriate  Neurologic:Cranial Nerves II-XII grossly intact  Musculoskeletal:normal    Imaging  No orders to display       No orders of the defined types were placed in this encounter

## 2021-08-30 ENCOUNTER — TELEPHONE (OUTPATIENT)
Dept: OBGYN CLINIC | Facility: CLINIC | Age: 41
End: 2021-08-30

## 2021-09-03 ENCOUNTER — OFFICE VISIT (OUTPATIENT)
Dept: INTERNAL MEDICINE CLINIC | Facility: CLINIC | Age: 41
End: 2021-09-03
Payer: COMMERCIAL

## 2021-09-03 ENCOUNTER — APPOINTMENT (OUTPATIENT)
Dept: LAB | Facility: CLINIC | Age: 41
End: 2021-09-03
Payer: COMMERCIAL

## 2021-09-03 VITALS
TEMPERATURE: 97.9 F | BODY MASS INDEX: 51.91 KG/M2 | HEIGHT: 63 IN | OXYGEN SATURATION: 97 % | HEART RATE: 78 BPM | DIASTOLIC BLOOD PRESSURE: 70 MMHG | WEIGHT: 293 LBS | RESPIRATION RATE: 18 BRPM | SYSTOLIC BLOOD PRESSURE: 100 MMHG

## 2021-09-03 DIAGNOSIS — Z12.31 ENCOUNTER FOR SCREENING MAMMOGRAM FOR MALIGNANT NEOPLASM OF BREAST: ICD-10-CM

## 2021-09-03 DIAGNOSIS — E78.2 MIXED HYPERLIPIDEMIA: Primary | ICD-10-CM

## 2021-09-03 DIAGNOSIS — E66.01 MORBID OBESITY (HCC): ICD-10-CM

## 2021-09-03 DIAGNOSIS — Z11.59 NEED FOR HEPATITIS C SCREENING TEST: ICD-10-CM

## 2021-09-03 DIAGNOSIS — R79.89 ABNORMAL THYROID BLOOD TEST: ICD-10-CM

## 2021-09-03 DIAGNOSIS — G89.4 CHRONIC PAIN SYNDROME: ICD-10-CM

## 2021-09-03 DIAGNOSIS — Z99.89 OBSTRUCTIVE SLEEP APNEA TREATED WITH CONTINUOUS POSITIVE AIRWAY PRESSURE (CPAP): ICD-10-CM

## 2021-09-03 DIAGNOSIS — F41.1 GAD (GENERALIZED ANXIETY DISORDER): ICD-10-CM

## 2021-09-03 DIAGNOSIS — R60.0 LOCALIZED EDEMA: ICD-10-CM

## 2021-09-03 DIAGNOSIS — M79.662 PAIN OF LEFT CALF: ICD-10-CM

## 2021-09-03 DIAGNOSIS — G47.33 OBSTRUCTIVE SLEEP APNEA TREATED WITH CONTINUOUS POSITIVE AIRWAY PRESSURE (CPAP): ICD-10-CM

## 2021-09-03 DIAGNOSIS — M15.9 PRIMARY OSTEOARTHRITIS INVOLVING MULTIPLE JOINTS: ICD-10-CM

## 2021-09-03 DIAGNOSIS — E78.2 MIXED HYPERLIPIDEMIA: ICD-10-CM

## 2021-09-03 LAB
ALBUMIN SERPL BCP-MCNC: 3.4 G/DL (ref 3.5–5)
ALP SERPL-CCNC: 85 U/L (ref 46–116)
ALT SERPL W P-5'-P-CCNC: 32 U/L (ref 12–78)
ANION GAP SERPL CALCULATED.3IONS-SCNC: 2 MMOL/L (ref 4–13)
AST SERPL W P-5'-P-CCNC: 17 U/L (ref 5–45)
BASOPHILS # BLD AUTO: 0.04 THOUSANDS/ΜL (ref 0–0.1)
BASOPHILS NFR BLD AUTO: 1 % (ref 0–1)
BILIRUB SERPL-MCNC: 0.4 MG/DL (ref 0.2–1)
BUN SERPL-MCNC: 12 MG/DL (ref 5–25)
CALCIUM ALBUM COR SERPL-MCNC: 9.4 MG/DL (ref 8.3–10.1)
CALCIUM SERPL-MCNC: 8.9 MG/DL (ref 8.3–10.1)
CHLORIDE SERPL-SCNC: 106 MMOL/L (ref 100–108)
CHOLEST SERPL-MCNC: 154 MG/DL (ref 50–200)
CO2 SERPL-SCNC: 29 MMOL/L (ref 21–32)
CREAT SERPL-MCNC: 0.7 MG/DL (ref 0.6–1.3)
D DIMER PPP FEU-MCNC: <0.27 UG/ML FEU
EOSINOPHIL # BLD AUTO: 0.28 THOUSAND/ΜL (ref 0–0.61)
EOSINOPHIL NFR BLD AUTO: 3 % (ref 0–6)
ERYTHROCYTE [DISTWIDTH] IN BLOOD BY AUTOMATED COUNT: 14.5 % (ref 11.6–15.1)
GFR SERPL CREATININE-BSD FRML MDRD: 107 ML/MIN/1.73SQ M
GLUCOSE P FAST SERPL-MCNC: 94 MG/DL (ref 65–99)
HCT VFR BLD AUTO: 44.8 % (ref 34.8–46.1)
HCV AB SER QL: NORMAL
HDLC SERPL-MCNC: 22 MG/DL
HGB BLD-MCNC: 14.3 G/DL (ref 11.5–15.4)
IMM GRANULOCYTES # BLD AUTO: 0.04 THOUSAND/UL (ref 0–0.2)
IMM GRANULOCYTES NFR BLD AUTO: 1 % (ref 0–2)
LDLC SERPL CALC-MCNC: 94 MG/DL (ref 0–100)
LYMPHOCYTES # BLD AUTO: 1.75 THOUSANDS/ΜL (ref 0.6–4.47)
LYMPHOCYTES NFR BLD AUTO: 21 % (ref 14–44)
MCH RBC QN AUTO: 30 PG (ref 26.8–34.3)
MCHC RBC AUTO-ENTMCNC: 31.9 G/DL (ref 31.4–37.4)
MCV RBC AUTO: 94 FL (ref 82–98)
MONOCYTES # BLD AUTO: 0.64 THOUSAND/ΜL (ref 0.17–1.22)
MONOCYTES NFR BLD AUTO: 8 % (ref 4–12)
NEUTROPHILS # BLD AUTO: 5.47 THOUSANDS/ΜL (ref 1.85–7.62)
NEUTS SEG NFR BLD AUTO: 66 % (ref 43–75)
NRBC BLD AUTO-RTO: 0 /100 WBCS
PLATELET # BLD AUTO: 293 THOUSANDS/UL (ref 149–390)
PMV BLD AUTO: 11.6 FL (ref 8.9–12.7)
POTASSIUM SERPL-SCNC: 4.5 MMOL/L (ref 3.5–5.3)
PROT SERPL-MCNC: 7.2 G/DL (ref 6.4–8.2)
RBC # BLD AUTO: 4.77 MILLION/UL (ref 3.81–5.12)
SODIUM SERPL-SCNC: 137 MMOL/L (ref 136–145)
TRIGL SERPL-MCNC: 192 MG/DL
TSH SERPL DL<=0.05 MIU/L-ACNC: 2.37 UIU/ML (ref 0.36–3.74)
WBC # BLD AUTO: 8.22 THOUSAND/UL (ref 4.31–10.16)

## 2021-09-03 PROCEDURE — 36415 COLL VENOUS BLD VENIPUNCTURE: CPT

## 2021-09-03 PROCEDURE — 86803 HEPATITIS C AB TEST: CPT

## 2021-09-03 PROCEDURE — 80053 COMPREHEN METABOLIC PANEL: CPT

## 2021-09-03 PROCEDURE — 85025 COMPLETE CBC W/AUTO DIFF WBC: CPT

## 2021-09-03 PROCEDURE — 84443 ASSAY THYROID STIM HORMONE: CPT

## 2021-09-03 PROCEDURE — 99214 OFFICE O/P EST MOD 30 MIN: CPT | Performed by: INTERNAL MEDICINE

## 2021-09-03 PROCEDURE — 80061 LIPID PANEL: CPT

## 2021-09-03 PROCEDURE — 85379 FIBRIN DEGRADATION QUANT: CPT

## 2021-09-03 RX ORDER — FUROSEMIDE 20 MG/1
20 TABLET ORAL DAILY
Qty: 30 TABLET | Refills: 5 | Status: SHIPPED | OUTPATIENT
Start: 2021-09-03 | End: 2021-10-04 | Stop reason: SDUPTHER

## 2021-09-03 NOTE — PROGRESS NOTES
Assessment/Plan:  Problem List Items Addressed This Visit        Respiratory    Obstructive sleep apnea treated with continuous positive airway pressure (CPAP)       Musculoskeletal and Integument    Primary osteoarthritis involving multiple joints       Other    Morbid obesity ([facility] )    Relevant Orders    Ambulatory referral to Weight Management    Mixed hyperlipidemia - Primary    Relevant Orders    Comprehensive metabolic panel    Lipid Panel with Direct LDL reflex    JOSEFINA (generalized anxiety disorder)    Chronic pain syndrome    Abnormal thyroid blood test    Relevant Orders    CBC and differential    Comprehensive metabolic panel    TSH, 3rd generation with Free T4 reflex      Other Visit Diagnoses     Encounter for screening mammogram for malignant neoplasm of breast        Relevant Orders    Mammo screening bilateral w 3d & cad    Need for hepatitis C screening test        Relevant Orders    Hepatitis C antibody    Localized edema        Relevant Medications    furosemide (LASIX) 20 mg tablet    Other Relevant Orders    D-dimer, quantitative    Pain of left calf        Relevant Medications    furosemide (LASIX) 20 mg tablet    Other Relevant Orders    D-dimer, quantitative           Diagnoses and all orders for this visit:    Mixed hyperlipidemia  -     Comprehensive metabolic panel; Future  -     Lipid Panel with Direct LDL reflex; Future    Abnormal thyroid blood test  -     CBC and differential; Future  -     Comprehensive metabolic panel; Future  -     TSH, 3rd generation with Free T4 reflex; Future    Chronic pain syndrome    Obstructive sleep apnea treated with continuous positive airway pressure (CPAP)    Primary osteoarthritis involving multiple joints    Morbid obesity ([facility] )  -     Ambulatory referral to Weight Management;  Future    Encounter for screening mammogram for malignant neoplasm of breast  -     Mammo screening bilateral w 3d & cad; Future    Need for hepatitis C screening test  - Hepatitis C antibody; Future    Localized edema  -     furosemide (LASIX) 20 mg tablet; Take 1 tablet (20 mg total) by mouth daily  -     D-dimer, quantitative; Future    Pain of left calf  -     furosemide (LASIX) 20 mg tablet; Take 1 tablet (20 mg total) by mouth daily  -     D-dimer, quantitative; Future    JOSEFINA (generalized anxiety disorder)        No problem-specific Assessment & Plan notes found for this encounter  A/P: Doing ok and will check labs  Discussed vaccines defers  Refer back to bariatrics  Discussed JOSEFINA and declines medicine at this time  Edema not overly impressive, but await labs and will give a lasix trial  No more leg pain, but at risk for DVT  Will check a d-dimer  If s/s persists, may need a duplex  Discussed placard and pain is upper back and pt otherwise doesn't qualify at this time  Wean tobacco  Order mammo  Continue current treatment and RTC one month for f/u labs, edema, JOSEFINA, and leg pain  Subjective:      Patient ID: Franklin Torres is a 42 y o  female  [facility] RTC for f/u hyperlipidemia, [place], etc  Doing ok, but several issues  First, several weeks bilat LE swelling with one episode of left calf pain  Swelling worse on the left  No h/o DVT, trauma, or prolonged immobility  ROS positiv for increase JOSEFINA due to home issues with her children  Remains active w/o difficulty otherwise and no falls  CHESTER controlled  Still smoking  CHronic pain is manageable  Requesting a parking placard  Due for labs, mammo, and vaccines  The following portions of the patient's history were reviewed and updated as appropriate:   She has a past medical history of Kidney stone, Obesity (4/15/2013), Primary osteoarthritis involving multiple joints (2020), Wears dentures, and Wears glasses  ,  does not have any pertinent problems on file  ,   has a past surgical history that includes Mouth surgery;  section; Tubal ligation ();  Back surgery; pr laparoscopy w tot hysterectuterus <=250 gram  w tube/ovary (N/A, 10/8/2020); pr cystourethroscopy (N/A, 10/8/2020); and Hysterectomy  ,  family history includes COPD in her family; Depression in her mother; Diverticulitis in her mother; Emphysema in her family; Heart murmur in her mother; Hypertension in her father and mother; Irritable bowel syndrome in her mother; Lung disease in her brother; Multiple sclerosis in her brother; No Known Problems in her daughter, daughter, daughter, maternal aunt, and maternal aunt; Obesity in her father, mother, and paternal grandmother; Spina bifida in her father; Stroke in her maternal grandmother and paternal grandmother  ,   reports that she has been smoking cigarettes  She has a 15 00 pack-year smoking history  She has never used smokeless tobacco  She reports current alcohol use  She reports that she does not use drugs  ,  is allergic to clindamycin/lincomycin, levaquin [levofloxacin], and penicillins     Current Outpatient Medications   Medication Sig Dispense Refill    acetaminophen (TYLENOL) 650 mg CR tablet Take 1 tablet (650 mg total) by mouth every 8 (eight) hours as needed for mild pain 30 tablet 0    gabapentin (NEURONTIN) 600 MG tablet Take 1 tablet (600 mg total) by mouth 4 (four) times a day 120 tablet 1    furosemide (LASIX) 20 mg tablet Take 1 tablet (20 mg total) by mouth daily 30 tablet 5     No current facility-administered medications for this visit  Review of Systems   Constitutional: Negative for activity change, chills, diaphoresis, fatigue and fever  HENT: Negative  Eyes: Negative for visual disturbance  Respiratory: Negative for cough, chest tightness, shortness of breath and wheezing  Cardiovascular: Negative for chest pain, palpitations and leg swelling  Gastrointestinal: Negative for abdominal pain, constipation, diarrhea, nausea and vomiting  Endocrine: Negative for cold intolerance and heat intolerance     Genitourinary: Negative for difficulty urinating, dysuria and frequency  Musculoskeletal: Negative for arthralgias, gait problem and myalgias  Neurological: Negative for dizziness, seizures, syncope, weakness, light-headedness and headaches  Psychiatric/Behavioral: Negative for confusion, dysphoric mood and sleep disturbance  The patient is not nervous/anxious  PHQ-9 Depression Screening    PHQ-9:   Frequency of the following problems over the past two weeks:            Objective:  Vitals:    09/03/21 0811   BP: 100/70   Pulse: 78   Resp: 18   Temp: 97 9 °F (36 6 °C)   TempSrc: Tympanic   SpO2: 97%   Weight: (!) 159 kg (351 lb)   Height: 5' 3" (1 6 m)     Body mass index is 62 18 kg/m²  Physical Exam  Vitals and nursing note reviewed  Constitutional:       General: She is not in acute distress  Appearance: Normal appearance  She is not ill-appearing  HENT:      Head: Normocephalic and atraumatic  Mouth/Throat:      Mouth: Mucous membranes are moist    Eyes:      Extraocular Movements: Extraocular movements intact  Conjunctiva/sclera: Conjunctivae normal       Pupils: Pupils are equal, round, and reactive to light  Neck:      Vascular: No carotid bruit  Cardiovascular:      Rate and Rhythm: Normal rate and regular rhythm  Heart sounds: Normal heart sounds  Pulmonary:      Effort: Pulmonary effort is normal  No respiratory distress  Breath sounds: Normal breath sounds  No wheezing or rales  Abdominal:      General: Bowel sounds are normal  There is no distension  Palpations: Abdomen is soft  Tenderness: There is no abdominal tenderness  Musculoskeletal:      Cervical back: Neck supple  Right lower leg: Edema present  Left lower leg: Edema present  Comments: Trace bilat LE edema and negative Wilson's  Neurological:      General: No focal deficit present  Mental Status: She is alert and oriented to person, place, and time  Mental status is at baseline     Psychiatric:         Mood and Affect: Mood normal          Behavior: Behavior normal          Thought Content:  Thought content normal          Judgment: Judgment normal

## 2021-09-03 NOTE — PATIENT INSTRUCTIONS
Chronic Hypertension   AMBULATORY CARE:   Hypertension  is high blood pressure  Your blood pressure is the force of your blood moving against the walls of your arteries  Hypertension causes your blood pressure to get so high that your heart has to work much harder than normal  This can damage your heart  Even if you have hypertension for years, lifestyle changes, medicines, or both can help bring your blood pressure to normal   Call your local emergency number (911 in the [address] [facility]) or have someone call if:   · You have chest pain  · You have any of the following signs of a heart attack:      ? Squeezing, pressure, or pain in your chest    ? You may  also have any of the following:     § Discomfort or pain in your back, neck, jaw, stomach, or arm    § Shortness of breath    § Nausea or vomiting    § Lightheadedness or a sudden cold sweat    · You become confused or have difficulty speaking  · You suddenly feel lightheaded or have trouble breathing  Seek care immediately if:   · You have a severe headache or vision loss  · You have weakness in an arm or leg  Call your doctor if:   · You feel faint, dizzy, confused, or drowsy  · You have been taking your blood pressure medicine but your pressure is higher than your provider says it should be  · You have questions or concerns about your condition or care  Treatment for chronic hypertension  may include medicine to lower your blood pressure and cholesterol levels  A low cholesterol level helps prevent heart disease and makes it easier to control your blood pressure  Heart disease can make your blood pressure harder to control  You may also need to make lifestyle changes  What you need to know about the stages of hypertension:       · Normal blood pressure is 119/79 or lower   Your healthcare provider may only check your blood pressure each year if it stays at a normal level  · Elevated blood pressure is 120/79 to 129/79    This is sometimes called prehypertension  Your healthcare provider may suggest lifestyle changes to help lower your blood pressure to a normal level  He or she may then check it again in 3 to 6 months  · Stage 1 hypertension is 130/80  to 139/89   Your provider may recommend lifestyle changes, medication, and checks every 3 to 6 months until your blood pressure is controlled  · Stage 2 hypertension is 140/90 or higher   Your provider will recommend lifestyle changes and have you take 2 kinds of hypertension medicines  You will also need to have your blood pressure checked monthly until it is controlled  Manage chronic hypertension:   · Check your blood pressure at home  Avoid smoking, caffeine, and exercise at least 30 minutes before checking your blood pressure  Sit and rest for 5 minutes before you take your blood pressure  Extend your arm and support it on a flat surface  Your arm should be at the same level as your heart  Follow the directions that came with your blood pressure monitor  Check your blood pressure 2 times, 1 minute apart, before you take your medicine in the morning  Also check your blood pressure before your evening meal  Keep a record of your readings and bring it to your follow-up visits  Ask your healthcare provider what your blood pressure should be  · Manage any other health conditions you have  Health conditions such as diabetes can increase your risk for hypertension  Follow your healthcare provider's instructions and take all your medicines as directed  Talk to your healthcare provider about any new health conditions you have recently developed  · Ask about all medicines  Certain medicines can increase your blood pressure  Examples include oral birth control pills, decongestants, herbal supplements, and NSAIDs, such as ibuprofen  Your healthcare provider can tell you which medicines are safe for you to take  This includes prescription and over-the-counter medicines      Lifestyle changes you can make to lower your blood pressure: Your provider may want you to make more lifestyle changes if you are having trouble controlling your blood pressure  This may feel difficult over time, especially if you think you are making good changes but your pressure is still high  It might help to focus on one new change at a time  For example, try to add 1 more day of exercise, or exercise for an extra 10 minutes on 2 days  Small changes can make a big difference  Your healthcare provider can also refer you to specialists such as a dietitian who can help you make small changes  Your family members may be included in helping you learn to create lifestyle changes, such as the following:  · Limit sodium (salt) as directed  Too much sodium can affect your fluid balance  Check labels to find low-sodium or no-salt-added foods  Some low-sodium foods use potassium salts for flavor  Too much potassium can also cause health problems  Your healthcare provider will tell you how much sodium and potassium are safe for you to have in a day  He or she may recommend that you limit sodium to 2,300 mg a day  · Follow the meal plan recommended by your healthcare provider  A dietitian or your provider can give you more information on low-sodium plans or the DASH (Dietary Approaches to Stop Hypertension) eating plan  The DASH plan is low in sodium, processed sugar, unhealthy fats, and total fat  It is high in potassium, calcium, and fiber  These can be found in vegetables, fruit, and whole-grain foods  · Be physically active throughout the day  Physical activity, such as exercise, can help control your blood pressure and your weight  Be physically active for at least 30 minutes per day, on most days of the week  Include aerobic activity, such as walking or riding a bicycle  Also include strength training at least 2 times each week  Your healthcare providers can help you create a physical activity plan  · Decrease stress  This may help lower your blood pressure  Learn ways to relax, such as deep breathing or listening to music  · Limit alcohol as directed  Alcohol can increase your blood pressure  A drink of alcohol is 12 ounces of beer, 5 ounces of wine, or 1½ ounces of liquor  · Do not smoke  Nicotine and other chemicals in cigarettes and cigars can increase your blood pressure and also cause lung damage  Ask your healthcare provider for information if you currently smoke and need help to quit  E-cigarettes or smokeless tobacco still contain nicotine  Talk to your healthcare provider before you use these products  Follow up with your doctor as directed: You will need to return to have your blood pressure checked and to have other lab tests done  Write down your questions so you remember to ask them during your visits  © Copyright [employer] 2021 Information is for End User's use only and may not be sold, redistributed or otherwise used for commercial purposes  All illustrations and images included in CareNotes® are the copyrighted property of [employer] M , Inc  or [facility]   The above information is an  only  It is not intended as medical advice for individual conditions or treatments  Talk to your doctor, nurse or pharmacist before following any medical regimen to see if it is safe and effective for you

## 2021-10-04 ENCOUNTER — TELEMEDICINE (OUTPATIENT)
Dept: INTERNAL MEDICINE CLINIC | Facility: CLINIC | Age: 41
End: 2021-10-04
Payer: COMMERCIAL

## 2021-10-04 DIAGNOSIS — M79.10 MYALGIA: ICD-10-CM

## 2021-10-04 DIAGNOSIS — F41.1 GAD (GENERALIZED ANXIETY DISORDER): ICD-10-CM

## 2021-10-04 DIAGNOSIS — M25.50 ARTHRALGIA, UNSPECIFIED JOINT: ICD-10-CM

## 2021-10-04 DIAGNOSIS — R60.0 LOCALIZED EDEMA: Primary | ICD-10-CM

## 2021-10-04 DIAGNOSIS — E88.09 SERUM ALBUMIN DECREASED: ICD-10-CM

## 2021-10-04 DIAGNOSIS — E78.2 MIXED HYPERLIPIDEMIA: ICD-10-CM

## 2021-10-04 DIAGNOSIS — Z20.822 CLOSE EXPOSURE TO COVID-19 VIRUS: ICD-10-CM

## 2021-10-04 DIAGNOSIS — M79.662 PAIN OF LEFT CALF: ICD-10-CM

## 2021-10-04 PROCEDURE — 99213 OFFICE O/P EST LOW 20 MIN: CPT | Performed by: INTERNAL MEDICINE

## 2021-10-04 RX ORDER — FUROSEMIDE 20 MG/1
40 TABLET ORAL DAILY
Qty: 30 TABLET | Refills: 5
Start: 2021-10-04 | End: 2021-12-15

## 2021-10-11 ENCOUNTER — TELEPHONE (OUTPATIENT)
Dept: INTERNAL MEDICINE CLINIC | Facility: CLINIC | Age: 41
End: 2021-10-11

## 2021-10-11 PROCEDURE — U0003 INFECTIOUS AGENT DETECTION BY NUCLEIC ACID (DNA OR RNA); SEVERE ACUTE RESPIRATORY SYNDROME CORONAVIRUS 2 (SARS-COV-2) (CORONAVIRUS DISEASE [COVID-19]), AMPLIFIED PROBE TECHNIQUE, MAKING USE OF HIGH THROUGHPUT TECHNOLOGIES AS DESCRIBED BY CMS-2020-01-R: HCPCS | Performed by: INTERNAL MEDICINE

## 2021-10-11 PROCEDURE — U0005 INFEC AGEN DETEC AMPLI PROBE: HCPCS | Performed by: INTERNAL MEDICINE

## 2021-11-08 ENCOUNTER — CLINICAL SUPPORT (OUTPATIENT)
Dept: BARIATRICS | Facility: CLINIC | Age: 41
End: 2021-11-08

## 2021-11-08 VITALS
BODY MASS INDEX: 51.91 KG/M2 | TEMPERATURE: 97.4 F | WEIGHT: 293 LBS | HEART RATE: 84 BPM | DIASTOLIC BLOOD PRESSURE: 70 MMHG | SYSTOLIC BLOOD PRESSURE: 110 MMHG | HEIGHT: 63 IN

## 2021-11-08 DIAGNOSIS — Z01.818 PREOPERATIVE CLEARANCE: ICD-10-CM

## 2021-11-08 DIAGNOSIS — E66.01 MORBID OBESITY (HCC): ICD-10-CM

## 2021-11-08 DIAGNOSIS — E66.01 CLASS 3 SEVERE OBESITY DUE TO EXCESS CALORIES WITHOUT SERIOUS COMORBIDITY WITH BODY MASS INDEX (BMI) OF 60.0 TO 69.9 IN ADULT (HCC): ICD-10-CM

## 2021-11-08 DIAGNOSIS — Z72.0 TOBACCO ABUSE: Primary | ICD-10-CM

## 2021-11-08 DIAGNOSIS — Z72.0 NICOTINE ABUSE: ICD-10-CM

## 2021-11-08 DIAGNOSIS — Z01.812 BLOOD TESTS PRIOR TO TREATMENT OR PROCEDURE: ICD-10-CM

## 2021-11-08 PROCEDURE — RECHECK

## 2021-11-24 ENCOUNTER — TELEPHONE (OUTPATIENT)
Dept: OTHER | Facility: OTHER | Age: 41
End: 2021-11-24

## 2021-12-03 ENCOUNTER — OFFICE VISIT (OUTPATIENT)
Dept: PAIN MEDICINE | Facility: CLINIC | Age: 41
End: 2021-12-03
Payer: COMMERCIAL

## 2021-12-03 VITALS
HEIGHT: 63 IN | SYSTOLIC BLOOD PRESSURE: 115 MMHG | WEIGHT: 293 LBS | HEART RATE: 95 BPM | BODY MASS INDEX: 51.91 KG/M2 | DIASTOLIC BLOOD PRESSURE: 70 MMHG

## 2021-12-03 DIAGNOSIS — M15.9 PRIMARY OSTEOARTHRITIS INVOLVING MULTIPLE JOINTS: ICD-10-CM

## 2021-12-03 DIAGNOSIS — G89.4 CHRONIC PAIN SYNDROME: Primary | ICD-10-CM

## 2021-12-03 DIAGNOSIS — M54.2 CHRONIC CERVICAL PAIN: ICD-10-CM

## 2021-12-03 DIAGNOSIS — M54.12 CERVICAL RADICULOPATHY: ICD-10-CM

## 2021-12-03 DIAGNOSIS — G89.29 CHRONIC CERVICAL PAIN: ICD-10-CM

## 2021-12-03 PROCEDURE — 99214 OFFICE O/P EST MOD 30 MIN: CPT | Performed by: NURSE PRACTITIONER

## 2021-12-03 RX ORDER — GABAPENTIN 600 MG/1
600 TABLET ORAL 4 TIMES DAILY
Qty: 120 TABLET | Refills: 2 | Status: SHIPPED | OUTPATIENT
Start: 2021-12-03 | End: 2022-03-25 | Stop reason: SDUPTHER

## 2021-12-03 RX ORDER — PREDNISONE 10 MG/1
TABLET ORAL
Qty: 15 TABLET | Refills: 0 | Status: SHIPPED | OUTPATIENT
Start: 2021-12-03 | End: 2021-12-15

## 2021-12-09 ENCOUNTER — OFFICE VISIT (OUTPATIENT)
Dept: BARIATRICS | Facility: CLINIC | Age: 41
End: 2021-12-09

## 2021-12-09 DIAGNOSIS — E66.01 MORBID (SEVERE) OBESITY DUE TO EXCESS CALORIES (HCC): Primary | ICD-10-CM

## 2021-12-09 PROCEDURE — RECHECK

## 2021-12-10 VITALS — BODY MASS INDEX: 60.84 KG/M2 | WEIGHT: 293 LBS

## 2021-12-14 ENCOUNTER — APPOINTMENT (EMERGENCY)
Dept: RADIOLOGY | Facility: HOSPITAL | Age: 41
End: 2021-12-14
Payer: COMMERCIAL

## 2021-12-14 ENCOUNTER — HOSPITAL ENCOUNTER (EMERGENCY)
Facility: HOSPITAL | Age: 41
Discharge: HOME | End: 2021-12-14
Attending: EMERGENCY MEDICINE | Admitting: EMERGENCY MEDICINE
Payer: COMMERCIAL

## 2021-12-14 VITALS
TEMPERATURE: 98.7 F | DIASTOLIC BLOOD PRESSURE: 72 MMHG | OXYGEN SATURATION: 91 % | RESPIRATION RATE: 20 BRPM | HEART RATE: 76 BPM | SYSTOLIC BLOOD PRESSURE: 135 MMHG

## 2021-12-14 DIAGNOSIS — R07.9 CHEST PAIN: Primary | ICD-10-CM

## 2021-12-14 DIAGNOSIS — F41.9 ANXIETY: ICD-10-CM

## 2021-12-14 LAB
2HR DELTA HS TROPONIN: 0 NG/L
ALBUMIN SERPL BCP-MCNC: 4 G/DL (ref 3.5–5)
ALP SERPL-CCNC: 76 U/L (ref 34–104)
ALT SERPL W P-5'-P-CCNC: 23 U/L (ref 7–52)
ANION GAP SERPL CALCULATED.3IONS-SCNC: 10 MMOL/L (ref 4–13)
AST SERPL W P-5'-P-CCNC: 14 U/L (ref 13–39)
BASOPHILS # BLD AUTO: 0.04 THOUSANDS/ΜL (ref 0–0.1)
BASOPHILS NFR BLD AUTO: 1 % (ref 0–1)
BILIRUB SERPL-MCNC: 0.53 MG/DL (ref 0.2–1)
BUN SERPL-MCNC: 10 MG/DL (ref 5–25)
CALCIUM SERPL-MCNC: 9.4 MG/DL (ref 8.4–10.2)
CARDIAC TROPONIN I PNL SERPL HS: 3 NG/L
CARDIAC TROPONIN I PNL SERPL HS: 3 NG/L
CHLORIDE SERPL-SCNC: 101 MMOL/L (ref 96–108)
CO2 SERPL-SCNC: 27 MMOL/L (ref 21–32)
CREAT SERPL-MCNC: 0.76 MG/DL (ref 0.6–1.3)
D DIMER PPP FEU-MCNC: <0.27 UG/ML FEU
EOSINOPHIL # BLD AUTO: 0.25 THOUSAND/ΜL (ref 0–0.61)
EOSINOPHIL NFR BLD AUTO: 3 % (ref 0–6)
ERYTHROCYTE [DISTWIDTH] IN BLOOD BY AUTOMATED COUNT: 14.5 % (ref 11.6–15.1)
GFR SERPL CREATININE-BSD FRML MDRD: 97 ML/MIN/1.73SQ M
GLUCOSE SERPL-MCNC: 94 MG/DL (ref 65–140)
HCT VFR BLD AUTO: 44.7 % (ref 34.8–46.1)
HGB BLD-MCNC: 15.2 G/DL (ref 11.5–15.4)
IMM GRANULOCYTES # BLD AUTO: 0.03 THOUSAND/UL (ref 0–0.2)
IMM GRANULOCYTES NFR BLD AUTO: 0 % (ref 0–2)
LYMPHOCYTES # BLD AUTO: 2.37 THOUSANDS/ΜL (ref 0.6–4.47)
LYMPHOCYTES NFR BLD AUTO: 27 % (ref 14–44)
MCH RBC QN AUTO: 31.1 PG (ref 26.8–34.3)
MCHC RBC AUTO-ENTMCNC: 34 G/DL (ref 31.4–37.4)
MCV RBC AUTO: 92 FL (ref 82–98)
MONOCYTES # BLD AUTO: 0.58 THOUSAND/ΜL (ref 0.17–1.22)
MONOCYTES NFR BLD AUTO: 7 % (ref 4–12)
NEUTROPHILS # BLD AUTO: 5.55 THOUSANDS/ΜL (ref 1.85–7.62)
NEUTS SEG NFR BLD AUTO: 62 % (ref 43–75)
NRBC BLD AUTO-RTO: 0 /100 WBCS
PLATELET # BLD AUTO: 349 THOUSANDS/UL (ref 149–390)
PMV BLD AUTO: 11.7 FL (ref 8.9–12.7)
POTASSIUM SERPL-SCNC: 3.8 MMOL/L (ref 3.5–5.3)
PROT SERPL-MCNC: 7 G/DL (ref 6.4–8.4)
RBC # BLD AUTO: 4.88 MILLION/UL (ref 3.81–5.12)
SODIUM SERPL-SCNC: 138 MMOL/L (ref 135–147)
WBC # BLD AUTO: 8.82 THOUSAND/UL (ref 4.31–10.16)

## 2021-12-14 PROCEDURE — 96375 TX/PRO/DX INJ NEW DRUG ADDON: CPT

## 2021-12-14 PROCEDURE — 85379 FIBRIN DEGRADATION QUANT: CPT | Performed by: EMERGENCY MEDICINE

## 2021-12-14 PROCEDURE — 96374 THER/PROPH/DIAG INJ IV PUSH: CPT

## 2021-12-14 PROCEDURE — 93005 ELECTROCARDIOGRAM TRACING: CPT

## 2021-12-14 PROCEDURE — 36415 COLL VENOUS BLD VENIPUNCTURE: CPT | Performed by: EMERGENCY MEDICINE

## 2021-12-14 PROCEDURE — 71045 X-RAY EXAM CHEST 1 VIEW: CPT

## 2021-12-14 PROCEDURE — 84484 ASSAY OF TROPONIN QUANT: CPT | Performed by: EMERGENCY MEDICINE

## 2021-12-14 PROCEDURE — 80053 COMPREHEN METABOLIC PANEL: CPT | Performed by: EMERGENCY MEDICINE

## 2021-12-14 PROCEDURE — 85025 COMPLETE CBC W/AUTO DIFF WBC: CPT | Performed by: EMERGENCY MEDICINE

## 2021-12-14 PROCEDURE — 99285 EMERGENCY DEPT VISIT HI MDM: CPT | Performed by: EMERGENCY MEDICINE

## 2021-12-14 PROCEDURE — 99285 EMERGENCY DEPT VISIT HI MDM: CPT

## 2021-12-14 RX ORDER — LORAZEPAM 2 MG/ML
1 INJECTION INTRAMUSCULAR ONCE
Status: COMPLETED | OUTPATIENT
Start: 2021-12-14 | End: 2021-12-14

## 2021-12-14 RX ORDER — KETOROLAC TROMETHAMINE 30 MG/ML
15 INJECTION, SOLUTION INTRAMUSCULAR; INTRAVENOUS ONCE
Status: COMPLETED | OUTPATIENT
Start: 2021-12-14 | End: 2021-12-14

## 2021-12-14 RX ORDER — ONDANSETRON 2 MG/ML
4 INJECTION INTRAMUSCULAR; INTRAVENOUS ONCE
Status: DISCONTINUED | OUTPATIENT
Start: 2021-12-14 | End: 2021-12-14 | Stop reason: HOSPADM

## 2021-12-14 RX ADMIN — KETOROLAC TROMETHAMINE 15 MG: 30 INJECTION, SOLUTION INTRAMUSCULAR at 12:42

## 2021-12-14 RX ADMIN — LORAZEPAM 1 MG: 2 INJECTION INTRAMUSCULAR; INTRAVENOUS at 15:34

## 2021-12-15 ENCOUNTER — OFFICE VISIT (OUTPATIENT)
Dept: INTERNAL MEDICINE CLINIC | Facility: CLINIC | Age: 41
End: 2021-12-15
Payer: COMMERCIAL

## 2021-12-15 VITALS
TEMPERATURE: 97.1 F | WEIGHT: 293 LBS | SYSTOLIC BLOOD PRESSURE: 122 MMHG | BODY MASS INDEX: 51.91 KG/M2 | HEART RATE: 89 BPM | OXYGEN SATURATION: 98 % | DIASTOLIC BLOOD PRESSURE: 76 MMHG | HEIGHT: 63 IN

## 2021-12-15 DIAGNOSIS — F41.1 GAD (GENERALIZED ANXIETY DISORDER): ICD-10-CM

## 2021-12-15 DIAGNOSIS — R07.89 ATYPICAL CHEST PAIN: Primary | ICD-10-CM

## 2021-12-15 DIAGNOSIS — F41.0 PANIC ATTACK: ICD-10-CM

## 2021-12-15 LAB
ATRIAL RATE: 76 BPM
P AXIS: 24 DEGREES
PR INTERVAL: 150 MS
QRS AXIS: 37 DEGREES
QRSD INTERVAL: 94 MS
QT INTERVAL: 398 MS
QTC INTERVAL: 447 MS
T WAVE AXIS: 48 DEGREES
VENTRICULAR RATE: 76 BPM

## 2021-12-15 PROCEDURE — 93010 ELECTROCARDIOGRAM REPORT: CPT | Performed by: INTERNAL MEDICINE

## 2021-12-15 PROCEDURE — 99213 OFFICE O/P EST LOW 20 MIN: CPT | Performed by: INTERNAL MEDICINE

## 2021-12-15 RX ORDER — ALPRAZOLAM 0.5 MG/1
0.5 TABLET ORAL 3 TIMES DAILY PRN
Qty: 30 TABLET | Refills: 0 | Status: SHIPPED | OUTPATIENT
Start: 2021-12-15

## 2021-12-15 RX ORDER — PAROXETINE HYDROCHLORIDE 20 MG/1
20 TABLET, FILM COATED ORAL
Qty: 90 TABLET | Refills: 1 | Status: SHIPPED | OUTPATIENT
Start: 2021-12-15 | End: 2022-02-14 | Stop reason: SDUPTHER

## 2022-01-11 NOTE — PROGRESS NOTES
Bariatric Nutrition Follow-Up Note    Type of surgery    Preop 6 month program: 2 of 6 today  Surgery Date: TBD- tentative 2022  Surgeon: Dr Mariluz Francois  42 y o   female     Wt with BMI of 25: 139 3lbs  Pre-Op Excess Wt: 200 7lbs  Wt (!) 155 kg (342 lb 12 8 oz)   LMP 2020   BMI 61 70 kg/m²    3 3lb wt gain in the last month  Pt now needs 19 8lb wt loss to schedule surgery and 36 8lb total wt loss by surgery date  Pre-Op Goal Weight:  5%=17#=Goal of 323# TO SCHEDULE SURGERY  10%=34#=Goal of 306# BY DAY OF SURGERY       [facility] Equation:     Weight maintenance= 2596 kcal/day  Estimated calories for weight loss 3723-6607 kcal/day  (1-2# per wk wt loss - sedentary)  Estimated protein needs 63 3-76 g/day (1 0-1 2 gms/kg IBW)   Estimated fluid needs 3466-8539 ml/day (30-35 ml/kg IBW)      Review of History and Medications   Past Medical History:   Diagnosis Date    Kidney stone     Obesity 4/15/2013    Peripheral edema     Primary osteoarthritis involving multiple joints 2020    Sleep apnea     Wears dentures     Wears glasses      Past Surgical History:   Procedure Laterality Date    BACK SURGERY      lump removed near shoulder on right side     SECTION      x3, 2005,,     HYSTERECTOMY      MOUTH SURGERY      21 teeth removed    [place] CYSTOURETHROSCOPY N/A 10/8/2020    Procedure: Kevin Mcburney;  Surgeon: Angel Wu MD;  Location: [facility];  Service: Gynecology    [address] [facility]any[address] Ralf  W TUBE/OVARY N/A 10/8/2020    Procedure: ROBOTIC TOTAL HYSTERECTOMY; BILATERAL SALPINGECTOMY;  Surgeon: Angel Wu MD;  Location[facility];  Service: Gynecology    TUBAL LIGATION       Social History     Socioeconomic History    Marital status: /Civil Union     Spouse name: Not on file    Number of children: Not on file    Years of education: Not on file   [employer] education level: Not on file   Occupational History    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   Tobacco Use    Smoking status: Current Every Day Smoker     Packs/day: 1 00     Years: 30 00     Pack years: 30 00     Types: Cigarettes    Smokeless tobacco: Never Used   Vaping Use    Vaping Use: Never used   Substance and Sexual Activity    Alcohol use: Not Currently     Comment: 3x per year will have 1-2 drinks    Drug use: No    Sexual activity: Not Currently   Other Topics Concern    Not on file   Social History Narrative    fhx not asked intriage    EMPLOYED     Social Determinants of Health     Financial Resource Strain: Not on file   Food Insecurity: Not on file   Transportation Needs: Not on file   Physical Activity: Not on file   Stress: Not on file   Social Connections: Not on file   Intimate Partner Violence: Not on file   Housing Stability: Not on file       Current Outpatient Medications:     acetaminophen (TYLENOL) 650 mg CR tablet, Take 1 tablet (650 mg total) by mouth every 8 (eight) hours as needed for mild pain, Disp: 30 tablet, Rfl: 0    ALPRAZolam (XANAX) 0 5 mg tablet, Take 1 tablet (0 5 mg total) by mouth 3 (three) times a day as needed for anxiety, Disp: 30 tablet, Rfl: 0    gabapentin (NEURONTIN) 600 MG tablet, Take 1 tablet (600 mg total) by mouth 4 (four) times a day, Disp: 120 tablet, Rfl: 2    PARoxetine (PAXIL) 20 mg tablet, Take 1 tablet (20 mg total) by mouth daily at bedtime, Disp: 90 tablet, Rfl: 1     Food Intake and Lifestyle Assessment   Food Intake Assessment completed via usual diet recall  Breakfast: glass of water  1 cup coffee cream and sugar  More water  Eats breakfast 5 days per week:  Banana and oatmeal OR pastry  Snack: water until lunch  One can regular soda  Lunch: 12:25: packs lunch: dinner leftovers  Water or pepsi    Snack: water  Dinner: 4-6pm: cooks: porkribs with normandy vegetables and buttered noodles OR smoked kielbasa with rice and green beans OR spaghetti  Snack: none  Pt reports that over the holidays she bakes a lot of maria cookies and indulged more than she should have  Pt identifies home made cookies as a trigger food  Beverage intake: water, regular soda and coffee, one iced tea:  Pt has eliminated the soda and iced tea, but during the holidays started drinking them again  Pt reports it has now been a week without soda or sweet tea of maria cookies  Protein supplement: none  Estimated protein intake per day: 30-60g  Estimated fluid intake per day: 24 oz water x 4-5 per day  Meals eaten away from home: 1-2 per month  Typical meal pattern: 2-3 meals per day and 0-1 snacks per day  Eating Behaviors: Consumption of high calorie/ high fat foods and Consumption of high calorie beverages  Pt reports she does well with the 30/60 rule when at work, but often finds herself sipping drinks with dinner at home  Food allergies or intolerances: NKFA  Was allergic to shellfish as a child, can eat small amounts now  Allergies   Allergen Reactions    Clindamycin/Lincomycin      Mouth ulcers severe  But, tolerates azithromycin   Levaquin [Levofloxacin] Throat Swelling     Facial swelling leading to throat swelling    Penicillins Anaphylaxis     Cultural or Faith considerations: none noted    Physical Assessment  Physical Activity  Types of exercise: Walking most of her work day, house chores after work  Coaches softball spring and fall  Current physical limitations: broke bone behind her kneecap 2 years ago  Neck and back pain:  Bulging discs, scoliosis, herniated discs, following with pain management for about a year  Psychosocial Assessment   Support systems: spouse and children friend(s) relative(s)  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  Lives with her , 3 daughters, 3 stepson, brother-in-law, one friend and her two children  Another stepson comes over every other weekend    Pt reports that last month she had an ER visit for anxiety- states this was brought on by unruly/disrespectful classroom students  Pt states she was prescribed Paxil and Xanax and is now feeling better  Nutrition Diagnosis-Continued  Diagnosis: Overweight / Obesity (NC-3 3) and Excessive energy intake (NI-1 5)  Related to: Physical inactivity and Excessive energy intake  As Evidenced by: BMI >25, Excessive energy intake and Unintentional weight gain     Interventions and Teaching   Discussed pre-op and post-op nutrition guidelines  No current vitamins  Patient educated and handouts provided  Adequate hydration  Expected weight loss:  Reviewed pre-op weight loss goal   Weight loss plateaus/ possibility of weight regain  Exercise:  Reviewed walking plan in Ch 2 of manual with pt  Suggestions for pre-op diet  Nutrition considerations after surgery  Protein supplements:  Discussed using protein drink for breakfast daily  Provided samples and coupons for Ensure Max Protein  Dietary and lifestyle changes:  Discussed how to navigate holidays and family celebrations centered around food after her bariatric surgery  Discussed stress/comfort eating and thinking of healthy alternative coping strategies for after surgery  Possible problems with poor eating habits  Techniques for self monitoring and keeping daily food journal:  Instructed pt to [employer] ira and begin food logging      Education provided to: patient  Barriers to learning: No barriers identified  Readiness to change: action  Comprehension: needs reinforcement and verbalizes understanding   Expected Compliance: good    Evaluation / Monitoring  Dietitian to Monitor: Eating pattern as discussed Tolerance of nutrition prescription Body weight Lab values Physical activity    Goals  Eliminate sugar sweetened beverages, Food journal, Exercise 30 minutes 5 times per week, Complete lession plans 1-6, Eat 3 meals per day and Eliminate mindless snacking   Protein drink for breakfast   Continue to practice 30/60 rule  Start food journaling  Start walking plan  Have [address] done by next month  Workflow:   Bloodwork: CBC, CMP, Lipids, TSH drawn 9/3/21  Ordered CBC+CMP for March 2022   Support Group: not done  Provided new flier today   Weight Loss: 5%=17#=Goal of 323# TO SCHEDULE SURGERY  10%=34#=Goal of 306# BY DAY OF SURGERY   Nicotine test: Ordered nicotine test for May 2022  Pt is still currently smoking   6 Month Pre-Operative Program: 2 of 6 today  3 of 6 scheduled 2/9/22 with Dr Sugey ERWIN To be scheduled at surgeon consult   Cardiac Risk Assessment: scheduled for 4/15/22      Time Spent:   30 minutes

## 2022-01-12 ENCOUNTER — OFFICE VISIT (OUTPATIENT)
Dept: BARIATRICS | Facility: CLINIC | Age: 42
End: 2022-01-12

## 2022-01-12 VITALS — BODY MASS INDEX: 61.7 KG/M2 | WEIGHT: 293 LBS

## 2022-01-12 DIAGNOSIS — E66.01 MORBID OBESITY (HCC): ICD-10-CM

## 2022-01-12 DIAGNOSIS — E66.01 CLASS 3 SEVERE OBESITY DUE TO EXCESS CALORIES WITHOUT SERIOUS COMORBIDITY WITH BODY MASS INDEX (BMI) OF 60.0 TO 69.9 IN ADULT (HCC): Primary | ICD-10-CM

## 2022-01-12 PROCEDURE — RECHECK: Performed by: DIETITIAN, REGISTERED

## 2022-01-21 ENCOUNTER — OFFICE VISIT (OUTPATIENT)
Dept: INTERNAL MEDICINE CLINIC | Facility: CLINIC | Age: 42
End: 2022-01-21
Payer: COMMERCIAL

## 2022-01-21 VITALS
OXYGEN SATURATION: 95 % | SYSTOLIC BLOOD PRESSURE: 140 MMHG | DIASTOLIC BLOOD PRESSURE: 78 MMHG | TEMPERATURE: 98.2 F | WEIGHT: 293 LBS | HEART RATE: 112 BPM | HEIGHT: 63 IN | BODY MASS INDEX: 51.91 KG/M2

## 2022-01-21 DIAGNOSIS — F41.1 GAD (GENERALIZED ANXIETY DISORDER): Primary | ICD-10-CM

## 2022-01-21 DIAGNOSIS — F41.0 PANIC ATTACK: ICD-10-CM

## 2022-01-21 PROCEDURE — 99213 OFFICE O/P EST LOW 20 MIN: CPT | Performed by: INTERNAL MEDICINE

## 2022-01-21 NOTE — PROGRESS NOTES
Assessment/Plan:  Problem List Items Addressed This Visit        Other    JOSEFINA (generalized anxiety disorder) - Primary      Other Visit Diagnoses     Panic attack               Diagnoses and all orders for this visit:    JOSEFINA (generalized anxiety disorder)    Panic attack        No problem-specific Assessment & Plan notes found for this encounter  A/P: Doing ok and feel the meds are helping, but pt isn't where we would like her  Will increase the paxil to 40 mg q day and continue with xanax PRN, cut has not used any recently  If s/s persists, ?adding buspar or seroquel  Consider change to celexa  RTC two weeks for f/u  Subjective:      Patient ID: Vikas Vergara is a 42 y o  female  WF RTC for f/u JOSEFINA, panic attacks and atypical CP  Started on paxil and prn xanax  Not using the xanax  Not sure if the paxil is working per pt  However, no further panic attacks or CP, several near episodes  Tolerating the meds  No new issues  The following portions of the patient's history were reviewed and updated as appropriate:   She has a past medical history of Kidney stone, Obesity (4/15/2013), Peripheral edema, Primary osteoarthritis involving multiple joints (2020), Sleep apnea, Wears dentures, and Wears glasses  ,  does not have any pertinent problems on file  ,   has a past surgical history that includes Mouth surgery;  section; Tubal ligation (); Back surgery; pr laparoscopy w tot hysterectuterus <=250 gram  w tube/ovary (N/A, 10/8/2020); pr cystourethroscopy (N/A, 10/8/2020); and Hysterectomy  ,  family history includes COPD in her family; Depression in her mother; Diverticulitis in her mother; Emphysema in her family;  Heart murmur in her mother; Hypertension in her father and mother; Irritable bowel syndrome in her mother; Lung disease in her brother; Multiple sclerosis in her brother; No Known Problems in her daughter, daughter, daughter, maternal aunt, and maternal aunt; Obesity in her father, mother, and paternal grandmother; Spina bifida in her father; Stroke in her maternal grandmother and paternal grandmother  ,   reports that she has been smoking cigarettes  She has a 30 00 pack-year smoking history  She has never used smokeless tobacco  She reports previous alcohol use  She reports that she does not use drugs  ,  is allergic to clindamycin/lincomycin, levaquin [levofloxacin], and penicillins     Current Outpatient Medications   Medication Sig Dispense Refill    acetaminophen (TYLENOL) 650 mg CR tablet Take 1 tablet (650 mg total) by mouth every 8 (eight) hours as needed for mild pain 30 tablet 0    gabapentin (NEURONTIN) 600 MG tablet Take 1 tablet (600 mg total) by mouth 4 (four) times a day 120 tablet 2    PARoxetine (PAXIL) 20 mg tablet Take 1 tablet (20 mg total) by mouth daily at bedtime 90 tablet 1    ALPRAZolam (XANAX) 0 5 mg tablet Take 1 tablet (0 5 mg total) by mouth 3 (three) times a day as needed for anxiety (Patient not taking: Reported on 1/21/2022 ) 30 tablet 0     No current facility-administered medications for this visit  Review of Systems   Constitutional: Negative for activity change, chills, diaphoresis, fatigue and fever  Respiratory: Negative for cough, chest tightness, shortness of breath and wheezing  Cardiovascular: Negative for chest pain, palpitations and leg swelling  Gastrointestinal: Negative for abdominal pain, constipation, diarrhea, nausea and vomiting  Genitourinary: Negative for difficulty urinating, dysuria and frequency  Musculoskeletal: Negative for arthralgias, gait problem and myalgias  Neurological: Negative for light-headedness and headaches  Psychiatric/Behavioral: Negative for confusion, dysphoric mood and sleep disturbance  The patient is nervous/anxious          PHQ-2/9 Depression Screening          Objective:  Vitals:    01/21/22 1453   BP: 140/78   Pulse: (!) 112   Temp: 98 2 °F (36 8 °C)   SpO2: 95%   Weight: (!) 153 kg (337 lb)   Height: 5' 2 5" (1 588 m)     Body mass index is 60 66 kg/m²  Physical Exam  Vitals and nursing note reviewed  Constitutional:       General: She is not in acute distress  Appearance: Normal appearance  She is not ill-appearing  HENT:      Head: Normocephalic and atraumatic  Mouth/Throat:      Mouth: Mucous membranes are moist    Eyes:      Extraocular Movements: Extraocular movements intact  Conjunctiva/sclera: Conjunctivae normal       Pupils: Pupils are equal, round, and reactive to light  Cardiovascular:      Rate and Rhythm: Normal rate and regular rhythm  Heart sounds: Normal heart sounds  Pulmonary:      Effort: Pulmonary effort is normal  No respiratory distress  Breath sounds: Normal breath sounds  No wheezing or rales  Neurological:      General: No focal deficit present  Mental Status: She is alert and oriented to person, place, and time  Mental status is at baseline  Psychiatric:         Mood and Affect: Mood normal          Behavior: Behavior normal          Thought Content:  Thought content normal          Judgment: Judgment normal

## 2022-01-21 NOTE — PATIENT INSTRUCTIONS
Anxiety   AMBULATORY CARE:   Anxiety  is a condition that causes you to feel extremely worried or nervous  The feelings are so strong that they can cause problems with your daily activities or sleep  Anxiety may be triggered by something you fear, or it may happen without a cause  Family or work stress, smoking, caffeine, and alcohol can increase your risk for anxiety  Certain medicines or health conditions can also increase your risk  Anxiety can become a long-term condition if it is not managed or treated  Common signs and symptoms that may occur with anxiety:   · Fatigue or muscle tightness    · Shaking, restlessness, or irritability    · Problems focusing    · Trouble sleeping    · Feeling jumpy, easily startled, or dizzy    · Rapid heartbeat or shortness of breath    Call your local emergency number (911 in the [address]) if:   · You have chest pain, tightness, or heaviness that may spread to your shoulders, arms, jaw, neck, or back  · You feel like hurting yourself or someone else  Call your doctor if:   · Your symptoms get worse or do not get better with treatment  · Your anxiety keeps you from doing your regular daily activities  · You have new symptoms since your last visit  · You have questions or concerns about your condition or care  Treatment for anxiety  may include medicines to help you feel calm and relaxed, and decrease your symptoms  Medicines are usually given together with therapy or other treatments  Manage anxiety:   · Talk to someone about your anxiety  Your healthcare provider may suggest counseling  Cognitive behavioral therapy can help you understand and change how you react to events that trigger your symptoms  You might feel more comfortable talking with a friend or family member about your anxiety  Choose someone you know will be supportive and encouraging  · Find ways to relax  Activities such as exercise, meditation, or listening to music can help you relax   Spend time with friends, or do things you enjoy  · Practice deep breathing  Deep breathing can help you relax when you feel anxious  Focus on taking slow, deep breaths several times a day, or during an anxiety attack  Breathe in through your nose and out through your mouth  · Create a regular sleep routine  Regular sleep can help you feel calmer during the day  Go to sleep and wake up at the same times every day  Do not watch television or use the computer right before bed  Your room should be comfortable, dark, and quiet  · Eat a variety of healthy foods  Healthy foods include fruits, vegetables, low-fat dairy products, lean meats, fish, whole-grain breads, and cooked beans  Healthy foods can help you feel less anxious and have more energy  · Exercise regularly  Exercise can increase your energy level  Exercise may also lift your mood and help you sleep better  Your healthcare provider can help you create an exercise plan  · Do not smoke  Nicotine and other chemicals in cigarettes and cigars can increase anxiety  Ask your healthcare provider for information if you currently smoke and need help to quit  E-cigarettes or smokeless tobacco still contain nicotine  Talk to your healthcare provider before you use these products  · Do not have caffeine  Caffeine can make your symptoms worse  Do not have foods or drinks that are meant to increase your energy level  · Limit or do not drink alcohol  Ask your healthcare provider if alcohol is safe for you  You may not be able to drink alcohol if you take certain anxiety or depression medicines  Limit alcohol to 1 drink per day if you are a woman  Limit alcohol to 2 drinks per day if you are a man  A drink of alcohol is 12 ounces of beer, 5 ounces of wine, or 1½ ounces of liquor  · Do not use drugs  Drugs can make your anxiety worse  It can also make anxiety hard to manage   Talk to your healthcare provider if you use drugs and want help to quit     Follow up with your doctor within 2 weeks or as directed:  Write down your questions so you remember to ask them during your visits  © Copyright [employer] 2021 Information is for End User's use only and may not be sold, redistributed or otherwise used for commercial purposes  All illustrations and images included in CareNotes® are the copyrighted property of A D A M , Inc  or [facility]en [facility]   The above information is an  only  It is not intended as medical advice for individual conditions or treatments  Talk to your doctor, nurse or pharmacist before following any medical regimen to see if it is safe and effective for you

## 2022-02-03 ENCOUNTER — TELEPHONE (OUTPATIENT)
Dept: INTERNAL MEDICINE CLINIC | Facility: CLINIC | Age: 42
End: 2022-02-03

## 2022-02-03 DIAGNOSIS — E66.01 MORBID OBESITY (HCC): ICD-10-CM

## 2022-02-03 DIAGNOSIS — G47.33 OBSTRUCTIVE SLEEP APNEA (ADULT) (PEDIATRIC): Primary | ICD-10-CM

## 2022-02-03 NOTE — TELEPHONE ENCOUNTER
Bandar De La Paz called from Sleep Medicine  A new referral needs to be entered in Epic for a Ambulatory Referral to Sleep Medicine Physician to Physician  Dx: G47 [id number], [id number] [phone]   Weight Management is requesting referral, and Bandar De La Paz said this needs to be done by pcp

## 2022-02-10 ENCOUNTER — OFFICE VISIT (OUTPATIENT)
Dept: SLEEP CENTER | Facility: CLINIC | Age: 42
End: 2022-02-10
Payer: COMMERCIAL

## 2022-02-10 VITALS
HEART RATE: 84 BPM | HEIGHT: 63 IN | WEIGHT: 293 LBS | BODY MASS INDEX: 51.91 KG/M2 | DIASTOLIC BLOOD PRESSURE: 61 MMHG | SYSTOLIC BLOOD PRESSURE: 128 MMHG

## 2022-02-10 DIAGNOSIS — E66.01 MORBID OBESITY (HCC): ICD-10-CM

## 2022-02-10 DIAGNOSIS — G47.33 OBSTRUCTIVE SLEEP APNEA TREATED WITH CONTINUOUS POSITIVE AIRWAY PRESSURE (CPAP): Primary | ICD-10-CM

## 2022-02-10 DIAGNOSIS — Z99.89 OBSTRUCTIVE SLEEP APNEA TREATED WITH CONTINUOUS POSITIVE AIRWAY PRESSURE (CPAP): Primary | ICD-10-CM

## 2022-02-10 PROCEDURE — 99214 OFFICE O/P EST MOD 30 MIN: CPT | Performed by: NURSE PRACTITIONER

## 2022-02-10 NOTE — PROGRESS NOTES
Progress Note - [address] SHARON Connelly 42 y o  female   :1980, MRN: [id number]  2/10/2022          Follow Up Evaluation / Problem:  Severe Obstructive Sleep Apnea  Obesity      Thank you for the opportunity of participating in the evaluation and care of this patient in the Sleep Clinic at [facility]  HPI: Ayo Douglas is a 42y o  year old female  The patient presents for follow up of severe obstructive sleep apnea  She has a long history of loud snoring and frequent night time awakenings for repositioning  She also experiences some symptoms consistent with restless legs syndrome when she gets into bed  She completed a diagnostic sleep study, followed by a CPAP titration study  AHI was 30 9, worsening to 37 2 in supine sleep and 81 in REM sleep  Oxygen chauncey was 80%  PLMs were noted during the diagnostic study, but were no longer present during the titration study  She began the use of CPAP equipment  She presents to review compliance with use of equipment and effectiveness of treatment      Review of Systems      Genitourinary hot flashes at night   Cardiology ankle/leg swelling   Gastrointestinal none   Neurology frequent headaches, awaken with headache, need to move extremities, muscle weakness and numbness/tingling of an extremity   Constitutional fatigue   Integumentary rash or dry skin   Psychiatry anxiety, depression, aggressiveness or irritability and mood change   Musculoskeletal joint pain, muscle aches, back pain, legs twitching/jerking, sciatica and leg cramps   Pulmonary shortness of breath with activity and snoring   ENT none   Endocrine excessive thirst   Hematological none       Current Outpatient Medications:     acetaminophen (TYLENOL) 650 mg CR tablet, Take 1 tablet (650 mg total) by mouth every 8 (eight) hours as needed for mild pain, Disp: 30 tablet, Rfl: 0    gabapentin (NEURONTIN) 600 MG tablet, Take 1 tablet (600 mg total) by mouth 4 (four) times a day, Disp: 120 tablet, Rfl: 2    PARoxetine (PAXIL) 20 mg tablet, Take 1 tablet (20 mg total) by mouth daily at bedtime, Disp: 90 tablet, Rfl: 1    ALPRAZolam (XANAX) 0 5 mg tablet, Take 1 tablet (0 5 mg total) by mouth 3 (three) times a day as needed for anxiety (Patient not taking: Reported on 1/21/2022 ), Disp: 30 tablet, Rfl: 0    [place] Sleepiness Scale  Sitting and reading: Slight chance of dozing  Watching TV: Slight chance of dozing  Sitting, inactive in a public place (e g  a theatre or a meeting): Slight chance of dozing  As a passenger in a car for an hour without a break: Slight chance of dozing  Lying down to rest in the afternoon when circumstances permit: Moderate chance of dozing  Sitting and talking to someone: Would never doze  Sitting quietly after a lunch without alcohol: Slight chance of dozing  In a car, while stopped for a few minutes in traffic: Would never doze  Total score: 7              Vitals:    02/10/22 1411   BP: 128/61   Pulse: 84   Weight: (!) 153 kg (337 lb)   Height: 5' 3" (1 6 m)       Body mass index is 59 7 kg/m²  [place] SLEEPINESS SCORE  Total score: 7      Past History Since Last Sleep Center Visit:   She reports that she had Covid 19 in October  She was using her CPAP equipment when she was sick  Several days into the illness, she developed headaches during sleep, which she had never had before  At that time, she also received notification that her CPAP equipment was recalled  She stopped using the CPAP equipment  She no longer has the headaches  She has not resumed using the equipment  Without use, she awakens frequently and feels sleepy during the day  The patient has never used an ozone  to clean the equipment  Aside from the headaches, the patient has not had any symptoms consistent with those associated with the recall      She mentions that even after the pressure change at her last visit, she continued to remove her mask in her sleep, unknowingly  She states that she notices limb movements in sleep  She feels she was having them, even with use of CPAP, however, they were not noted during the CPAP study  She is not interested in taking any medication to treat them  She already takes gabapentin 600mg 4 times daily and feels she has the limb movements even when taking it at bedtime  She is working with the bariatric specialists and plans to have gastric bypass in the upcoming months  The review of systems and following portions of the patient's history were reviewed and updated as appropriate: allergies, current medications, past family history, past medical history, past social history, past surgical history, and problem list         OBJECTIVE  Equipment set up date:  1/11/21 - [facility]  PAP Pressure: APAP 7-10cm  Type of mask used: full face  DME Provider: [employer]    Physical Exam:     General Appearance:   Alert, cooperative, no distress, appears stated age, morbidly obese     Head:   Normocephalic, without obvious abnormality, atraumatic     Eyes:   PERRL, conjunctiva/corneas clear, EOM's intact          Nose:  Nares normal, septum midline, no drainage or sinus tenderness           Throat:  Lips, teeth and gums normal; tongue normal size and  shape and midline mucosa moist with crowded oropharyngeal airway, uvula large, tonsils +1-2/4, Mallampati class 4       Neck:  Supple, symmetrical, trachea midline, no adenopathy;  Thyroid: No enlargement, tenderness or nodules; no carotid bruit or JVD     Lungs:      Clear to auscultation bilaterally, respirations unlabored     Heart:   Regular rate and rhythm, S1 and S2 normal, no murmur, rub or gallop       Extremities:  Extremities normal, atraumatic, no cyanosis and +1 edema in LE bilaterally       Skin:  Skin color, texture, turgor normal, no rashes or lesions       Neurologic:  No focal deficits noted ASSESSMENT / PLAN    1  Obstructive sleep apnea treated with continuous positive airway pressure (CPAP)  PAP DME Pressure Change    PAP DME Resupply/Reorder   2  Morbid obesity ([address] )  Ambulatory referral to Sleep Medicine    PAP DME Pressure Change    PAP DME Resupply/Reorder           Counseling / Coordination of Care  Total clinic time spent today 30 minutes  Greater than 50% of total time was spent with the patient and / or family counseling and / or coordination of care  A description of the counseling / coordination of care:     Impressions, Diagnostic results, Prognosis, Instructions for management, Risks and benefits of treatment, Patient and family education, Risk factor reductions and Importance of compliance with treatment    Today I reviewed the patient's compliance data  she has not been using the equipment, due to the recall and concern of headaches  Pressure will be changed to 7-10cm, due to unexplained removal of the mask during sleep and headaches  She was advised to call if the pressure is still not comfortable or she continues to remove the mask  The patient understands the benefits of using PAP equipment and would like to continue PAP therapy  Response to treatment has been very good  A prescription for new supplies for the next year was given today  We discussed the recent recall of the patient's PAP equipment  The risks and benefits for continued use vs  Discontinuation of PAP therapy were discussed  It is ultimately the patient's decision whether or not to continue PAP therapy at this time  The patient was advised to register their equipment on the shipbeat, which will give them further direction in the future replacement of the equipment  Since the ozone cleaning devices have been suspected as a causative agent in the recall, the patient has been advised to avoid using any ozone cleaning device and clean the equipment using mild soap and water      We discussed limb movements in sleep  She does not wish to take any medication to treat them or have any testing at this time  She will continue using this equipment at the settings noted above for the next 12 months  At that timeshe will then return for a routine follow-up evaluation  I have asked the patient to contact the [address] if she encounters any difficulties prior to that time  The following instructions have been given to the patient today:    Patient Instructions   1  Continue use of CPAP equipment nightly, at your discretion  2  Continue to clean your equipment, as discussed  3  Contact the Sleep [address] [place] [address] with any questions or concerns prior to your next visit, as needed  4  Schedule visit for follow-up in 1 year  Call if you continue to remove you mask for further pressure adjustments  Consider downloading the Dream  ira to view your usage, mask fit and AHI, with a goal of AHI less than 5, call if consistently higher than 5      Nursing Support:  When: Monday through Friday 7A-5PM except holidays  Where: Our direct line is [phone]  If you are having a true emergency please call 911  In the event that the line is busy or it is after hours please leave a voice message and we will return your call  Please speak clearly, leaving your full name, birth date, best number to reach you and the reason for your call  Medication refills: We will need the name of the medication, the dosage, the ordering provider, whether you get a 30 or 90 day refill, and the pharmacy name and address  Medications will be ordered by the provider only  Nurses cannot call in prescriptions  Please allow 7 days for medication refills  Physician requested updates:  If your provider requested that you call with an update after starting medication, please be ready to provide us the medication and dosage, what time you take your medication, the time you attempt to fall asleep, time you fall asleep, when you wake up, and what time you get out of bed  Sleep Study Results: We will contact you with sleep study results and/or next steps after the physician has reviewed your testing          Gayla Lee, [address] [place] [address]

## 2022-02-10 NOTE — PATIENT INSTRUCTIONS
1   Continue use of CPAP equipment nightly, at your discretion  2  Continue to clean your equipment, as discussed  3  Contact the Sleep [address] [place] [address] with any questions or concerns prior to your next visit, as needed  4  Schedule visit for follow-up in 1 year  Call if you continue to remove you mask for further pressure adjustments  Consider downloading the Dream  ira to view your usage, mask fit and AHI, with a goal of AHI less than 5, call if consistently higher than 5      Nursing Support:  When: Monday through Friday 7A-5PM except holidays  Where: Our direct line is [phone]  If you are having a true emergency please call 911  In the event that the line is busy or it is after hours please leave a voice message and we will return your call  Please speak clearly, leaving your full name, birth date, best number to reach you and the reason for your call  Medication refills: We will need the name of the medication, the dosage, the ordering provider, whether you get a 30 or 90 day refill, and the pharmacy name and address  Medications will be ordered by the provider only  Nurses cannot call in prescriptions  Please allow 7 days for medication refills  Physician requested updates: If your provider requested that you call with an update after starting medication, please be ready to provide us the medication and dosage, what time you take your medication, the time you attempt to fall asleep, time you fall asleep, when you wake up, and what time you get out of bed  Sleep Study Results: We will contact you with sleep study results and/or next steps after the physician has reviewed your testing

## 2022-02-11 ENCOUNTER — TELEPHONE (OUTPATIENT)
Dept: SLEEP CENTER | Facility: CLINIC | Age: 42
End: 2022-02-11

## 2022-02-11 NOTE — TELEPHONE ENCOUNTER
Rx for PAP resupply faxed to [facility]  Rx for pressure change emailed to [facility]

## 2022-02-14 ENCOUNTER — OFFICE VISIT (OUTPATIENT)
Dept: INTERNAL MEDICINE CLINIC | Facility: CLINIC | Age: 42
End: 2022-02-14
Payer: COMMERCIAL

## 2022-02-14 VITALS
BODY MASS INDEX: 51.91 KG/M2 | TEMPERATURE: 98.2 F | HEIGHT: 63 IN | WEIGHT: 293 LBS | SYSTOLIC BLOOD PRESSURE: 134 MMHG | HEART RATE: 88 BPM | OXYGEN SATURATION: 98 % | DIASTOLIC BLOOD PRESSURE: 84 MMHG

## 2022-02-14 DIAGNOSIS — F41.0 PANIC ATTACK: ICD-10-CM

## 2022-02-14 DIAGNOSIS — F41.1 GAD (GENERALIZED ANXIETY DISORDER): Primary | ICD-10-CM

## 2022-02-14 DIAGNOSIS — R07.89 ATYPICAL CHEST PAIN: ICD-10-CM

## 2022-02-14 PROCEDURE — 99213 OFFICE O/P EST LOW 20 MIN: CPT | Performed by: INTERNAL MEDICINE

## 2022-02-14 RX ORDER — PAROXETINE HYDROCHLORIDE 20 MG/1
40 TABLET, FILM COATED ORAL
Qty: 90 TABLET | Refills: 1 | Status: SHIPPED | OUTPATIENT
Start: 2022-02-14 | End: 2022-06-04

## 2022-02-14 RX ORDER — BUSPIRONE HYDROCHLORIDE 10 MG/1
10 TABLET ORAL 3 TIMES DAILY
Qty: 270 TABLET | Refills: 1 | Status: SHIPPED | OUTPATIENT
Start: 2022-02-14

## 2022-02-14 NOTE — PROGRESS NOTES
Tobacco Cessation Counseling: Tobacco cessation counseling was provided  The patient is sincerely urged to quit consumption of tobacco  She is not ready to quit tobacco  Medication options discussed  Side effects of medication not discussed  Patient refused medication  Assessment/Plan:  Problem List Items Addressed This Visit        Other    JOSEFINA (generalized anxiety disorder) - Primary    Relevant Medications    busPIRone (BUSPAR) 10 mg tablet    PARoxetine (PAXIL) 20 mg tablet      Other Visit Diagnoses     Panic attack        Relevant Medications    busPIRone (BUSPAR) 10 mg tablet    PARoxetine (PAXIL) 20 mg tablet    Atypical chest pain        Relevant Medications    busPIRone (BUSPAR) 10 mg tablet    PARoxetine (PAXIL) 20 mg tablet           Diagnoses and all orders for this visit:    JOSEFINA (generalized anxiety disorder)  -     busPIRone (BUSPAR) 10 mg tablet; Take 1 tablet (10 mg total) by mouth 3 (three) times a day  -     PARoxetine (PAXIL) 20 mg tablet; Take 2 tablets (40 mg total) by mouth daily at bedtime    Panic attack  -     busPIRone (BUSPAR) 10 mg tablet; Take 1 tablet (10 mg total) by mouth 3 (three) times a day  -     PARoxetine (PAXIL) 20 mg tablet; Take 2 tablets (40 mg total) by mouth daily at bedtime    Atypical chest pain  -     busPIRone (BUSPAR) 10 mg tablet; Take 1 tablet (10 mg total) by mouth 3 (three) times a day  -     PARoxetine (PAXIL) 20 mg tablet; Take 2 tablets (40 mg total) by mouth daily at bedtime        No problem-specific Assessment & Plan notes found for this encounter  A/P: A little better, but not at goal  Tolerating the meds  Not abusing the xanax  Will add buspar  RTC one month for routine  Wean tobacco     Subjective:      Patient ID: Vikas Vergara is a 42 y o  female  WF RTC for f/u JOSEFINA/panic attacks after increasing her paxil  Reports some improvement, but not over the entire day  Tolerating the meds  No new issues         The following portions of the patient's history were reviewed and updated as appropriate:   She has a past medical history of Kidney stone, Obesity (4/15/2013), Peripheral edema, Primary osteoarthritis involving multiple joints (2020), Sleep apnea, Wears dentures, and Wears glasses  ,  does not have any pertinent problems on file  ,   has a past surgical history that includes Mouth surgery;  section; Tubal ligation (); Back surgery; pr laparoscopy w tot hysterectuterus <=250 gram  w tube/ovary (N/A, 10/8/2020); pr cystourethroscopy (N/A, 10/8/2020); and Hysterectomy  ,  family history includes COPD in her family; Depression in her mother; Diverticulitis in her mother; Emphysema in her family; Heart murmur in her mother; Hypertension in her father and mother; Irritable bowel syndrome in her mother; Lung disease in her brother; Multiple sclerosis in her brother; No Known Problems in her daughter, daughter, daughter, maternal aunt, and maternal aunt; Obesity in her father, mother, and paternal grandmother; Spina bifida in her father; Stroke in her maternal grandmother and paternal grandmother  ,   reports that she has been smoking cigarettes  She has a 30 00 pack-year smoking history  She has never used smokeless tobacco  She reports previous alcohol use  She reports that she does not use drugs  ,  is allergic to clindamycin/lincomycin, levaquin [levofloxacin], and penicillins     Current Outpatient Medications   Medication Sig Dispense Refill    acetaminophen (TYLENOL) 650 mg CR tablet Take 1 tablet (650 mg total) by mouth every 8 (eight) hours as needed for mild pain 30 tablet 0    ALPRAZolam (XANAX) 0 5 mg tablet Take 1 tablet (0 5 mg total) by mouth 3 (three) times a day as needed for anxiety 30 tablet 0    gabapentin (NEURONTIN) 600 MG tablet Take 1 tablet (600 mg total) by mouth 4 (four) times a day 120 tablet 2    PARoxetine (PAXIL) 20 mg tablet Take 2 tablets (40 mg total) by mouth daily at bedtime 90 tablet 1    busPIRone (BUSPAR) 10 mg tablet Take 1 tablet (10 mg total) by mouth 3 (three) times a day 270 tablet 1     No current facility-administered medications for this visit  Review of Systems   Constitutional: Negative for activity change, chills, diaphoresis, fatigue and fever  Respiratory: Negative for cough, chest tightness, shortness of breath and wheezing  Cardiovascular: Negative for chest pain, palpitations and leg swelling  Gastrointestinal: Negative for abdominal pain, constipation, diarrhea, nausea and vomiting  Genitourinary: Negative for difficulty urinating, dysuria and frequency  Musculoskeletal: Negative for arthralgias, gait problem and myalgias  Neurological: Negative for light-headedness and headaches  Psychiatric/Behavioral: Negative for confusion, dysphoric mood, sleep disturbance and suicidal ideas  The patient is nervous/anxious  PHQ-2/9 Depression Screening          Objective:  Vitals:    02/14/22 1510   BP: 134/84   Pulse: 88   Temp: 98 2 °F (36 8 °C)   TempSrc: Tympanic   SpO2: 98%   Weight: (!) 153 kg (338 lb)   Height: 5' 3" (1 6 m)     Body mass index is 59 87 kg/m²  Physical Exam  Vitals and nursing note reviewed  Constitutional:       General: She is not in acute distress  Appearance: Normal appearance  She is not ill-appearing  HENT:      Head: Normocephalic and atraumatic  Mouth/Throat:      Mouth: Mucous membranes are moist    Eyes:      Extraocular Movements: Extraocular movements intact  Conjunctiva/sclera: Conjunctivae normal       Pupils: Pupils are equal, round, and reactive to light  Neck:      Vascular: No carotid bruit  Cardiovascular:      Rate and Rhythm: Normal rate and regular rhythm  Heart sounds: Normal heart sounds  Pulmonary:      Effort: Pulmonary effort is normal  No respiratory distress  Breath sounds: Normal breath sounds  No wheezing or rales     Abdominal:      General: Bowel sounds are normal  There is no distension  Palpations: Abdomen is soft  Tenderness: There is no abdominal tenderness  Musculoskeletal:      Cervical back: Neck supple  Right lower leg: No edema  Left lower leg: No edema  Neurological:      General: No focal deficit present  Mental Status: She is alert and oriented to person, place, and time  Mental status is at baseline  Psychiatric:         Mood and Affect: Mood normal          Behavior: Behavior normal          Thought Content: Thought content normal          Judgment: Judgment normal            Tobacco Cessation Counseling: Tobacco cessation counseling and education was provided  The patient is sincerely urged to quit consumption of tobacco  She is not ready to quit tobacco  The numerous health risks of tobacco consumption were discussed  If she decides to quit, there are a number of helpful adjunctive aids, and she can see me to discuss nicotine replacement therapy, chantix, or bupropion anytime in the future

## 2022-02-14 NOTE — PATIENT INSTRUCTIONS
Cigarette Smoking and Your Health   AMBULATORY CARE:   Risks to your health if you smoke:  Nicotine and other chemicals found in tobacco and e-cigarettes can damage every cell in your body  Even if you are a light smoker, you have an increased risk for cancer, heart disease, and lung disease  If you are pregnant or have diabetes, smoking increases your risk for complications  Nicotine can affect an adolescent's developing brain  This can lead to trouble thinking, learning, or paying attention  Benefits to your health if you stop smoking:   · You decrease respiratory symptoms such as coughing, wheezing, and shortness of breath  · You reduce your risk for cancers of the lung, mouth, throat, kidney, bladder, pancreas, stomach, and cervix  If you already have cancer, you increase the benefits of chemotherapy  You also reduce your risk for cancer returning or a second cancer from developing  · You reduce your risk for heart disease, blood clots, heart attack, and stroke  · You reduce your risk for lung infections, and diseases such as pneumonia, asthma, chronic bronchitis, and emphysema  · Your circulation improves  More oxygen can be delivered to your body  If you have diabetes, you lower your risk for complications, such as kidney, artery, and eye diseases  You also lower your risk for nerve damage  Nerve damage can lead to amputations, poor vision, and blindness  · You improve your body's ability to heal and to fight infections  · An adolescent can help his or her brain and body develop in a healthy way  Talk to your adolescent about all the health risks of nicotine  If you can, start talking about nicotine when your child is younger than 12 years  This may make it easier for him or her not to start using nicotine as a teenager or adult  Explain to him or her that it is best never to start  It can be hard to try to quit later      Benefits to the health of others if you stop smoking:  Tobacco is harmful to nonsmokers who breathe in your secondhand smoke  The following are ways the health of others around you may improve when you stop smoking:  · You lower the risks for lung cancer and heart disease in nonsmoking adults  · If you are pregnant, you lower the risk for miscarriage, early delivery, low birth weight, and stillbirth  You also lower your baby's risk for SIDS, obesity, developmental delay, and neurobehavioral problems, such as ADHD  · If you have children, you lower their risk for ear infections, colds, pneumonia, bronchitis, and asthma  Follow up with your doctor as directed:  Write down your questions so you remember to ask them during your visits  For support and more information:   · American [employer] Association  [address] [facility]  [address]  Phone: [facility] [address]  Phone: 4- 463 - 637-8039  Web Address: [employer]  [employer]    · Smokefree  gov  Phone: 1- 070 - 125-2772  Web Address: www smokefree  [facility] [address] 2021 Information is for End User's use only and may not be sold, redistributed or otherwise used for commercial purposes  All illustrations and images included in CareNotes® are the copyrighted property of A D A M , Inc  or [address] [place], [place] [address]tiffany   The above information is an  only  It is not intended as medical advice for individual conditions or treatments  Talk to your doctor, nurse or pharmacist before following any medical regimen to see if it is safe and effective for you

## 2022-02-16 ENCOUNTER — OFFICE VISIT (OUTPATIENT)
Dept: BARIATRICS | Facility: CLINIC | Age: 42
End: 2022-02-16

## 2022-02-16 DIAGNOSIS — E66.01 MORBID (SEVERE) OBESITY DUE TO EXCESS CALORIES (HCC): Primary | ICD-10-CM

## 2022-02-16 PROCEDURE — RECHECK

## 2022-02-17 VITALS — WEIGHT: 293 LBS | BODY MASS INDEX: 59.47 KG/M2

## 2022-02-17 NOTE — PROGRESS NOTES
3/6 WT CHK  Patient lost 7 lbs since last visit  Focusing on cutting out sugars  No soda, no tea, and sugar in coffee  Patient has set date of 3/17 to quit smoking  1/2 a pack a day and continues to cut back; patient using smaller portions; encouraged patient to measure rather than only to eye ball portion sizes  Provides more accuracy for serving sizes  Patient drinking adequate water  Patient got an exercise bike last week and working on getting started with it  Reviewed workflow; cardio scheduled for 4/15   Patient making progress towards surgery goal

## 2022-03-15 ENCOUNTER — OFFICE VISIT (OUTPATIENT)
Dept: INTERNAL MEDICINE CLINIC | Facility: CLINIC | Age: 42
End: 2022-03-15
Payer: COMMERCIAL

## 2022-03-15 VITALS
DIASTOLIC BLOOD PRESSURE: 76 MMHG | BODY MASS INDEX: 51.91 KG/M2 | OXYGEN SATURATION: 98 % | HEART RATE: 78 BPM | TEMPERATURE: 99.5 F | WEIGHT: 293 LBS | HEIGHT: 63 IN | SYSTOLIC BLOOD PRESSURE: 140 MMHG

## 2022-03-15 DIAGNOSIS — M15.9 PRIMARY OSTEOARTHRITIS INVOLVING MULTIPLE JOINTS: ICD-10-CM

## 2022-03-15 DIAGNOSIS — Z99.89 OBSTRUCTIVE SLEEP APNEA TREATED WITH CONTINUOUS POSITIVE AIRWAY PRESSURE (CPAP): ICD-10-CM

## 2022-03-15 DIAGNOSIS — E78.2 MIXED HYPERLIPIDEMIA: Primary | ICD-10-CM

## 2022-03-15 DIAGNOSIS — G89.4 CHRONIC PAIN SYNDROME: ICD-10-CM

## 2022-03-15 DIAGNOSIS — F41.1 GAD (GENERALIZED ANXIETY DISORDER): ICD-10-CM

## 2022-03-15 DIAGNOSIS — E66.01 MORBID OBESITY (HCC): ICD-10-CM

## 2022-03-15 DIAGNOSIS — Z72.0 TOBACCO ABUSE: ICD-10-CM

## 2022-03-15 DIAGNOSIS — G47.33 OBSTRUCTIVE SLEEP APNEA TREATED WITH CONTINUOUS POSITIVE AIRWAY PRESSURE (CPAP): ICD-10-CM

## 2022-03-15 DIAGNOSIS — Z12.31 ENCOUNTER FOR SCREENING MAMMOGRAM FOR MALIGNANT NEOPLASM OF BREAST: ICD-10-CM

## 2022-03-15 PROBLEM — R79.89 ABNORMAL THYROID BLOOD TEST: Status: RESOLVED | Noted: 2020-01-17 | Resolved: 2022-03-15

## 2022-03-15 PROCEDURE — 99214 OFFICE O/P EST MOD 30 MIN: CPT | Performed by: INTERNAL MEDICINE

## 2022-03-15 NOTE — PATIENT INSTRUCTIONS
Hyperlipidemia   WHAT YOU NEED TO KNOW:   What is hyperlipidemia? Hyperlipidemia is a high level of lipids (fats) in your blood  These lipids include cholesterol or triglycerides  Lipids are made by your body  They also come from the foods you eat  Your body needs lipids to work properly, but high levels increase your risk for heart disease, heart attack, and stroke  What increases my risk for hyperlipidemia? · Family history of high lipid levels    · Diet high in saturated fats, cholesterol, or calories    · High alcohol intake or smoking    · Lack of regular physical activity    · Medical conditions such as hypothyroidism, obesity, or type 2 diabetes    · Certain medicines, such as blood pressure medicines, hormones, and steroids    How is hyperlipidemia managed and treated? Your healthcare provider may first recommend that you make lifestyle changes to help decrease your lipid levels  Your provider may recommend you work with a team to manage hyperlipidemia  The team may include medical experts such as a dietitian, an exercise or physical therapist, and a behavior therapist  Your family members may be included in helping you create lifestyle changes  You may also need to take medicine to lower your lipid levels  Some of the lifestyle changes you may need to make include the following:  · Maintain a healthy weight  Ask your healthcare provider what a healthy weight is for you  Ask him or her to help you create a weight loss plan if you are overweight  Weight loss can decrease your cholesterol and triglyceride levels  · Be physically active throughout the day  Physical activity, such as exercise, lowers your cholesterol levels and helps you maintain a healthy weight  Get 30 minutes or more of aerobic exercise 4 to 6 days each week  You can split your exercise into four 10-minute workouts instead of 30 minutes at one time  Examples of aerobic exercises include walking briskly, swimming, or riding a bike  Work with your healthcare provider to plan the best exercise program for you  Also include strength training at least 2 times each week  Your healthcare providers can help you create a physical activity plan  · Do not smoke  Nicotine and other chemicals in cigarettes and cigars can increase your risk for a heart attack and stroke  Ask your healthcare provider for information if you currently smoke and need help to quit  E-cigarettes or smokeless tobacco still contain nicotine  Talk to your healthcare provider before you use these products  · Eat heart-healthy foods  A dietitian or your provider can give you more information on low-sodium plans or the DASH (Dietary Approaches to Stop Hypertension) eating plan  The DASH plan is low in sodium, processed sugar, unhealthy fats, and total fat  It is high in potassium, calcium, and fiber  It is high in potassium, calcium, and fiber  These can be found in vegetables, fruit, and whole-grain foods  The following are ways to get more heart-healthy foods:         ? Decrease the total amount of fat you eat  Choose lean meats, fat-free or 1% fat milk, and low-fat dairy products, such as yogurt and cheese  Limit or do not eat red meat  Red meats are high in fat and cholesterol  ? Replace unhealthy fats with healthy fats  Unhealthy fats include saturated fat, trans fat, and cholesterol  Choose soft margarines that are low in saturated fat and have little or no trans fat  Monounsaturated fats are healthy fats  These are found in olive oil, canola oil, avocado, and nuts  Polyunsaturated fats are also healthy  These are found in fish, flaxseed, walnuts, and soybeans  ? Eat 5 or more servings of fruits and vegetables every day  They are low in calories and fat, and a good source of essential vitamins  Include dark green, red, and orange vegetables  Examples include spinach, kale, broccoli, and carrots  ? Eat foods high in fiber    Fiber can help lower your cholesterol levels  Choose whole grain, high-fiber foods  Good choices include whole-wheat breads or cereals, beans, peas, fruits, and vegetables  ? Limit sodium (salt) as directed  Too much sodium can affect your fluid balance and blood pressure  Your healthcare provider will tell you how much sodium and potassium are safe for you to have in a day  He or she may recommend that you limit sodium to 2,300 mg a day  Your provider or a dietitian can help you find ways to limit sodium  For example, if you add salt while you cook, do not add more salt at the table  Check labels to find low-sodium or no-salt-added foods  Some low-sodium foods use potassium salts for flavor  Too much potassium can also cause health problems  · Ask your healthcare provider if it is okay for you to drink alcohol  Alcohol can increase your cholesterol and triglyceride levels  Your provider can tell you how many drinks are okay to have within 24 hours and within 1 week  A drink of alcohol is 12 ounces of beer, 5 ounces of wine, or 1½ ounces of liquor  Call your local emergency number [phone] in the [facility]) or have someone call if:   · You have any of the following signs of a heart attack:      ? Squeezing, pressure, or pain in your chest    ? You may  also have any of the following:     § Discomfort or pain in your back, neck, jaw, stomach, or arm    § Shortness of breath    § Nausea or vomiting    § Lightheadedness or a sudden cold sweat    · You have any of the following signs of a stroke:      ? Numbness or drooping on one side of your face     ? Weakness in an arm or leg    ? Confusion or difficulty speaking    ? Dizziness, a severe headache, or vision loss    When should I call my doctor? · You have questions or concerns about your condition or care  CARE AGREEMENT:   You have the right to help plan your care  Learn about your health condition and how it may be treated   Discuss treatment options with your healthcare providers to decide what care you want to receive  You always have the right to refuse treatment  The above information is an  only  It is not intended as medical advice for individual conditions or treatments  Talk to your doctor, nurse or pharmacist before following any medical regimen to see if it is safe and effective for you  © Copyright [employer] 2022 Information is for End User's use only and may not be sold, redistributed or otherwise used for commercial purposes   All illustrations and images included in CareNotes® are the copyrighted property of A D A M [employer] Inc  or [address] [place] [address]

## 2022-03-15 NOTE — PROGRESS NOTES
Assessment/Plan:  Problem List Items Addressed This Visit        Respiratory    Obstructive sleep apnea treated with continuous positive airway pressure (CPAP)       Musculoskeletal and Integument    Primary osteoarthritis involving multiple joints       Other    Tobacco abuse    Morbid obesity ([address] )    Mixed hyperlipidemia - Primary    Relevant Orders    LDL cholesterol, direct    Triglycerides    JOSEFINA (generalized anxiety disorder)    Chronic pain syndrome      Other Visit Diagnoses     Encounter for screening mammogram for malignant neoplasm of breast        Relevant Orders    Mammo screening bilateral w 3d & cad           Diagnoses and all orders for this visit:    Mixed hyperlipidemia  -     LDL cholesterol, direct; Future  -     Triglycerides; Future    Chronic pain syndrome    JOSEFINA (generalized anxiety disorder)    Morbid obesity (HCC)    Tobacco abuse    Primary osteoarthritis involving multiple joints    Obstructive sleep apnea treated with continuous positive airway pressure (CPAP)    Encounter for screening mammogram for malignant neoplasm of breast  -     Mammo screening bilateral w 3d & cad; Future        No problem-specific Assessment & Plan notes found for this encounter  A/P: Doing ok and will check labs  Re-order mammo  Wean tobacco and pt reports attempting to go cold turkey late this week    Continue current treatment and RTC six months for routine  Subjective:      Patient ID: Allison Yoon is a 42 y o  female  WF RTC for f/u hyperlipidemia, CHESTER, etc  Doing ok and no new issues  Remains active w/o difficulty and no falls  CHESTER is manageable and continues with CPAP  Chronic pain is manageable  JOSEFINA is controlled and a little better after some changes at work    Still smoking  Due for labs and mammo         The following portions of the patient's history were reviewed and updated as appropriate:   She has a past medical history of Kidney stone, Obesity (4/15/2013), Peripheral edema, Primary osteoarthritis involving multiple joints (2020), Sleep apnea, Wears dentures, and Wears glasses  ,  does not have any pertinent problems on file  ,   has a past surgical history that includes Mouth surgery;  section; Tubal ligation (); Back surgery; pr laparoscopy w tot hysterectuterus <=250 gram  w tube/ovary (N/A, 10/8/2020); pr cystourethroscopy (N/A, 10/8/2020); and Hysterectomy  ,  family history includes COPD in her family; Depression in her mother; Diverticulitis in her mother; Emphysema in her family; Heart murmur in her mother; Hypertension in her father and mother; Irritable bowel syndrome in her mother; Lung disease in her brother; Multiple sclerosis in her brother; No Known Problems in her daughter, daughter, daughter, maternal aunt, and maternal aunt; Obesity in her father, mother, and paternal grandmother; Spina bifida in her father; Stroke in her maternal grandmother and paternal grandmother  ,   reports that she has been smoking cigarettes  She has a 30 00 pack-year smoking history  She has never used smokeless tobacco  She reports previous alcohol use  She reports that she does not use drugs  ,  is allergic to clindamycin/lincomycin, levaquin [levofloxacin], and penicillins     Current Outpatient Medications   Medication Sig Dispense Refill    acetaminophen (TYLENOL) 650 mg CR tablet Take 1 tablet (650 mg total) by mouth every 8 (eight) hours as needed for mild pain 30 tablet 0    ALPRAZolam (XANAX) 0 5 mg tablet Take 1 tablet (0 5 mg total) by mouth 3 (three) times a day as needed for anxiety 30 tablet 0    busPIRone (BUSPAR) 10 mg tablet Take 1 tablet (10 mg total) by mouth 3 (three) times a day 270 tablet 1    gabapentin (NEURONTIN) 600 MG tablet Take 1 tablet (600 mg total) by mouth 4 (four) times a day 120 tablet 2    PARoxetine (PAXIL) 20 mg tablet Take 2 tablets (40 mg total) by mouth daily at bedtime 90 tablet 1     No current facility-administered medications for this visit  Review of Systems   Constitutional: Negative for activity change, chills, diaphoresis, fatigue and fever  HENT: Negative  Eyes: Negative for visual disturbance  Respiratory: Negative for cough, chest tightness, shortness of breath and wheezing  Cardiovascular: Negative for chest pain, palpitations and leg swelling  Gastrointestinal: Negative for abdominal pain, constipation, diarrhea, nausea and vomiting  Endocrine: Negative for cold intolerance and heat intolerance  Genitourinary: Negative for difficulty urinating, dysuria and frequency  Musculoskeletal: Negative for arthralgias, gait problem and myalgias  Neurological: Negative for dizziness, seizures, syncope, weakness, light-headedness and headaches  Psychiatric/Behavioral: Negative for confusion, dysphoric mood and sleep disturbance  The patient is not nervous/anxious  PHQ-2/9 Depression Screening          Objective:  Vitals:    03/15/22 1455   BP: 140/76   Pulse: 78   Temp: 99 5 °F (37 5 °C)   TempSrc: Tympanic   SpO2: 98%   Weight: (!) 153 kg (338 lb)   Height: 5' 3" (1 6 m)     Body mass index is 59 87 kg/m²  Physical Exam  Vitals and nursing note reviewed  Constitutional:       General: She is not in acute distress  Appearance: Normal appearance  She is not ill-appearing  HENT:      Head: Normocephalic and atraumatic  Mouth/Throat:      Mouth: Mucous membranes are moist    Eyes:      Extraocular Movements: Extraocular movements intact  Conjunctiva/sclera: Conjunctivae normal       Pupils: Pupils are equal, round, and reactive to light  Neck:      Vascular: No carotid bruit  Cardiovascular:      Rate and Rhythm: Normal rate and regular rhythm  Heart sounds: Normal heart sounds  Pulmonary:      Effort: Pulmonary effort is normal  No respiratory distress  Breath sounds: Normal breath sounds  No wheezing or rales     Abdominal:      General: Bowel sounds are normal  There is no distension  Palpations: Abdomen is soft  Tenderness: There is no abdominal tenderness  Musculoskeletal:      Cervical back: Neck supple  Right lower leg: No edema  Left lower leg: No edema  Neurological:      General: No focal deficit present  Mental Status: She is alert and oriented to person, place, and time  Mental status is at baseline  Psychiatric:         Mood and Affect: Mood normal          Behavior: Behavior normal          Thought Content:  Thought content normal          Judgment: Judgment normal

## 2022-03-24 ENCOUNTER — OFFICE VISIT (OUTPATIENT)
Dept: BARIATRICS | Facility: CLINIC | Age: 42
End: 2022-03-24
Payer: COMMERCIAL

## 2022-03-24 VITALS
HEIGHT: 63 IN | DIASTOLIC BLOOD PRESSURE: 82 MMHG | TEMPERATURE: 97.2 F | BODY MASS INDEX: 51.91 KG/M2 | WEIGHT: 293 LBS | SYSTOLIC BLOOD PRESSURE: 130 MMHG | HEART RATE: 96 BPM

## 2022-03-24 DIAGNOSIS — E66.01 MORBID (SEVERE) OBESITY DUE TO EXCESS CALORIES (HCC): Primary | ICD-10-CM

## 2022-03-24 PROCEDURE — 99203 OFFICE O/P NEW LOW 30 MIN: CPT | Performed by: SURGERY

## 2022-03-24 NOTE — PROGRESS NOTES
BARIATRIC CONSULT-INITIAL - BARIATRIC SURGERY  Romelia Connelly 42 y o  female MRN: [id number]  Unit/Bed#:  Encounter: [id number]      HPI:  Jeferson Horan is a 42 y o  female who presents with morbid obesity to discuss weight loss options  Review of Systems    Historical Information   Past Medical History:   Diagnosis Date    Kidney stone     Obesity 4/15/2013    Peripheral edema     Primary osteoarthritis involving multiple joints 2020    Sleep apnea     Wears dentures     Wears glasses      Past Surgical History:   Procedure Laterality Date    BACK SURGERY      lump removed near shoulder on right side     SECTION      x3, 2005,,     HYSTERECTOMY      MOUTH SURGERY      21 teeth removed    CT CYSTOURETHROSCOPY N/A 10/8/2020    Procedure: CYSTOSCOPY;  Surgeon: Adam Figueredo MD;  Location: [facility];  Service: Gynecology    CT LAPAROSCOPY W TOT HYSTERECTUTERUS <=250 Marsha Terral  W TUBE/OVARY N/A 10/8/2020    Procedure: ROBOTIC TOTAL HYSTERECTOMY; BILATERAL SALPINGECTOMY;  Surgeon: Adam Figueredo MD;  Location: [facility];  Service: Gynecology    TUBAL LIGATION       Social History   Social History     Substance and Sexual Activity   Alcohol Use Not Currently    Comment: 3x per year will have 1-2 drinks     Social History     Substance and Sexual Activity   Drug Use No     Social History     Tobacco Use   Smoking Status Current Every Day Smoker    Packs/day: 0 25    Years: 30 00    Pack years: 7 50    Types: Cigarettes   Smokeless Tobacco Never Used     Family History: non-contributory    Meds/Allergies   all medications and allergies reviewed  Allergies   Allergen Reactions    Clindamycin/Lincomycin      Mouth ulcers severe  But, tolerates azithromycin      Levaquin [Levofloxacin] Throat Swelling     Facial swelling leading to throat swelling    Penicillins Anaphylaxis       Objective       Current Vitals:   Blood Pressure: 130/82 (22 1906)  Pulse: 96 (03/24/22 0758)  Temperature: (!) 97 2 °F (36 2 °C) (03/24/22 0758)  Temp Source: Tympanic (03/24/22 0758)  Height: 5' 2 5" (158 8 cm) (03/24/22 0758)  Weight - Scale: (!) 151 kg (332 lb) (03/24/22 0758)  Body mass index is 59 76 kg/m²  Invasive Devices  Report    Drain            NG/OG/Enteral Tube Orogastric 18 Fr Center mouth 531 days                Physical Exam    Lab Results: I have personally reviewed pertinent lab results  Imaging: I have personally reviewed pertinent reports  EKG, Pathology, and Other Studies: I have personally reviewed pertinent reports  Code Status: [unfilled]  Advance Directive and Living Will:      Power of :    POLST:      Assessment/PLAN:            Patient has a long history of morbid obesity and is presenting to discuss the surgical weight loss options  Despite the patient best efforts patient was unable to lose any meaningful or sustainable weight using nonsurgical means  We had a long discussion regarding all the surgical weight-loss options at our disposal at this point and reviewed the risks and benefits of each procedure in details as it relates to her age, BMI and medical conditions  Patient elected to undergo RYGB possible Sleeve in case of adhesions (history of 3 C-sections and hysterectomy)    Risks and benefits were explained to the patient  We also discussed the importance and need of a preoperative workup to make sure that the patient can undergo the procedure safely  Preoperative workup includes sleep apnea screening, cardiac evaluation, nutrition/psych and preoperative EGD  Risks and benefits of all the preoperative diagnostic tests were discussed with the patient including but not limited to the upper endoscopy  Alternatives to surgery and alternative forms of surgery were also explained  Postsurgical commitment and aftercare programs were discussed and explained to the patient in details       In terms of comorbidities patient suffers mostly of   Past Medical History:   Diagnosis Date    Kidney stone     Obesity 4/15/2013    Peripheral edema     Primary osteoarthritis involving multiple joints 1/17/2020    Sleep apnea     Wears dentures     Wears glasses        I informed the patient that the rate of resolution of comorbid conditions following weight loss surgery is between 60 and 90% depending on the severity of the specific medical condition  I discussed and educated the patient regarding the different components of our multidisciplinary program and the importance of compliance and follow-up in the postoperative period  All questions answered  Patient understands risks and benefits  An image of the procedure was also shown to the patient  After showing the image we discussed all the technical aspects of the procedure and also the potential complications including but not limited to gastrointestinal perforation, leak, obstruction, stricture and hemorrhage  I spent 30 min with the patient more than 50% of the time was spent educating the patient and coordinating care

## 2022-03-25 ENCOUNTER — OFFICE VISIT (OUTPATIENT)
Dept: PAIN MEDICINE | Facility: CLINIC | Age: 42
End: 2022-03-25
Payer: COMMERCIAL

## 2022-03-25 VITALS
DIASTOLIC BLOOD PRESSURE: 82 MMHG | HEART RATE: 88 BPM | TEMPERATURE: 98.6 F | WEIGHT: 293 LBS | SYSTOLIC BLOOD PRESSURE: 132 MMHG | BODY MASS INDEX: 51.91 KG/M2 | HEIGHT: 63 IN

## 2022-03-25 DIAGNOSIS — M54.12 CERVICAL RADICULOPATHY: ICD-10-CM

## 2022-03-25 DIAGNOSIS — M54.2 CHRONIC CERVICAL PAIN: ICD-10-CM

## 2022-03-25 DIAGNOSIS — G89.4 CHRONIC PAIN SYNDROME: Primary | ICD-10-CM

## 2022-03-25 DIAGNOSIS — G89.29 CHRONIC CERVICAL PAIN: ICD-10-CM

## 2022-03-25 DIAGNOSIS — M15.9 PRIMARY OSTEOARTHRITIS INVOLVING MULTIPLE JOINTS: ICD-10-CM

## 2022-03-25 PROCEDURE — 99214 OFFICE O/P EST MOD 30 MIN: CPT | Performed by: NURSE PRACTITIONER

## 2022-03-25 RX ORDER — GABAPENTIN 600 MG/1
600 TABLET ORAL 4 TIMES DAILY
Qty: 120 TABLET | Refills: 5 | Status: SHIPPED | OUTPATIENT
Start: 2022-03-25

## 2022-03-25 NOTE — PROGRESS NOTES
Assessment:  1  Chronic pain syndrome    2  Chronic cervical pain    3  Cervical radiculopathy    4  Primary osteoarthritis involving multiple joints        Plan:  While the patient was in the office today, I did have a thorough conversation regarding their chronic pain syndrome, medication management, and treatment plan options  Patient is being seen for follow-up visit  Overall, she reports that her pain has remained stable with the use of gabapentin 600 mg 4 times daily  Medication reduces her pain by 35-40%  Continue gabapentin 600 mg 4 times daily to address the neuropathic component of her pain  Prescription was sent to her pharmacy with refills  Continue with weight management  Patient tells me that she is planning to have bariatric surgery this summer  The patient will follow-up in 6 months for medication prescription refill and reevaluation  The patient was advised to contact the office should their symptoms worsen in the interim  The patient was agreeable and verbalized an understanding  History of Present Illness: The patient is a 42 y o  female who presents for a follow up office visit in regards to Headache, Neck Pain, Shoulder Pain, Arm Pain, Hand Pain, Elbow Pain, Back Pain, Leg Pain, Knee Pain, Ankle Pain, Wrist Pain, Foot Pain, and Hip Pain  The patients current symptoms include complaints of neck and upper extremity pain  Current pain level 7/10  Quality pain is described as burning, sharp, throbbing, pressure-like, shooting, numb  Current pain medications includes:  Gabapentin 600 mg 4 times daily   The patient reports that this regimen is providing 35-40 % pain relief  The patient is reporting no side effects from this pain medication regimen  I have personally reviewed and/or updated the patient's past medical history, past surgical history, family history, social history, current medications, allergies, and vital signs today           Review of Systems  Review of Systems   Respiratory: Negative for shortness of breath  Cardiovascular: Negative for chest pain  Gastrointestinal: Negative for constipation, diarrhea, nausea and vomiting  Musculoskeletal: Positive for arthralgias, joint swelling and myalgias  Negative for gait problem  Decreased range of motion  Joint stiffness  Pain in extremity - arms & legs   Neurological: Positive for dizziness  Negative for seizures and weakness  All other systems reviewed and are negative          Past Medical History:   Diagnosis Date    Kidney stone     Obesity 4/15/2013    Peripheral edema     Primary osteoarthritis involving multiple joints 2020    Sleep apnea     Wears dentures     Wears glasses        Past Surgical History:   Procedure Laterality Date    BACK SURGERY      lump removed near shoulder on right side     SECTION      x3, 2005,,     HYSTERECTOMY      MOUTH SURGERY      21 teeth removed    [place] CYSTOURETHROSCOPY N/A 10/8/2020    Procedure: CYSTOSCOPY;  Surgeon: Nikolas Degroot MD;  [facility];  Service: Gynecology    [place] LAPAROSCOPY W TOT HYSTERECTUTERUS <=250 [place]  W TUBE/OVARY N/A 10/8/2020    Procedure: ROBOTIC TOTAL HYSTERECTOMY; BILATERAL SALPINGECTOMY;  Surgeon: Nikolas Degroot MD;  [facility];  Service: Gynecology    TUBAL LIGATION         Family History   Problem Relation Age of Onset    Hypertension Mother     Irritable bowel syndrome Mother     Diverticulitis Mother     Depression Mother     Heart murmur Mother     Obesity Mother     Hypertension Father     Spina bifida Father     Obesity Father     Multiple sclerosis Brother     Lung disease Brother     COPD Family     Emphysema Family     No Known Problems Daughter     Stroke Maternal Grandmother     Obesity Paternal Grandmother     Stroke Paternal Grandmother     No Known Problems Daughter     No Known Problems Daughter     No Known Problems Maternal Aunt     No Known Problems Maternal Aunt        Social History     Occupational History    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   Tobacco Use    Smoking status: Current Every Day Smoker     Packs/day: 0 25     Years: 30 00     Pack years: 7 50     Types: Cigarettes    Smokeless tobacco: Never Used   Vaping Use    Vaping Use: Never used   Substance and Sexual Activity    Alcohol use: Not Currently     Comment: 3x per year will have 1-2 drinks    Drug use: No    Sexual activity: Not Currently         Current Outpatient Medications:     acetaminophen (TYLENOL) 650 mg CR tablet, Take 1 tablet (650 mg total) by mouth every 8 (eight) hours as needed for mild pain, Disp: 30 tablet, Rfl: 0    ALPRAZolam (XANAX) 0 5 mg tablet, Take 1 tablet (0 5 mg total) by mouth 3 (three) times a day as needed for anxiety, Disp: 30 tablet, Rfl: 0    busPIRone (BUSPAR) 10 mg tablet, Take 1 tablet (10 mg total) by mouth 3 (three) times a day, Disp: 270 tablet, Rfl: 1    gabapentin (NEURONTIN) 600 MG tablet, Take 1 tablet (600 mg total) by mouth 4 (four) times a day, Disp: 120 tablet, Rfl: 5    PARoxetine (PAXIL) 20 mg tablet, Take 2 tablets (40 mg total) by mouth daily at bedtime, Disp: 90 tablet, Rfl: 1    Allergies   Allergen Reactions    Clindamycin/Lincomycin      Mouth ulcers severe  But, tolerates azithromycin   Levaquin [Levofloxacin] Throat Swelling     Facial swelling leading to throat swelling    Penicillins Anaphylaxis       Physical Exam:    /82 (BP Location: Right arm, Patient Position: Sitting, Cuff Size: Large)   Pulse 88   Temp 98 6 °F (37 °C)   Ht 5' 2 5" (1 588 m)   Wt (!) 152 kg (336 lb)   LMP 08/25/2020   BMI 60 48 kg/m²     Constitutional:normal, well developed, well nourished, alert, in no distress and non-toxic and no overt pain behavior   and obese  Eyes:anicteric  HEENT:grossly intact  Neck:supple, symmetric, trachea midline and no masses   Pulmonary:even and unlabored  Cardiovascular:No edema or pitting edema present  Skin:Normal without rashes or lesions and well hydrated  Psychiatric:Mood and affect appropriate  Neurologic:Cranial Nerves II-XII grossly intact  Musculoskeletal:normal    Imaging  MRI THORACIC SPINE WITHOUT CONTRAST     INDICATION: Neck pain and bilateral arm pain with numbness and tingling        COMPARISON:  None      TECHNIQUE:  Sagittal T1, sagittal T2, sagittal inversion recovery, axial T2,  axial 2D MERGE  Imaging performed on 1 5T MRI    IMAGE QUALITY: Motion degradation limits optimal assessment of the spinal canal and neural foramina      FINDINGS:     ALIGNMENT: Normal alignment of the thoracic spine  No compression fracture  No subluxation  No scoliosis      MARROW SIGNAL:  Normal marrow signal is identified within the visualized bony structures  No discrete marrow lesion      THORACIC CORD: Normal signal within the thoracic cord      PARAVERTEBRAL SOFT TISSUES:  Normal      THORACIC DEGENERATIVE CHANGE: No gross disc herniation, canal stenosis or foraminal narrowing  No significant thoracic degenerative changes      Lower cervical degenerative changes at C5-C6 and C6-C7      IMPRESSION:     Mild motion degradation      Unremarkable MRI of the thoracic spine      Lower cervical spondylosis, please see the separate MRI cervical spine study report of April 28, 2021  MRI CERVICAL SPINE WITHOUT CONTRAST     INDICATION: G89 4: Chronic pain syndrome  M54 6: Pain in thoracic spine  Upper back and mid back pain      COMPARISON:  None      TECHNIQUE:  Sagittal T1, sagittal T2, sagittal inversion recovery, axial T2, axial  gradient     IMAGE QUALITY:  Diagnostic     FINDINGS:     ALIGNMENT:  Normal alignment of the cervical spine  No compression fracture  No subluxation  No scoliosis      MARROW SIGNAL:  Normal marrow signal is identified within the visualized bony structures    No discrete marrow lesion      CERVICAL AND VISUALIZED THORACIC CORD:  Normal signal within the visualized cord      PREVERTEBRAL AND PARASPINAL SOFT TISSUES:  Normal      VISUALIZED POSTERIOR FOSSA:  The visualized posterior fossa demonstrates no abnormal signal      CERVICAL DISC SPACES:     C2-C3:  Normal      C3-C4:  Normal      C4-C5:  Normal      C5-C6:  Mild annular bulge  Minimal central stenosis  No significant foraminal narrowing      C6-C7:  Moderate loss of disc height  Right paramedian protrusion type disc herniation results in mild central stenosis  Small marginal osteophytes contribute to mild right foraminal narrowing      C7-T1:  Normal      UPPER THORACIC DISC SPACES:  Normal      IMPRESSION:     Degenerative changes identified resulting in mild central and right foraminal narrowing at C6-7  No cord compression or cord signal abnormality      THORACIC SPINE  INDICATION:   M54 12: Radiculopathy, cervical region  M54 9: Dorsalgia, unspecified  G89 4: Chronic pain syndrome  COMPARISON:  4/15/2013  VIEWS:  XR SPINE THORACIC 3 VW  FINDINGS:  There is no fracture or pathologic bone lesion  Alignment is unchanged from the prior study, with mild degree of thoracolumbar scoliosis  Mild degree of mid thoracic spine discogenic degenerative change, slightly worse than prior  There is no displacement of the paraspinal line  The pedicles appear intact  IMPRESSION:  Mild discogenic degenerative changes of the thoracic spine and mild thoracolumbar scoliosis  No compression fracture or other acute osseous abnormality identified      CERVICAL SPINE  INDICATION:   M54 12: Radiculopathy, cervical region  G89 4: Chronic pain syndrome  COMPARISON:  9/17/2017  VIEWS:  XR SPINE CERVICAL COMPLETE 4 OR 5 VW NON INJURY   FINDINGS:  No fracture  Normal alignment without subluxation  Degenerative disc disease at C6-C7 has slightly worsened since the prior study  Mild bilateral foraminal encroachment secondary to uncovertebral joint spurring at C6-C7    The prevertebral soft tissues are within normal limits  The lung apices are clear    IMPRESSION:  Degenerative disc disease at C6-C7 with mild worsening since previous examination

## 2022-03-29 ENCOUNTER — PREP FOR PROCEDURE (OUTPATIENT)
Dept: BARIATRICS | Facility: CLINIC | Age: 42
End: 2022-03-29

## 2022-03-29 DIAGNOSIS — E66.01 MORBID (SEVERE) OBESITY DUE TO EXCESS CALORIES (HCC): Primary | ICD-10-CM

## 2022-04-15 ENCOUNTER — OFFICE VISIT (OUTPATIENT)
Dept: CARDIOLOGY CLINIC | Facility: CLINIC | Age: 42
End: 2022-04-15
Payer: COMMERCIAL

## 2022-04-15 VITALS
DIASTOLIC BLOOD PRESSURE: 84 MMHG | SYSTOLIC BLOOD PRESSURE: 120 MMHG | HEIGHT: 63 IN | HEART RATE: 75 BPM | WEIGHT: 293 LBS | BODY MASS INDEX: 51.91 KG/M2

## 2022-04-15 DIAGNOSIS — E78.2 MIXED HYPERLIPIDEMIA: ICD-10-CM

## 2022-04-15 DIAGNOSIS — Z01.810 PREOP CARDIOVASCULAR EXAM: Primary | ICD-10-CM

## 2022-04-15 DIAGNOSIS — E66.01 OBESITY, CLASS III, BMI 40-49.9 (MORBID OBESITY) (HCC): ICD-10-CM

## 2022-04-15 PROCEDURE — 93000 ELECTROCARDIOGRAM COMPLETE: CPT | Performed by: NURSE PRACTITIONER

## 2022-04-15 PROCEDURE — 99243 OFF/OP CNSLTJ NEW/EST LOW 30: CPT | Performed by: NURSE PRACTITIONER

## 2022-04-15 NOTE — PROGRESS NOTES
Patient ID: Kate Thornton is a 42 y o  female  Plan:      Mixed hyperlipidemia  Triglyceride levels mildly elevated  Continue to monitor with PCP    Obesity, Class III, BMI 40-49 9 (morbid obesity) ([address] )  Planning for bariatric surgery    Preop cardiovascular exam  No cardiac contraindications to proceeding with surgery as planned  There is no indication for additional cardiac testing at this time  Follow up Plan/Summary Comments:  [address] is stable from a cardiac perspective to proceed with her planned bariatric surgery  No additional testing is needed at this time  Follow-up in our office on an as-needed basis  HPI: I had the please of meeting Zoltan Garcia in the office today at the request of Dr Cindi Tamayo for a preoperative cardiovascular exam     She denies any known prior cardiac history  [address] denies any chest pain, pressure, tightness, burning, palpitations  She is able to walk the equivalent of 4 city blocks without the need for rest or any symptoms noted above  She is also able to climb 2 flights of steps without the need for rest or any symptoms  She denies any dizziness, lightheadedness, syncope  She has a chronic left lower extremity edema which has remained stable  FH maternal aunt and cousin have CHF    SH current tobacco user, rare alcohol use, denies drug use      Review of Systems   10  point ROS  was otherwise non pertinent or negative except as per HPI or as below  Gait: Normal      Most recent or relevant cardiac/vascular testing:      Results for orders placed or performed in visit on 04/15/22   POCT ECG    Impression    NSR, 76           Objective:     /84   Pulse 75   Ht 5' 3" (1 6 m)   Wt (!) 154 kg (340 lb)   LMP 08/25/2020   BMI 60 23 kg/m²     PHYSICAL EXAM:    General:  Normal appearance, no acute distress  Eyes:  Anicteric  Oral mucosa:  Moist   Neck:  No JVD  Carotid upstrokes are brisk without bruits  No masses    Chest:  Clear to auscultation   Cardiac:  No palpable PMI  Normal S1 and S2  No murmur gallop or rub  Abdomen:  Obese  Soft and nontender  Extremities:  +1 LLE edema, chronic per pt edema  Musculoskeletal:  Symmetric  Vascular:  Pedal pulses are intact  Neuro:  Grossly symmetric  Psych:  Alert and oriented x3  Allergies   Allergen Reactions    Clindamycin/Lincomycin      Mouth ulcers severe  But, tolerates azithromycin      Levaquin [Levofloxacin] Throat Swelling     Facial swelling leading to throat swelling    Penicillins Anaphylaxis       Current Outpatient Medications:     acetaminophen (TYLENOL) 650 mg CR tablet, Take 1 tablet (650 mg total) by mouth every 8 (eight) hours as needed for mild pain, Disp: 30 tablet, Rfl: 0    ALPRAZolam (XANAX) 0 5 mg tablet, Take 1 tablet (0 5 mg total) by mouth 3 (three) times a day as needed for anxiety, Disp: 30 tablet, Rfl: 0    busPIRone (BUSPAR) 10 mg tablet, Take 1 tablet (10 mg total) by mouth 3 (three) times a day, Disp: 270 tablet, Rfl: 1    gabapentin (NEURONTIN) 600 MG tablet, Take 1 tablet (600 mg total) by mouth 4 (four) times a day, Disp: 120 tablet, Rfl: 5    PARoxetine (PAXIL) 20 mg tablet, Take 2 tablets (40 mg total) by mouth daily at bedtime, Disp: 90 tablet, Rfl: 1  Past Medical History:   Diagnosis Date    Kidney stone     Obesity 4/15/2013    Peripheral edema     Primary osteoarthritis involving multiple joints 2020    Sleep apnea     Wears dentures     Wears glasses      Past Surgical History:   Procedure Laterality Date    BACK SURGERY      lump removed near shoulder on right side     SECTION      x3, 2005,,     HYSTERECTOMY      MOUTH SURGERY      21 teeth removed    [place] CYSTOURETHROSCOPY N/A 10/8/2020    Procedure: CYSTOSCOPY;  Surgeon: Gt Vieira MD;  [facility];  Service: Gynecology    [place] LAPAROSCOPY W TOT HYSTERECTUTERUS <=250 [address]  W TUBE/OVARY N/A 10/8/2020    Procedure: ROBOTIC TOTAL HYSTERECTOMY; BILATERAL SALPINGECTOMY;  Surgeon: Aric Bernal MD;  [facility];  Service: Gynecology    TUBAL LIGATION  2012       CMP:   Lab Results   Component Value Date     10/05/2015    K 3 8 12/14/2021    K 4 1 10/05/2015     12/14/2021     10/05/2015    CO2 27 12/14/2021    CO2 25 8 10/05/2015    BUN 10 12/14/2021    BUN 13 10/05/2015    CREATININE 0 76 12/14/2021    CREATININE 0 84 10/05/2015    GLUCOSE 88 10/05/2015    EGFR 97 12/14/2021     Lipid Profile:    Lab Results   Component Value Date    CHOL 164 07/21/2015    TRIG 192 (H) 09/03/2021    TRIG 170 07/21/2015    HDL 22 (L) 09/03/2021    HDL 23 07/21/2015         Social History     Tobacco Use   Smoking Status Current Every Day Smoker    Packs/day: 0 25    Years: 30 00    Pack years: 7 50    Types: Cigarettes   Smokeless Tobacco Never Used

## 2022-04-15 NOTE — ASSESSMENT & PLAN NOTE
No cardiac contraindications to proceeding with surgery as planned  There is no indication for additional cardiac testing at this time

## 2022-04-26 NOTE — PROGRESS NOTES
Bariatric Nutrition Follow-Up Note    Type of surgery    Preop 6 month program: 5 of 6 today  Surgery Date: TBD- tentative 2022  Deadline 2022  Surgeon: Dr Leandro Maya  42 y o   female     Wt with BMI of 25: 139 3lbs  Pre-Op Excess Wt: 200 7lbs   Wt (!) 152 kg (335 lb 3 2 oz)   LMP 2020   BMI 59 38 kg/m²    2 2lb wt gain in the last month  Net 4 8lb wt loss since starting program in November  Pt now needs 12 2lb wt loss to schedule surgery and 29 2lb total wt loss by surgery date  Pre-Op Goal Weight:  5%=17#=Goal of 323# TO SCHEDULE SURGERY  10%=34#=Goal of 306# BY DAY OF SURGERY       [facility] Equation:     Weight maintenance= 2596 kcal/day  Estimated calories for weight loss 8314-9307 kcal/day  (1-2# per wk wt loss - sedentary)  Estimated protein needs 63 3-76 g/day (1 0-1 2 gms/kg IBW)   Estimated fluid needs 1157-6169 ml/day (30-35 ml/kg IBW)      Review of History and Medications   Past Medical History:   Diagnosis Date    Kidney stone     Obesity 4/15/2013    Peripheral edema     Primary osteoarthritis involving multiple joints 2020    Sleep apnea     Wears dentures     Wears glasses      Past Surgical History:   Procedure Laterality Date    BACK SURGERY      lump removed near shoulder on right side     SECTION      x3, 2005,,     HYSTERECTOMY      MOUTH SURGERY      21 teeth removed    [place] CYSTOURETHROSCOPY N/A 10/8/2020    Procedure: CYSTOSCOPY;  Surgeon: Dayna Rodriguez MD;  [facility];  Service: Gynecology    [address] <=250 [address]  W TUBE/OVARY N/A 10/8/2020    Procedure: ROBOTIC TOTAL HYSTERECTOMY; BILATERAL SALPINGECTOMY;  Surgeon: Dayna Rodriguez MD;  [facility];  Service: Gynecology    TUBAL LIGATION       Social History     Socioeconomic History    Marital status: /Civil Union     Spouse name: Not on file    Number of children: Not on file    Years of education: Not on file    Highest education level: Not on file   Occupational History    Occupation:    Tobacco Use    Smoking status: Current Every Day Smoker     Packs/day: 0 25     Years: 30 00     Pack years: 7 50     Types: Cigarettes    Smokeless tobacco: Never Used   Vaping Use    Vaping Use: Never used   Substance and Sexual Activity    Alcohol use: Not Currently     Comment: 3x per year will have 1-2 drinks    Drug use: No    Sexual activity: Not Currently   Other Topics Concern    Not on file   Social History Narrative    fhx not asked intriage    EMPLOYED     Social Determinants of Health     Financial Resource Strain: Not on file   Food Insecurity: Not on file   Transportation Needs: Not on file   Physical Activity: Not on file   Stress: Not on file   Social Connections: Not on file   Intimate Partner Violence: Not on file   Housing Stability: Not on file       Current Outpatient Medications:     acetaminophen (TYLENOL) 650 mg CR tablet, Take 1 tablet (650 mg total) by mouth every 8 (eight) hours as needed for mild pain, Disp: 30 tablet, Rfl: 0    ALPRAZolam (XANAX) 0 5 mg tablet, Take 1 tablet (0 5 mg total) by mouth 3 (three) times a day as needed for anxiety, Disp: 30 tablet, Rfl: 0    busPIRone (BUSPAR) 10 mg tablet, Take 1 tablet (10 mg total) by mouth 3 (three) times a day, Disp: 270 tablet, Rfl: 1    gabapentin (NEURONTIN) 600 MG tablet, Take 1 tablet (600 mg total) by mouth 4 (four) times a day, Disp: 120 tablet, Rfl: 5    PARoxetine (PAXIL) 20 mg tablet, Take 2 tablets (40 mg total) by mouth daily at bedtime, Disp: 90 tablet, Rfl: 1     Food Intake and Lifestyle Assessment   Food Intake Assessment completed via usual diet recall  Breakfast: protein drink, fruit: berries or oranges/apples/grapes  Lunch: protein drink   Fruit and vegetable  Dinner: 4-6pm:cooks:palm of hand sized chicken, carrots  Snack: ice cream  Drinks:  Mostly water, propel rodriguez  Protein supplement: Equate protein drink:  30g protein  Estimated protein intake per day: 30-60g  Estimated fluid intake per day: 24 oz water x 4-5 per day  Meals eaten away from home: 1-2 per month  Typical meal pattern: 2-3 meals per day and 0-1 snacks per day  Eating Behaviors: Consumption of high calorie/ high fat foods and Consumption of high calorie beverages  Pt reports she does well with the 30/60 rule when at work, but often finds herself sipping drinks with dinner at home  Pt reports there have been a lot of birthday parties recently and has been eating leftover ice cream   Food allergies or intolerances: NKFA  Was allergic to shellfish as a child, can eat small amounts now  Allergies   Allergen Reactions    Clindamycin/Lincomycin      Mouth ulcers severe  But, tolerates azithromycin   Levaquin [Levofloxacin] Throat Swelling     Facial swelling leading to throat swelling    Penicillins Anaphylaxis     Cultural or Yazdanism considerations: none noted    Physical Assessment  Physical Activity  Types of exercise: Walking most of her work day, house chores after work  Coaches softball spring and fall  Current physical limitations: broke bone behind her kneecap 2 years ago  Neck and back pain:  Bulging discs, scoliosis, herniated discs, following with pain management for about a year  Psychosocial Assessment   Support systems: spouse and children friend(s) relative(s)  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  Lives with her , 3 daughters, 3 stepson, brother-in-law, one friend and her two children  Another stepson comes over every other weekend  Pt reports that her  brings lots of snack foods and desserts into the house, which their children eat as well  Pt also reports that her family members do smoke in the house, but they have one closed off room in the house that they go in to smoke      Nutrition Diagnosis-Continued  Diagnosis: Overweight / Obesity (NC-3 3) and Excessive energy intake (NI-1 5)  Related to: Physical inactivity and Excessive energy intake  As Evidenced by: BMI >25, Excessive energy intake and Unintentional weight gain     Interventions and Teaching   Discussed pre-op and post-op nutrition guidelines  No current vitamins  Patient educated and handouts provided  Adequate hydration  Expected weight loss:  Reviewed pre-op weight loss goal   Weight loss plateaus/ possibility of weight regain  Exercise:  Reviewed walking plan in Ch 2 of manual with pt  Suggestions for pre-op diet  Nutrition considerations after surgery  Protein supplements:  Discussed using protein drink for breakfast daily  Provided samples and coupons for Ensure Max Protein  Dietary and lifestyle changes:  Discussed how to navigate holidays and family celebrations centered around food after her bariatric surgery  Discussed stress/comfort eating and thinking of healthy alternative coping strategies for after surgery  Possible problems with poor eating habits  Techniques for self monitoring and keeping daily food journal:  Instructed pt to [employer] ira and begin food logging  Education provided to: patient  Barriers to learning: No barriers identified  Readiness to change: action  Comprehension: needs reinforcement and verbalizes understanding   Expected Compliance: fair to good    Evaluation / Monitoring  Dietitian to Monitor: Eating pattern as discussed Tolerance of nutrition prescription Body weight Lab values Physical activity    Goals  Eliminate sugar sweetened beverages, Food journal, Exercise 30 minutes 5 times per week, Complete lession plans 1-6, Eat 3 meals per day and Eliminate mindless snacking   Protein drink for breakfast  In progress  Continue to practice 30/60 rule  In progress  Start food journaling not done  Start walking plan not done  Have [address] done by next month      This session goals:  Get trigger foods out of the house  Quit smoking  Remaining Workflow:   Bloodwork:   o Lipids, TSH drawn 9/3/21  Good until 9/3/22   o CBC+CMP drawn 12/14/21  Good until 6/14/22   Weight Loss: 5%=17#=Goal of 323# TO SCHEDULE SURGERY  10%=34#=Goal of 306# BY DAY OF SURGERY   Nicotine test: Ordered nicotine test for May 2022  Pt is still currently smoking 7 cigarettes per day  Discussed with pt that this can delay her surgery   6 Month Pre-Operative Program: 5 of 6 today   6 of 6 scheduled for 5/18/22 with SW    EGD scheduled for 5/25    Time Spent:   30 minutes

## 2022-04-27 ENCOUNTER — OFFICE VISIT (OUTPATIENT)
Dept: BARIATRICS | Facility: CLINIC | Age: 42
End: 2022-04-27

## 2022-04-27 DIAGNOSIS — E66.01 MORBID (SEVERE) OBESITY DUE TO EXCESS CALORIES (HCC): Primary | ICD-10-CM

## 2022-04-27 PROCEDURE — RECHECK: Performed by: DIETITIAN, REGISTERED

## 2022-04-28 VITALS — BODY MASS INDEX: 59.38 KG/M2 | WEIGHT: 293 LBS

## 2022-05-03 ENCOUNTER — APPOINTMENT (EMERGENCY)
Dept: CT IMAGING | Facility: HOSPITAL | Age: 42
End: 2022-05-03
Payer: COMMERCIAL

## 2022-05-03 ENCOUNTER — HOSPITAL ENCOUNTER (EMERGENCY)
Facility: HOSPITAL | Age: 42
Discharge: HOME | End: 2022-05-03
Attending: EMERGENCY MEDICINE
Payer: COMMERCIAL

## 2022-05-03 VITALS
BODY MASS INDEX: 59.34 KG/M2 | DIASTOLIC BLOOD PRESSURE: 90 MMHG | SYSTOLIC BLOOD PRESSURE: 135 MMHG | RESPIRATION RATE: 18 BRPM | OXYGEN SATURATION: 98 % | WEIGHT: 293 LBS | TEMPERATURE: 98.3 F | HEART RATE: 85 BPM

## 2022-05-03 DIAGNOSIS — R10.84 GENERALIZED ABDOMINAL PAIN: Primary | ICD-10-CM

## 2022-05-03 LAB
ALBUMIN SERPL BCP-MCNC: 4.2 G/DL (ref 3.5–5)
ALP SERPL-CCNC: 70 U/L (ref 34–104)
ALT SERPL W P-5'-P-CCNC: 17 U/L (ref 7–52)
ANION GAP SERPL CALCULATED.3IONS-SCNC: 7 MMOL/L (ref 4–13)
AST SERPL W P-5'-P-CCNC: 9 U/L (ref 13–39)
BASOPHILS # BLD AUTO: 0.02 THOUSANDS/ΜL (ref 0–0.1)
BASOPHILS NFR BLD AUTO: 0 % (ref 0–1)
BILIRUB SERPL-MCNC: 0.63 MG/DL (ref 0.2–1)
BILIRUB UR QL STRIP: NEGATIVE
BUN SERPL-MCNC: 13 MG/DL (ref 5–25)
CALCIUM SERPL-MCNC: 9.2 MG/DL (ref 8.4–10.2)
CHLORIDE SERPL-SCNC: 102 MMOL/L (ref 96–108)
CLARITY UR: CLEAR
CO2 SERPL-SCNC: 29 MMOL/L (ref 21–32)
COLOR UR: YELLOW
CREAT SERPL-MCNC: 0.82 MG/DL (ref 0.6–1.3)
EOSINOPHIL # BLD AUTO: 0.1 THOUSAND/ΜL (ref 0–0.61)
EOSINOPHIL NFR BLD AUTO: 1 % (ref 0–6)
ERYTHROCYTE [DISTWIDTH] IN BLOOD BY AUTOMATED COUNT: 14.3 % (ref 11.6–15.1)
EXT PREG TEST URINE: NEGATIVE
EXT. CONTROL ED NAV: NORMAL
GFR SERPL CREATININE-BSD FRML MDRD: 88 ML/MIN/1.73SQ M
GLUCOSE SERPL-MCNC: 93 MG/DL (ref 65–140)
GLUCOSE UR STRIP-MCNC: NEGATIVE MG/DL
HCT VFR BLD AUTO: 47.1 % (ref 34.8–46.1)
HGB BLD-MCNC: 15.2 G/DL (ref 11.5–15.4)
HGB UR QL STRIP.AUTO: NEGATIVE
IMM GRANULOCYTES # BLD AUTO: 0.02 THOUSAND/UL (ref 0–0.2)
IMM GRANULOCYTES NFR BLD AUTO: 0 % (ref 0–2)
KETONES UR STRIP-MCNC: NEGATIVE MG/DL
LEUKOCYTE ESTERASE UR QL STRIP: NEGATIVE
LIPASE SERPL-CCNC: 15 U/L (ref 11–82)
LYMPHOCYTES # BLD AUTO: 0.96 THOUSANDS/ΜL (ref 0.6–4.47)
LYMPHOCYTES NFR BLD AUTO: 14 % (ref 14–44)
MCH RBC QN AUTO: 30.6 PG (ref 26.8–34.3)
MCHC RBC AUTO-ENTMCNC: 32.3 G/DL (ref 31.4–37.4)
MCV RBC AUTO: 95 FL (ref 82–98)
MONOCYTES # BLD AUTO: 0.5 THOUSAND/ΜL (ref 0.17–1.22)
MONOCYTES NFR BLD AUTO: 7 % (ref 4–12)
NEUTROPHILS # BLD AUTO: 5.42 THOUSANDS/ΜL (ref 1.85–7.62)
NEUTS SEG NFR BLD AUTO: 78 % (ref 43–75)
NITRITE UR QL STRIP: NEGATIVE
NRBC BLD AUTO-RTO: 0 /100 WBCS
PH UR STRIP.AUTO: 6 [PH]
PLATELET # BLD AUTO: 245 THOUSANDS/UL (ref 149–390)
PMV BLD AUTO: 11.2 FL (ref 8.9–12.7)
POTASSIUM SERPL-SCNC: 4.3 MMOL/L (ref 3.5–5.3)
PROT SERPL-MCNC: 7.1 G/DL (ref 6.4–8.4)
PROT UR STRIP-MCNC: NEGATIVE MG/DL
RBC # BLD AUTO: 4.96 MILLION/UL (ref 3.81–5.12)
SODIUM SERPL-SCNC: 138 MMOL/L (ref 135–147)
SP GR UR STRIP.AUTO: >=1.03 (ref 1–1.03)
UROBILINOGEN UR QL STRIP.AUTO: 0.2 E.U./DL
WBC # BLD AUTO: 7.02 THOUSAND/UL (ref 4.31–10.16)

## 2022-05-03 PROCEDURE — 85025 COMPLETE CBC W/AUTO DIFF WBC: CPT | Performed by: EMERGENCY MEDICINE

## 2022-05-03 PROCEDURE — 83690 ASSAY OF LIPASE: CPT | Performed by: EMERGENCY MEDICINE

## 2022-05-03 PROCEDURE — 96361 HYDRATE IV INFUSION ADD-ON: CPT

## 2022-05-03 PROCEDURE — 80053 COMPREHEN METABOLIC PANEL: CPT | Performed by: EMERGENCY MEDICINE

## 2022-05-03 PROCEDURE — 74177 CT ABD & PELVIS W/CONTRAST: CPT

## 2022-05-03 PROCEDURE — 36415 COLL VENOUS BLD VENIPUNCTURE: CPT | Performed by: EMERGENCY MEDICINE

## 2022-05-03 PROCEDURE — G1004 CDSM NDSC: HCPCS

## 2022-05-03 PROCEDURE — 81003 URINALYSIS AUTO W/O SCOPE: CPT | Performed by: EMERGENCY MEDICINE

## 2022-05-03 PROCEDURE — 96375 TX/PRO/DX INJ NEW DRUG ADDON: CPT

## 2022-05-03 PROCEDURE — 99284 EMERGENCY DEPT VISIT MOD MDM: CPT

## 2022-05-03 PROCEDURE — 96374 THER/PROPH/DIAG INJ IV PUSH: CPT

## 2022-05-03 PROCEDURE — 99284 EMERGENCY DEPT VISIT MOD MDM: CPT | Performed by: EMERGENCY MEDICINE

## 2022-05-03 PROCEDURE — 81025 URINE PREGNANCY TEST: CPT | Performed by: EMERGENCY MEDICINE

## 2022-05-03 RX ORDER — ONDANSETRON 2 MG/ML
4 INJECTION INTRAMUSCULAR; INTRAVENOUS ONCE
Status: COMPLETED | OUTPATIENT
Start: 2022-05-03 | End: 2022-05-03

## 2022-05-03 RX ORDER — MAGNESIUM HYDROXIDE/ALUMINUM HYDROXICE/SIMETHICONE 120; 1200; 1200 MG/30ML; MG/30ML; MG/30ML
30 SUSPENSION ORAL ONCE
Status: COMPLETED | OUTPATIENT
Start: 2022-05-03 | End: 2022-05-03

## 2022-05-03 RX ORDER — KETOROLAC TROMETHAMINE 30 MG/ML
15 INJECTION, SOLUTION INTRAMUSCULAR; INTRAVENOUS ONCE
Status: COMPLETED | OUTPATIENT
Start: 2022-05-03 | End: 2022-05-03

## 2022-05-03 RX ORDER — SUCRALFATE ORAL 1 G/10ML
1 SUSPENSION ORAL 4 TIMES DAILY PRN
Qty: 414 ML | Refills: 0 | Status: SHIPPED | OUTPATIENT
Start: 2022-05-03

## 2022-05-03 RX ORDER — LIDOCAINE HYDROCHLORIDE 20 MG/ML
15 SOLUTION OROPHARYNGEAL ONCE
Status: COMPLETED | OUTPATIENT
Start: 2022-05-03 | End: 2022-05-03

## 2022-05-03 RX ADMIN — SODIUM CHLORIDE 1000 ML: 0.9 INJECTION, SOLUTION INTRAVENOUS at 13:15

## 2022-05-03 RX ADMIN — LIDOCAINE HYDROCHLORIDE 15 ML: 20 SOLUTION ORAL; TOPICAL at 14:46

## 2022-05-03 RX ADMIN — KETOROLAC TROMETHAMINE 15 MG: 30 INJECTION, SOLUTION INTRAMUSCULAR at 13:31

## 2022-05-03 RX ADMIN — IOHEXOL 100 ML: 350 INJECTION, SOLUTION INTRAVENOUS at 13:53

## 2022-05-03 RX ADMIN — ALUMINUM HYDROXIDE, MAGNESIUM HYDROXIDE, AND SIMETHICONE 30 ML: 200; 200; 20 SUSPENSION ORAL at 14:45

## 2022-05-03 RX ADMIN — ONDANSETRON 4 MG: 2 INJECTION INTRAMUSCULAR; INTRAVENOUS at 13:31

## 2022-05-03 NOTE — H&P (VIEW-ONLY)
History  Chief Complaint   Patient presents with    Abdominal Pain     After dinner last evening patient began with left sided abdominal pain with nausea and diarrhea  69-year-old female with history of obesity, kidney stones, presenting for evaluation of left-sided abdominal pain  Patient says the symptoms have been present since yesterday after eating  Describes as a sharp stabbing in the left upper but denies any vomiting  Denies any fevers, chills, lower abdominal pain  Admits to some loose nonbloody stools  Denies any fevers or chills  Denies any chest pain or shortness of breath  Vitals are within normal limits  Prior to Admission Medications   Prescriptions Last Dose Informant Patient Reported? Taking?    ALPRAZolam (XANAX) 0 5 mg tablet   No No   Sig: Take 1 tablet (0 5 mg total) by mouth 3 (three) times a day as needed for anxiety   PARoxetine (PAXIL) 20 mg tablet   No No   Sig: Take 2 tablets (40 mg total) by mouth daily at bedtime   acetaminophen (TYLENOL) 650 mg CR tablet   No No   Sig: Take 1 tablet (650 mg total) by mouth every 8 (eight) hours as needed for mild pain   busPIRone (BUSPAR) 10 mg tablet   No No   Sig: Take 1 tablet (10 mg total) by mouth 3 (three) times a day   gabapentin (NEURONTIN) 600 MG tablet   No No   Sig: Take 1 tablet (600 mg total) by mouth 4 (four) times a day      Facility-Administered Medications: None       Past Medical History:   Diagnosis Date    Kidney stone     Obesity 4/15/2013    Peripheral edema     Primary osteoarthritis involving multiple joints 2020    Sleep apnea     Wears dentures     Wears glasses        Past Surgical History:   Procedure Laterality Date    BACK SURGERY      lump removed near shoulder on right side     SECTION      x3, 2005,,     HYSTERECTOMY      MOUTH SURGERY      21 teeth removed    NH CYSTOURETHROSCOPY N/A 10/8/2020    Procedure: CYSTOSCOPY;  Surgeon: Bart Strauss MD;  Location: [facility];  Service: Gynecology    OK LAPAROSCOPY W TOT HYSTERECTUTERUS <=250 Nap[address] Hoots TUBE/OVARY N/A 10/8/2020    Procedure: ROBOTIC TOTAL HYSTERECTOMY; BILATERAL SALPINGECTOMY;  Surgeon: Tigre Clay MD;  Location[facility];  Service: Gynecology    TUBAL LIGATION  2012       Family History   Problem Relation Age of Onset    Hypertension Mother     Irritable bowel syndrome Mother     Diverticulitis Mother     Depression Mother     Heart murmur Mother     Obesity Mother     Hypertension Father     Spina bifida Father     Obesity Father     Multiple sclerosis Brother     Lung disease Brother     COPD Family     Emphysema Family     No Known Problems Daughter     Stroke Maternal Grandmother     Obesity Paternal Grandmother     Stroke Paternal Grandmother     No Known Problems Daughter     No Known Problems Daughter     No Known Problems Maternal Aunt     No Known Problems Maternal Aunt      I have reviewed and agree with the history as documented  E-Cigarette/Vaping    E-Cigarette Use Never User      E-Cigarette/Vaping Substances    Nicotine No     THC No     CBD No     Flavoring No     Other No     Unknown No      Social History     Tobacco Use    Smoking status: Current Every Day Smoker     Packs/day: 0 25     Years: 30 00     Pack years: 7 50     Types: Cigarettes    Smokeless tobacco: Never Used   Vaping Use    Vaping Use: Never used   Substance Use Topics    Alcohol use: Not Currently     Comment: 3x per year will have 1-2 drinks    Drug use: No       Review of Systems   Constitutional: Negative for chills, diaphoresis and fever  HENT: Negative for congestion, sinus pressure, sore throat and trouble swallowing  Eyes: Negative for pain, discharge and itching  Respiratory: Negative for cough, chest tightness, shortness of breath and wheezing  Cardiovascular: Negative for chest pain, palpitations and leg swelling     Gastrointestinal: Positive for abdominal pain and nausea  Negative for abdominal distention, blood in stool, diarrhea and vomiting  Endocrine: Negative for polyphagia and polyuria  Genitourinary: Negative for difficulty urinating, dysuria, flank pain, hematuria, pelvic pain and vaginal bleeding  Musculoskeletal: Negative for arthralgias and back pain  Skin: Negative for rash  Neurological: Negative for dizziness, syncope, weakness, light-headedness and headaches  Physical Exam  Physical Exam  Vitals and nursing note reviewed  Constitutional:       General: She is not in acute distress  Appearance: She is well-developed  HENT:      Head: Normocephalic and atraumatic  Right Ear: External ear normal       Left Ear: External ear normal       Nose: Nose normal       Mouth/Throat:      Mouth: Mucous membranes are moist       Pharynx: No oropharyngeal exudate  Eyes:      Conjunctiva/sclera: Conjunctivae normal       Pupils: Pupils are equal, round, and reactive to light  Cardiovascular:      Rate and Rhythm: Normal rate and regular rhythm  Heart sounds: Normal heart sounds  No murmur heard  No friction rub  No gallop  Pulmonary:      Effort: Pulmonary effort is normal  No respiratory distress  Breath sounds: Normal breath sounds  No wheezing or rales  Abdominal:      General: There is no distension  Palpations: Abdomen is soft  Tenderness: There is generalized abdominal tenderness and tenderness in the left upper quadrant  There is no guarding  Musculoskeletal:         General: No swelling, tenderness or deformity  Normal range of motion  Cervical back: Normal range of motion and neck supple  Lymphadenopathy:      Cervical: No cervical adenopathy  Skin:     General: Skin is warm and dry  Neurological:      General: No focal deficit present  Mental Status: She is alert and oriented to person, place, and time  Mental status is at baseline  Cranial Nerves: No cranial nerve deficit  Sensory: No sensory deficit  Motor: No weakness or abnormal muscle tone        Coordination: Coordination normal          Vital Signs  ED Triage Vitals [05/03/22 1210]   Temperature Pulse Respirations Blood Pressure SpO2   99 4 °F (37 4 °C) 86 18 139/92 97 %      Temp Source Heart Rate Source Patient Position - Orthostatic VS BP Location FiO2 (%)   Tympanic Monitor Sitting Left arm --      Pain Score       8           Vitals:    05/03/22 1210 05/03/22 1517   BP: 139/92 135/90   Pulse: 86 85   Patient Position - Orthostatic VS: Sitting Sitting         Visual Acuity      ED Medications  Medications   sodium chloride 0 9 % bolus 1,000 mL (0 mL Intravenous Stopped 5/3/22 1445)   ketorolac (TORADOL) injection 15 mg (15 mg Intravenous Given 5/3/22 1331)   ondansetron (ZOFRAN) injection 4 mg (4 mg Intravenous Given 5/3/22 1331)   iohexol (OMNIPAQUE) 350 MG/ML injection (SINGLE-DOSE) 100 mL (100 mL Intravenous Given 5/3/22 1353)   Lidocaine Viscous HCl (XYLOCAINE) 2 % mucosal solution 15 mL (15 mL Swish & Swallow Given 5/3/22 1446)   aluminum-magnesium hydroxide-simethicone (MYLANTA) oral suspension 30 mL (30 mL Oral Given 5/3/22 1445)       Diagnostic Studies  Results Reviewed     Procedure Component Value Units Date/Time    Lipase [[id number]]  (Normal) Collected: 05/03/22 1314    Lab Status: Final result Specimen: Blood from Arm, Right Updated: 05/03/22 1342     Lipase 15 u/L     Comprehensive metabolic panel [[id number]]  (Abnormal) Collected: 05/03/22 1314    Lab Status: Final result Specimen: Blood from Arm, Right Updated: 05/03/22 1342     Sodium 138 mmol/L      Potassium 4 3 mmol/L      Chloride 102 mmol/L      CO2 29 mmol/L      ANION GAP 7 mmol/L      BUN 13 mg/dL      Creatinine 0 82 mg/dL      Glucose 93 mg/dL      Calcium 9 2 mg/dL      AST 9 U/L      ALT 17 U/L      Alkaline Phosphatase 70 U/L      Total Protein 7 1 g/dL      Albumin 4 2 g/dL      Total Bilirubin 0 63 mg/dL      eGFR 88 ml/min/1 73sq m Narrative:      National Kidney Disease Foundation guidelines for Chronic Kidney Disease (CKD):     Stage 1 with normal or high GFR (GFR > 90 mL/min/1 73 square meters)    Stage 2 Mild CKD (GFR = 60-89 mL/min/1 73 square meters)    Stage 3A Moderate CKD (GFR = 45-59 mL/min/1 73 square meters)    Stage 3B Moderate CKD (GFR = 30-44 mL/min/1 73 square meters)    Stage 4 Severe CKD (GFR = 15-29 mL/min/1 73 square meters)    Stage 5 End Stage CKD (GFR <15 mL/min/1 73 square meters)  Note: GFR calculation is accurate only with a steady state creatinine    POCT pregnancy, urine [[id number]]  (Normal) Resulted: 05/03/22 1328    Lab Status: Final result Updated: 05/03/22 1328     EXT PREG TEST UR (Ref: Negative) Negative     Control Valid    CBC and differential [[id number]]  (Abnormal) Collected: 05/03/22 1314    Lab Status: Final result Specimen: Blood from Arm, Right Updated: 05/03/22 1326     WBC 7 02 Thousand/uL      RBC 4 96 Million/uL      Hemoglobin 15 2 g/dL      Hematocrit 47 1 %      MCV 95 fL      MCH 30 6 pg      MCHC 32 3 g/dL      RDW 14 3 %      MPV 11 2 fL      Platelets 974 Thousands/uL      nRBC 0 /100 WBCs      Neutrophils Relative 78 %      Immat GRANS % 0 %      Lymphocytes Relative 14 %      Monocytes Relative 7 %      Eosinophils Relative 1 %      Basophils Relative 0 %      Neutrophils Absolute 5 42 Thousands/µL      Immature Grans Absolute 0 02 Thousand/uL      Lymphocytes Absolute 0 96 Thousands/µL      Monocytes Absolute 0 50 Thousand/µL      Eosinophils Absolute 0 10 Thousand/µL      Basophils Absolute 0 02 Thousands/µL     UA (URINE) with reflex to Scope [[id number]]  (Abnormal) Collected: 05/03/22 1314    Lab Status: Final result Specimen: Urine, Clean Catch Updated: 05/03/22 1325     Color, UA Yellow     Clarity, UA Clear     Specific Gravity, UA >=1 030     pH, UA 6 0     Leukocytes, UA Negative     Nitrite, UA Negative     Protein, UA Negative mg/dl      Glucose, UA Negative mg/dl Ketones, UA Negative mg/dl      Urobilinogen, UA 0 2 E U /dl      Bilirubin, UA Negative     Blood, UA Negative                 CT abdomen pelvis with contrast   Final Result by Bernarda Mendez MD (05/03 1425)      No acute inflammatory changes in the abdomen or pelvis  Workstation performed: [id number]                    Procedures  Procedures         ED Course                                             MDM  Number of Diagnoses or Management Options  Generalized abdominal pain  Diagnosis management comments: 19-year-old female presenting for evaluation of generalized and left-sided abdominal pain  Present since yesterday  Sharp stabbing in nature  Admits to nausea denies any vomiting, fevers or chills  Admits to loose nonbloody stools  Has diffuse tenderness on exam   Will obtain belly labs, CT abdomen pelvis with contrast   Will give Toradol and GI cocktail  Patient symptoms mildly improved after medications  Imaging and lab work within normal limits  Patient given GI referral       Disposition  Final diagnoses:   Generalized abdominal pain     Time reflects when diagnosis was documented in both MDM as applicable and the Disposition within this note     Time User Action Codes Description Comment    5/3/2022  2:38 PM Tong Rodriguez Add [R10 [id number]] Generalized abdominal pain       ED Disposition     ED Disposition Condition Date/Time Comment    Discharge Stable Tue May 3, 2022  2:38 PM Jacob 67 discharge to home/self care              Follow-up Information     Follow up With Specialties Details Why Contact Info Additional Information    Ángel Rouse DO Internal Medicine Schedule an appointment as soon as possible for a visit  For follow up of smptoms Pl  [place] [address] [facility] [address] [facility] Schedule an appointment as soon as possible for a visit  For follow up of symptoms [address] [place] [address] [address] [id number] [phone] [address] [facility] [place], [address] [facility], [address] [place], [place], [address], [phone]          Discharge Medication List as of 5/3/2022  3:13 PM      START taking these medications    Details   sucralfate (CARAFATE) 1 g/10 mL suspension Take 10 mL (1 g total) by mouth 4 (four) times a day as needed (epgiastric pain), Starting Tue 5/3/2022, Normal         CONTINUE these medications which have NOT CHANGED    Details   acetaminophen (TYLENOL) 650 mg CR tablet Take 1 tablet (650 mg total) by mouth every 8 (eight) hours as needed for mild pain, Starting Thu 10/8/2020, Normal      ALPRAZolam (XANAX) 0 5 mg tablet Take 1 tablet (0 5 mg total) by mouth 3 (three) times a day as needed for anxiety, Starting Wed 12/15/2021, Normal      busPIRone (BUSPAR) 10 mg tablet Take 1 tablet (10 mg total) by mouth 3 (three) times a day, Starting Mon 2/14/2022, Normal      gabapentin (NEURONTIN) 600 MG tablet Take 1 tablet (600 mg total) by mouth 4 (four) times a day, Starting Fri 3/25/2022, Normal      PARoxetine (PAXIL) 20 mg tablet Take 2 tablets (40 mg total) by mouth daily at bedtime, Starting Mon 2/14/2022, Normal             No discharge procedures on file      PDMP Review       Value Time User    PDMP Reviewed  Yes 12/15/2021  9:56 AM Noam Sandoval DO          ED Provider  Electronically Signed by           Hannah Singh DO  05/03/22 5719

## 2022-05-03 NOTE — ED PROVIDER NOTES
History  Chief Complaint   Patient presents with    Abdominal Pain     After dinner last evening patient began with left sided abdominal pain with nausea and diarrhea  76-year-old female with history of obesity, kidney stones, presenting for evaluation of left-sided abdominal pain  Patient says the symptoms have been present since yesterday after eating  Describes as a sharp stabbing in the left upper but denies any vomiting  Denies any fevers, chills, lower abdominal pain  Admits to some loose nonbloody stools  Denies any fevers or chills  Denies any chest pain or shortness of breath  Vitals are within normal limits  Prior to Admission Medications   Prescriptions Last Dose Informant Patient Reported? Taking?    ALPRAZolam (XANAX) 0 5 mg tablet   No No   Sig: Take 1 tablet (0 5 mg total) by mouth 3 (three) times a day as needed for anxiety   PARoxetine (PAXIL) 20 mg tablet   No No   Sig: Take 2 tablets (40 mg total) by mouth daily at bedtime   acetaminophen (TYLENOL) 650 mg CR tablet   No No   Sig: Take 1 tablet (650 mg total) by mouth every 8 (eight) hours as needed for mild pain   busPIRone (BUSPAR) 10 mg tablet   No No   Sig: Take 1 tablet (10 mg total) by mouth 3 (three) times a day   gabapentin (NEURONTIN) 600 MG tablet   No No   Sig: Take 1 tablet (600 mg total) by mouth 4 (four) times a day      Facility-Administered Medications: None       Past Medical History:   Diagnosis Date    Kidney stone     Obesity 4/15/2013    Peripheral edema     Primary osteoarthritis involving multiple joints 2020    Sleep apnea     Wears dentures     Wears glasses        Past Surgical History:   Procedure Laterality Date    BACK SURGERY      lump removed near shoulder on right side     SECTION      x3, 2005,,     HYSTERECTOMY      MOUTH SURGERY      21 teeth removed    AR CYSTOURETHROSCOPY N/A 10/8/2020    Procedure: CYSTOSCOPY;  Surgeon: Pankaj Couch MD;  Location: [facility];  Service: Gynecology    OH LAPAROSCOPY W TOT HYSTERECTUTERUS <=250 Nikkie Lozano TUBE/OVARY N/A 10/8/2020    Procedure: ROBOTIC TOTAL HYSTERECTOMY; BILATERAL SALPINGECTOMY;  Surgeon: Aric Bernal MD;  Location[facility];  Service: Gynecology    TUBAL LIGATION  2012       Family History   Problem Relation Age of Onset    Hypertension Mother     Irritable bowel syndrome Mother     Diverticulitis Mother     Depression Mother     Heart murmur Mother     Obesity Mother     Hypertension Father     Spina bifida Father     Obesity Father     Multiple sclerosis Brother     Lung disease Brother     COPD Family     Emphysema Family     No Known Problems Daughter     Stroke Maternal Grandmother     Obesity Paternal Grandmother     Stroke Paternal Grandmother     No Known Problems Daughter     No Known Problems Daughter     No Known Problems Maternal Aunt     No Known Problems Maternal Aunt      I have reviewed and agree with the history as documented  E-Cigarette/Vaping    E-Cigarette Use Never User      E-Cigarette/Vaping Substances    Nicotine No     THC No     CBD No     Flavoring No     Other No     Unknown No      Social History     Tobacco Use    Smoking status: Current Every Day Smoker     Packs/day: 0 25     Years: 30 00     Pack years: 7 50     Types: Cigarettes    Smokeless tobacco: Never Used   Vaping Use    Vaping Use: Never used   Substance Use Topics    Alcohol use: Not Currently     Comment: 3x per year will have 1-2 drinks    Drug use: No       Review of Systems   Constitutional: Negative for chills, diaphoresis and fever  HENT: Negative for congestion, sinus pressure, sore throat and trouble swallowing  Eyes: Negative for pain, discharge and itching  Respiratory: Negative for cough, chest tightness, shortness of breath and wheezing  Cardiovascular: Negative for chest pain, palpitations and leg swelling     Gastrointestinal: Positive for abdominal pain and nausea  Negative for abdominal distention, blood in stool, diarrhea and vomiting  Endocrine: Negative for polyphagia and polyuria  Genitourinary: Negative for difficulty urinating, dysuria, flank pain, hematuria, pelvic pain and vaginal bleeding  Musculoskeletal: Negative for arthralgias and back pain  Skin: Negative for rash  Neurological: Negative for dizziness, syncope, weakness, light-headedness and headaches  Physical Exam  Physical Exam  Vitals and nursing note reviewed  Constitutional:       General: She is not in acute distress  Appearance: She is well-developed  HENT:      Head: Normocephalic and atraumatic  Right Ear: External ear normal       Left Ear: External ear normal       Nose: Nose normal       Mouth/Throat:      Mouth: Mucous membranes are moist       Pharynx: No oropharyngeal exudate  Eyes:      Conjunctiva/sclera: Conjunctivae normal       Pupils: Pupils are equal, round, and reactive to light  Cardiovascular:      Rate and Rhythm: Normal rate and regular rhythm  Heart sounds: Normal heart sounds  No murmur heard  No friction rub  No gallop  Pulmonary:      Effort: Pulmonary effort is normal  No respiratory distress  Breath sounds: Normal breath sounds  No wheezing or rales  Abdominal:      General: There is no distension  Palpations: Abdomen is soft  Tenderness: There is generalized abdominal tenderness and tenderness in the left upper quadrant  There is no guarding  Musculoskeletal:         General: No swelling, tenderness or deformity  Normal range of motion  Cervical back: Normal range of motion and neck supple  Lymphadenopathy:      Cervical: No cervical adenopathy  Skin:     General: Skin is warm and dry  Neurological:      General: No focal deficit present  Mental Status: She is alert and oriented to person, place, and time  Mental status is at baseline  Cranial Nerves: No cranial nerve deficit  Sensory: No sensory deficit  Motor: No weakness or abnormal muscle tone        Coordination: Coordination normal          Vital Signs  ED Triage Vitals [05/03/22 1210]   Temperature Pulse Respirations Blood Pressure SpO2   99 4 °F (37 4 °C) 86 18 139/92 97 %      Temp Source Heart Rate Source Patient Position - Orthostatic VS BP Location FiO2 (%)   Tympanic Monitor Sitting Left arm --      Pain Score       8           Vitals:    05/03/22 1210 05/03/22 1517   BP: 139/92 135/90   Pulse: 86 85   Patient Position - Orthostatic VS: Sitting Sitting         Visual Acuity      ED Medications  Medications   sodium chloride 0 9 % bolus 1,000 mL (0 mL Intravenous Stopped 5/3/22 1445)   ketorolac (TORADOL) injection 15 mg (15 mg Intravenous Given 5/3/22 1331)   ondansetron (ZOFRAN) injection 4 mg (4 mg Intravenous Given 5/3/22 1331)   iohexol (OMNIPAQUE) 350 MG/ML injection (SINGLE-DOSE) 100 mL (100 mL Intravenous Given 5/3/22 1353)   Lidocaine Viscous HCl (XYLOCAINE) 2 % mucosal solution 15 mL (15 mL Swish & Swallow Given 5/3/22 1446)   aluminum-magnesium hydroxide-simethicone (MYLANTA) oral suspension 30 mL (30 mL Oral Given 5/3/22 1445)       Diagnostic Studies  Results Reviewed     Procedure Component Value Units Date/Time    Lipase [[id number]]  (Normal) Collected: 05/03/22 1314    Lab Status: Final result Specimen: Blood from Arm, Right Updated: 05/03/22 1342     Lipase 15 u/L     Comprehensive metabolic panel [[id number]]  (Abnormal) Collected: 05/03/22 1314    Lab Status: Final result Specimen: Blood from Arm, Right Updated: 05/03/22 1342     Sodium 138 mmol/L      Potassium 4 3 mmol/L      Chloride 102 mmol/L      CO2 29 mmol/L      ANION GAP 7 mmol/L      BUN 13 mg/dL      Creatinine 0 82 mg/dL      Glucose 93 mg/dL      Calcium 9 2 mg/dL      AST 9 U/L      ALT 17 U/L      Alkaline Phosphatase 70 U/L      Total Protein 7 1 g/dL      Albumin 4 2 g/dL      Total Bilirubin 0 63 mg/dL      eGFR 88 ml/min/1 73sq m Narrative:      National Kidney Disease Foundation guidelines for Chronic Kidney Disease (CKD):     Stage 1 with normal or high GFR (GFR > 90 mL/min/1 73 square meters)    Stage 2 Mild CKD (GFR = 60-89 mL/min/1 73 square meters)    Stage 3A Moderate CKD (GFR = 45-59 mL/min/1 73 square meters)    Stage 3B Moderate CKD (GFR = 30-44 mL/min/1 73 square meters)    Stage 4 Severe CKD (GFR = 15-29 mL/min/1 73 square meters)    Stage 5 End Stage CKD (GFR <15 mL/min/1 73 square meters)  Note: GFR calculation is accurate only with a steady state creatinine    POCT pregnancy, urine [[id number]]  (Normal) Resulted: 05/03/22 1328    Lab Status: Final result Updated: 05/03/22 1328     EXT PREG TEST UR (Ref: Negative) Negative     Control Valid    CBC and differential [[id number]]  (Abnormal) Collected: 05/03/22 1314    Lab Status: Final result Specimen: Blood from Arm, Right Updated: 05/03/22 1326     WBC 7 02 Thousand/uL      RBC 4 96 Million/uL      Hemoglobin 15 2 g/dL      Hematocrit 47 1 %      MCV 95 fL      MCH 30 6 pg      MCHC 32 3 g/dL      RDW 14 3 %      MPV 11 2 fL      Platelets 124 Thousands/uL      nRBC 0 /100 WBCs      Neutrophils Relative 78 %      Immat GRANS % 0 %      Lymphocytes Relative 14 %      Monocytes Relative 7 %      Eosinophils Relative 1 %      Basophils Relative 0 %      Neutrophils Absolute 5 42 Thousands/µL      Immature Grans Absolute 0 02 Thousand/uL      Lymphocytes Absolute 0 96 Thousands/µL      Monocytes Absolute 0 50 Thousand/µL      Eosinophils Absolute 0 10 Thousand/µL      Basophils Absolute 0 02 Thousands/µL     UA (URINE) with reflex to Scope [[id number]]  (Abnormal) Collected: 05/03/22 1314    Lab Status: Final result Specimen: Urine, Clean Catch Updated: 05/03/22 1325     Color, UA Yellow     Clarity, UA Clear     Specific Gravity, UA >=1 030     pH, UA 6 0     Leukocytes, UA Negative     Nitrite, UA Negative     Protein, UA Negative mg/dl      Glucose, UA Negative mg/dl Ketones, UA Negative mg/dl      Urobilinogen, UA 0 2 E U /dl      Bilirubin, UA Negative     Blood, UA Negative                 CT abdomen pelvis with contrast   Final Result by Chente Paredes MD (05/03 1425)      No acute inflammatory changes in the abdomen or pelvis  Workstation performed: [id number]                    Procedures  Procedures         ED Course                                             MDM  Number of Diagnoses or Management Options  Generalized abdominal pain  Diagnosis management comments: 50-year-old female presenting for evaluation of generalized and left-sided abdominal pain  Present since yesterday  Sharp stabbing in nature  Admits to nausea denies any vomiting, fevers or chills  Admits to loose nonbloody stools  Has diffuse tenderness on exam   Will obtain belly labs, CT abdomen pelvis with contrast   Will give Toradol and GI cocktail  Patient symptoms mildly improved after medications  Imaging and lab work within normal limits  Patient given GI referral       Disposition  Final diagnoses:   Generalized abdominal pain     Time reflects when diagnosis was documented in both MDM as applicable and the Disposition within this note     Time User Action Codes Description Comment    5/3/2022  2:38 PM Alesha Nava Add [R10 84] Generalized abdominal pain       ED Disposition     ED Disposition Condition Date/Time Comment    Discharge Stable Tue May 3, 2022  2:38 PM Jacob 67 discharge to home/self care              Follow-up Information     Follow up With Specialties Details Why Contact Info Additional Information    Kassi Mejia DO Internal Medicine Schedule an appointment as soon as possible for a visit  For follow up of smptoms Jerri Victor [address] [facility] Schedule an appointment as soon as possible for a visit  For follow up of symptoms [address] [place] [address] [address] [id number] [phone] [address] [facility] [place], [address] [facility], [address] [facility] [place], [place], [address], [phone]          Discharge Medication List as of 5/3/2022  3:13 PM      START taking these medications    Details   sucralfate (CARAFATE) 1 g/10 mL suspension Take 10 mL (1 g total) by mouth 4 (four) times a day as needed (epgiastric pain), Starting Tue 5/3/2022, Normal         CONTINUE these medications which have NOT CHANGED    Details   acetaminophen (TYLENOL) 650 mg CR tablet Take 1 tablet (650 mg total) by mouth every 8 (eight) hours as needed for mild pain, Starting Thu 10/8/2020, Normal      ALPRAZolam (XANAX) 0 5 mg tablet Take 1 tablet (0 5 mg total) by mouth 3 (three) times a day as needed for anxiety, Starting Wed 12/15/2021, Normal      busPIRone (BUSPAR) 10 mg tablet Take 1 tablet (10 mg total) by mouth 3 (three) times a day, Starting Mon 2/14/2022, Normal      gabapentin (NEURONTIN) 600 MG tablet Take 1 tablet (600 mg total) by mouth 4 (four) times a day, Starting Fri 3/25/2022, Normal      PARoxetine (PAXIL) 20 mg tablet Take 2 tablets (40 mg total) by mouth daily at bedtime, Starting Mon 2/14/2022, Normal             No discharge procedures on file      PDMP Review       Value Time User    PDMP Reviewed  Yes 12/15/2021  9:56 AM Matt Gonzalez DO          ED Provider  Electronically Signed by           Onel Ravi DO  05/03/22 3876

## 2022-05-17 NOTE — PROGRESS NOTES
Name         To: 5 18 22  Eduardo Connelly        Last time weight 335 2  Today's weight 337 3  6/6 weight  check     Behavioral Health Follow Up Note:    Preop 6 month program: 6 of 6 today  Surgery Date: TBD- tentative June 2022  Deadline October 2022    Surgeon: Dr Charlotte [facility] -Patient presents to the office today expressing frustration due to weight gain since the last visit  She expressed following the dietary instructions and not snacking, but was able to recognize she was eating her dinner a  little later then normal because of her current schedule with her children and practice  Anxiety symptoms are controled and continuing her MH  Medication  Eating behaviors -The patient has  seven days not smoking and expressed not ready to quit  SW educated the patient that this can delay the process  Having 8 family members in her home at times it is difficult to focus on her portion controls especially for dinner  The patient explained that the current meal she feels is not controled with potion control is her dinner  Activity -  Walking most of her work day, house chores after work  Practices activities with her children keeping active  Aware she needs to increase activity level  Progress toward program requirements    Workflow:  · Psych and/or D+A Clearance: n/a  · PCP Letter: 9 3 21  · Support Group:2 22 22  · Surgeon Appt : 11 8 21  · EGD : scheduled for 5/25  · Cardiac Risk Assessment:   · Sleep Studies: 11 8 21    Reviewed   Adequate hydration  Increase exercise  Quit smoking  Meal planning and preparation  Possible problems with poor eating habits  workflow      Goal: 1- mindful eating 2-increase exercise 3-quit smoking   Next appointment: KATHY GARCIA 6 17 22 at 1:30p m

## 2022-05-18 ENCOUNTER — OFFICE VISIT (OUTPATIENT)
Dept: BARIATRICS | Facility: CLINIC | Age: 42
End: 2022-05-18

## 2022-05-18 DIAGNOSIS — E66.01 MORBID (SEVERE) OBESITY DUE TO EXCESS CALORIES (HCC): Primary | ICD-10-CM

## 2022-05-18 PROCEDURE — RECHECK

## 2022-05-22 RX ORDER — SODIUM CHLORIDE 9 MG/ML
125 INJECTION, SOLUTION INTRAVENOUS CONTINUOUS
Status: CANCELLED | OUTPATIENT
Start: 2022-05-22

## 2022-05-25 ENCOUNTER — HOSPITAL ENCOUNTER (OUTPATIENT)
Dept: GASTROENTEROLOGY | Facility: HOSPITAL | Age: 42
Setting detail: OUTPATIENT SURGERY
Discharge: HOME | End: 2022-05-25
Attending: SURGERY | Admitting: SURGERY
Payer: COMMERCIAL

## 2022-05-25 ENCOUNTER — ANESTHESIA (OUTPATIENT)
Dept: GASTROENTEROLOGY | Facility: HOSPITAL | Age: 42
End: 2022-05-25

## 2022-05-25 ENCOUNTER — ANESTHESIA EVENT (OUTPATIENT)
Dept: GASTROENTEROLOGY | Facility: HOSPITAL | Age: 42
End: 2022-05-25

## 2022-05-25 VITALS
RESPIRATION RATE: 12 BRPM | DIASTOLIC BLOOD PRESSURE: 63 MMHG | BODY MASS INDEX: 51.91 KG/M2 | OXYGEN SATURATION: 97 % | WEIGHT: 293 LBS | HEART RATE: 68 BPM | HEIGHT: 63 IN | TEMPERATURE: 98 F | SYSTOLIC BLOOD PRESSURE: 118 MMHG

## 2022-05-25 DIAGNOSIS — E66.01 MORBID (SEVERE) OBESITY DUE TO EXCESS CALORIES (HCC): ICD-10-CM

## 2022-05-25 PROCEDURE — 88305 TISSUE EXAM BY PATHOLOGIST: CPT | Performed by: PATHOLOGY

## 2022-05-25 PROCEDURE — 43239 EGD BIOPSY SINGLE/MULTIPLE: CPT | Performed by: SURGERY

## 2022-05-25 RX ORDER — LIDOCAINE HYDROCHLORIDE 20 MG/ML
INJECTION, SOLUTION EPIDURAL; INFILTRATION; INTRACAUDAL; PERINEURAL AS NEEDED
Status: DISCONTINUED | OUTPATIENT
Start: 2022-05-25 | End: 2022-05-25

## 2022-05-25 RX ORDER — SODIUM CHLORIDE 9 MG/ML
INJECTION, SOLUTION INTRAVENOUS CONTINUOUS PRN
Status: DISCONTINUED | OUTPATIENT
Start: 2022-05-25 | End: 2022-05-25

## 2022-05-25 RX ORDER — SODIUM CHLORIDE 9 MG/ML
125 INJECTION, SOLUTION INTRAVENOUS CONTINUOUS
Status: DISCONTINUED | OUTPATIENT
Start: 2022-05-25 | End: 2022-05-29 | Stop reason: HOSPADM

## 2022-05-25 RX ORDER — PROPOFOL 10 MG/ML
INJECTION, EMULSION INTRAVENOUS AS NEEDED
Status: DISCONTINUED | OUTPATIENT
Start: 2022-05-25 | End: 2022-05-25

## 2022-05-25 RX ADMIN — LIDOCAINE HYDROCHLORIDE 100 MG: 20 INJECTION, SOLUTION EPIDURAL; INFILTRATION; INTRACAUDAL at 10:37

## 2022-05-25 RX ADMIN — SODIUM CHLORIDE: 0.9 INJECTION, SOLUTION INTRAVENOUS at 10:25

## 2022-05-25 RX ADMIN — SODIUM CHLORIDE 125 ML/HR: 0.9 INJECTION, SOLUTION INTRAVENOUS at 10:38

## 2022-05-25 RX ADMIN — PROPOFOL 200 MG: 10 INJECTION, EMULSION INTRAVENOUS at 10:37

## 2022-05-25 NOTE — ANESTHESIA PREPROCEDURE EVALUATION
Procedure:  EGD    Relevant Problems   CARDIO   (+) Mixed hyperlipidemia      GYN   (+) S/P hysterectomy      MUSCULOSKELETAL   (+) Primary osteoarthritis involving multiple joints      NEURO/PSYCH   (+) Chronic cervical pain   (+) Chronic pain syndrome   (+) JOSEFINA (generalized anxiety disorder)      PULMONARY   (+) Obstructive sleep apnea treated with continuous positive airway pressure (CPAP)        Physical Exam    Airway    Mallampati score: III  TM Distance: >3 FB  Neck ROM: full     Dental       Cardiovascular  Rhythm: regular, Rate: normal,     Pulmonary  Breath sounds clear to auscultation,     Other Findings        Anesthesia Plan  ASA Score- 3     Anesthesia Type- general with ASA Monitors  Additional Monitors:   Airway Plan:           Plan Factors-Exercise tolerance (METS): >4 METS  Chart reviewed  Existing labs reviewed  Patient summary reviewed  Patient is not a current smoker  Patient not instructed to abstain from smoking on day of procedure  Patient did not smoke on day of surgery  Obstructive sleep apnea risk education given perioperatively  Induction- intravenous  Postoperative Plan-     Informed Consent- Anesthetic plan and risks discussed with patient

## 2022-05-25 NOTE — INTERVAL H&P NOTE
H&P reviewed  After examining the patient I find no changes in the patients condition since the H&P had been written      Vitals:    05/25/22 1021   BP: 135/63   Pulse: 70   Resp: 18   Temp: 98 °F (36 7 °C)   SpO2: 96%

## 2022-05-25 NOTE — ANESTHESIA POSTPROCEDURE EVALUATION
Post-Op Assessment Note    CV Status:  Stable    Pain management: adequate     Mental Status:  Alert and awake   Hydration Status:  Euvolemic   PONV Controlled:  Controlled   Airway Patency:  Patent      Post Op Vitals Reviewed: Yes      Staff: Anesthesiologist         No complications documented      /59 (05/25/22 1042)    Temp      Pulse (!) 114 (05/25/22 1042)   Resp 20 (05/25/22 1042)    SpO2 93 % (05/25/22 1042)

## 2022-06-04 DIAGNOSIS — F41.0 PANIC ATTACK: ICD-10-CM

## 2022-06-04 DIAGNOSIS — F41.1 GAD (GENERALIZED ANXIETY DISORDER): ICD-10-CM

## 2022-06-04 DIAGNOSIS — R07.89 ATYPICAL CHEST PAIN: ICD-10-CM

## 2022-06-04 RX ORDER — PAROXETINE HYDROCHLORIDE 20 MG/1
TABLET, FILM COATED ORAL
Qty: 90 TABLET | Refills: 1 | Status: SHIPPED | OUTPATIENT
Start: 2022-06-04

## 2022-06-10 DIAGNOSIS — Z01.818 PREOP TESTING: Primary | ICD-10-CM

## 2022-06-16 NOTE — PROGRESS NOTES
Bariatric Nutrition Follow-Up Note    Type of surgery    Preop 6 month program: 6 of 6 completed in May  Surgery Date: TBD- tentative -2022  Deadline 2022  Surgeon: Dr Mu Elizabeth  42 y o   female     Wt with BMI of 25: 139 3lbs  Pre-Op Excess Wt: 200 7lbs   Wt (!) 152 kg (335 lb)   LMP 2020   BMI 59 34 kg/m²    2 3lb wt loss in the last month  Net 5 1lb wt loss since starting program in November  Pt now needs 12 lb wt loss to schedule surgery and 29 lb total wt loss by surgery date  Pre-Op Goal Weight:  5%=17#=Goal of 323# TO SCHEDULE SURGERY  10%=34#=Goal of 306# BY DAY OF SURGERY       [facility] Equation:     Weight maintenance= 2596 kcal/day  Estimated calories for weight loss 9198-9905 kcal/day  (1-2# per wk wt loss - sedentary)  Estimated protein needs 63 3-76 g/day (1 0-1 2 gms/kg IBW)   Estimated fluid needs 6200-3468 ml/day (30-35 ml/kg IBW)      Review of History and Medications   Past Medical History:   Diagnosis Date    Kidney stone     Obesity 4/15/2013    Peripheral edema     Primary osteoarthritis involving multiple joints 2020    Sleep apnea     Wears dentures     Wears glasses      Past Surgical History:   Procedure Laterality Date    BACK SURGERY      lump removed near shoulder on right side     SECTION      x3, 2005,,     HYSTERECTOMY      MOUTH SURGERY      21 teeth removed    [place] CYSTOURETHROSCOPY N/A 10/8/2020    Procedure: CYSTOSCOPY;  Surgeon: Chaparro Manrique MD;  Location: [facility];  Service: Gynecology    [address]  W TUBE/OVARY N/A 10/8/2020    Procedure: ROBOTIC TOTAL HYSTERECTOMY; BILATERAL SALPINGECTOMY;  Surgeon: Chaparro Manrique MD;  Location: [facility];  Service: Gynecology    TUBAL LIGATION       Social History     Socioeconomic History    Marital status: /Civil Union     Spouse name: Not on file    Number of children: Not on file    Years of education: Not on file    Highest education level: Not on file   Occupational History    Occupation:    Tobacco Use    Smoking status: Former Smoker     Years: 30      Types: Cigarettes     Quit date: 5/10/2022     Years since quittin 1    Smokeless tobacco: Never Used   Vaping Use    Vaping Use: Never used   Substance and Sexual Activity    Alcohol use: Not Currently     Comment: 3x per year will have 1-2 drinks    Drug use: No    Sexual activity: Not Currently   Other Topics Concern    Not on file   Social History Narrative    fhx not asked intriage    EMPLOYED     Social Determinants of Health     Financial Resource Strain: Not on file   Food Insecurity: Not on file   Transportation Needs: Not on file   Physical Activity: Not on file   Stress: Not on file   Social Connections: Not on file   Intimate Partner Violence: Not on file   Housing Stability: Not on file       Current Outpatient Medications:     acetaminophen (TYLENOL) 650 mg CR tablet, Take 1 tablet (650 mg total) by mouth every 8 (eight) hours as needed for mild pain, Disp: 30 tablet, Rfl: 0    ALPRAZolam (XANAX) 0 5 mg tablet, Take 1 tablet (0 5 mg total) by mouth 3 (three) times a day as needed for anxiety, Disp: 30 tablet, Rfl: 0    busPIRone (BUSPAR) 10 mg tablet, Take 1 tablet (10 mg total) by mouth 3 (three) times a day, Disp: 270 tablet, Rfl: 1    gabapentin (NEURONTIN) 600 MG tablet, Take 1 tablet (600 mg total) by mouth 4 (four) times a day, Disp: 120 tablet, Rfl: 5    PARoxetine (PAXIL) 20 mg tablet, take 2 tablets by mouth daily at bedtime, Disp: 90 tablet, Rfl: 1    sucralfate (CARAFATE) 1 g/10 mL suspension, Take 10 mL (1 g total) by mouth 4 (four) times a day as needed (epgiastric pain), Disp: 414 mL, Rfl: 0     Food Intake and Lifestyle Assessment   Food Intake Assessment completed via usual diet recall  Breakfast: Equate bran 30 g protein drink  Lunch: Another Equate brand 30g protein drink, and vegetables:  Cauliflower, broccoli, celery, carrots, green beans, corn on cob  Dinner: hot dog or hamburger or pork chop, potato chips  Snack: ice cream  Drinks:  Mostly water, propel rodriguez  Protein supplement: Equate protein drink:  30g protein twice daily  Estimated protein intake per day: 60-80g  Estimated fluid intake per day: 24 oz water x 4-5 per day  Meals eaten away from home: 1-2 per month  Typical meal pattern: 2-3 meals per day and 0-1 snacks per day  Eating Behaviors: Consumption of high calorie/ high fat foods and Consumption of high calorie beverages  Pt reports she does well with the 30/60 rule when at work, but often finds herself sipping drinks with dinner at home  Pt reports that she did very well planning and preparing healthy foods to have at her [facility]  Pt reports that she packed only rodriguez and no soda last time they went camping  Food allergies or intolerances: NKFA  Was allergic to shellfish as a child, can eat small amounts now  Allergies   Allergen Reactions    Clindamycin/Lincomycin      Mouth ulcers severe  But, tolerates azithromycin   Levaquin [Levofloxacin] Throat Swelling     Facial swelling leading to throat swelling    Penicillins Anaphylaxis     Cultural or Jainism considerations: none noted    Physical Assessment  Physical Activity  Types of exercise: Walking most of her work day, house chores after work  Coaches softball spring and fall  Pt has started doing some exercises at home, and her son is going to start exercising with her  Current physical limitations: broke bone behind her kneecap 2 years ago  Neck and back pain:  Bulging discs, scoliosis, herniated discs, following with pain management for about a year  Psychosocial Assessment   Support systems: spouse and children friend(s) relative(s)  Socioeconomic factors: Teacher at high school   is [place]    Lives with her , 3 daughters, 1 stepson, brother-in-law, one friend and her two children  Another stepson comes over every other weekend  Pt reports that her  brings lots of snack foods and desserts into the house, which their children eat as well  Pt also reports that her family members do smoke in the house, but they have one closed off room in the house that they go in to smoke  Pt reports that over the past month her family has adopted another 16year old girl  Pt reports that they go camping frequently in the summer (in a tent, not an RV), and can only bring what they can fit in a cooler to eat, which usually consists of hot dogs and hamburgers  They do go fishing while camping, but it is catch and release only  Nutrition Diagnosis-Continued  Diagnosis: Overweight / Obesity (NC-3 3) and Excessive energy intake (NI-1 5)  Related to: Physical inactivity and Excessive energy intake  As Evidenced by: BMI >25, Excessive energy intake and Unintentional weight gain     Interventions and Teaching   Discussed pre-op and post-op nutrition guidelines  Patient educated and handouts provided  Adequate hydration  Expected weight loss:  Reviewed pre-op weight loss goal   Weight loss plateaus/ possibility of weight regain  Exercise  Suggestions for pre-op diet  Discussed sticking to nonstarchy vegetables only for lunch  Discussed portion sizes for dinner meals  Discussed leaner protein sources and starchy vegetables to limit  Nutrition considerations after surgery  Protein supplements:  Pt to continue protein drink for breakfast and lunch  Dietary and lifestyle changes  Discussed keeping trigger foods out of the house  Discussed keeping serving dishes off the table at dinner  Reinforced using Measuring cups to measure dinner meal   Possible problems with poor eating habits  Discussed pt's frequent camping trips and ways that she can pack foods and drinks that will be more cohesive with her post-operative diet    Techniques for self monitoring and keeping daily food journal:  Instructed pt to [employer] ira and begin food logging  Education provided to: patient  Barriers to learning: No barriers identified  Readiness to change: action  Comprehension: needs reinforcement and verbalizes understanding   Expected Compliance: fair to good    Evaluation / Monitoring  Dietitian to Monitor: Eating pattern as discussed Tolerance of nutrition prescription Body weight Lab values Physical activity    Goals  Eliminate sugar sweetened beverages, Food journal, Exercise 30 minutes 5 times per week, Complete lession plans 1-6, Eat 3 meals per day and Eliminate mindless snacking   Protein drink for breakfast  Doing well  Continue to practice 30/60 rule  In progress  Start food journaling not done  Start walking plan  Not done  But pt has started exercising with her son and is also doing some exercises at home  Have Lesson Plan #3 done by next month  Pt did not bring book this session  Previous session goals:  Get trigger foods out of the house  Doing ok, but did have some potato chops with dinner recently  Quit smoking  Achieved  Pt reports last cigarette was 5/10/22  New Session Goals:  1)  Continue Protein shake for breakfast   Continue protein shake and nonstarchy vegetables for lunch  2)  Focus on dinner:  portioning 3-6 oz LEAN protein, 1 cup NONSTARCHY vegetables, no more than 1/2 cup starch, no snacks after dinner  3)  Brainstorm healthy foods she can bring camping with her  4) Get nicotine blood test done prior to next appointment  5)  Goal 12# wt loss by next appointment x 1 month  Remaining Workflow:   Bloodwork:   o Lipids, TSH drawn 9/3/21  Good until 9/3/22   o CBC+CMP drawn 5/3/2022  Good until 11/3/2022   Weight Loss: 5%=17#=Goal of 323# TO SCHEDULE SURGERY  10%=34#=Goal of 306# BY DAY OF SURGERY  12# wt loss needed yet to schedule surgery     Nicotine test: Pt is ready to take nicotine blood test     Time Spent:   30 minutes

## 2022-06-17 ENCOUNTER — OFFICE VISIT (OUTPATIENT)
Dept: BARIATRICS | Facility: CLINIC | Age: 42
End: 2022-06-17

## 2022-06-17 VITALS — WEIGHT: 293 LBS | BODY MASS INDEX: 59.34 KG/M2

## 2022-06-17 DIAGNOSIS — E66.01 OBESITY, CLASS III, BMI 40-49.9 (MORBID OBESITY) (HCC): Primary | ICD-10-CM

## 2022-06-17 DIAGNOSIS — Z99.89 OBSTRUCTIVE SLEEP APNEA TREATED WITH CONTINUOUS POSITIVE AIRWAY PRESSURE (CPAP): ICD-10-CM

## 2022-06-17 DIAGNOSIS — G47.33 OBSTRUCTIVE SLEEP APNEA TREATED WITH CONTINUOUS POSITIVE AIRWAY PRESSURE (CPAP): ICD-10-CM

## 2022-06-17 PROCEDURE — RECHECK: Performed by: DIETITIAN, REGISTERED

## 2022-06-20 ENCOUNTER — APPOINTMENT (OUTPATIENT)
Dept: LAB | Facility: CLINIC | Age: 42
End: 2022-06-20
Payer: COMMERCIAL

## 2022-06-20 DIAGNOSIS — M25.50 ARTHRALGIA, UNSPECIFIED JOINT: ICD-10-CM

## 2022-06-20 DIAGNOSIS — Z01.818 PREOPERATIVE CLEARANCE: ICD-10-CM

## 2022-06-20 DIAGNOSIS — E78.2 MIXED HYPERLIPIDEMIA: ICD-10-CM

## 2022-06-20 DIAGNOSIS — Z01.818 PREOP TESTING: ICD-10-CM

## 2022-06-20 DIAGNOSIS — E66.01 MORBID OBESITY (HCC): ICD-10-CM

## 2022-06-20 DIAGNOSIS — Z72.0 NICOTINE ABUSE: ICD-10-CM

## 2022-06-20 DIAGNOSIS — Z72.0 TOBACCO ABUSE: ICD-10-CM

## 2022-06-20 DIAGNOSIS — M79.10 MYALGIA: ICD-10-CM

## 2022-06-20 DIAGNOSIS — E66.01 CLASS 3 SEVERE OBESITY DUE TO EXCESS CALORIES WITHOUT SERIOUS COMORBIDITY WITH BODY MASS INDEX (BMI) OF 60.0 TO 69.9 IN ADULT (HCC): ICD-10-CM

## 2022-06-20 DIAGNOSIS — Z01.812 BLOOD TESTS PRIOR TO TREATMENT OR PROCEDURE: ICD-10-CM

## 2022-06-20 LAB
ALBUMIN SERPL BCP-MCNC: 3.4 G/DL (ref 3.5–5)
ALP SERPL-CCNC: 94 U/L (ref 46–116)
ALT SERPL W P-5'-P-CCNC: 32 U/L (ref 12–78)
ANION GAP SERPL CALCULATED.3IONS-SCNC: 11 MMOL/L (ref 4–13)
AST SERPL W P-5'-P-CCNC: 14 U/L (ref 5–45)
BILIRUB SERPL-MCNC: 0.28 MG/DL (ref 0.2–1)
BUN SERPL-MCNC: 13 MG/DL (ref 5–25)
CALCIUM ALBUM COR SERPL-MCNC: 9.5 MG/DL (ref 8.3–10.1)
CALCIUM SERPL-MCNC: 9 MG/DL (ref 8.3–10.1)
CHLORIDE SERPL-SCNC: 107 MMOL/L (ref 100–108)
CO2 SERPL-SCNC: 22 MMOL/L (ref 21–32)
CREAT SERPL-MCNC: 0.76 MG/DL (ref 0.6–1.3)
CRP SERPL QL: 7.7 MG/L
ERYTHROCYTE [DISTWIDTH] IN BLOOD BY AUTOMATED COUNT: 13.9 % (ref 11.6–15.1)
GFR SERPL CREATININE-BSD FRML MDRD: 97 ML/MIN/1.73SQ M
GLUCOSE P FAST SERPL-MCNC: 130 MG/DL (ref 65–99)
HCT VFR BLD AUTO: 43.8 % (ref 34.8–46.1)
HGB BLD-MCNC: 14.2 G/DL (ref 11.5–15.4)
LDLC SERPL DIRECT ASSAY-MCNC: 113 MG/DL (ref 0–100)
MCH RBC QN AUTO: 30.3 PG (ref 26.8–34.3)
MCHC RBC AUTO-ENTMCNC: 32.4 G/DL (ref 31.4–37.4)
MCV RBC AUTO: 93 FL (ref 82–98)
PLATELET # BLD AUTO: 291 THOUSANDS/UL (ref 149–390)
PMV BLD AUTO: 12.1 FL (ref 8.9–12.7)
POTASSIUM SERPL-SCNC: 3.8 MMOL/L (ref 3.5–5.3)
PROT SERPL-MCNC: 7.2 G/DL (ref 6.4–8.2)
RBC # BLD AUTO: 4.69 MILLION/UL (ref 3.81–5.12)
SODIUM SERPL-SCNC: 140 MMOL/L (ref 136–145)
TRIGL SERPL-MCNC: 348 MG/DL
URATE SERPL-MCNC: 6.8 MG/DL (ref 2–6.8)
WBC # BLD AUTO: 7.84 THOUSAND/UL (ref 4.31–10.16)

## 2022-06-20 PROCEDURE — 86430 RHEUMATOID FACTOR TEST QUAL: CPT

## 2022-06-20 PROCEDURE — 84550 ASSAY OF BLOOD/URIC ACID: CPT

## 2022-06-20 PROCEDURE — 86038 ANTINUCLEAR ANTIBODIES: CPT

## 2022-06-20 PROCEDURE — 80053 COMPREHEN METABOLIC PANEL: CPT

## 2022-06-20 PROCEDURE — 84478 ASSAY OF TRIGLYCERIDES: CPT

## 2022-06-20 PROCEDURE — 83721 ASSAY OF BLOOD LIPOPROTEIN: CPT

## 2022-06-20 PROCEDURE — 85027 COMPLETE CBC AUTOMATED: CPT

## 2022-06-20 PROCEDURE — 80323 ALKALOIDS NOS: CPT

## 2022-06-20 PROCEDURE — 86140 C-REACTIVE PROTEIN: CPT

## 2022-06-20 PROCEDURE — 36415 COLL VENOUS BLD VENIPUNCTURE: CPT

## 2022-06-20 PROCEDURE — 86618 LYME DISEASE ANTIBODY: CPT

## 2022-06-21 LAB
B BURGDOR IGG+IGM SER-ACNC: 0.2 AI
RHEUMATOID FACT SER QL LA: NEGATIVE

## 2022-06-22 DIAGNOSIS — R79.9 ABNORMAL BLOOD CHEMISTRY: Primary | ICD-10-CM

## 2022-06-22 LAB — RYE IGE QN: NEGATIVE

## 2022-06-27 LAB
COTININE SERPL-MCNC: <1 NG/ML
NICOTINE SERPL-MCNC: <1 NG/ML

## 2022-07-08 ENCOUNTER — APPOINTMENT (OUTPATIENT)
Dept: LAB | Facility: CLINIC | Age: 42
End: 2022-07-08
Payer: COMMERCIAL

## 2022-07-08 DIAGNOSIS — R79.9 ABNORMAL BLOOD CHEMISTRY: ICD-10-CM

## 2022-07-08 LAB
EST. AVERAGE GLUCOSE BLD GHB EST-MCNC: 108 MG/DL
HBA1C MFR BLD: 5.4 %

## 2022-07-08 PROCEDURE — 83036 HEMOGLOBIN GLYCOSYLATED A1C: CPT

## 2022-07-08 PROCEDURE — 36415 COLL VENOUS BLD VENIPUNCTURE: CPT

## 2022-07-15 ENCOUNTER — OFFICE VISIT (OUTPATIENT)
Dept: BARIATRICS | Facility: CLINIC | Age: 42
End: 2022-07-15

## 2022-07-15 VITALS — BODY MASS INDEX: 60.49 KG/M2 | WEIGHT: 293 LBS

## 2022-07-15 DIAGNOSIS — E66.01 OBESITY, CLASS III, BMI 40-49.9 (MORBID OBESITY) (HCC): Primary | ICD-10-CM

## 2022-07-15 PROCEDURE — RECHECK: Performed by: DIETITIAN, REGISTERED

## 2022-07-15 NOTE — PROGRESS NOTES
Weight (!) 155 kg (341 lb 8 oz), last menstrual period 08/25/2020  Pt arrived for pre-op weight check  6 months completed in May  Deadline month for surgery program is October 6 5lb weight regain since last month  Net 1 5lb weight gain since starting program 8 months ago  Pt needs 5% wt loss to schedule surgery, 10% weight loss by day of surgery  Pt reports she had a lot of stress/emotional eating during the last month, tearful while discussing this  Pt reports a lot of stress going on at home  Discussed with pt the importance of addressing emotional eating prior to surgery to prevent post-op weight regain  Provided pt with some support resources for overeaters anonymous, Am I Hungry intuitive eating resources, and provided pt with list of PA therapists from      Pt agreed to be halted from surgical program at this time and to try working with non surgical weight loss  Discussed process of restarting surgery program if she decides to continue in the future once she is in a better place  Pt thankful for information

## 2022-08-17 ENCOUNTER — HOSPITAL ENCOUNTER (EMERGENCY)
Facility: HOSPITAL | Age: 42
Discharge: HOME | End: 2022-08-17
Attending: EMERGENCY MEDICINE
Payer: COMMERCIAL

## 2022-08-17 VITALS
SYSTOLIC BLOOD PRESSURE: 133 MMHG | HEART RATE: 68 BPM | RESPIRATION RATE: 18 BRPM | DIASTOLIC BLOOD PRESSURE: 74 MMHG | OXYGEN SATURATION: 96 %

## 2022-08-17 DIAGNOSIS — R44.3 HALLUCINATIONS: Primary | ICD-10-CM

## 2022-08-17 DIAGNOSIS — Z91.14 NON COMPLIANCE W MEDICATION REGIMEN: ICD-10-CM

## 2022-08-17 DIAGNOSIS — F41.9 ANXIETY: ICD-10-CM

## 2022-08-17 LAB
AMPHETAMINES SERPL QL SCN: NEGATIVE
ANION GAP SERPL CALCULATED.3IONS-SCNC: 7 MMOL/L (ref 4–13)
APAP SERPL-MCNC: <10 UG/ML (ref 10–20)
BARBITURATES UR QL: NEGATIVE
BASOPHILS # BLD AUTO: 0.04 THOUSANDS/ΜL (ref 0–0.1)
BASOPHILS NFR BLD AUTO: 0 % (ref 0–1)
BENZODIAZ UR QL: NEGATIVE
BUN SERPL-MCNC: 19 MG/DL (ref 5–25)
CALCIUM SERPL-MCNC: 9.5 MG/DL (ref 8.4–10.2)
CHLORIDE SERPL-SCNC: 104 MMOL/L (ref 96–108)
CO2 SERPL-SCNC: 25 MMOL/L (ref 21–32)
COCAINE UR QL: NEGATIVE
CREAT SERPL-MCNC: 0.85 MG/DL (ref 0.6–1.3)
EOSINOPHIL # BLD AUTO: 0.39 THOUSAND/ΜL (ref 0–0.61)
EOSINOPHIL NFR BLD AUTO: 4 % (ref 0–6)
ERYTHROCYTE [DISTWIDTH] IN BLOOD BY AUTOMATED COUNT: 14.2 % (ref 11.6–15.1)
ETHANOL SERPL-MCNC: <10 MG/DL
GFR SERPL CREATININE-BSD FRML MDRD: 84 ML/MIN/1.73SQ M
GLUCOSE SERPL-MCNC: 110 MG/DL (ref 65–140)
HCT VFR BLD AUTO: 44.6 % (ref 34.8–46.1)
HGB BLD-MCNC: 14.5 G/DL (ref 11.5–15.4)
IMM GRANULOCYTES # BLD AUTO: 0.03 THOUSAND/UL (ref 0–0.2)
IMM GRANULOCYTES NFR BLD AUTO: 0 % (ref 0–2)
LYMPHOCYTES # BLD AUTO: 2.58 THOUSANDS/ΜL (ref 0.6–4.47)
LYMPHOCYTES NFR BLD AUTO: 27 % (ref 14–44)
MCH RBC QN AUTO: 30.6 PG (ref 26.8–34.3)
MCHC RBC AUTO-ENTMCNC: 32.5 G/DL (ref 31.4–37.4)
MCV RBC AUTO: 94 FL (ref 82–98)
METHADONE UR QL: NEGATIVE
MONOCYTES # BLD AUTO: 0.74 THOUSAND/ΜL (ref 0.17–1.22)
MONOCYTES NFR BLD AUTO: 8 % (ref 4–12)
NEUTROPHILS # BLD AUTO: 5.88 THOUSANDS/ΜL (ref 1.85–7.62)
NEUTS SEG NFR BLD AUTO: 61 % (ref 43–75)
NRBC BLD AUTO-RTO: 0 /100 WBCS
OPIATES UR QL SCN: NEGATIVE
OXYCODONE+OXYMORPHONE UR QL SCN: NEGATIVE
PCP UR QL: NEGATIVE
PLATELET # BLD AUTO: 258 THOUSANDS/UL (ref 149–390)
PMV BLD AUTO: 11.4 FL (ref 8.9–12.7)
POTASSIUM SERPL-SCNC: 4 MMOL/L (ref 3.5–5.3)
RBC # BLD AUTO: 4.74 MILLION/UL (ref 3.81–5.12)
SALICYLATES SERPL-MCNC: <5 MG/DL (ref 3–20)
SODIUM SERPL-SCNC: 136 MMOL/L (ref 135–147)
T4 FREE SERPL-MCNC: 0.91 NG/DL (ref 0.76–1.46)
THC UR QL: NEGATIVE
TSH SERPL DL<=0.05 MIU/L-ACNC: 5.96 UIU/ML (ref 0.45–4.5)
WBC # BLD AUTO: 9.66 THOUSAND/UL (ref 4.31–10.16)

## 2022-08-17 PROCEDURE — 96361 HYDRATE IV INFUSION ADD-ON: CPT

## 2022-08-17 PROCEDURE — 82077 ASSAY SPEC XCP UR&BREATH IA: CPT | Performed by: EMERGENCY MEDICINE

## 2022-08-17 PROCEDURE — 80307 DRUG TEST PRSMV CHEM ANLYZR: CPT | Performed by: EMERGENCY MEDICINE

## 2022-08-17 PROCEDURE — 84439 ASSAY OF FREE THYROXINE: CPT | Performed by: EMERGENCY MEDICINE

## 2022-08-17 PROCEDURE — 96360 HYDRATION IV INFUSION INIT: CPT

## 2022-08-17 PROCEDURE — 80048 BASIC METABOLIC PNL TOTAL CA: CPT | Performed by: EMERGENCY MEDICINE

## 2022-08-17 PROCEDURE — 99285 EMERGENCY DEPT VISIT HI MDM: CPT

## 2022-08-17 PROCEDURE — 36415 COLL VENOUS BLD VENIPUNCTURE: CPT | Performed by: EMERGENCY MEDICINE

## 2022-08-17 PROCEDURE — 85025 COMPLETE CBC W/AUTO DIFF WBC: CPT | Performed by: EMERGENCY MEDICINE

## 2022-08-17 PROCEDURE — 80179 DRUG ASSAY SALICYLATE: CPT | Performed by: EMERGENCY MEDICINE

## 2022-08-17 PROCEDURE — 84443 ASSAY THYROID STIM HORMONE: CPT | Performed by: EMERGENCY MEDICINE

## 2022-08-17 PROCEDURE — 80143 DRUG ASSAY ACETAMINOPHEN: CPT | Performed by: EMERGENCY MEDICINE

## 2022-08-17 PROCEDURE — 99284 EMERGENCY DEPT VISIT MOD MDM: CPT | Performed by: EMERGENCY MEDICINE

## 2022-08-17 PROCEDURE — 96372 THER/PROPH/DIAG INJ SC/IM: CPT

## 2022-08-17 RX ORDER — MIDAZOLAM HYDROCHLORIDE 2 MG/2ML
2 INJECTION, SOLUTION INTRAMUSCULAR; INTRAVENOUS ONCE
Status: COMPLETED | OUTPATIENT
Start: 2022-08-17 | End: 2022-08-17

## 2022-08-17 RX ORDER — HALOPERIDOL 5 MG/ML
5 INJECTION INTRAMUSCULAR ONCE
Status: COMPLETED | OUTPATIENT
Start: 2022-08-17 | End: 2022-08-17

## 2022-08-17 RX ORDER — PAROXETINE HYDROCHLORIDE 20 MG/1
20 TABLET, FILM COATED ORAL ONCE
Status: DISCONTINUED | OUTPATIENT
Start: 2022-08-17 | End: 2022-08-17

## 2022-08-17 RX ADMIN — MIDAZOLAM 2 MG: 1 INJECTION INTRAMUSCULAR; INTRAVENOUS at 03:03

## 2022-08-17 RX ADMIN — HALOPERIDOL LACTATE 5 MG: 5 INJECTION, SOLUTION INTRAMUSCULAR at 03:03

## 2022-08-17 RX ADMIN — SODIUM CHLORIDE 1000 ML: 0.9 INJECTION, SOLUTION INTRAVENOUS at 03:02
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
## 2022-08-17 NOTE — ED NOTES
Pt a x 4 affect congruent with mood  Mood is panic level anxiety  Thought process linear goal directed, Denies SI or HI significant for T/VH  demonstrates good insight  Reports she recently stopped taking her Paxil 20 mg which she has been on for approx a year, no other medication changes  Denies any family history of Bipolar or schizophrenia  Reassurance provided  A lot 1 on 1 time spent with patient, PRN medication effective in reducing anxiety level   Frequent rounding maintained      Diana Stiles RN  08/17/22 6974

## 2022-08-17 NOTE — DISCHARGE INSTRUCTIONS
Take your medications as previously prescribed    Recommend follow-up with PCP in a few days to ensure symptoms are improving    Return to the emergency department if you experience worsening of symptoms including any difficulty breathing, uncontrollable vomiting, thoughts of wanting to harm yourself or anyone else, or any hallucinations

## 2022-08-22 ENCOUNTER — OFFICE VISIT (OUTPATIENT)
Dept: URGENT CARE | Facility: CLINIC | Age: 42
End: 2022-08-22
Payer: COMMERCIAL

## 2022-08-22 VITALS
OXYGEN SATURATION: 96 % | BODY MASS INDEX: 42.66 KG/M2 | DIASTOLIC BLOOD PRESSURE: 66 MMHG | TEMPERATURE: 97.8 F | WEIGHT: 240.8 LBS | RESPIRATION RATE: 18 BRPM | SYSTOLIC BLOOD PRESSURE: 131 MMHG | HEART RATE: 85 BPM | HEIGHT: 63 IN

## 2022-08-22 DIAGNOSIS — J02.0 STREP PHARYNGITIS: Primary | ICD-10-CM

## 2022-08-22 LAB — S PYO AG THROAT QL: NEGATIVE

## 2022-08-22 PROCEDURE — 87880 STREP A ASSAY W/OPTIC: CPT | Performed by: PHYSICIAN ASSISTANT

## 2022-08-22 PROCEDURE — 99213 OFFICE O/P EST LOW 20 MIN: CPT | Performed by: PHYSICIAN ASSISTANT

## 2022-08-22 RX ORDER — AZITHROMYCIN 250 MG/1
TABLET, FILM COATED ORAL
Qty: 6 TABLET | Refills: 0 | Status: SHIPPED | OUTPATIENT
Start: 2022-08-22 | End: 2022-08-26

## 2022-08-22 NOTE — PROGRESS NOTES
[address]      NAME: Marline Montoya is a 36 y o  female  : 1980    MRN: [id number]  DATE: 2022  TIME: 11:35 AM    Assessment and Plan   Strep pharyngitis [J02 0]  1  Strep pharyngitis  POCT rapid strepA    azithromycin (ZITHROMAX) 250 mg tablet       Patient Instructions   Rapid strep negative, but clinically appears to be strep  Linsey Freeze called in to take with probiotics  Follow up with PCP in 3-4 days  Salt water gargles  Alternate tylenol/ibu for pain  Hot tea/honey  To present to the ER if symptoms worsen  Chief Complaint     Chief Complaint   Patient presents with    Sore Throat     Sore throat started this morning         History of Present Illness   Marline Montoya presents to the clinic c/o    Sore Throat   This is a new problem  The current episode started today  The problem has been unchanged  Neither side of throat is experiencing more pain than the other  There has been no fever  The pain is at a severity of 8/10  Associated symptoms include swollen glands  Pertinent negatives include no abdominal pain, congestion, coughing, ear discharge, ear pain, headaches or shortness of breath  She has had no exposure to strep  She has tried nothing for the symptoms  The treatment provided no relief  Review of Systems   Review of Systems   Constitutional: Negative for chills, diaphoresis, fatigue and fever  HENT: Positive for sore throat  Negative for congestion, ear discharge, ear pain, facial swelling, postnasal drip and sinus pressure  Eyes: Negative for photophobia, pain, discharge, redness, itching and visual disturbance  Respiratory: Negative for apnea, cough, chest tightness, shortness of breath and wheezing  Cardiovascular: Negative for chest pain and palpitations  Gastrointestinal: Negative for abdominal pain  Skin: Negative for color change, rash and wound  Neurological: Negative for dizziness and headaches  Hematological: Positive for adenopathy  Current Medications     Long-Term Medications   Medication Sig Dispense Refill    ALPRAZolam (XANAX) 0 5 mg tablet Take 1 tablet (0 5 mg total) by mouth 3 (three) times a day as needed for anxiety 30 tablet 0    busPIRone (BUSPAR) 10 mg tablet Take 1 tablet (10 mg total) by mouth 3 (three) times a day 270 tablet 1    gabapentin (NEURONTIN) 600 MG tablet Take 1 tablet (600 mg total) by mouth 4 (four) times a day 120 tablet 5    PARoxetine (PAXIL) 20 mg tablet take 2 tablets by mouth daily at bedtime 90 tablet 1    sucralfate (CARAFATE) 1 g/10 mL suspension Take 10 mL (1 g total) by mouth 4 (four) times a day as needed (epgiastric pain) 414 mL 0       Current Allergies     Allergies as of 2022 - Reviewed 2022   Allergen Reaction Noted    Clindamycin/lincomycin  2016    Levaquin [levofloxacin] Throat Swelling 2018    Penicillins Anaphylaxis 2016            The following portions of the patient's history were reviewed and updated as appropriate: allergies, current medications, past family history, past medical history, past social history, past surgical history and problem list   Past Medical History:   Diagnosis Date    Kidney stone     Obesity 4/15/2013    Peripheral edema     Primary osteoarthritis involving multiple joints 2020    Sleep apnea     Wears dentures     Wears glasses      Past Surgical History:   Procedure Laterality Date    BACK SURGERY      lump removed near shoulder on right side     SECTION      x3, 2005,,     HYSTERECTOMY      MOUTH SURGERY      21 teeth removed    [place] CYSTOURETHROSCOPY N/A 10/8/2020    Procedure: CYSTOSCOPY;  Surgeon: Marilu Torres MD;  [facility];  Service: Gynecology    [place] LAPAROSCOPY W TOT HYSTERECTUTERUS <=250 Kendall Chroman  W TUBE/OVARY N/A 10/8/2020    Procedure: ROBOTIC TOTAL HYSTERECTOMY; BILATERAL SALPINGECTOMY;  Surgeon: Marilu Torres MD;  [facility];  Service: Gynecology  TUBAL LIGATION  2012     Social History     Socioeconomic History    Marital status: /Civil Union     Spouse name: Not on file    Number of children: Not on file    Years of education: Not on file    Highest education level: Not on file   Occupational History    Occupation:    Tobacco Use    Smoking status: Current Every Day Smoker     Years: 30 00     Types: Cigarettes     Last attempt to quit: 5/10/2022     Years since quittin 2    Smokeless tobacco: Never Used   Vaping Use    Vaping Use: Never used   Substance and Sexual Activity    Alcohol use: Not Currently     Comment: 3x per year will have 1-2 drinks    Drug use: No    Sexual activity: Not Currently   Other Topics Concern    Not on file   Social History Narrative    fhx not asked intriage    EMPLOYED     Social Determinants of Health     Financial Resource Strain: Not on file   Food Insecurity: Not on file   Transportation Needs: Not on file   Physical Activity: Not on file   Stress: Not on file   Social Connections: Not on file   Intimate Partner Violence: Not on file   Housing Stability: Not on file       Objective   /66   Pulse 85   Temp 97 8 °F (36 6 °C)   Resp 18   Ht 5' 3" (1 6 m)   Wt 109 kg (240 lb 12 8 oz)   LMP 2020   SpO2 96%   BMI 42 66 kg/m²      Physical Exam     Physical Exam  Vitals and nursing note reviewed  Constitutional:       General: She is not in acute distress  Appearance: She is well-developed  She is not diaphoretic  HENT:      Head: Normocephalic and atraumatic  Right Ear: Tympanic membrane and external ear normal       Left Ear: Tympanic membrane and external ear normal       Nose: Nose normal       Mouth/Throat:      Mouth: Mucous membranes are moist       Pharynx: Oropharyngeal exudate and posterior oropharyngeal erythema present  Eyes:      General: No scleral icterus  Right eye: No discharge  Left eye: No discharge  Conjunctiva/sclera: Conjunctivae normal    Cardiovascular:      Rate and Rhythm: Normal rate and regular rhythm  Heart sounds: Normal heart sounds  No murmur heard  No friction rub  No gallop  Pulmonary:      Effort: Pulmonary effort is normal  No respiratory distress  Breath sounds: Normal breath sounds  No decreased breath sounds, wheezing, rhonchi or rales  Lymphadenopathy:      Cervical: Cervical adenopathy present  Right cervical: Superficial cervical adenopathy present  Left cervical: Superficial cervical adenopathy present  Skin:     General: Skin is warm and dry  Coloration: Skin is not pale  Findings: No erythema or rash  Neurological:      Mental Status: She is alert and oriented to person, place, and time  Psychiatric:         Behavior: Behavior normal          Thought Content:  Thought content normal          Judgment: Judgment normal          Anitha Johnson PA-C

## 2022-09-10 DIAGNOSIS — F41.1 GAD (GENERALIZED ANXIETY DISORDER): ICD-10-CM

## 2022-09-10 DIAGNOSIS — F41.0 PANIC ATTACK: ICD-10-CM

## 2022-09-10 DIAGNOSIS — R07.89 ATYPICAL CHEST PAIN: ICD-10-CM

## 2022-09-10 RX ORDER — PAROXETINE HYDROCHLORIDE 20 MG/1
TABLET, FILM COATED ORAL
Qty: 90 TABLET | Refills: 1 | Status: SHIPPED | OUTPATIENT
Start: 2022-09-10

## 2022-09-10 RX ORDER — BUSPIRONE HYDROCHLORIDE 10 MG/1
TABLET ORAL
Qty: 270 TABLET | Refills: 1 | Status: SHIPPED | OUTPATIENT
Start: 2022-09-10

## 2022-09-19 ENCOUNTER — OFFICE VISIT (OUTPATIENT)
Dept: INTERNAL MEDICINE CLINIC | Facility: CLINIC | Age: 42
End: 2022-09-19
Payer: COMMERCIAL

## 2022-09-19 VITALS
DIASTOLIC BLOOD PRESSURE: 74 MMHG | HEART RATE: 79 BPM | TEMPERATURE: 97.6 F | OXYGEN SATURATION: 100 % | WEIGHT: 293 LBS | BODY MASS INDEX: 51.91 KG/M2 | SYSTOLIC BLOOD PRESSURE: 120 MMHG | HEIGHT: 63 IN

## 2022-09-19 DIAGNOSIS — M15.9 PRIMARY OSTEOARTHRITIS INVOLVING MULTIPLE JOINTS: ICD-10-CM

## 2022-09-19 DIAGNOSIS — Z23 ENCOUNTER FOR VACCINATION: ICD-10-CM

## 2022-09-19 DIAGNOSIS — E66.01 OBESITY, CLASS III, BMI 40-49.9 (MORBID OBESITY) (HCC): ICD-10-CM

## 2022-09-19 DIAGNOSIS — Z12.31 ENCOUNTER FOR SCREENING MAMMOGRAM FOR MALIGNANT NEOPLASM OF BREAST: ICD-10-CM

## 2022-09-19 DIAGNOSIS — G89.4 CHRONIC PAIN SYNDROME: ICD-10-CM

## 2022-09-19 DIAGNOSIS — G47.33 OBSTRUCTIVE SLEEP APNEA TREATED WITH CONTINUOUS POSITIVE AIRWAY PRESSURE (CPAP): ICD-10-CM

## 2022-09-19 DIAGNOSIS — Z99.89 OBSTRUCTIVE SLEEP APNEA TREATED WITH CONTINUOUS POSITIVE AIRWAY PRESSURE (CPAP): ICD-10-CM

## 2022-09-19 DIAGNOSIS — L98.9 SKIN LESION: ICD-10-CM

## 2022-09-19 DIAGNOSIS — F41.1 GAD (GENERALIZED ANXIETY DISORDER): ICD-10-CM

## 2022-09-19 DIAGNOSIS — Z72.0 TOBACCO ABUSE: ICD-10-CM

## 2022-09-19 DIAGNOSIS — L91.8 SKIN TAGS, MULTIPLE ACQUIRED: ICD-10-CM

## 2022-09-19 DIAGNOSIS — E78.2 MIXED HYPERLIPIDEMIA: Primary | ICD-10-CM

## 2022-09-19 PROBLEM — Z01.810 PREOP CARDIOVASCULAR EXAM: Status: RESOLVED | Noted: 2022-04-15 | Resolved: 2022-09-19

## 2022-09-19 PROCEDURE — 99214 OFFICE O/P EST MOD 30 MIN: CPT | Performed by: INTERNAL MEDICINE

## 2022-09-19 NOTE — PATIENT INSTRUCTIONS
Cigarette Smoking and Your Health   AMBULATORY CARE:   Risks to your health if you smoke:  Nicotine and other chemicals found in tobacco and e-cigarettes can damage every cell in your body  Even if you are a light smoker, you have an increased risk for cancer, heart disease, and lung disease  If you are pregnant or have diabetes, smoking increases your risk for complications  Nicotine can affect an adolescent's developing brain  This can lead to trouble thinking, learning, or paying attention  Benefits to your health if you stop smoking: You decrease respiratory symptoms such as coughing, wheezing, and shortness of breath  You reduce your risk for cancers of the lung, mouth, throat, kidney, bladder, pancreas, stomach, and cervix  If you already have cancer, you increase the benefits of chemotherapy  You also reduce your risk for cancer returning or a second cancer from developing  You reduce your risk for heart disease, blood clots, heart attack, and stroke  You reduce your risk for lung infections, and diseases such as pneumonia, asthma, chronic bronchitis, and emphysema  Your circulation improves  More oxygen can be delivered to your body  If you have diabetes, you lower your risk for complications, such as kidney, artery, and eye diseases  You also lower your risk for nerve damage  Nerve damage can lead to amputations, poor vision, and blindness  You improve your body's ability to heal and to fight infections  An adolescent can help his or her brain and body develop in a healthy way  Talk to your adolescent about all the health risks of nicotine  If you can, start talking about nicotine when your child is younger than 12 years  This may make it easier for him or her not to start using nicotine as a teenager or adult  Explain to him or her that it is best never to start  It can be hard to try to quit later      Benefits to the health of others if you stop smoking:  Tobacco is harmful to nonsmokers who breathe in your secondhand smoke  The following are ways the health of others around you may improve when you stop smoking: You lower the risks for lung cancer and heart disease in nonsmoking adults  If you are pregnant, you lower the risk for miscarriage, early delivery, low birth weight, and stillbirth  You also lower your baby's risk for SIDS, obesity, developmental delay, and neurobehavioral problems, such as ADHD  If you have children, you lower their risk for ear infections, colds, pneumonia, bronchitis, and asthma  Follow up with your doctor as directed:  Write down your questions so you remember to ask them during your visits  For support and more information:   [employer]  [address] [place] [address],[address]  [address]  Phone: [facility] [address]  Phone: [phone]  Web Address: Giov[employer] webster    Smokefree  gov  Phone: 4- 050 - 977-6611  Web Address: www smokefree  [facility] 2022 Information is for End User's use only and may not be sold, redistributed or otherwise used for commercial purposes  All illustrations and images included in CareNotes® are the copyrighted property of A D A M , Inc  or [facility] [employer]   The above information is an  only  It is not intended as medical advice for individual conditions or treatments  Talk to your doctor, nurse or pharmacist before following any medical regimen to see if it is safe and effective for you

## 2022-09-19 NOTE — PROGRESS NOTES
Depression Screening and Follow-up Plan: Patient was screened for depression during today's encounter  They screened negative with a PHQ-2 score of 0      Assessment/Plan:  Problem List Items Addressed This Visit        Respiratory    Obstructive sleep apnea treated with continuous positive airway pressure (CPAP)       Musculoskeletal and Integument    Primary osteoarthritis involving multiple joints       Other    Tobacco abuse    Obesity, Class III, BMI 40-49 9 (morbid obesity) (HCC)    Mixed hyperlipidemia - Primary    JOSEFINA (generalized anxiety disorder)    Relevant Orders    TSH, 3rd generation with Free T4 reflex    [facility] All Prescribed Meds and Special Instructions    Amphetamines, Methamphetamines    Butalbital    Phenobarbital    Secobarbital    Temazepam    Alprazolam    Clonazepam    Diazepam    Lorazepam    Oxazepam    Gabapentin    Pregabalin    Cocaine    Heroin    Buprenorphine    Levorphanol    Meperidine    Naltrexone    Fentanyl    Methadone    Oxycodone    Oxymorphone    Tapentadol    THC    Tramadol    Codeine, Hydrocodone, Hydropmorphone, Morphine    Bath Salts    Ethyl Glucuronide/Ethyl Sulfate    Kratom    Spice    Methylphenidate    Phentermine    Validity Oxidant    Validity Creatinine    Validity pH    Validity Specific    Chronic pain syndrome      Other Visit Diagnoses     Encounter for screening mammogram for malignant neoplasm of breast        Relevant Orders    Mammo screening bilateral w 3d & cad    Encounter for vaccination        Skin tags, multiple acquired        Relevant Orders    Ambulatory referral to General Surgery    Skin lesion        Relevant Orders    Ambulatory referral to General Surgery           Diagnoses and all orders for this visit:    Mixed hyperlipidemia    Encounter for screening mammogram for malignant neoplasm of breast  -     Mammo screening bilateral w 3d & cad; Future    JOSEFINA (generalized anxiety disorder)  -     TSH, 3rd generation with Free T4 reflex; Future  -     [facility] All Prescribed Meds and Special Instructions  -     Amphetamines, Methamphetamines  -     Butalbital  -     Phenobarbital  -     Secobarbital  -     Temazepam  -     Alprazolam  -     Clonazepam  -     Diazepam  -     Lorazepam  -     Oxazepam  -     Gabapentin  -     Pregabalin  -     Cocaine  -     Heroin  -     Buprenorphine  -     Levorphanol  -     Meperidine  -     Naltrexone  -     Fentanyl  -     Methadone  -     Oxycodone  -     Oxymorphone  -     Tapentadol  -     THC  -     Tramadol  -     Codeine, Hydrocodone, Hydropmorphone, Morphine  -     Bath Salts  -     Ethyl Glucuronide/Ethyl Sulfate  -     Kratom  -     Spice  -     Methylphenidate  -     Phentermine  -     Validity Oxidant  -     Validity Creatinine  -     Validity pH  -     Validity Specific    Chronic pain syndrome    Obesity, Class III, BMI 40-49 9 (morbid obesity) (HCC)    Tobacco abuse    Primary osteoarthritis involving multiple joints    Obstructive sleep apnea treated with continuous positive airway pressure (CPAP)    Encounter for vaccination    Skin tags, multiple acquired  -     Ambulatory referral to General Surgery; Future    Skin lesion  -     Ambulatory referral to General Surgery; Future      No problem-specific Assessment & Plan notes found for this encounter  A/P: doing ok and will check labs  Wean tobacco  Discussed vaccines and defers  Will refer to general sx for skin tag and skin lesion removal as we are out of liquid nitrogen and having issues getting it  Also, leg lesion will need a wide excision and looks like it will need aggressive hemostasis    Continue current treatment and RTC six months for routine  Subjective:      Patient ID: Romulo Pa is a 36 y o  female  WF RTC for f/u hyperlipidemia, DJD, etc  Doing ok and no new issue except for some skin lesions  Notes skin tags on the neck and armpit that bleed due to getting caught on the clothes and jewelry   Also, several lesion on the leg that bleed from shaving  Remains active w/o difficulty and no falls  Still smoking  Chronic pain is manageable  JOSEFINA is controlled  CHESTER is stable  Due for labs, vaccines, and mammo  The following portions of the patient's history were reviewed and updated as appropriate:   She has a past medical history of Kidney stone, Obesity (4/15/2013), Peripheral edema, Primary osteoarthritis involving multiple joints (2020), Sleep apnea, Wears dentures, and Wears glasses  ,  does not have any pertinent problems on file  ,   has a past surgical history that includes Mouth surgery;  section; Tubal ligation (); Back surgery; pr laparoscopy w tot hysterectuterus <=250 gram  w tube/ovary (N/A, 10/8/2020); pr cystourethroscopy (N/A, 10/8/2020); and Hysterectomy  ,  family history includes COPD in her family; Depression in her mother; Diverticulitis in her mother; Emphysema in her family; Heart murmur in her mother; Hypertension in her father and mother; Irritable bowel syndrome in her mother; Lung disease in her brother; Multiple sclerosis in her brother; No Known Problems in her daughter, daughter, daughter, maternal aunt, and maternal aunt; Obesity in her father, mother, and paternal grandmother; Spina bifida in her father; Stroke in her maternal grandmother and paternal grandmother  ,   reports that she has been smoking cigarettes  She has smoked for the past 30 00 years  She has never used smokeless tobacco  She reports previous alcohol use  She reports that she does not use drugs  ,  is allergic to clindamycin/lincomycin, levaquin [levofloxacin], and penicillins     Current Outpatient Medications   Medication Sig Dispense Refill    acetaminophen (TYLENOL) 650 mg CR tablet Take 1 tablet (650 mg total) by mouth every 8 (eight) hours as needed for mild pain 30 tablet 0    busPIRone (BUSPAR) 10 mg tablet take 1 tablet by mouth three times a day 270 tablet 1    gabapentin (NEURONTIN) 600 MG tablet Take 1 tablet (600 mg total) by mouth 4 (four) times a day 120 tablet 5    PARoxetine (PAXIL) 20 mg tablet take 2 tablets by mouth daily at bedtime 90 tablet 1     No current facility-administered medications for this visit  Review of Systems   Constitutional: Negative for activity change, chills, diaphoresis, fatigue and fever  HENT: Negative  Eyes: Negative for visual disturbance  Respiratory: Negative for cough, chest tightness, shortness of breath and wheezing  Cardiovascular: Negative for chest pain, palpitations and leg swelling  Gastrointestinal: Negative for abdominal pain, constipation, diarrhea, nausea and vomiting  Endocrine: Negative for cold intolerance and heat intolerance  Genitourinary: Negative for difficulty urinating, dysuria and frequency  Musculoskeletal: Negative for arthralgias, gait problem and myalgias  Skin:        Skin tags   Neurological: Negative for dizziness, seizures, syncope, weakness, light-headedness and headaches  Psychiatric/Behavioral: Negative for confusion, dysphoric mood and sleep disturbance  The patient is not nervous/anxious  PHQ-2/9 Depression Screening    Little interest or pleasure in doing things: 0 - not at all  Feeling down, depressed, or hopeless: 0 - not at all  PHQ-2 Score: 0  PHQ-2 Interpretation: Negative depression screen        Objective:  Vitals:    09/19/22 1452   BP: 120/74   Pulse: 79   Temp: 97 6 °F (36 4 °C)   SpO2: 100%   Weight: (!) 158 kg (348 lb 6 oz)   Height: 5' 3" (1 6 m)     Body mass index is 61 71 kg/m²  Physical Exam  Vitals and nursing note reviewed  Constitutional:       General: She is not in acute distress  Appearance: Normal appearance  She is obese  She is not ill-appearing  HENT:      Head: Normocephalic and atraumatic  Mouth/Throat:      Mouth: Mucous membranes are moist    Eyes:      Extraocular Movements: Extraocular movements intact        Conjunctiva/sclera: Conjunctivae normal  Pupils: Pupils are equal, round, and reactive to light  Neck:      Vascular: No carotid bruit  Cardiovascular:      Rate and Rhythm: Normal rate and regular rhythm  Heart sounds: Normal heart sounds  Pulmonary:      Effort: Pulmonary effort is normal  No respiratory distress  Breath sounds: Normal breath sounds  No wheezing or rales  Abdominal:      General: Bowel sounds are normal  There is no distension  Palpations: Abdomen is soft  Tenderness: There is no abdominal tenderness  Musculoskeletal:      Cervical back: Neck supple  Right lower leg: Edema present  Left lower leg: Edema present  Skin:     Findings: Lesion present  Neurological:      General: No focal deficit present  Mental Status: She is alert and oriented to person, place, and time  Mental status is at baseline  Psychiatric:         Mood and Affect: Mood normal          Behavior: Behavior normal          Thought Content: Thought content normal          Judgment: Judgment normal          Tobacco Cessation Counseling: Tobacco cessation counseling and education was provided  The patient is sincerely urged to quit consumption of tobacco  She is not ready to quit tobacco  The numerous health risks of tobacco consumption were discussed  If she decides to quit, there are a number of helpful adjunctive aids, and she can see me to discuss nicotine replacement therapy, chantix, or bupropion anytime in the future

## 2022-09-21 LAB
6MAM UR QL CFM: NEGATIVE NG/ML
7AMINOCLONAZEPAM UR QL CFM: NEGATIVE NG/ML
A-OH ALPRAZ UR QL CFM: ABNORMAL NG/ML
ACCEPTABLE CREAT UR QL: NORMAL MG/DL
ACCEPTIBLE SP GR UR QL: NORMAL
AMPHET UR QL CFM: NEGATIVE NG/ML
BUPRENORPHINE UR QL CFM: NEGATIVE NG/ML
BUTALBITAL UR QL CFM: NEGATIVE NG/ML
BZE UR QL CFM: NEGATIVE NG/ML
CODEINE UR QL CFM: NEGATIVE NG/ML
EDDP UR QL CFM: NEGATIVE NG/ML
ETHYL GLUCURONIDE UR QL CFM: NEGATIVE NG/ML
ETHYL SULFATE UR QL SCN: NEGATIVE NG/ML
EUTYLONE UR QL: NEGATIVE NG/ML
FENTANYL UR QL CFM: NEGATIVE NG/ML
GLIADIN IGG SER IA-ACNC: NEGATIVE NG/ML
HYDROCODONE UR QL CFM: NEGATIVE NG/ML
HYDROMORPHONE UR QL CFM: NEGATIVE NG/ML
LORAZEPAM UR QL CFM: NEGATIVE NG/ML
ME-PHENIDATE UR QL CFM: NEGATIVE NG/ML
MEPERIDINE UR QL CFM: NEGATIVE NG/ML
METHADONE UR QL CFM: NEGATIVE NG/ML
METHAMPHET UR QL CFM: NEGATIVE NG/ML
MORPHINE UR QL CFM: NEGATIVE NG/ML
NALTREXONE UR QL CFM: NEGATIVE NG/ML
NITRITE UR QL: NORMAL UG/ML
NORBUPRENORPHINE UR QL CFM: NEGATIVE NG/ML
NORDIAZEPAM UR QL CFM: NEGATIVE NG/ML
NORFENTANYL UR QL CFM: NEGATIVE NG/ML
NORHYDROCODONE UR QL CFM: NEGATIVE NG/ML
NORMEPERIDINE UR QL CFM: NEGATIVE NG/ML
NOROXYCODONE UR QL CFM: NEGATIVE NG/ML
OXAZEPAM UR QL CFM: NEGATIVE NG/ML
OXYCODONE UR QL CFM: NEGATIVE NG/ML
OXYMORPHONE UR QL CFM: NEGATIVE NG/ML
OXYMORPHONE UR QL CFM: NEGATIVE NG/ML
PARA-FLUOROFENTANYL QUANTIFICATION: NORMAL NG/ML
PHENOBARB UR QL CFM: NEGATIVE NG/ML
RESULT ALL_PRESCRIBED MEDS AND SPECIAL INSTRUCTIONS: NORMAL
SECOBARBITAL UR QL CFM: NEGATIVE NG/ML
SL AMB 4-ANPP QUANTIFICATION: NORMAL NG/ML
SL AMB 5F-ADB-M7 METABOLITE QUANTIFICATION: NEGATIVE NG/ML
SL AMB 7-OH-MITRAGYNINE (KRATOM ALKALOID) QUANTIFICATION: NEGATIVE NG/ML
SL AMB AB-FUBINACA-M3 METABOLITE QUANTIFICATION: NEGATIVE NG/ML
SL AMB ACETYL FENTANYL QUANTIFICATION: NORMAL NG/ML
SL AMB ACETYL NORFENTANYL QUANTIFICATION: NORMAL NG/ML
SL AMB ACRYL FENTANYL QUANTIFICATION: NORMAL NG/ML
SL AMB CARFENTANIL QUANTIFICATION: NORMAL NG/ML
SL AMB CTHC (MARIJUANA METABOLITE) QUANTIFICATION: NEGATIVE NG/ML
SL AMB DEXTRORPHAN (DEXTROMETHORPHAN METABOLITE) QUANT: NEGATIVE NG/ML
SL AMB GABAPENTIN QUANTIFICATION: NORMAL
SL AMB JWH018 METABOLITE QUANTIFICATION: NEGATIVE NG/ML
SL AMB JWH073 METABOLITE QUANTIFICATION: NEGATIVE NG/ML
SL AMB MDMB-FUBINACA-M1 METABOLITE QUANTIFICATION: NEGATIVE NG/ML
SL AMB METHYLONE QUANTIFICATION: NEGATIVE NG/ML
SL AMB N-DESMETHYL-TRAMADOL QUANTIFICATION: NEGATIVE NG/ML
SL AMB PHENTERMINE QUANTIFICATION: NEGATIVE NG/ML
SL AMB PREGABALIN QUANTIFICATION: NEGATIVE
SL AMB RCS4 METABOLITE QUANTIFICATION: NEGATIVE NG/ML
SL AMB RITALINIC ACID QUANTIFICATION: NEGATIVE NG/ML
SMOOTH MUSCLE AB TITR SER IF: NEGATIVE NG/ML
SPECIMEN DRAWN SERPL: NEGATIVE NG/ML
SPECIMEN PH ACCEPTABLE UR: NORMAL
TAPENTADOL UR QL CFM: NEGATIVE NG/ML
TEMAZEPAM UR QL CFM: NEGATIVE NG/ML
TEMAZEPAM UR QL CFM: NEGATIVE NG/ML
TRAMADOL UR QL CFM: NEGATIVE NG/ML
URATE/CREAT 24H UR: NEGATIVE NG/ML

## 2022-10-12 ENCOUNTER — CONSULT (OUTPATIENT)
Dept: SURGERY | Facility: CLINIC | Age: 42
End: 2022-10-12
Payer: COMMERCIAL

## 2022-10-12 VITALS
SYSTOLIC BLOOD PRESSURE: 130 MMHG | HEART RATE: 83 BPM | RESPIRATION RATE: 18 BRPM | TEMPERATURE: 97.6 F | OXYGEN SATURATION: 96 % | WEIGHT: 293 LBS | DIASTOLIC BLOOD PRESSURE: 78 MMHG | BODY MASS INDEX: 61.47 KG/M2

## 2022-10-12 DIAGNOSIS — L91.8 SKIN TAGS, MULTIPLE ACQUIRED: ICD-10-CM

## 2022-10-12 DIAGNOSIS — L91.8 CUTANEOUS SKIN TAGS: Primary | ICD-10-CM

## 2022-10-12 DIAGNOSIS — L98.9 SKIN LESION: ICD-10-CM

## 2022-10-12 DIAGNOSIS — D48.5 NEOPLASM OF UNCERTAIN BEHAVIOR OF SKIN OF LOWER LEG: ICD-10-CM

## 2022-10-12 PROCEDURE — 11200 RMVL SKIN TAGS UP TO&INC 15: CPT | Performed by: SURGERY

## 2022-10-12 PROCEDURE — 99243 OFF/OP CNSLTJ NEW/EST LOW 30: CPT | Performed by: SURGERY

## 2022-10-12 NOTE — ASSESSMENT & PLAN NOTE
Patient has a longstanding history for bilateral firm subcutaneous skin nodules of uncertain etiology but most likely consistent with the diagnosis of dermatofibroma  Patient is interested in pursuing definitive diagnosis and treatment at some point the future

## 2022-10-12 NOTE — ASSESSMENT & PLAN NOTE
Patient presents for the definitive treatment of multiple skin tags over her neck bilaterally axilla bilaterally and left chest wall  On physical examination the patient has 2 skin tags on her left neck 4 in her left axilla 2 on her left chest wall 3 on her right neck and 2 in her right axilla  Technical details of the procedure reviewed with the patient  Informed verbal consent obtained to proceed with excision by shave technique

## 2022-10-12 NOTE — PROGRESS NOTES
Assessment/Plan:    Cutaneous skin tags  Patient presents for the definitive treatment of multiple skin tags over her neck bilaterally axilla bilaterally and left chest wall  On physical examination the patient has 2 skin tags on her left neck 4 in her left axilla 2 on her left chest wall 3 on her right neck and 2 in her right axilla  Technical details of the procedure reviewed with the patient  Informed verbal consent obtained to proceed with excision by shave technique  Neoplasm of uncertain behavior of skin of lower leg  Patient has a longstanding history for bilateral firm subcutaneous skin nodules of uncertain etiology but most likely consistent with the diagnosis of dermatofibroma  Patient is interested in pursuing definitive diagnosis and treatment at some point the future  Subjective:      Patient ID: Ese Fuentes is a 36 y o  female  Pt is coming in the office today for Skin tags, multiple acquired/ Skin lesion all over the body some cause her pain and have been getting caught while shaving or wear clothes  And the ones on her legs have gotten larger  But no fever/chills Yodit POWELL MA      The following portions of the patient's history were reviewed and updated as appropriate: allergies, current medications, past family history, past medical history, past social history, past surgical history and problem list     Review of Systems   Constitutional: Negative for chills and fever  HENT: Negative for ear pain and sore throat  Eyes: Negative for pain and visual disturbance  Respiratory: Negative for cough and shortness of breath  Cardiovascular: Negative for chest pain and palpitations  Gastrointestinal: Negative for abdominal pain and vomiting  Genitourinary: Negative for dysuria and hematuria  Musculoskeletal: Negative for arthralgias and back pain  Skin: Negative for color change and rash  Neurological: Negative for seizures and syncope     All other systems reviewed and are negative  Objective:      /78 (BP Location: Left arm, Patient Position: Sitting, Cuff Size: Large)   Pulse 83   Temp 97 6 °F (36 4 °C) (Temporal)   Resp 18   Wt (!) 157 kg (347 lb)   LMP 08/25/2020   SpO2 96%   BMI 61 47 kg/m²            Physical Exam  Constitutional:       Appearance: She is well-developed  HENT:      Head: Normocephalic and atraumatic  Eyes:      Conjunctiva/sclera: Conjunctivae normal       Pupils: Pupils are equal, round, and reactive to light  Cardiovascular:      Rate and Rhythm: Normal rate and regular rhythm  Pulmonary:      Effort: Pulmonary effort is normal       Breath sounds: Normal breath sounds  Abdominal:      General: Bowel sounds are normal       Palpations: Abdomen is soft  Musculoskeletal:         General: Normal range of motion  Cervical back: Normal range of motion and neck supple  Skin:     General: Skin is warm and dry  Comments: Skin tags as described above  Neurological:      Mental Status: She is alert and oriented to person, place, and time  Psychiatric:         Behavior: Behavior normal          Thought Content: Thought content normal          Judgment: Judgment normal        Skin tag removal    Date/Time: 10/12/2022 8:33 AM  Performed by: Tricia Villarreal MD  Authorized by: Tricia Villarreal MD   Universal Protocol:  Consent: Verbal consent obtained  Written consent obtained  Risks and benefits: risks, benefits and alternatives were discussed  Consent given by: patient  Time out: Immediately prior to procedure a "time out" was called to verify the correct patient, procedure, equipment, support staff and site/side marked as required    Timeout called at: 10/12/2022 8:34 AM   Patient understanding: patient states understanding of the procedure being performed  Patient consent: the patient's understanding of the procedure matches consent given  Site marked: the operative site was marked  Patient identity confirmed: verbally with patient        Procedure Details - Skin Tag Destruction:     Up to 15      Body area:  Head/neck    Head/neck location:  Neck    Malignancy: benign lesion      Destruction method: scissors used for extraction    Lesion 2:     Body area:  Head/neck    Head/neck location:  Neck    Malignancy: benign lesion      Destruction method: scissors used for extraction    Lesion 3:     Body area:  Trunk    Trunk location:  L axilla    Malignancy: benign lesion      Destruction method: scissors used for extraction    Lesion 4:     Body area:  Trunk    Trunk location:  L axilla    Malignancy: benign lesion      Destruction method: scissors used for extraction    Lesion 5:     Body area:  Trunk    Trunk location:  Chest    Malignancy: benign lesion      Destruction method: scissors used for extraction    Lesion 6:     Body area:  Trunk    Trunk location:  R axilla    Malignancy: benign lesion      Destruction method: scissors used for extraction       Procedure note: With informed verbal consent under sterile conditions using aseptic technique using 0 25% bupivacaine and bicarbonate 13 skin tags were excised off of the neck bilaterally axilla bilaterally and left chest   Patient tolerated the procedure well

## 2022-10-12 NOTE — LETTER
October 12, 2022     [facility] [address] [facility]    Patient: Allison Yoon   YOB: 1980   Date of Visit: 10/12/2022       Dear Dr Mika Delcid: Thank you for referring Jalil Dasilva to me for evaluation  Below are my notes for this consultation  If you have questions, please do not hesitate to call me  I look forward to following your patient along with you  Sincerely,        Rosamaria Gonzales MD        CC: Mayte Horn  Rosamaria Gonzales MD  10/12/2022  8:36 AM  Sign when Signing Visit  Assessment/Plan:    Cutaneous skin tags  Patient presents for the definitive treatment of multiple skin tags over her neck bilaterally axilla bilaterally and left chest wall  On physical examination the patient has 2 skin tags on her left neck 4 in her left axilla 2 on her left chest wall 3 on her right neck and 2 in her right axilla  Technical details of the procedure reviewed with the patient  Informed verbal consent obtained to proceed with excision by shave technique  Neoplasm of uncertain behavior of skin of lower leg  Patient has a longstanding history for bilateral firm subcutaneous skin nodules of uncertain etiology but most likely consistent with the diagnosis of dermatofibroma  Patient is interested in pursuing definitive diagnosis and treatment at some point the future  Subjective:      Patient ID: Allison Yoon is a 36 y o  female  Pt is coming in the office today for Skin tags, multiple acquired/ Skin lesion all over the body some cause her pain and have been getting caught while shaving or wear clothes  And the ones on her legs have gotten larger   But no fever/chills Yodit POWELL MA      The following portions of the patient's history were reviewed and updated as appropriate: allergies, current medications, past family history, past medical history, past social history, past surgical history and problem list     Review of Systems   Constitutional: Negative for chills and fever  HENT: Negative for ear pain and sore throat  Eyes: Negative for pain and visual disturbance  Respiratory: Negative for cough and shortness of breath  Cardiovascular: Negative for chest pain and palpitations  Gastrointestinal: Negative for abdominal pain and vomiting  Genitourinary: Negative for dysuria and hematuria  Musculoskeletal: Negative for arthralgias and back pain  Skin: Negative for color change and rash  Neurological: Negative for seizures and syncope  All other systems reviewed and are negative  Objective:      /78 (BP Location: Left arm, Patient Position: Sitting, Cuff Size: Large)   Pulse 83   Temp 97 6 °F (36 4 °C) (Temporal)   Resp 18   Wt (!) 157 kg (347 lb)   LMP 08/25/2020   SpO2 96%   BMI 61 47 kg/m²            Physical Exam  Constitutional:       Appearance: She is well-developed  HENT:      Head: Normocephalic and atraumatic  Eyes:      Conjunctiva/sclera: Conjunctivae normal       Pupils: Pupils are equal, round, and reactive to light  Cardiovascular:      Rate and Rhythm: Normal rate and regular rhythm  Pulmonary:      Effort: Pulmonary effort is normal       Breath sounds: Normal breath sounds  Abdominal:      General: Bowel sounds are normal       Palpations: Abdomen is soft  Musculoskeletal:         General: Normal range of motion  Cervical back: Normal range of motion and neck supple  Skin:     General: Skin is warm and dry  Comments: Skin tags as described above  Neurological:      Mental Status: She is alert and oriented to person, place, and time  Psychiatric:         Behavior: Behavior normal          Thought Content: Thought content normal          Judgment: Judgment normal        Skin tag removal    Date/Time: 10/12/2022 8:33 AM  Performed by: Ana María Wall MD  Authorized by: Ana María Wall MD   Universal Protocol:  Consent: Verbal consent obtained  Written consent obtained  Risks and benefits: risks, benefits and alternatives were discussed  Consent given by: patient  Time out: Immediately prior to procedure a "time out" was called to verify the correct patient, procedure, equipment, support staff and site/side marked as required  Timeout called at: 10/12/2022 8:34 AM   Patient understanding: patient states understanding of the procedure being performed  Patient consent: the patient's understanding of the procedure matches consent given  Site marked: the operative site was marked  Patient identity confirmed: verbally with patient        Procedure Details - Skin Tag Destruction:     Up to 15      Body area:  Head/neck    Head/neck location:  Neck    Malignancy: benign lesion      Destruction method: scissors used for extraction    Lesion 2:     Body area:  Head/neck    Head/neck location:  Neck    Malignancy: benign lesion      Destruction method: scissors used for extraction    Lesion 3:     Body area:  Trunk    Trunk location:  L axilla    Malignancy: benign lesion      Destruction method: scissors used for extraction    Lesion 4:     Body area:  Trunk    Trunk location:  L axilla    Malignancy: benign lesion      Destruction method: scissors used for extraction    Lesion 5:     Body area:  Trunk    Trunk location:  Chest    Malignancy: benign lesion      Destruction method: scissors used for extraction    Lesion 6:     Body area:  Trunk    Trunk location:  R axilla    Malignancy: benign lesion      Destruction method: scissors used for extraction       Procedure note: With informed verbal consent under sterile conditions using aseptic technique using 0 25% bupivacaine and bicarbonate 13 skin tags were excised off of the neck bilaterally axilla bilaterally and left chest   Patient tolerated the procedure well

## 2022-10-26 ENCOUNTER — OFFICE VISIT (OUTPATIENT)
Dept: PAIN MEDICINE | Facility: CLINIC | Age: 42
End: 2022-10-26
Payer: COMMERCIAL

## 2022-10-26 VITALS
HEIGHT: 63 IN | SYSTOLIC BLOOD PRESSURE: 157 MMHG | DIASTOLIC BLOOD PRESSURE: 91 MMHG | HEART RATE: 84 BPM | WEIGHT: 293 LBS | BODY MASS INDEX: 51.91 KG/M2

## 2022-10-26 DIAGNOSIS — G89.4 CHRONIC PAIN SYNDROME: Primary | ICD-10-CM

## 2022-10-26 DIAGNOSIS — G89.29 CHRONIC CERVICAL PAIN: ICD-10-CM

## 2022-10-26 DIAGNOSIS — M54.2 CHRONIC CERVICAL PAIN: ICD-10-CM

## 2022-10-26 DIAGNOSIS — M54.12 CERVICAL RADICULOPATHY: ICD-10-CM

## 2022-10-26 PROCEDURE — 99214 OFFICE O/P EST MOD 30 MIN: CPT | Performed by: NURSE PRACTITIONER

## 2022-10-26 RX ORDER — PREGABALIN 50 MG/1
CAPSULE ORAL
Qty: 90 CAPSULE | Refills: 1 | Status: SHIPPED | OUTPATIENT
Start: 2022-10-26

## 2022-10-26 NOTE — PATIENT INSTRUCTIONS
Pregabalin (By mouth)   Pregabalin (pre-GA-ba-montana)  Treats nerve and muscle pain, including fibromyalgia  Also treats partial-onset seizures  Brand Name(s): Lyrica, Lyrica CR   There may be other brand names for this medicine  When This Medicine Should Not Be Used: This medicine is not right for everyone  Do not use it if you had an allergic reaction to pregabalin  How to Use This Medicine:   Capsule, Liquid, Long Acting Tablet  Take your medicine as directed  Your dose may need to be changed several times to find what works best for you  Extended-release tablet: Swallow the extended-release tablet whole  Do not crush, break, or chew it  Take it after an evening meal   Oral liquid: Measure the oral liquid medicine with a marked measuring spoon, oral syringe, or medicine cup  This medicine should come with a Medication Guide  Ask your pharmacist for a copy if you do not have one  Missed dose: Take a dose as soon as you remember  If it is almost time for your next dose, wait until then and take a regular dose  Do not take extra medicine to make up for a missed dose  If you miss a dose of the extended-release tablet after your evening meal, take it before bedtime after a snack  If you miss the dose before bedtime, take it after your morning meal  If you do not take the dose the following morning, then take the next dose at your regular time after your evening meal  Do not take 2 doses at the same time  Store the medicine in a closed container at room temperature, away from heat, moisture, and direct light  Drugs and Foods to Avoid:   Ask your doctor or pharmacist before using any other medicine, including over-the-counter medicines, vitamins, and herbal products  Some medicines can affect how pregabalin works   Tell your doctor if you are using any of the following:   ACE inhibitor (including benazepril, enalapril, lisinopril, quinapril, ramipril)  Oral diabetes medicine (including metformin, pioglitazone, rosiglitazone)  Do not drink alcohol while you are using this medicine  Tell your doctor if you use anything else that makes you sleepy  Some examples are allergy medicine, narcotic pain medicine, and alcohol  Tell your doctor if you are also using oxycodone, lorazepam, or zolpidem  Warnings While Using This Medicine:   Tell your doctor if you are pregnant or breastfeeding, or if you have kidney disease, heart failure, heart rhythm problems, lung or breathing problems, a bleeding disorder, diabetes, sores or skin problems, or a low blood platelet count  Tell your doctor if you have a history of angioedema (severe swelling), alcohol or drug abuse, depression, or other mood problems  This medicine may cause the following problems:   Angioedema (severe swelling), which may be life-threatening  Changes in mood or behavior, including suicidal thoughts or behavior  Respiratory depression (serious breathing problem that can be life-threatening), when used with narcotic pain medicines  Peripheral edema (swelling of your hands, ankles, feet, or lower legs)  Increased risk for cancer and bleeding  Serious muscle problems  Heart rhythm changes  This medicine may make you dizzy or drowsy  It may also cause blurry or double vision  Do not drive or do anything else that could be dangerous until you know how this medicine affects you  Do not stop using this medicine suddenly  Your doctor will need to slowly decrease your dose before you stop it completely  Your doctor will do lab tests at regular visits to check on the effects of this medicine  Keep all appointments  Keep all medicine out of the reach of children  Never share your medicine with anyone  Possible Side Effects While Using This Medicine:   Call your doctor right away if you notice any of these side effects:   Allergic reaction: Itching or hives, swelling in your face or hands, swelling or tingling in your mouth or throat, chest tightness, trouble breathing  Blistering, peeling, red skin rash  Blue lips, fingernails, or skin, trouble breathing, chest pain  Blurry or double vision  Fever, chills, cough, sore throat, body aches  Muscle pain, tenderness, or weakness, general feeling of illness  Rapid weight gain, swelling in your hands, ankles, or feet  Severe dizziness or drowsiness  Sudden mood changes, unusual moods or behavior, including extreme happiness or depression, thoughts or attempts of killing oneself  Swelling in your throat, head, or neck  Uneven heartbeat  Unusual bleeding, bruising, or weakness  If you notice these less serious side effects, talk with your doctor:   Confusion, trouble concentrating  Constipation  Dry mouth  If you notice other side effects that you think are caused by this medicine, tell your doctor  Call your doctor for medical advice about side effects  You may report side effects to FDA at 1-806-FDA-3656    © Copyright [employer] 2022 Information is for End User's use only and may not be sold, redistributed or otherwise used for commercial purposes  The above information is an  only  It is not intended as medical advice for individual conditions or treatments  Talk to your doctor, nurse or pharmacist before following any medical regimen to see if it is safe and effective for you  Duloxetine (By mouth)   Duloxetine (doo-LOX-e-teen)  Treats depression, anxiety, diabetic peripheral neuropathy, fibromyalgia, and chronic muscle or bone pain  This medicine is an SSNRI  Brand Name(s): Cymbalta, izalLatrice Nicholson   There may be other brand names for this medicine  When This Medicine Should Not Be Used: This medicine is not right for everyone  Do not use it if you had an allergic reaction to duloxetine  How to Use This Medicine:   Capsule, Delayed Release Capsule  Take your medicine as directed  Your dose may need to be changed several times to find what works best for you    Delayed-release capsule: Swallow the capsule whole  Do not crush, chew, break, or open it  Do not open the Cymbalta® delayed-release capsule and sprinkle the contents on food or in liquids  If you have trouble swallowing the Henri Kohli delayed release capsule: You may open the capsule and sprinkle the contents over one tablespoon (15 mL) of applesauce  Swallow the mixture right away and do not save any of the mixture to use later  You may open the capsule and pour the contents to an all plastic catheter tip syringe and add 50 mL of water  Do not use other liquids  Gently shake it for 10 seconds, and then use it through a nasogastric tube  Rinse with additional water (about 15 mL) if needed  This medicine should come with a Medication Guide  Ask your pharmacist for a copy if you do not have one  Missed dose: Take a dose as soon as you remember  If it is almost time for your next dose, wait until then and take a regular dose  Do not take extra medicine to make up for a missed dose  Store the medicine in a closed container at room temperature, away from heat, moisture, and direct light  Drugs and Foods to Avoid:   Ask your doctor or pharmacist before using any other medicine, including over-the-counter medicines, vitamins, and herbal products  Do not take duloxetine if you have used an MAO inhibitor (MAOI) within the past 14 days  Do not start taking an MAO inhibitor within 5 days of stopping duloxetine  Ask your doctor if you are not sure if you take an MAOI, including linezolid or methylene blue injection  Some medicines can affect how duloxetine works   Tell your doctor if you are using any of the following:  Buspirone, cimetidine, ciprofloxacin, enoxacin, fentanyl, fluvoxamine, lithium, Vanesa's wort, theophylline, tramadol, tryptophan, warfarin  Amphetamines  Blood pressure medicine  Diuretic (water pill)  Medicine for heart rhythm problems (including flecainide, propafenone, quinidine)  Medicine to treat migraine headaches (including triptans)  NSAID pain or arthritis medicine (including aspirin, celecoxib, diclofenac, ibuprofen, naproxen)  Other medicine to treat depression or mood disorders (including amitriptyline, desipramine, fluoxetine, imipramine, nortriptyline, paroxetine)  Phenothiazine medicine (including thioridazine)  Stomach medicine (including famotidine, antacids containing aluminum or magnesium, PPIs)  Tell your doctor if you use anything else that makes you sleepy  Some examples are allergy medicine, narcotic pain medicine, and alcohol  Do not drink alcohol while you are using this medicine  Warnings While Using This Medicine:   Tell your doctor if you are pregnant or breastfeeding, or if you have kidney disease, liver disease, bleeding problems, diabetes, digestion problems, glaucoma, heart disease, high or low blood pressure, or problems with urination  Tell your doctor if you smoke or you have a history of seizures, mental health problems (including bipolar disorder, norm), or drug or alcohol addiction  This medicine may cause the following problems:   Serious liver problems  Serotonin syndrome, when used with certain medicines  Increased risk of bleeding problems  Serious skin reactions, including erythema multiforme, Menendez-Ranjit syndrome  Low sodium levels in the blood  Sexual problems  This medicine can increase thoughts of suicide  Tell your doctor right away if you start to feel depressed and have thoughts about hurting yourself  This medicine may cause blurred vision, dizziness, drowsiness, trouble with thinking, or trouble with controlling body movements, which may lead to falls, fractures, or other injuries  Do not drive or do anything that could be dangerous until you know how this medicine affects you  Stand up slowly to avoid falls  Do not stop using this medicine suddenly  Your doctor will need to slowly decrease your dose before you stop it completely    Your doctor will check your progress and the effects of this medicine at regular visits  Keep all appointments  Keep all medicine out of the reach of children  Never share your medicine with anyone  Possible Side Effects While Using This Medicine:   Call your doctor right away if you notice any of these side effects: Allergic reaction: Itching or hives, swelling in your face or hands, swelling or tingling in your mouth or throat, chest tightness, trouble breathing  Anxiety, restlessness, fever, fast heartbeat, sweating, muscle spasms, diarrhea, seeing or hearing things that are not there  Blistering, peeling, red skin rash  Confusion, weakness, muscle twitching  Dark urine or pale stools, nausea, vomiting, loss of appetite, stomach pain, yellow skin or eyes  Decrease in how much or how often you urinate  Decrease in sexual ability, desire, drive, or performance  Eye pain, vision changes, seeing halos around lights  Feeling more energetic than usual  Lightheadedness, dizziness, fainting  Unusual moods or behaviors, worsening depression, thoughts about hurting yourself, trouble sleeping  Unusual bleeding or bruising  If you notice these less serious side effects, talk with your doctor:   Decrease in appetite or weight  Dry mouth, constipation, mild nausea  Headache  Unusual drowsiness, sleepiness, or tiredness  If you notice other side effects that you think are caused by this medicine, tell your doctor  Call your doctor for medical advice about side effects  You may report side effects to FDA at 7-983-FDA-0794    © Copyright [employer] 2022 Information is for End User's use only and may not be sold, redistributed or otherwise used for commercial purposes  The above information is an  only  It is not intended as medical advice for individual conditions or treatments  Talk to your doctor, nurse or pharmacist before following any medical regimen to see if it is safe and effective for you

## 2022-10-26 NOTE — PROGRESS NOTES
Assessment:  1  Chronic pain syndrome    2  Chronic cervical pain    3  Cervical radiculopathy        Plan:  While the patient was in the office today, I did have a thorough conversation regarding their chronic pain syndrome, medication management, and treatment plan options  Patient is being seen for follow-up visit  Patient tells me that she ran out of 600 mg of gabapentin a couple weeks ago, so she switched to 300 mg pills  She ran out of those pills about 5 days ago  She states that her pain is not worse without the gabapentin  Will trial patient on Lyrica titrating to 50 mg 3 times daily  I advised her that this medication will need to be titrated to therapeutic dose  Her PCP prescribes Paxil 20 mg daily  We discussed possibility of switching Paxil to Cymbalta  I provided her with some information about Cymbalta and advised her to discuss this with her PCP  She previously underwent a C7-T1 interlaminar epidural steroid injection without improvement  Patient was planning for bariatric surgery this past summer  Unfortunately, she did not meet her weight loss goals and surgery was postponed  The patient will follow-up in 1 month for medication prescription refill and reevaluation  The patient was advised to contact the office should their symptoms worsen in the interim  The patient was agreeable and verbalized an understanding  History of Present Illness: The patient is a 36 y o  female who presents for a follow up office visit in regards to Neck Pain, Shoulder Pain, Arm Pain, and Hand Pain  The patient’s current symptoms include complaints of neck pain and pain in both upper extremities  Current pain level is an 8/10  Quality pain is described as burning, throbbing, shooting, numb, pins and needles  Current pain medications includes:  None  Patient stopped gabapentin about 5 days ago      I have personally reviewed and/or updated the patient's past medical history, past surgical history, family history, social history, current medications, allergies, and vital signs today  Review of Systems  Review of Systems   Constitutional: Negative for unexpected weight change  HENT: Negative for hearing loss  Eyes: Negative for visual disturbance  Respiratory: Negative for shortness of breath  Cardiovascular: Negative for leg swelling  Gastrointestinal: Negative for constipation  Endocrine: Negative for polyuria  Genitourinary: Negative for difficulty urinating  Musculoskeletal: Positive for arthralgias and myalgias  Negative for gait problem and joint swelling  Decreased range of motion  Joint stiffness  Swelling - neck  /arms  Pain in extremity- neck / back/arms   Skin: Negative for rash  Neurological: Positive for dizziness  Negative for weakness and headaches  Psychiatric/Behavioral: Negative for decreased concentration  All other systems reviewed and are negative          Past Medical History:   Diagnosis Date   • Kidney stone    • Obesity 4/15/2013   • Peripheral edema    • Primary osteoarthritis involving multiple joints 2020   • Sleep apnea    • Wears dentures    • Wears glasses        Past Surgical History:   Procedure Laterality Date   • BACK SURGERY      lump removed near shoulder on right side   •  SECTION      x3, ,,    • HYSTERECTOMY     • MOUTH SURGERY      21 teeth removed   • VT CYSTOURETHROSCOPY N/A 10/8/2020    Procedure: Jolene Guzman;  Surgeon: Kris Hernandez MD;  Location: [facility];  Service: Gynecology   • VT LAPAROSCOPY W TOT HYSTERECTUTERUS <=250 GRAM  W TUBE/OVARY N/A 10/8/2020    Procedure: ROBOTIC TOTAL HYSTERECTOMY; BILATERAL SALPINGECTOMY;  Surgeon: Kris Hernandez MD;  Location: [facility];  Service: Gynecology   • TUBAL LIGATION         Family History   Problem Relation Age of Onset   • Hypertension Mother    • Irritable bowel syndrome Mother    • Diverticulitis Mother    • Depression Mother    • Heart murmur Mother    • Obesity Mother    • Hypertension Father    • Spina bifida Father    • Obesity Father    • Multiple sclerosis Brother    • Lung disease Brother    • COPD Family    • Emphysema Family    • No Known Problems Daughter    • Stroke Maternal Grandmother    • Obesity Paternal Grandmother    • Stroke Paternal Grandmother    • No Known Problems Daughter    • No Known Problems Daughter    • No Known Problems Maternal Aunt    • No Known Problems Maternal Aunt        Social History     Occupational History   • 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   Tobacco Use   • Smoking status: Current Every Day Smoker     Years: 30      Types: Cigarettes     Last attempt to quit: 5/10/2022     Years since quittin 4   • Smokeless tobacco: Never Used   Vaping Use   • Vaping Use: Never used   Substance and Sexual Activity   • Alcohol use: Not Currently     Comment: 3x per year will have 1-2 drinks   • Drug use: No   • Sexual activity: Not Currently         Current Outpatient Medications:   •  acetaminophen (TYLENOL) 650 mg CR tablet, Take 1 tablet (650 mg total) by mouth every 8 (eight) hours as needed for mild pain, Disp: 30 tablet, Rfl: 0  •  busPIRone (BUSPAR) 10 mg tablet, take 1 tablet by mouth three times a day, Disp: 270 tablet, Rfl: 1  •  gabapentin (NEURONTIN) 600 MG tablet, Take 1 tablet (600 mg total) by mouth 4 (four) times a day, Disp: 120 tablet, Rfl: 5  •  PARoxetine (PAXIL) 20 mg tablet, take 2 tablets by mouth daily at bedtime, Disp: 90 tablet, Rfl: 1    Allergies   Allergen Reactions   • Clindamycin/Lincomycin      Mouth ulcers severe  But, tolerates azithromycin     • Levaquin [Levofloxacin] Throat Swelling     Facial swelling leading to throat swelling   • Penicillins Anaphylaxis       Physical Exam:    /91   Pulse 84   Ht 5' 3" (1 6 m)   Wt (!) 159 kg (351 lb 6 4 oz)   LMP 2020   BMI 62 25 kg/m²     Constitutional:normal, well developed, well nourished, alert, in no distress and non-toxic and no overt pain behavior  and obese  Eyes:anicteric  HEENT:grossly intact  Neck:supple, symmetric, trachea midline and no masses   Pulmonary:even and unlabored  Cardiovascular:No edema or pitting edema present  Skin:Normal without rashes or lesions and well hydrated  Psychiatric:Mood and affect appropriate  Neurologic:Cranial Nerves II-XII grossly intact  Musculoskeletal:normal    Imaging  No orders to display       No orders of the defined types were placed in this encounter

## 2022-11-09 ENCOUNTER — OFFICE VISIT (OUTPATIENT)
Dept: URGENT CARE | Facility: CLINIC | Age: 42
End: 2022-11-09

## 2022-11-09 VITALS
RESPIRATION RATE: 20 BRPM | HEIGHT: 63 IN | OXYGEN SATURATION: 96 % | SYSTOLIC BLOOD PRESSURE: 110 MMHG | WEIGHT: 293 LBS | DIASTOLIC BLOOD PRESSURE: 50 MMHG | HEART RATE: 80 BPM | TEMPERATURE: 97.9 F | BODY MASS INDEX: 51.91 KG/M2

## 2022-11-09 DIAGNOSIS — Z87.442 PERSONAL HISTORY OF KIDNEY STONES: ICD-10-CM

## 2022-11-09 DIAGNOSIS — J11.1 INFLUENZA: Primary | ICD-10-CM

## 2022-11-09 DIAGNOSIS — R10.9 ACUTE RIGHT FLANK PAIN: ICD-10-CM

## 2022-11-09 DIAGNOSIS — R52 BODY ACHES: ICD-10-CM

## 2022-11-09 LAB
SL AMB  POCT GLUCOSE, UA: NORMAL
SL AMB LEUKOCYTE ESTERASE,UA: NORMAL
SL AMB POCT BILIRUBIN,UA: NORMAL
SL AMB POCT BLOOD,UA: NORMAL
SL AMB POCT CLARITY,UA: NORMAL
SL AMB POCT COLOR,UA: YELLOW
SL AMB POCT KETONES,UA: NORMAL
SL AMB POCT NITRITE,UA: NORMAL
SL AMB POCT PH,UA: 5
SL AMB POCT SPECIFIC GRAVITY,UA: 1.02
SL AMB POCT URINE PROTEIN: NORMAL
SL AMB POCT UROBILINOGEN: 0.2

## 2022-11-09 RX ORDER — OSELTAMIVIR PHOSPHATE 75 MG/1
75 CAPSULE ORAL 2 TIMES DAILY
Qty: 10 CAPSULE | Refills: 0 | Status: SHIPPED | OUTPATIENT
Start: 2022-11-09 | End: 2022-11-14

## 2022-11-09 NOTE — PATIENT INSTRUCTIONS
Influenza   AMBULATORY CARE:   Influenza  (the flu) is an infection caused by the influenza virus  The flu is easily spread when an infected person coughs, sneezes, or has close contact with others  You may be able to spread the flu to others for 1 week or longer after signs or symptoms appear  Common signs and symptoms include the following:   Fever and chills    Headaches, body aches, and muscle or joint pain    Cough, runny nose, and sore throat    Loss of appetite, nausea, vomiting, or diarrhea    Tiredness    Trouble breathing    Call your local emergency number (911 in the [address] [facility]) if:   You have trouble breathing, and your lips look purple or blue  You have a seizure  Seek care immediately if:   You are dizzy, or you are urinating less or not at all  You have a headache with a stiff neck, and you feel tired or confused  You have new pain or pressure in your chest     Your symptoms, such as shortness of breath, vomiting, or diarrhea, get worse  Your symptoms, such as fever and coughing, seem to get better, but then get worse  Call your doctor if:   You have new muscle pain or weakness  You have questions or concerns about your condition or care  Treatment for influenza  may include any of the following:  Acetaminophen  decreases pain and fever  It is available without a doctor's order  Ask how much to take and how often to take it  Follow directions  Read the labels of all other medicines you are using to see if they also contain acetaminophen, or ask your doctor or pharmacist  Acetaminophen can cause liver damage if not taken correctly  Do not use more than 4 grams (4,000 milligrams) total of acetaminophen in one day  NSAIDs , such as ibuprofen, help decrease swelling, pain, and fever  This medicine is available with or without a doctor's order  NSAIDs can cause stomach bleeding or kidney problems in certain people   If you take blood thinner medicine, always ask your healthcare provider if NSAIDs are safe for you  Always read the medicine label and follow directions  Antivirals  help fight a viral infection  Manage your symptoms:   Rest  as much as you can to help you recover  Drink liquids as directed  to help prevent dehydration  Ask how much liquid to drink each day and which liquids are best for you  Prevent the spread of germs:       Wash your hands often  Wash your hands several times each day  Wash after you use the bathroom, change a child's diaper, and before you prepare or eat food  Use soap and water every time  Rub your soapy hands together, lacing your fingers  Wash the front and back of your hands, and in between your fingers  Use the fingers of one hand to scrub under the fingernails of the other hand  Wash for at least 20 seconds  Rinse with warm, running water for several seconds  Then dry your hands with a clean towel or paper towel  Use hand  that contains alcohol if soap and water are not available  Do not touch your eyes, nose, or mouth without washing your hands first          Cover a sneeze or cough  Use a tissue that covers your mouth and nose  Throw the tissue away in a trash can right away  Use the bend of your arm if a tissue is not available  Wash your hands well with soap and water or use a hand   Stay away from others while you are sick  Avoid crowds as much as possible  Ask about vaccines you may need  Talk to your healthcare provider about your vaccine history  He or she will tell you which vaccines you need, and when to get them  Get the influenza (flu) vaccine as soon as it is available  Flu viruses change, so it is important to get a flu vaccine every year  Get the pneumonia vaccine if recommended  This vaccine is usually recommended every 5 years  Your provider will tell you when to get this vaccine, if needed      Follow up with your doctor as directed:  Write down your questions so you remember to ask them during your visits  © Copyright [employer] 2022 Information is for End User's use only and may not be sold, redistributed or otherwise used for commercial purposes  All illustrations and images included in CareNotes® are the copyrighted property of A D A M , Inc  or Dilan Weber  The above information is an  only  It is not intended as medical advice for individual conditions or treatments  Talk to your doctor, nurse or pharmacist before following any medical regimen to see if it is safe and effective for you

## 2022-11-09 NOTE — PROGRESS NOTES
[facility]        NAME: Lupis Naqvi is a 36 y o  female  : 1980    MRN: [id number]  DATE: 2022  TIME: 5:05 PM      Assessment and Plan     Influenza [J11 1]  1  Influenza  oseltamivir (TAMIFLU) 75 mg capsule    Covid/Flu-Office Collect   2  Body aches  oseltamivir (TAMIFLU) 75 mg capsule    Covid/Flu-Office Collect   3  Acute right flank pain  POCT urine dip    Urine culture   4  Personal history of kidney stones  POCT urine dip    Urine culture         Patient Instructions   Patient Instructions     Influenza   AMBULATORY CARE:   Influenza  (the flu) is an infection caused by the influenza virus  The flu is easily spread when an infected person coughs, sneezes, or has close contact with others  You may be able to spread the flu to others for 1 week or longer after signs or symptoms appear  Common signs and symptoms include the following:   · Fever and chills    · Headaches, body aches, and muscle or joint pain    · Cough, runny nose, and sore throat    · Loss of appetite, nausea, vomiting, or diarrhea    · Tiredness    · Trouble breathing    Call your local emergency number (911 in the [address]) if:   · You have trouble breathing, and your lips look purple or blue  · You have a seizure  Seek care immediately if:   · You are dizzy, or you are urinating less or not at all  · You have a headache with a stiff neck, and you feel tired or confused  · You have new pain or pressure in your chest     · Your symptoms, such as shortness of breath, vomiting, or diarrhea, get worse  · Your symptoms, such as fever and coughing, seem to get better, but then get worse  Call your doctor if:   · You have new muscle pain or weakness  · You have questions or concerns about your condition or care  Treatment for influenza  may include any of the following:  · Acetaminophen  decreases pain and fever  It is available without a doctor's order   Ask how much to take and how often to take it  Follow directions  Read the labels of all other medicines you are using to see if they also contain acetaminophen, or ask your doctor or pharmacist  Acetaminophen can cause liver damage if not taken correctly  Do not use more than 4 grams (4,000 milligrams) total of acetaminophen in one day  · NSAIDs , such as ibuprofen, help decrease swelling, pain, and fever  This medicine is available with or without a doctor's order  NSAIDs can cause stomach bleeding or kidney problems in certain people  If you take blood thinner medicine, always ask your healthcare provider if NSAIDs are safe for you  Always read the medicine label and follow directions  · Antivirals  help fight a viral infection  Manage your symptoms:   · Rest  as much as you can to help you recover  · Drink liquids as directed  to help prevent dehydration  Ask how much liquid to drink each day and which liquids are best for you  Prevent the spread of germs:       1  Wash your hands often  Wash your hands several times each day  Wash after you use the bathroom, change a child's diaper, and before you prepare or eat food  Use soap and water every time  Rub your soapy hands together, lacing your fingers  Wash the front and back of your hands, and in between your fingers  Use the fingers of one hand to scrub under the fingernails of the other hand  Wash for at least 20 seconds  Rinse with warm, running water for several seconds  Then dry your hands with a clean towel or paper towel  Use hand  that contains alcohol if soap and water are not available  Do not touch your eyes, nose, or mouth without washing your hands first          2  Cover a sneeze or cough  Use a tissue that covers your mouth and nose  Throw the tissue away in a trash can right away  Use the bend of your arm if a tissue is not available  Wash your hands well with soap and water or use a hand   3  Stay away from others while you are sick    Avoid crowds as much as possible  4  Ask about vaccines you may need  Talk to your healthcare provider about your vaccine history  He or she will tell you which vaccines you need, and when to get them  ? Get the influenza (flu) vaccine as soon as it is available  Flu viruses change, so it is important to get a flu vaccine every year  ? Get the pneumonia vaccine if recommended  This vaccine is usually recommended every 5 years  Your provider will tell you when to get this vaccine, if needed  Follow up with your doctor as directed:  Write down your questions so you remember to ask them during your visits  © Copyright [employer] 2022 Information is for End User's use only and may not be sold, redistributed or otherwise used for commercial purposes  All illustrations and images included in CareNotes® are the copyrighted property of A D A M , Inc  or [place] [employer]   The above information is an  only  It is not intended as medical advice for individual conditions or treatments  Talk to your doctor, nurse or pharmacist before following any medical regimen to see if it is safe and effective for you  Follow up with PCP in 3-5 days  Proceed to  ER if symptoms worsen  Chief Complaint     Chief Complaint   Patient presents with   • Generalized Body Aches     Started two days ago   • Diarrhea     Started today   • Vomiting     today   • Sinusitis     Sinus congestion started 2 days ago  History of Present Illness     Patient presents along w/ her kids and her mom, all of whom are ill w/ viral possible flu-like symptoms  Patient's started on Monmday w/ GI symptoms starting today  She does use a CPAP for [place]  She also notes intermittent right flank pain w/ a hx of kidney stones--pain is 4/10 most of the time w/ short periods of pain 8/10  No urinary symptoms otherwise  Her generalized body aches are 6/10        Review of Systems     Review of Systems   Constitutional: Positive for chills  Negative for fever  HENT: Positive for congestion and postnasal drip  Respiratory: Positive for wheezing (slight--hx smoking and mild wheezing w/ illness; no hx asthma, no prior albuterol use)  Gastrointestinal: Positive for diarrhea (onset today; 2x), nausea and vomiting (onset today; 1violent episode)  Genitourinary: Positive for flank pain  Negative for dysuria, frequency, hematuria, pelvic pain and urgency  Musculoskeletal: Positive for myalgias  All other systems reviewed and are negative          Current Medications       Current Outpatient Medications:   •  acetaminophen (TYLENOL) 650 mg CR tablet, Take 1 tablet (650 mg total) by mouth every 8 (eight) hours as needed for mild pain, Disp: 30 tablet, Rfl: 0  •  busPIRone (BUSPAR) 10 mg tablet, take 1 tablet by mouth three times a day, Disp: 270 tablet, Rfl: 1  •  oseltamivir (TAMIFLU) 75 mg capsule, Take 1 capsule (75 mg total) by mouth 2 (two) times a day for 5 days, Disp: 10 capsule, Rfl: 0  •  PARoxetine (PAXIL) 20 mg tablet, take 2 tablets by mouth daily at bedtime, Disp: 90 tablet, Rfl: 1  •  pregabalin (LYRICA) 50 mg capsule, 1 PO QHS x 1 day, then 1 PO BID x 1 day, then 1 PO TID, Disp: 90 capsule, Rfl: 1    Current Allergies     Allergies as of 11/09/2022 - Reviewed 11/09/2022   Allergen Reaction Noted   • Clindamycin/lincomycin  03/14/2016   • Levaquin [levofloxacin] Throat Swelling 02/01/2018   • Penicillins Anaphylaxis 03/14/2016              The following portions of the patient's history were reviewed and updated as appropriate: allergies, current medications, past family history, past medical history, past social history, past surgical history and problem list      Past Medical History:   Diagnosis Date   • Kidney stone    • Obesity 4/15/2013   • Peripheral edema    • Primary osteoarthritis involving multiple joints 1/17/2020   • Sleep apnea    • Wears dentures    • Wears glasses        Past Surgical History: Procedure Laterality Date   • BACK SURGERY      lump removed near shoulder on right side   •  SECTION      x3, 2005,,    • HYSTERECTOMY     • MOUTH SURGERY      21 teeth removed   • PA CYSTOURETHROSCOPY N/A 10/8/2020    Procedure: Johnson Kellee;  Surgeon: Osvaldo Eduardo MD;  Location: [facility];  Service: Gynecology   • PA LAPAROSCOPY W TOT HYSTERECTUTERUS <=250 GRAM  W TUBE/OVARY N/A 10/8/2020    Procedure: ROBOTIC TOTAL HYSTERECTOMY; BILATERAL SALPINGECTOMY;  Surgeon: Osvaldo Eduardo MD;  Location: [facility];  Service: Gynecology   • TUBAL LIGATION         Family History   Problem Relation Age of Onset   • Hypertension Mother    • Irritable bowel syndrome Mother    • Diverticulitis Mother    • Depression Mother    • Heart murmur Mother    • Obesity Mother    • Hypertension Father    • Spina bifida Father    • Obesity Father    • Multiple sclerosis Brother    • Lung disease Brother    • COPD Family    • Emphysema Family    • No Known Problems Daughter    • Stroke Maternal Grandmother    • Obesity Paternal Grandmother    • Stroke Paternal Grandmother    • No Known Problems Daughter    • No Known Problems Daughter    • No Known Problems Maternal Aunt    • No Known Problems Maternal Aunt          Medications have been verified  Objective     /50   Pulse 80   Temp 97 9 °F (36 6 °C)   Resp 20   Ht 5' 3" (1 6 m)   Wt (!) 159 kg (351 lb)   LMP 2020   SpO2 96%   BMI 62 18 kg/m²   Patient's last menstrual period was 2020  Physical Exam     Physical Exam  Vitals and nursing note reviewed  Constitutional:       General: She is not in acute distress  Appearance: Normal appearance  She is well-developed  She is ill-appearing  She is not toxic-appearing or diaphoretic  HENT:      Head: Normocephalic and atraumatic  Right Ear: Hearing, tympanic membrane, ear canal and external ear normal  Tenderness present  Tympanic membrane is not erythematous  Left Ear: Hearing, tympanic membrane, ear canal and external ear normal  Tenderness present  Tympanic membrane is not erythematous  Nose: Mucosal edema and congestion present  Right Sinus: Maxillary sinus tenderness and frontal sinus tenderness present  Left Sinus: Maxillary sinus tenderness and frontal sinus tenderness present  Mouth/Throat:      Mouth: Mucous membranes are moist       Pharynx: Oropharynx is clear  Uvula midline  No oropharyngeal exudate or posterior oropharyngeal erythema  Tonsils: No tonsillar exudate or tonsillar abscesses  1+ on the right  1+ on the left  Eyes:      Pupils: Pupils are equal, round, and reactive to light  Cardiovascular:      Rate and Rhythm: Normal rate and regular rhythm  Heart sounds: Normal heart sounds  Pulmonary:      Effort: Pulmonary effort is normal  No tachypnea, bradypnea, accessory muscle usage or respiratory distress  Breath sounds: No stridor  No decreased breath sounds, wheezing (clear throughout at present time), rhonchi or rales  Abdominal:      General: Bowel sounds are normal  There is no distension  Palpations: Abdomen is soft  Tenderness: There is no abdominal tenderness  There is right CVA tenderness  There is no left CVA tenderness  Musculoskeletal:         General: Normal range of motion  Cervical back: Normal range of motion and neck supple  Lymphadenopathy:      Cervical: Cervical adenopathy present  Skin:     General: Skin is warm and dry  Capillary Refill: Capillary refill takes less than 2 seconds  Neurological:      General: No focal deficit present  Mental Status: She is alert and oriented to person, place, and time  Psychiatric:         Mood and Affect: Mood normal          Behavior: Behavior normal          Thought Content:  Thought content normal          Judgment: Judgment normal

## 2022-11-10 LAB
BACTERIA UR CULT: NORMAL
FLUAV RNA RESP QL NAA+PROBE: NEGATIVE
FLUBV RNA RESP QL NAA+PROBE: NEGATIVE
SARS-COV-2 RNA RESP QL NAA+PROBE: NEGATIVE

## 2022-11-23 ENCOUNTER — OFFICE VISIT (OUTPATIENT)
Dept: PAIN MEDICINE | Facility: CLINIC | Age: 42
End: 2022-11-23

## 2022-11-23 VITALS
DIASTOLIC BLOOD PRESSURE: 80 MMHG | WEIGHT: 293 LBS | HEIGHT: 63 IN | HEART RATE: 92 BPM | BODY MASS INDEX: 51.91 KG/M2 | SYSTOLIC BLOOD PRESSURE: 162 MMHG

## 2022-11-23 DIAGNOSIS — M54.12 CERVICAL RADICULOPATHY: ICD-10-CM

## 2022-11-23 DIAGNOSIS — M54.2 CHRONIC CERVICAL PAIN: ICD-10-CM

## 2022-11-23 DIAGNOSIS — G89.4 CHRONIC PAIN SYNDROME: Primary | ICD-10-CM

## 2022-11-23 DIAGNOSIS — G89.29 CHRONIC CERVICAL PAIN: ICD-10-CM

## 2022-11-23 RX ORDER — PREGABALIN 75 MG/1
75 CAPSULE ORAL 3 TIMES DAILY
Qty: 90 CAPSULE | Refills: 1 | Status: SHIPPED | OUTPATIENT
Start: 2022-11-23

## 2022-11-23 NOTE — PROGRESS NOTES
Assessment:  1  Chronic pain syndrome    2  Chronic cervical pain    3  Cervical radiculopathy        Plan:  While the patient was in the office today, I did have a thorough conversation regarding their chronic pain syndrome, medication management, and treatment plan options  Patient is being seen for follow-up visit  She was last seen here on 10/26/2022 at which time we started Lyrica titrating to 50 mg 3 times daily  So far, she denies any significant improvement  She denies side effects  At this point, we will increase the Lyrica to 75 mg 3 times daily  New prescription was sent to her pharmacy  She is planning to discuss the possibility of switching Paxil to Cymbalta with her PCP  The patient will follow-up in 1 month for medication prescription refill and reevaluation  The patient was advised to contact the office should their symptoms worsen in the interim  The patient was agreeable and verbalized an understanding  History of Present Illness: The patient is a 36 y o  female who presents for a follow up office visit in regards to Headache, Neck Pain, Arm Pain, Back Pain, Shoulder Pain, and Hand Pain  The patient’s current symptoms include complaints of neck and bilateral upper extremity pain  Current pain level is an 8/10  Quality of pain is described as burning, dull, aching, cramping, pressure-like, shooting, numb, pins and needles  Current pain medications includes:  Lyrica 50 mg 3 times daily    The patient reports that this regimen is providing 0 % pain relief  The patient is reporting no side effects from this pain medication regimen  I have personally reviewed and/or updated the patient's past medical history, past surgical history, family history, social history, current medications, allergies, and vital signs today  Review of Systems  Review of Systems   Constitutional: Negative for unexpected weight change  HENT: Negative for hearing loss      Eyes: Negative for visual disturbance  Respiratory: Negative for shortness of breath  Cardiovascular: Negative for leg swelling  Gastrointestinal: Negative for constipation  Endocrine: Negative for polyuria  Genitourinary: Negative for difficulty urinating  Musculoskeletal: Positive for arthralgias, gait problem and myalgias  Negative for joint swelling  Decreased range of motion  Joint stiffness  Swelling -arms, neck  Pain in extremity- arms, hands   Skin: Negative for rash  Neurological: Positive for dizziness  Negative for weakness and headaches  Psychiatric/Behavioral: Negative for decreased concentration  All other systems reviewed and are negative          Past Medical History:   Diagnosis Date   • Kidney stone    • Obesity 4/15/2013   • Peripheral edema    • Primary osteoarthritis involving multiple joints 2020   • Sleep apnea    • Wears dentures    • Wears glasses        Past Surgical History:   Procedure Laterality Date   • BACK SURGERY      lump removed near shoulder on right side   •  SECTION      x3, ,,    • HYSTERECTOMY     • MOUTH SURGERY      21 teeth removed   • [place] CYSTOURETHROSCOPY N/A 10/8/2020    Procedure: CYSTOSCOPY;  Surgeon: Shabana Corona MD;  [facility];  Service: Gynecology   • AZ LAPAROSCOPY W TOT HYSTERECTUTERUS <=250 GRAM  W TUBE/OVARY N/A 10/8/2020    Procedure: ROBOTIC TOTAL HYSTERECTOMY; BILATERAL SALPINGECTOMY;  Surgeon: Shabana Corona MD;  [facility];  Service: Gynecology   • TUBAL LIGATION  2012       Family History   Problem Relation Age of Onset   • Hypertension Mother    • Irritable bowel syndrome Mother    • Diverticulitis Mother    • Depression Mother    • Heart murmur Mother    • Obesity Mother    • Hypertension Father    • Spina bifida Father    • Obesity Father    • Multiple sclerosis Brother    • Lung disease Brother    • COPD Family    • Emphysema Family    • No Known Problems Daughter    • Stroke Maternal Grandmother    • Obesity Paternal Grandmother    • Stroke Paternal Grandmother    • No Known Problems Daughter    • No Known Problems Daughter    • No Known Problems Maternal Aunt    • No Known Problems Maternal Aunt        Social History     Occupational History   • 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   Tobacco Use   • Smoking status: Every Day     Years: 30 00     Types: Cigarettes     Last attempt to quit: 5/10/2022     Years since quittin 5   • Smokeless tobacco: Never   Vaping Use   • Vaping Use: Never used   Substance and Sexual Activity   • Alcohol use: Not Currently     Comment: 3x per year will have 1-2 drinks   • Drug use: No   • Sexual activity: Not Currently         Current Outpatient Medications:   •  acetaminophen (TYLENOL) 650 mg CR tablet, Take 1 tablet (650 mg total) by mouth every 8 (eight) hours as needed for mild pain, Disp: 30 tablet, Rfl: 0  •  busPIRone (BUSPAR) 10 mg tablet, take 1 tablet by mouth three times a day, Disp: 270 tablet, Rfl: 1  •  PARoxetine (PAXIL) 20 mg tablet, take 2 tablets by mouth daily at bedtime, Disp: 90 tablet, Rfl: 1  •  pregabalin (LYRICA) 75 mg capsule, Take 1 capsule (75 mg total) by mouth 3 (three) times a day, Disp: 90 capsule, Rfl: 1    Allergies   Allergen Reactions   • Clindamycin/Lincomycin      Mouth ulcers severe  But, tolerates azithromycin  • Levaquin [Levofloxacin] Throat Swelling     Facial swelling leading to throat swelling   • Penicillins Anaphylaxis       Physical Exam:    /80   Pulse 92   Ht 5' 3" (1 6 m)   Wt (!) 157 kg (347 lb)   LMP 2020   BMI 61 47 kg/m²     Constitutional:normal, well developed, well nourished, alert, in no distress and non-toxic and no overt pain behavior    Eyes:anicteric  HEENT:grossly intact  Neck:supple, symmetric, trachea midline and no masses   Pulmonary:even and unlabored  Cardiovascular:No edema or pitting edema present  Skin:Normal without rashes or lesions and well hydrated  Psychiatric:Mood and affect appropriate  Neurologic:Cranial Nerves II-XII grossly intact  Musculoskeletal:normal    Imaging  No orders to display       No orders of the defined types were placed in this encounter

## 2022-12-01 ENCOUNTER — OFFICE VISIT (OUTPATIENT)
Dept: INTERNAL MEDICINE CLINIC | Facility: CLINIC | Age: 42
End: 2022-12-01

## 2022-12-01 VITALS
HEIGHT: 63 IN | WEIGHT: 293 LBS | DIASTOLIC BLOOD PRESSURE: 72 MMHG | TEMPERATURE: 98 F | HEART RATE: 96 BPM | BODY MASS INDEX: 51.91 KG/M2 | OXYGEN SATURATION: 98 % | SYSTOLIC BLOOD PRESSURE: 116 MMHG

## 2022-12-01 DIAGNOSIS — G89.4 CHRONIC PAIN SYNDROME: ICD-10-CM

## 2022-12-01 DIAGNOSIS — F41.1 GAD (GENERALIZED ANXIETY DISORDER): Primary | ICD-10-CM

## 2022-12-01 RX ORDER — DULOXETIN HYDROCHLORIDE 60 MG/1
60 CAPSULE, DELAYED RELEASE ORAL DAILY
Qty: 90 CAPSULE | Refills: 3 | Status: SHIPPED | OUTPATIENT
Start: 2022-12-01

## 2022-12-01 NOTE — PROGRESS NOTES
Assessment/Plan:  Problem List Items Addressed This Visit        Other    JOSEFINA (generalized anxiety disorder) - Primary    Relevant Medications    DULoxetine (CYMBALTA) 60 mg delayed release capsule    Chronic pain syndrome    Relevant Medications    DULoxetine (CYMBALTA) 60 mg delayed release capsule        Diagnoses and all orders for this visit:    JOSEFINA (generalized anxiety disorder)  -     DULoxetine (CYMBALTA) 60 mg delayed release capsule; Take 1 capsule (60 mg total) by mouth daily    Chronic pain syndrome  -     DULoxetine (CYMBALTA) 60 mg delayed release capsule; Take 1 capsule (60 mg total) by mouth daily      No problem-specific Assessment & Plan notes found for this encounter  A/P: Doing ok  Appreciate pain management input  Will d/c paxil and start cymbalta  Continue with lyrica and keep f/u with pain management  Continue the rest of her meds  If JOSEFINA/MDD not controlled, consider adjunct therapy  RTC three months for routine  Subjective:      Patient ID: Nitin George is a 36 y o  female  WF with ongoing chronic pain due to C spine issues, presents to discuss her meds  Seeing pain management and javier changed to Lyrica  Is on paxil for JOSEFINA/MDD  Doing ok and tolerating the SSRI, but could be better  Pain management suggested med change to help with pain management  Still having significant neck pain with radiation at times    No other issues  The following portions of the patient's history were reviewed and updated as appropriate:   She has a past medical history of Kidney stone, Obesity (4/15/2013), Peripheral edema, Primary osteoarthritis involving multiple joints (2020), Sleep apnea, Wears dentures, and Wears glasses  ,  does not have any pertinent problems on file  ,   has a past surgical history that includes Mouth surgery;  section; Tubal ligation ();  Back surgery; pr laparoscopy w tot hysterectuterus <=250 gram  w tube/ovary (N/A, 10/8/2020); pr cystourethroscopy (N/A, 10/8/2020); and Hysterectomy  ,  family history includes COPD in her family; Depression in her mother; Diverticulitis in her mother; Emphysema in her family; Heart murmur in her mother; Hypertension in her father and mother; Irritable bowel syndrome in her mother; Lung disease in her brother; Multiple sclerosis in her brother; No Known Problems in her daughter, daughter, daughter, maternal aunt, and maternal aunt; Obesity in her father, mother, and paternal grandmother; Spina bifida in her father; Stroke in her maternal grandmother and paternal grandmother  ,   reports that she has been smoking cigarettes  She has never used smokeless tobacco  She reports that she does not currently use alcohol  She reports that she does not use drugs  ,  is allergic to clindamycin/lincomycin, levaquin [levofloxacin], and penicillins     Current Outpatient Medications   Medication Sig Dispense Refill   • acetaminophen (TYLENOL) 650 mg CR tablet Take 1 tablet (650 mg total) by mouth every 8 (eight) hours as needed for mild pain 30 tablet 0   • busPIRone (BUSPAR) 10 mg tablet take 1 tablet by mouth three times a day 270 tablet 1   • DULoxetine (CYMBALTA) 60 mg delayed release capsule Take 1 capsule (60 mg total) by mouth daily 90 capsule 3   • pregabalin (LYRICA) 75 mg capsule Take 1 capsule (75 mg total) by mouth 3 (three) times a day 90 capsule 1     No current facility-administered medications for this visit  Review of Systems   Constitutional: Negative for activity change, chills, diaphoresis, fatigue and fever  HENT: Negative  Eyes: Negative for visual disturbance  Respiratory: Negative for cough, chest tightness, shortness of breath and wheezing  Cardiovascular: Negative for chest pain, palpitations and leg swelling  Gastrointestinal: Negative for abdominal pain, constipation, diarrhea, nausea and vomiting  Endocrine: Negative for cold intolerance and heat intolerance     Genitourinary: Negative for difficulty urinating, dysuria and frequency  Musculoskeletal: Positive for neck pain and neck stiffness  Negative for arthralgias, gait problem and myalgias  Neurological: Positive for weakness and numbness  Negative for light-headedness and headaches  Psychiatric/Behavioral: Negative for confusion  The patient is not nervous/anxious  PHQ-2/9 Depression Screening          Objective:  Vitals:    12/01/22 1521   BP: 116/72   Pulse: 96   Temp: 98 °F (36 7 °C)   SpO2: 98%   Weight: (!) 160 kg (353 lb 6 oz)   Height: 5' 3" (1 6 m)     Body mass index is 62 6 kg/m²  Physical Exam  Vitals and nursing note reviewed  Constitutional:       General: She is not in acute distress  Appearance: Normal appearance  She is not ill-appearing  HENT:      Head: Normocephalic and atraumatic  Mouth/Throat:      Mouth: Mucous membranes are moist    Eyes:      Extraocular Movements: Extraocular movements intact  Conjunctiva/sclera: Conjunctivae normal       Pupils: Pupils are equal, round, and reactive to light  Cardiovascular:      Rate and Rhythm: Normal rate and regular rhythm  Heart sounds: Normal heart sounds  Pulmonary:      Effort: Pulmonary effort is normal  No respiratory distress  Breath sounds: Normal breath sounds  No wheezing, rhonchi or rales  Musculoskeletal:         General: Tenderness present  No swelling  Neurological:      General: No focal deficit present  Mental Status: She is alert and oriented to person, place, and time  Mental status is at baseline  Motor: No weakness  Psychiatric:         Mood and Affect: Mood normal          Behavior: Behavior normal          Thought Content:  Thought content normal          Judgment: Judgment normal

## 2022-12-01 NOTE — PATIENT INSTRUCTIONS
Duloxetine (By mouth)   Duloxetine ([id number]-LOX-[id number])  Treats depression, anxiety, diabetic peripheral neuropathy, fibromyalgia, and chronic muscle or bone pain  This medicine is an SSNRI  Brand Name(s): Cymbalta, Drizalma Sprinkle, Irenka   There may be other brand names for this medicine  When This Medicine Should Not Be Used: This medicine is not right for everyone  Do not use it if you had an allergic reaction to duloxetine  How to Use This Medicine:   Capsule, Delayed Release Capsule  Take your medicine as directed  Your dose may need to be changed several times to find what works best for you  Delayed-release capsule: Swallow the capsule whole  Do not crush, chew, break, or open it  Do not open the Cymbalta® delayed-release capsule and sprinkle the contents on food or in liquids  If you have trouble swallowing the Sherlene Achilles delayed release capsule: You may open the capsule and sprinkle the contents over one tablespoon (15 mL) of applesauce  Swallow the mixture right away and do not save any of the mixture to use later  You may open the capsule and pour the contents to an all plastic catheter tip syringe and add 50 mL of water  Do not use other liquids  Gently shake it for 10 seconds, and then use it through a nasogastric tube  Rinse with additional water (about 15 mL) if needed  This medicine should come with a Medication Guide  Ask your pharmacist for a copy if you do not have one  Missed dose: Take a dose as soon as you remember  If it is almost time for your next dose, wait until then and take a regular dose  Do not take extra medicine to make up for a missed dose  Store the medicine in a closed container at room temperature, away from heat, moisture, and direct light  Drugs and Foods to Avoid:   Ask your doctor or pharmacist before using any other medicine, including over-the-counter medicines, vitamins, and herbal products    Do not take duloxetine if you have used an MAO inhibitor (MAOI) within the past 14 days  Do not start taking an MAO inhibitor within 5 days of stopping duloxetine  Ask your doctor if you are not sure if you take an MAOI, including linezolid or methylene blue injection  Some medicines can affect how duloxetine works  Tell your doctor if you are using any of the following:  Buspirone, cimetidine, ciprofloxacin, enoxacin, fentanyl, fluvoxamine, lithium, Vanesa's wort, theophylline, tramadol, tryptophan, warfarin  Amphetamines  Blood pressure medicine  Diuretic (water pill)  Medicine for heart rhythm problems (including flecainide, propafenone, quinidine)  Medicine to treat migraine headaches (including triptans)  NSAID pain or arthritis medicine (including aspirin, celecoxib, diclofenac, ibuprofen, naproxen)  Other medicine to treat depression or mood disorders (including amitriptyline, desipramine, fluoxetine, imipramine, nortriptyline, paroxetine)  Phenothiazine medicine (including thioridazine)  Stomach medicine (including famotidine, antacids containing aluminum or magnesium, PPIs)  Tell your doctor if you use anything else that makes you sleepy  Some examples are allergy medicine, narcotic pain medicine, and alcohol  Do not drink alcohol while you are using this medicine  Warnings While Using This Medicine:   Tell your doctor if you are pregnant or breastfeeding, or if you have kidney disease, liver disease, bleeding problems, diabetes, digestion problems, glaucoma, heart disease, high or low blood pressure, or problems with urination  Tell your doctor if you smoke or you have a history of seizures, mental health problems (including bipolar disorder, norm), or drug or alcohol addiction    This medicine may cause the following problems:   Serious liver problems  Serotonin syndrome, when used with certain medicines  Increased risk of bleeding problems  Serious skin reactions, including erythema multiforme, Menendez-Ranjit syndrome  Low sodium levels in the blood  Sexual problems  This medicine can increase thoughts of suicide  Tell your doctor right away if you start to feel depressed and have thoughts about hurting yourself  This medicine may cause blurred vision, dizziness, drowsiness, trouble with thinking, or trouble with controlling body movements, which may lead to falls, fractures, or other injuries  Do not drive or do anything that could be dangerous until you know how this medicine affects you  Stand up slowly to avoid falls  Do not stop using this medicine suddenly  Your doctor will need to slowly decrease your dose before you stop it completely  Your doctor will check your progress and the effects of this medicine at regular visits  Keep all appointments  Keep all medicine out of the reach of children  Never share your medicine with anyone  Possible Side Effects While Using This Medicine:   Call your doctor right away if you notice any of these side effects:   Allergic reaction: Itching or hives, swelling in your face or hands, swelling or tingling in your mouth or throat, chest tightness, trouble breathing  Anxiety, restlessness, fever, fast heartbeat, sweating, muscle spasms, diarrhea, seeing or hearing things that are not there  Blistering, peeling, red skin rash  Confusion, weakness, muscle twitching  Dark urine or pale stools, nausea, vomiting, loss of appetite, stomach pain, yellow skin or eyes  Decrease in how much or how often you urinate  Decrease in sexual ability, desire, drive, or performance  Eye pain, vision changes, seeing halos around lights  Feeling more energetic than usual  Lightheadedness, dizziness, fainting  Unusual moods or behaviors, worsening depression, thoughts about hurting yourself, trouble sleeping  Unusual bleeding or bruising  If you notice these less serious side effects, talk with your doctor:   Decrease in appetite or weight  Dry mouth, constipation, mild nausea  Headache  Unusual drowsiness, sleepiness, or tiredness  If you notice other side effects that you think are caused by this medicine, tell your doctor  Call your doctor for medical advice about side effects  You may report side effects to FDA at 9-031-CWD-7319    © Copyright [employer] 2022 Information is for End User's use only and may not be sold, redistributed or otherwise used for commercial purposes  The above information is an  only  It is not intended as medical advice for individual conditions or treatments  Talk to your doctor, nurse or pharmacist before following any medical regimen to see if it is safe and effective for you

## 2022-12-23 ENCOUNTER — OFFICE VISIT (OUTPATIENT)
Dept: PAIN MEDICINE | Facility: CLINIC | Age: 42
End: 2022-12-23

## 2022-12-23 VITALS
HEIGHT: 63 IN | BODY MASS INDEX: 62.6 KG/M2 | DIASTOLIC BLOOD PRESSURE: 83 MMHG | SYSTOLIC BLOOD PRESSURE: 161 MMHG | HEART RATE: 108 BPM

## 2022-12-23 DIAGNOSIS — M54.2 CHRONIC CERVICAL PAIN: ICD-10-CM

## 2022-12-23 DIAGNOSIS — G89.4 CHRONIC PAIN SYNDROME: Primary | ICD-10-CM

## 2022-12-23 DIAGNOSIS — G89.29 CHRONIC CERVICAL PAIN: ICD-10-CM

## 2022-12-23 DIAGNOSIS — M54.12 CERVICAL RADICULOPATHY: ICD-10-CM

## 2022-12-23 RX ORDER — PREGABALIN 100 MG/1
100 CAPSULE ORAL 3 TIMES DAILY
Qty: 90 CAPSULE | Refills: 1 | Status: SHIPPED | OUTPATIENT
Start: 2022-12-23

## 2022-12-23 NOTE — PROGRESS NOTES
Assessment:  1  Chronic pain syndrome    2  Chronic cervical pain    3  Cervical radiculopathy        Plan:  While the patient was in the office today, I did have a thorough conversation regarding their chronic pain syndrome, medication management, and treatment plan options  Patient is being seen for a follow-up visit  She was last seen here on 11/23/2022 at which time Lyrica was increased to 75 mg 3 times daily  She was recently switched from Paxil to Cymbalta 60 mg daily by her PCP  Overall, she is reporting a slight improvement in her symptoms  Increase Lyrica to 100 mg 3 times daily  A new prescription was sent to her pharmacy  The patient will follow-up in 1 month for medication prescription refill and reevaluation  The patient was advised to contact the office should their symptoms worsen in the interim  The patient was agreeable and verbalized an understanding  History of Present Illness: The patient is a 36 y o  female who presents for a follow up office visit in regards to Neck Pain, Shoulder Pain, Arm Pain, Hand Pain, and Back Pain  The patient’s current symptoms include complaints of neck pain that radiates to both upper extremities  Current pain levels a 6/10  Quality pain is described as burning, dull, aching, sharp, throbbing, pressure-like, shooting, numb, pins-and-needles  Current pain medications includes: Lyrica 75 mg 3 times daily, Cymbalta 60 mg daily  The patient reports that this regimen is providing 15-20% pain relief  The patient is reporting no side effects from this pain medication regimen  I have personally reviewed and/or updated the patient's past medical history, past surgical history, family history, social history, current medications, allergies, and vital signs today  Review of Systems  Review of Systems   Constitutional: Negative for unexpected weight change  HENT: Negative for hearing loss  Eyes: Negative for visual disturbance  Respiratory: Negative for shortness of breath  Cardiovascular: Negative for leg swelling  Gastrointestinal: Negative for constipation  Endocrine: Negative for polyuria  Genitourinary: Negative for difficulty urinating  Musculoskeletal: Positive for gait problem  Negative for joint swelling and myalgias  Decreased range of motion  Joint stiffness  Swelling-arms  Pain in extremity-arms   Skin: Negative for rash  Neurological: Positive for dizziness  Negative for weakness and headaches  Psychiatric/Behavioral: Negative for decreased concentration  All other systems reviewed and are negative          Past Medical History:   Diagnosis Date   • Kidney stone    • Obesity 4/15/2013   • Peripheral edema    • Primary osteoarthritis involving multiple joints 2020   • Sleep apnea    • Wears dentures    • Wears glasses        Past Surgical History:   Procedure Laterality Date   • BACK SURGERY      lump removed near shoulder on right side   •  SECTION      x3, 2005,,    • HYSTERECTOMY     • MOUTH SURGERY      21 teeth removed   • RI CYSTOURETHROSCOPY N/A 10/8/2020    Procedure: CYSTOSCOPY;  Surgeon: Peter Caro MD;  Location: [facility];  Service: Gynecology   • RI LAPAROSCOPY W TOT HYSTERECTUTERUS <=250 GRAM  W TUBE/OVARY N/A 10/8/2020    Procedure: ROBOTIC TOTAL HYSTERECTOMY; BILATERAL SALPINGECTOMY;  Surgeon: Peter Caro MD;  Location: [facility];  Service: Gynecology   • TUBAL LIGATION         Family History   Problem Relation Age of Onset   • Hypertension Mother    • Irritable bowel syndrome Mother    • Diverticulitis Mother    • Depression Mother    • Heart murmur Mother    • Obesity Mother    • Hypertension Father    • Spina bifida Father    • Obesity Father    • Multiple sclerosis Brother    • Lung disease Brother    • COPD Family    • Emphysema Family    • No Known Problems Daughter    • Stroke Maternal Grandmother    • Obesity Paternal Grandmother    • Stroke Paternal Grandmother    • No Known Problems Daughter    • No Known Problems Daughter    • No Known Problems Maternal Aunt    • No Known Problems Maternal Aunt        Social History     Occupational History   • Occupation:    Tobacco Use   • Smoking status: Every Day     Years: 30 00     Types: Cigarettes     Last attempt to quit: 5/10/2022     Years since quittin 6   • Smokeless tobacco: Never   Vaping Use   • Vaping Use: Never used   Substance and Sexual Activity   • Alcohol use: Not Currently     Comment: 3x per year will have 1-2 drinks   • Drug use: No   • Sexual activity: Not Currently         Current Outpatient Medications:   •  acetaminophen (TYLENOL) 650 mg CR tablet, Take 1 tablet (650 mg total) by mouth every 8 (eight) hours as needed for mild pain, Disp: 30 tablet, Rfl: 0  •  busPIRone (BUSPAR) 10 mg tablet, take 1 tablet by mouth three times a day, Disp: 270 tablet, Rfl: 1  •  DULoxetine (CYMBALTA) 60 mg delayed release capsule, Take 1 capsule (60 mg total) by mouth daily, Disp: 90 capsule, Rfl: 3  •  pregabalin (LYRICA) 100 mg capsule, Take 1 capsule (100 mg total) by mouth 3 (three) times a day, Disp: 90 capsule, Rfl: 1    Allergies   Allergen Reactions   • Clindamycin/Lincomycin      Mouth ulcers severe  But, tolerates azithromycin  • Levaquin [Levofloxacin] Throat Swelling     Facial swelling leading to throat swelling   • Penicillins Anaphylaxis       Physical Exam:    /83   Pulse (!) 108   Ht 5' 3" (1 6 m)   LMP 2020   BMI 62 60 kg/m²     Constitutional:normal, well developed, well nourished, alert, in no distress and non-toxic and no overt pain behavior    Eyes:anicteric  HEENT:grossly intact  Neck:supple, symmetric, trachea midline and no masses   Pulmonary:even and unlabored  Cardiovascular:No edema or pitting edema present  Skin:Normal without rashes or lesions and well hydrated  Psychiatric:Mood and affect appropriate  Neurologic:Cranial Nerves II-XII grossly intact  Musculoskeletal:normal    Imaging  No orders to display       No orders of the defined types were placed in this encounter

## 2023-01-17 ENCOUNTER — OFFICE VISIT (OUTPATIENT)
Dept: URGENT CARE | Facility: CLINIC | Age: 43
End: 2023-01-17

## 2023-01-17 VITALS
RESPIRATION RATE: 18 BRPM | OXYGEN SATURATION: 96 % | BODY MASS INDEX: 51.91 KG/M2 | DIASTOLIC BLOOD PRESSURE: 97 MMHG | HEIGHT: 63 IN | WEIGHT: 293 LBS | TEMPERATURE: 98.8 F | SYSTOLIC BLOOD PRESSURE: 135 MMHG | HEART RATE: 90 BPM

## 2023-01-17 DIAGNOSIS — J06.9 ACUTE URI: ICD-10-CM

## 2023-01-17 DIAGNOSIS — J02.9 SORE THROAT: Primary | ICD-10-CM

## 2023-01-17 LAB
S PYO AG THROAT QL: NEGATIVE
SARS-COV-2 AG UPPER RESP QL IA: NEGATIVE
VALID CONTROL: NORMAL

## 2023-01-17 RX ORDER — METHYLPREDNISOLONE 4 MG/1
TABLET ORAL
Qty: 21 TABLET | Refills: 0 | Status: SHIPPED | OUTPATIENT
Start: 2023-01-17

## 2023-01-17 RX ORDER — BROMPHENIRAMINE MALEATE, PSEUDOEPHEDRINE HYDROCHLORIDE, AND DEXTROMETHORPHAN HYDROBROMIDE 2; 30; 10 MG/5ML; MG/5ML; MG/5ML
10 SYRUP ORAL 4 TIMES DAILY PRN
Qty: 120 ML | Refills: 0 | Status: SHIPPED | OUTPATIENT
Start: 2023-01-17

## 2023-01-17 NOTE — PATIENT INSTRUCTIONS
Drink plenty of fluids, rest, saltwater gargles, lozenges, hot tea with honey  Tylenol or motrin for pain  If you develop a prolonged high fever, difficulty breathing, swallowing, managing secretions, decreased fluid intake, or urination, any new or concerning symptoms please return or proceed ER  Recommend following up with PCP in 3-5 days

## 2023-01-17 NOTE — PROGRESS NOTES
[address] [employer] [facility]        NAME: Eden Saini is a 36 y o  female  : 1980    MRN: [id number]  DATE: 2023  TIME: 5:56 PM    Assessment and Plan   Sore throat [J02 9]  1  Sore throat  POCT rapid strepA    Poct Covid 19 Rapid Antigen Test    methylPREDNISolone 4 MG tablet therapy pack      2  Acute URI  brompheniramine-pseudoephedrine-DM 30-2-10 MG/5ML syrup        Rapid strep and covid negative    Patient Instructions     Patient Instructions   Drink plenty of fluids, rest, saltwater gargles, lozenges, hot tea with honey  Tylenol or motrin for pain  If you develop a prolonged high fever, difficulty breathing, swallowing, managing secretions, decreased fluid intake, or urination, any new or concerning symptoms please return or proceed ER  Recommend following up with PCP in 3-5 days  Chief Complaint     Chief Complaint   Patient presents with   • Cold Like Symptoms     Patient c/o loss of voice and stuffy nose that started a week ago  History of Present Illness       URI   This is a new problem  The current episode started in the past 7 days  The problem has been gradually worsening  There has been no fever  Associated symptoms include congestion, rhinorrhea and a sore throat  Pertinent negatives include no abdominal pain, chest pain, coughing, diarrhea, ear pain, headaches, neck pain, rash, sinus pain, sneezing, swollen glands, vomiting or wheezing  Associated symptoms comments: "losing my voice"    She has tried acetaminophen and decongestant for the symptoms  The treatment provided mild relief  Review of Systems   Review of Systems   Constitutional: Negative for chills, diaphoresis, fatigue and fever  HENT: Positive for congestion, rhinorrhea, sore throat and voice change  Negative for ear pain, facial swelling, postnasal drip, sinus pressure, sinus pain, sneezing, tinnitus and trouble swallowing  Eyes: Negative      Respiratory: Negative for cough, chest tightness, shortness of breath, wheezing and stridor  Cardiovascular: Negative for chest pain and palpitations  Gastrointestinal: Negative  Negative for abdominal pain, diarrhea and vomiting  Musculoskeletal: Negative for arthralgias, back pain, joint swelling, myalgias, neck pain and neck stiffness  Skin: Negative for rash  Neurological: Negative for dizziness, facial asymmetry, weakness, light-headedness, numbness and headaches           Current Medications       Current Outpatient Medications:   •  acetaminophen (TYLENOL) 650 mg CR tablet, Take 1 tablet (650 mg total) by mouth every 8 (eight) hours as needed for mild pain, Disp: 30 tablet, Rfl: 0  •  brompheniramine-pseudoephedrine-DM 30-2-10 MG/5ML syrup, Take 10 mL by mouth 4 (four) times a day as needed for allergies, Disp: 120 mL, Rfl: 0  •  busPIRone (BUSPAR) 10 mg tablet, take 1 tablet by mouth three times a day, Disp: 270 tablet, Rfl: 1  •  DULoxetine (CYMBALTA) 60 mg delayed release capsule, Take 1 capsule (60 mg total) by mouth daily, Disp: 90 capsule, Rfl: 3  •  methylPREDNISolone 4 MG tablet therapy pack, Use as directed on package, Disp: 21 tablet, Rfl: 0  •  pregabalin (LYRICA) 100 mg capsule, Take 1 capsule (100 mg total) by mouth 3 (three) times a day, Disp: 90 capsule, Rfl: 1    Current Allergies     Allergies as of 01/17/2023 - Reviewed 01/17/2023   Allergen Reaction Noted   • Clindamycin/lincomycin  03/14/2016   • Levaquin [levofloxacin] Throat Swelling 02/01/2018   • Penicillins Anaphylaxis 03/14/2016            The following portions of the patient's history were reviewed and updated as appropriate: allergies, current medications, past family history, past medical history, past social history, past surgical history and problem list      Past Medical History:   Diagnosis Date   • Kidney stone    • Obesity 4/15/2013   • Peripheral edema    • Primary osteoarthritis involving multiple joints 1/17/2020   • Sleep apnea    • Wears dentures • Wears glasses        Past Surgical History:   Procedure Laterality Date   • BACK SURGERY      lump removed near shoulder on right side   •  SECTION      x3, 2005,, 2012   • HYSTERECTOMY     • MOUTH SURGERY      21 teeth removed   • [place] CYSTOURETHROSCOPY N/A 10/8/2020    Procedure: Darra Gun;  Surgeon: Chaparrita Bryan MD;  Location: [facility];  Service: Gynecology   • [place] LAPS TOTAL HYSTERECT 250 GM/< W/RMVL TUBE/OVARY N/A 10/8/2020    Procedure: ROBOTIC TOTAL HYSTERECTOMY; BILATERAL SALPINGECTOMY;  Surgeon: Chaparrita Bryan MD;  Location: [facility];  Service: Gynecology   • TUBAL LIGATION         Family History   Problem Relation Age of Onset   • Hypertension Mother    • Irritable bowel syndrome Mother    • Diverticulitis Mother    • Depression Mother    • Heart murmur Mother    • Obesity Mother    • Hypertension Father    • Spina bifida Father    • Obesity Father    • Multiple sclerosis Brother    • Lung disease Brother    • COPD Family    • Emphysema Family    • No Known Problems Daughter    • Stroke Maternal Grandmother    • Obesity Paternal Grandmother    • Stroke Paternal Grandmother    • No Known Problems Daughter    • No Known Problems Daughter    • No Known Problems Maternal Aunt    • No Known Problems Maternal Aunt          Medications have been verified  Objective   /97   Pulse 90   Temp 98 8 °F (37 1 °C) (Temporal)   Resp 18   Ht 5' 3" (1 6 m)   Wt (!) 160 kg (353 lb)   LMP 2020   SpO2 96%   BMI 62 53 kg/m²   Patient's last menstrual period was 2020  Physical Exam     Physical Exam  Constitutional:       General: She is not in acute distress  Appearance: She is well-developed  HENT:      Head: Normocephalic and atraumatic  Right Ear: Hearing, tympanic membrane, ear canal and external ear normal       Left Ear: Hearing, tympanic membrane, ear canal and external ear normal       Nose: Congestion present  No rhinorrhea        Right Sinus: No maxillary sinus tenderness or frontal sinus tenderness  Left Sinus: No maxillary sinus tenderness or frontal sinus tenderness  Mouth/Throat:      Mouth: Mucous membranes are moist       Pharynx: Oropharynx is clear  Uvula midline  Posterior oropharyngeal erythema (mild) present  No oropharyngeal exudate  Tonsils: No tonsillar exudate or tonsillar abscesses  1+ on the right  1+ on the left  Eyes:      Conjunctiva/sclera: Conjunctivae normal       Pupils: Pupils are equal, round, and reactive to light  Cardiovascular:      Rate and Rhythm: Normal rate and regular rhythm  Heart sounds: Normal heart sounds, S1 normal and S2 normal    Pulmonary:      Effort: Pulmonary effort is normal       Breath sounds: Normal breath sounds and air entry  Lymphadenopathy:      Cervical: No cervical adenopathy  Skin:     General: Skin is warm and dry  Capillary Refill: Capillary refill takes less than 2 seconds  Neurological:      Mental Status: She is alert and oriented to person, place, and time

## 2023-01-17 NOTE — LETTER
January 17, 2023     Patient: Janie Lemon   YOB: 1980   Date of Visit: 1/17/2023       To Whom it May Concern:    Yesenia Lin was seen in my clinic on 1/17/2023  She may return to work on 1/19/2023  If you have any questions or concerns, please don't hesitate to call           Sincerely,          JAISON Noel        CC: No Recipients

## 2023-02-08 ENCOUNTER — OFFICE VISIT (OUTPATIENT)
Dept: URGENT CARE | Facility: CLINIC | Age: 43
End: 2023-02-08

## 2023-02-08 VITALS
WEIGHT: 293 LBS | BODY MASS INDEX: 51.91 KG/M2 | DIASTOLIC BLOOD PRESSURE: 99 MMHG | HEIGHT: 63 IN | RESPIRATION RATE: 22 BRPM | OXYGEN SATURATION: 98 % | TEMPERATURE: 98 F | HEART RATE: 92 BPM | SYSTOLIC BLOOD PRESSURE: 154 MMHG

## 2023-02-08 DIAGNOSIS — J02.9 SORE THROAT: Primary | ICD-10-CM

## 2023-02-08 DIAGNOSIS — B34.9 VIRAL SYNDROME: ICD-10-CM

## 2023-02-08 LAB — S PYO AG THROAT QL: NEGATIVE

## 2023-02-09 NOTE — PATIENT INSTRUCTIONS
Rapid strep negative, throat culture pending  Declined covid/flu testing  Your results will come through [address]  Check MyChart and call if needed for test results  OTC supplements/medications discussed  Follow-up with PCP in the next 1-2 days for re-evaluation  Go to the ED if any fevers, unable to stay hydrated, abdominal pain, chest pain, shortness of breath, wheezing, chest tightness, headaches, dizziness, weakness, new or worsening symptoms or other concerning symptoms

## 2023-02-09 NOTE — PROGRESS NOTES
[facility]        NAME: Richie Noel is a 36 y o  female  : 1980    MRN: [id number]  DATE: 2023  TIME: 7:06 PM    Assessment and Plan   Sore throat [J02 9]  1  Sore throat  POCT rapid strepA    Throat culture      2  Viral syndrome          Rapid strep negative, throat culture pending  Declined COVID/flu testing    Patient Instructions     Rapid strep negative, throat culture pending  Declined covid/flu testing  Your results will come through [address]  Check MyChart and call if needed for test results  OTC supplements/medications discussed  Follow-up with PCP in the next 1-2 days for re-evaluation  Go to the ED if any fevers, unable to stay hydrated, abdominal pain, chest pain, shortness of breath, wheezing, chest tightness, headaches, dizziness, weakness, new or worsening symptoms or other concerning symptoms  Chief Complaint     Chief Complaint   Patient presents with   • Earache     Patient c/o right ear pain that started this morning and sore throat that started 2 days ago  History of Present Illness       16-year-old female presents for runny nose/nasal congestion, sore throat, right ear pain x 2-3 days  Denies any fevers or chills  Has not tried anything for it  Declines COVID/flu testing  States eating and drinking normally, staying hydrated  Denies any chest pain, chest tightness, shortness of breath, GI/ symptoms or other complaints  Review of Systems   Review of Systems   Constitutional: Negative for activity change, appetite change, chills, fatigue and fever  HENT: Positive for congestion, ear pain, postnasal drip, rhinorrhea and sore throat  Negative for ear discharge, facial swelling, hearing loss, sinus pressure, trouble swallowing and voice change  Eyes: Negative for discharge, itching and visual disturbance  Respiratory: Negative for cough, chest tightness, shortness of breath and wheezing      Cardiovascular: Negative for chest pain    Gastrointestinal: Negative for abdominal pain, diarrhea, nausea and vomiting  Musculoskeletal: Negative for back pain and neck pain  Skin: Negative for rash  Neurological: Negative for dizziness, syncope, weakness, numbness and headaches  All other systems reviewed and are negative          Current Medications       Current Outpatient Medications:   •  busPIRone (BUSPAR) 10 mg tablet, take 1 tablet by mouth three times a day, Disp: 270 tablet, Rfl: 1  •  DULoxetine (CYMBALTA) 60 mg delayed release capsule, Take 1 capsule (60 mg total) by mouth daily, Disp: 90 capsule, Rfl: 3  •  pregabalin (LYRICA) 100 mg capsule, Take 1 capsule (100 mg total) by mouth 3 (three) times a day, Disp: 90 capsule, Rfl: 1  •  acetaminophen (TYLENOL) 650 mg CR tablet, Take 1 tablet (650 mg total) by mouth every 8 (eight) hours as needed for mild pain (Patient not taking: Reported on 2/8/2023), Disp: 30 tablet, Rfl: 0  •  brompheniramine-pseudoephedrine-DM 30-2-10 MG/5ML syrup, Take 10 mL by mouth 4 (four) times a day as needed for allergies (Patient not taking: Reported on 2/8/2023), Disp: 120 mL, Rfl: 0  •  methylPREDNISolone 4 MG tablet therapy pack, Use as directed on package (Patient not taking: Reported on 2/8/2023), Disp: 21 tablet, Rfl: 0    Current Allergies     Allergies as of 02/08/2023 - Reviewed 02/08/2023   Allergen Reaction Noted   • Clindamycin/lincomycin  03/14/2016   • Levaquin [levofloxacin] Throat Swelling 02/01/2018   • Penicillins Anaphylaxis 03/14/2016            The following portions of the patient's history were reviewed and updated as appropriate: allergies, current medications, past family history, past medical history, past social history, past surgical history and problem list      Past Medical History:   Diagnosis Date   • Kidney stone    • Obesity 4/15/2013   • Peripheral edema    • Primary osteoarthritis involving multiple joints 1/17/2020   • Sleep apnea    • Wears dentures    • Wears glasses Past Surgical History:   Procedure Laterality Date   • BACK SURGERY      lump removed near shoulder on right side   •  SECTION      x3, 2005,, 2012   • HYSTERECTOMY     • MOUTH SURGERY      21 teeth removed   • [place] CYSTOURETHROSCOPY N/A 10/8/2020    Procedure: Melia Jean Marie;  Surgeon: Damien Ricardo MD;  [facility];  Service: Gynecology   • [place] LAPS TOTAL HYSTERECT 250 GM/< W/RMVL TUBE/OVARY N/A 10/8/2020    Procedure: ROBOTIC TOTAL HYSTERECTOMY; BILATERAL SALPINGECTOMY;  Surgeon: Damien Ricardo MD;  [facility];  Service: Gynecology   • TUBAL LIGATION         Family History   Problem Relation Age of Onset   • Hypertension Mother    • Irritable bowel syndrome Mother    • Diverticulitis Mother    • Depression Mother    • Heart murmur Mother    • Obesity Mother    • Hypertension Father    • Spina bifida Father    • Obesity Father    • Multiple sclerosis Brother    • Lung disease Brother    • COPD Family    • Emphysema Family    • No Known Problems Daughter    • Stroke Maternal Grandmother    • Obesity Paternal Grandmother    • Stroke Paternal Grandmother    • No Known Problems Daughter    • No Known Problems Daughter    • No Known Problems Maternal Aunt    • No Known Problems Maternal Aunt          Medications have been verified  Objective   /99   Pulse 92   Temp 98 °F (36 7 °C) (Temporal)   Resp 22   Ht 5' 3" (1 6 m)   Wt (!) 160 kg (353 lb)   LMP 2020   SpO2 98%   BMI 62 53 kg/m²        Physical Exam     Physical Exam  Vitals and nursing note reviewed  Constitutional:       General: She is not in acute distress  Appearance: Normal appearance  She is not ill-appearing or toxic-appearing  HENT:      Head: Normocephalic and atraumatic  Right Ear: Tympanic membrane normal       Left Ear: Tympanic membrane normal       Mouth/Throat:      Mouth: Mucous membranes are moist       Pharynx: Posterior oropharyngeal erythema present   No oropharyngeal exudate  Comments: Mild post nasal drip visualized  Eyes:      Pupils: Pupils are equal, round, and reactive to light  Cardiovascular:      Rate and Rhythm: Normal rate and regular rhythm  Heart sounds: Normal heart sounds  Pulmonary:      Effort: Pulmonary effort is normal       Breath sounds: Normal breath sounds  Skin:     Capillary Refill: Capillary refill takes less than 2 seconds  Neurological:      Mental Status: She is alert and oriented to person, place, and time     Psychiatric:         Mood and Affect: Mood normal          Behavior: Behavior normal

## 2023-02-11 LAB — BACTERIA THROAT CULT: ABNORMAL

## 2023-02-12 ENCOUNTER — TELEPHONE (OUTPATIENT)
Dept: URGENT CARE | Facility: CLINIC | Age: 43
End: 2023-02-12

## 2023-02-12 NOTE — TELEPHONE ENCOUNTER
Patient called regarding throat culture results, advised it was negative for a bacterial strep infection and does not require abx at this time  States that mild sore throat persist  Return precautions discussed  Verbalizes understanding

## 2023-02-15 ENCOUNTER — TELEPHONE (OUTPATIENT)
Dept: URGENT CARE | Age: 43
End: 2023-02-15

## 2023-02-15 DIAGNOSIS — J02.0 STREP THROAT: Primary | ICD-10-CM

## 2023-02-15 RX ORDER — AZITHROMYCIN 250 MG/1
TABLET, FILM COATED ORAL
Qty: 6 TABLET | Refills: 0 | Status: SHIPPED | OUTPATIENT
Start: 2023-02-15 | End: 2023-02-19

## 2023-02-15 NOTE — TELEPHONE ENCOUNTER
Patient states still having a sore throat  Discussed throat culture, answered all questions  She would like antibiotics at this time  States she tolerates a z-pack very well   Verbalized good understanding to follow-up with PCP or go to ER if any new worsening or concerning symtpoms

## 2023-02-22 ENCOUNTER — OFFICE VISIT (OUTPATIENT)
Dept: PAIN MEDICINE | Facility: CLINIC | Age: 43
End: 2023-02-22

## 2023-02-22 VITALS
WEIGHT: 293 LBS | SYSTOLIC BLOOD PRESSURE: 145 MMHG | HEART RATE: 98 BPM | BODY MASS INDEX: 51.91 KG/M2 | HEIGHT: 63 IN | DIASTOLIC BLOOD PRESSURE: 82 MMHG

## 2023-02-22 DIAGNOSIS — G89.29 CHRONIC CERVICAL PAIN: ICD-10-CM

## 2023-02-22 DIAGNOSIS — M54.2 CHRONIC CERVICAL PAIN: ICD-10-CM

## 2023-02-22 DIAGNOSIS — G89.4 CHRONIC PAIN SYNDROME: Primary | ICD-10-CM

## 2023-02-22 DIAGNOSIS — M54.12 CERVICAL RADICULOPATHY: ICD-10-CM

## 2023-02-22 RX ORDER — PREGABALIN 75 MG/1
75 CAPSULE ORAL 3 TIMES DAILY
Qty: 90 CAPSULE | Refills: 2 | Status: SHIPPED | OUTPATIENT
Start: 2023-02-22

## 2023-02-22 NOTE — PROGRESS NOTES
Assessment:  1  Chronic pain syndrome    2  Chronic cervical pain    3  Cervical radiculopathy        Plan:  While the patient was in the office today, I did have a thorough conversation regarding their chronic pain syndrome, medication management, and treatment plan options  Patient is being seen for a follow-up visit  She was last seen here on 12/23/2022 at which time Lyrica was increased to 100 mg 3 times daily  Unfortunately, patient denies any additional improvement with the increased dosage  Also, she states that she has been little more dizzy than usual   At this point, we will decrease the Lyrica back to 75 mg 3 times daily  Continue with Cymbalta 60 mg daily  We will update an MRI of her cervical spine due to complaints of increased neck pain and upper extremity pain numbness, weakness  Patient participated in physical therapy in the past   Was stopped due to increased pain after each session of physical therapy  Patient states that she tries to do cervical stretching/strengthening exercises at home on a daily basis without improvement  Follow-up in 6 weeks  History of Present Illness: The patient is a 36 y o  female who presents for a follow up office visit in regards to Neck Pain and Back Pain  The patient’s current symptoms include complaints of neck pain that can radiate to both upper extremities, mid back pain, low back pain that radiates down both lower extremities  Current pain levels an 8/10  Quality pain is described as burning, sharp, throbbing, pressure-like, shooting, numb, pins-and-needles  Current pain medications includes: Lyrica 100 mg 3 times daily, Cymbalta 60 mg daily  The patient reports that this regimen is providing 10% pain relief  The patient is reporting dizziness from this pain medication regimen      I have personally reviewed and/or updated the patient's past medical history, past surgical history, family history, social history, current medications, allergies, and vital signs today  Review of Systems  Review of Systems   Gastrointestinal: Positive for nausea  Musculoskeletal: Positive for arthralgias, gait problem and joint swelling  Difficulty walking  Decreased range of motion  Paralysis or muscle weakness   Pain in extremity: Neck, arms, Legs  Swelling: Neck, arms, Legs   Neurological: Positive for dizziness           Past Medical History:   Diagnosis Date   • Kidney stone    • Obesity 4/15/2013   • Peripheral edema    • Primary osteoarthritis involving multiple joints 2020   • Sleep apnea    • Wears dentures    • Wears glasses        Past Surgical History:   Procedure Laterality Date   • BACK SURGERY      lump removed near shoulder on right side   •  SECTION      x3, 2005,,    • HYSTERECTOMY     • MOUTH SURGERY      21 teeth removed   • [place] CYSTOURETHROSCOPY N/A 10/8/2020    Procedure: Juluis Door;  Surgeon: Jossy Allen MD;  [facility];  Service: Gynecology   • [place] LAPS TOTAL HYSTERECT 250 GM/< W/RMVL TUBE/OVARY N/A 10/8/2020    Procedure: ROBOTIC TOTAL HYSTERECTOMY; BILATERAL SALPINGECTOMY;  Surgeon: Jossy Allen MD;  [facility];  Service: Gynecology   • TUBAL LIGATION         Family History   Problem Relation Age of Onset   • Hypertension Mother    • Irritable bowel syndrome Mother    • Diverticulitis Mother    • Depression Mother    • Heart murmur Mother    • Obesity Mother    • Hypertension Father    • Spina bifida Father    • Obesity Father    • Multiple sclerosis Brother    • Lung disease Brother    • COPD Family    • Emphysema Family    • No Known Problems Daughter    • Stroke Maternal Grandmother    • Obesity Paternal Grandmother    • Stroke Paternal Grandmother    • No Known Problems Daughter    • No Known Problems Daughter    • No Known Problems Maternal Aunt    • No Known Problems Maternal Aunt        Social History     Occupational History   • 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 Tobacco Use   • Smoking status: Every Day     Packs/day: 1 00     Years: 30 00     Pack years: 30 00     Types: Cigarettes     Last attempt to quit: 5/10/2022     Years since quittin 7   • Smokeless tobacco: Never   Vaping Use   • Vaping Use: Never used   Substance and Sexual Activity   • Alcohol use: Not Currently     Comment: 3x per year will have 1-2 drinks   • Drug use: No   • Sexual activity: Not Currently         Current Outpatient Medications:   •  busPIRone (BUSPAR) 10 mg tablet, take 1 tablet by mouth three times a day, Disp: 270 tablet, Rfl: 1  •  DULoxetine (CYMBALTA) 60 mg delayed release capsule, Take 1 capsule (60 mg total) by mouth daily, Disp: 90 capsule, Rfl: 3  •  pregabalin (LYRICA) 75 mg capsule, Take 1 capsule (75 mg total) by mouth 3 (three) times a day, Disp: 90 capsule, Rfl: 2  •  acetaminophen (TYLENOL) 650 mg CR tablet, Take 1 tablet (650 mg total) by mouth every 8 (eight) hours as needed for mild pain (Patient not taking: Reported on 2023), Disp: 30 tablet, Rfl: 0  •  brompheniramine-pseudoephedrine-DM 30-2-10 MG/5ML syrup, Take 10 mL by mouth 4 (four) times a day as needed for allergies (Patient not taking: Reported on 2023), Disp: 120 mL, Rfl: 0  •  methylPREDNISolone 4 MG tablet therapy pack, Use as directed on package (Patient not taking: Reported on 2023), Disp: 21 tablet, Rfl: 0    Allergies   Allergen Reactions   • Clindamycin/Lincomycin      Mouth ulcers severe  But, tolerates azithromycin     • Levaquin [Levofloxacin] Throat Swelling     Facial swelling leading to throat swelling   • Penicillins Anaphylaxis       Physical Exam:    /82   Pulse 98   Ht 5' 3" (1 6 m)   Wt (!) 167 kg (368 lb)   LMP 2020   BMI 65 19 kg/m²     Constitutional:obese  Eyes:anicteric  HEENT:grossly intact  Neck:supple, symmetric, trachea midline and no masses   Pulmonary:even and unlabored  Cardiovascular:No edema or pitting edema present  Skin:Normal without rashes or lesions and well hydrated  Psychiatric:Mood and affect appropriate  Neurologic:Cranial Nerves II-XII grossly intact  Musculoskeletal:antalgic    Imaging  MRI cervical spine without contrast    (Results Pending)       Orders Placed This Encounter   Procedures   • MRI cervical spine without contrast

## 2023-02-22 NOTE — PATIENT INSTRUCTIONS
Neck Exercises   AMBULATORY CARE:   Neck exercises  help reduce neck pain, and improve neck movement and strength  Neck exercises also help prevent long-term neck problems  Call your doctor if:   Your pain does not get better, or gets worse  You have questions or concerns about your condition, care, or exercise program     What you need to know about exercise safety:   Move slowly, gently, and smoothly  Avoid fast or jerky motions  Stand and sit the way your healthcare provider shows you  Good posture may reduce your neck pain  Check your posture often, even when you are not doing your neck exercises  Follow the exercise program recommended by your healthcare provider  He or she will tell you which exercises are best for your condition  He or she will also tell you how many repetitions to do and how often you should do the exercises  How to perform neck exercises safely:   Exercise position:  You may sit or stand while you do neck exercises  Face forward  Your shoulders should be straight and relaxed, with a good posture  Head tilts, forward and back:  Gently bow your head and try to touch your chin to your chest  Your healthcare provider may tell you to push on the back of your neck to help bow your head  Raise your chin back to the starting position  Tilt your head back as far as possible so you are looking up at the ceiling  Your healthcare provider may tell you to lift your chin to help tilt your head back  Return your head to the starting position  Head tilts, side to side:  Tilt your head, bringing your ear toward your shoulder  Then tilt your head toward the other shoulder  Head turns:  Turn your head to look over your shoulder  Tilt your chin down and try to touch it to your shoulder  Do not raise your shoulder to your chin  Face forward again  Do the same on the other side           Head rolls:  Slowly bring your chin toward your chest  Next, roll your head to the right  Your ear should be positioned over your shoulder  Hold this position for 5 seconds  Roll your head back toward your chest and to the left into the same position  Hold for 5 seconds  Gently roll your head back and around in a clockwise [place] 3 times  Next, move your head in the reverse direction (counterclockwise) in a [place] 3 times  Do not shrug your shoulders upwards while you do this exercise  Follow up with your doctor as directed:  Write down your questions so you remember to ask them during your visits  © Copyright [facility] 2022 Information is for End User's use only and may not be sold, redistributed or otherwise used for commercial purposes  The above information is an  only  It is not intended as medical advice for individual conditions or treatments  Talk to your doctor, nurse or pharmacist before following any medical regimen to see if it is safe and effective for you

## 2023-03-10 ENCOUNTER — APPOINTMENT (OUTPATIENT)
Dept: LAB | Facility: CLINIC | Age: 43
End: 2023-03-10

## 2023-03-10 DIAGNOSIS — F41.1 GAD (GENERALIZED ANXIETY DISORDER): ICD-10-CM

## 2023-03-10 LAB — TSH SERPL DL<=0.05 MIU/L-ACNC: 3.35 UIU/ML (ref 0.45–4.5)

## 2023-03-16 ENCOUNTER — OFFICE VISIT (OUTPATIENT)
Dept: URGENT CARE | Facility: CLINIC | Age: 43
End: 2023-03-16

## 2023-03-16 VITALS
BODY MASS INDEX: 51.91 KG/M2 | TEMPERATURE: 98.6 F | OXYGEN SATURATION: 95 % | HEIGHT: 63 IN | DIASTOLIC BLOOD PRESSURE: 74 MMHG | SYSTOLIC BLOOD PRESSURE: 151 MMHG | WEIGHT: 293 LBS | RESPIRATION RATE: 18 BRPM | HEART RATE: 77 BPM

## 2023-03-16 DIAGNOSIS — R11.2 NAUSEA AND VOMITING, UNSPECIFIED VOMITING TYPE: Primary | ICD-10-CM

## 2023-03-16 LAB
SL AMB  POCT GLUCOSE, UA: NEGATIVE
SL AMB LEUKOCYTE ESTERASE,UA: NEGATIVE
SL AMB POCT BILIRUBIN,UA: NEGATIVE
SL AMB POCT BLOOD,UA: NEGATIVE
SL AMB POCT CLARITY,UA: CLEAR
SL AMB POCT COLOR,UA: YELLOW
SL AMB POCT KETONES,UA: NEGATIVE
SL AMB POCT NITRITE,UA: NEGATIVE
SL AMB POCT PH,UA: 7
SL AMB POCT SPECIFIC GRAVITY,UA: 1
SL AMB POCT URINE HCG: NEGATIVE
SL AMB POCT URINE PROTEIN: NEGATIVE
SL AMB POCT UROBILINOGEN: 0.2

## 2023-03-16 NOTE — PATIENT INSTRUCTIONS
Rest and drink fluids  BRAT diet  If you develop any abdominal pain, vomiting, fever, decreased fluid intake or urination or any new or concerning symptoms please return or proceed to ER   Follow up with pcp in 3-5 days

## 2023-03-16 NOTE — LETTER
March 16, 2023     Patient: Katherine Dubois   YOB: 1980   Date of Visit: 3/16/2023       To Whom it May Concern:    Felisha Valle was seen in my clinic on 3/16/2023  She may return to work on 3/17/2023  If you have any questions or concerns, please don't hesitate to call           Sincerely,          JAISON Mtz        CC: No Recipients

## 2023-03-16 NOTE — PROGRESS NOTES
[facility]        NAME: Rocio Dorsey is a 36 y o  female  : 1980    MRN: [id number]  DATE: 2023  TIME: 10:54 AM    Assessment and Plan   Nausea and vomiting, unspecified vomiting type [R11 2]  1  Nausea and vomiting, unspecified vomiting type  POCT urine dip    POCT urine HCG          Urine dip wnl  Patient Instructions     Patient Instructions   Rest and drink fluids  BRAT diet  If you develop any abdominal pain, vomiting, fever, decreased fluid intake or urination or any new or concerning symptoms please return or proceed to ER  Follow up with pcp in 3-5 days        Chief Complaint     Chief Complaint   Patient presents with   • Vomiting     Started yesterday morning Vomited x 9 and diarrhea  History of Present Illness       Vomiting   This is a new problem  The current episode started yesterday  Episode frequency: 9 times yesterday, once today  The problem has been gradually improving  The emesis has an appearance of stomach contents and bile  There has been no fever  Associated symptoms include abdominal pain and diarrhea  Pertinent negatives include no chills, coughing, fever, headaches, myalgias, sweats, URI or weight loss  She has tried nothing for the symptoms  Has not vomited today  Review of Systems   Review of Systems   Constitutional: Negative for activity change, appetite change, chills, fever, unexpected weight change and weight loss  HENT: Negative  Eyes: Negative  Respiratory: Negative for cough and shortness of breath  Cardiovascular: Negative  Gastrointestinal: Positive for abdominal pain, diarrhea, nausea and vomiting  Negative for blood in stool  Endocrine: Negative  Genitourinary: Negative  Musculoskeletal: Negative for myalgias  Skin: Negative  Neurological: Negative for headaches  Psychiatric/Behavioral: Negative            Current Medications       Current Outpatient Medications:   •  busPIRone (BUSPAR) 10 mg tablet, take 1 tablet by mouth three times a day, Disp: 270 tablet, Rfl: 1  •  DULoxetine (CYMBALTA) 60 mg delayed release capsule, Take 1 capsule (60 mg total) by mouth daily, Disp: 90 capsule, Rfl: 3  •  pregabalin (LYRICA) 75 mg capsule, Take 1 capsule (75 mg total) by mouth 3 (three) times a day, Disp: 90 capsule, Rfl: 2  •  acetaminophen (TYLENOL) 650 mg CR tablet, Take 1 tablet (650 mg total) by mouth every 8 (eight) hours as needed for mild pain (Patient not taking: Reported on 2023), Disp: 30 tablet, Rfl: 0  •  brompheniramine-pseudoephedrine-DM 30-2-10 MG/5ML syrup, Take 10 mL by mouth 4 (four) times a day as needed for allergies (Patient not taking: Reported on 2023), Disp: 120 mL, Rfl: 0  •  methylPREDNISolone 4 MG tablet therapy pack, Use as directed on package (Patient not taking: Reported on 2023), Disp: 21 tablet, Rfl: 0    Current Allergies     Allergies as of 2023 - Reviewed 2023   Allergen Reaction Noted   • Clindamycin/lincomycin  2016   • Levaquin [levofloxacin] Throat Swelling 2018   • Penicillins Anaphylaxis 2016            The following portions of the patient's history were reviewed and updated as appropriate: allergies, current medications, past family history, past medical history, past social history, past surgical history and problem list      Past Medical History:   Diagnosis Date   • Kidney stone    • Obesity 4/15/2013   • Peripheral edema    • Primary osteoarthritis involving multiple joints 2020   • Sleep apnea    • Wears dentures    • Wears glasses        Past Surgical History:   Procedure Laterality Date   • BACK SURGERY      lump removed near shoulder on right side   •  SECTION      x3, 2005,, 2012   • HYSTERECTOMY     • MOUTH SURGERY      21 teeth removed   • NC CYSTOURETHROSCOPY N/A 10/8/2020    Procedure: Nunu Mendez;  Surgeon: Nancy Conner MD;  Location: [facility];  Service: Gynecology   • NC LAPS TOTAL HYSTERECT 250 GM/< W/RMVL TUBE/OVARY N/A 10/8/2020    Procedure: ROBOTIC TOTAL HYSTERECTOMY; BILATERAL SALPINGECTOMY;  Surgeon: Girish Rene MD;  Location: [facility] OR;  Service: Gynecology   • TUBAL LIGATION  2012       Family History   Problem Relation Age of Onset   • Hypertension Mother    • Irritable bowel syndrome Mother    • Diverticulitis Mother    • Depression Mother    • Heart murmur Mother    • Obesity Mother    • Hypertension Father    • Spina bifida Father    • Obesity Father    • Multiple sclerosis Brother    • Lung disease Brother    • COPD Family    • Emphysema Family    • No Known Problems Daughter    • Stroke Maternal Grandmother    • Obesity Paternal Grandmother    • Stroke Paternal Grandmother    • No Known Problems Daughter    • No Known Problems Daughter    • No Known Problems Maternal Aunt    • No Known Problems Maternal Aunt          Medications have been verified  Objective   /74   Pulse 77   Temp 98 6 °F (37 °C)   Resp 18   Ht 5' 3" (1 6 m)   Wt (!) 168 kg (370 lb)   LMP 08/25/2020   SpO2 95%   BMI 65 54 kg/m²   Patient's last menstrual period was 08/25/2020  Physical Exam     Physical Exam  Constitutional:       General: She is not in acute distress  Appearance: Normal appearance  She is obese  She is not ill-appearing or toxic-appearing  HENT:      Right Ear: Tympanic membrane and ear canal normal       Left Ear: Tympanic membrane and ear canal normal       Nose: Nose normal       Mouth/Throat:      Mouth: Mucous membranes are moist    Eyes:      Pupils: Pupils are equal, round, and reactive to light  Cardiovascular:      Rate and Rhythm: Normal rate and regular rhythm  Pulses: Normal pulses  Heart sounds: Normal heart sounds  Pulmonary:      Effort: Pulmonary effort is normal  No respiratory distress  Breath sounds: Normal breath sounds and air entry  No wheezing  Abdominal:      General: Bowel sounds are increased        Palpations: Abdomen is soft  Tenderness: There is no abdominal tenderness  There is no right CVA tenderness, left CVA tenderness or guarding  Musculoskeletal:         General: Normal range of motion  Cervical back: Normal range of motion  Skin:     General: Skin is warm and dry  Neurological:      General: No focal deficit present  Mental Status: She is alert and oriented to person, place, and time  GCS: GCS eye subscore is 4  GCS verbal subscore is 5  GCS motor subscore is 6  Psychiatric:         Mood and Affect: Mood normal          Behavior: Behavior normal  Behavior is cooperative  Thought Content:  Thought content normal

## 2023-03-22 ENCOUNTER — OFFICE VISIT (OUTPATIENT)
Dept: INTERNAL MEDICINE CLINIC | Facility: CLINIC | Age: 43
End: 2023-03-22

## 2023-03-22 VITALS
HEIGHT: 63 IN | TEMPERATURE: 98.8 F | HEART RATE: 74 BPM | WEIGHT: 293 LBS | DIASTOLIC BLOOD PRESSURE: 68 MMHG | BODY MASS INDEX: 51.91 KG/M2 | SYSTOLIC BLOOD PRESSURE: 124 MMHG | OXYGEN SATURATION: 98 %

## 2023-03-22 DIAGNOSIS — Z13.29 SCREENING FOR THYROID DISORDER: ICD-10-CM

## 2023-03-22 DIAGNOSIS — G89.4 CHRONIC PAIN SYNDROME: ICD-10-CM

## 2023-03-22 DIAGNOSIS — Z13.0 SCREENING FOR DEFICIENCY ANEMIA: ICD-10-CM

## 2023-03-22 DIAGNOSIS — G47.33 OBSTRUCTIVE SLEEP APNEA TREATED WITH CONTINUOUS POSITIVE AIRWAY PRESSURE (CPAP): ICD-10-CM

## 2023-03-22 DIAGNOSIS — M15.9 PRIMARY OSTEOARTHRITIS INVOLVING MULTIPLE JOINTS: ICD-10-CM

## 2023-03-22 DIAGNOSIS — Z13.1 SCREENING FOR DIABETES MELLITUS (DM): ICD-10-CM

## 2023-03-22 DIAGNOSIS — R60.0 LOCALIZED EDEMA: ICD-10-CM

## 2023-03-22 DIAGNOSIS — E78.2 MIXED HYPERLIPIDEMIA: ICD-10-CM

## 2023-03-22 DIAGNOSIS — F41.1 GAD (GENERALIZED ANXIETY DISORDER): ICD-10-CM

## 2023-03-22 DIAGNOSIS — Z13.220 SCREENING FOR LIPID DISORDERS: ICD-10-CM

## 2023-03-22 DIAGNOSIS — E66.01 CLASS 3 SEVERE OBESITY DUE TO EXCESS CALORIES WITHOUT SERIOUS COMORBIDITY WITH BODY MASS INDEX (BMI) OF 60.0 TO 69.9 IN ADULT (HCC): ICD-10-CM

## 2023-03-22 DIAGNOSIS — Z12.31 ENCOUNTER FOR SCREENING MAMMOGRAM FOR MALIGNANT NEOPLASM OF BREAST: ICD-10-CM

## 2023-03-22 DIAGNOSIS — Z72.0 TOBACCO ABUSE: ICD-10-CM

## 2023-03-22 DIAGNOSIS — Z00.00 WELL ADULT EXAM: Primary | ICD-10-CM

## 2023-03-22 DIAGNOSIS — Z99.89 OBSTRUCTIVE SLEEP APNEA TREATED WITH CONTINUOUS POSITIVE AIRWAY PRESSURE (CPAP): ICD-10-CM

## 2023-03-22 RX ORDER — FUROSEMIDE 20 MG/1
20 TABLET ORAL DAILY
Qty: 30 TABLET | Refills: 5 | Status: SHIPPED | OUTPATIENT
Start: 2023-03-22

## 2023-03-22 NOTE — PROGRESS NOTES
Assessment/Plan:  Problem List Items Addressed This Visit        Respiratory    Obstructive sleep apnea treated with continuous positive airway pressure (CPAP)       Musculoskeletal and Integument    Primary osteoarthritis involving multiple joints       Other    Tobacco abuse    Mixed hyperlipidemia    Relevant Orders    Comprehensive metabolic panel    Lipid Panel with Direct LDL reflex    JOSEFINA (generalized anxiety disorder)    Class 3 severe obesity due to excess calories without serious comorbidity with body mass index (BMI) of 60 0 to 69 9 in [facility])    Relevant Orders    Comprehensive metabolic panel    Chronic pain syndrome   Other Visit Diagnoses     Well adult exam    -  Primary    Relevant Orders    Comprehensive metabolic panel    CBC and differential    Lipid Panel with Direct LDL reflex    Hemoglobin A1C    Encounter for screening mammogram for malignant neoplasm of breast        Screening for lipid disorders        Screening for thyroid disorder        Screening for diabetes mellitus (DM)        Relevant Orders    Hemoglobin A1C    Screening for deficiency anemia        Localized edema        Relevant Medications    furosemide (LASIX) 20 mg tablet           Diagnoses and all orders for this visit:    Well adult exam  -     Comprehensive metabolic panel; Future  -     CBC and differential; Future  -     Lipid Panel with Direct LDL reflex; Future  -     Hemoglobin A1C; Future    Mixed hyperlipidemia  -     Comprehensive metabolic panel; Future  -     Lipid Panel with Direct LDL reflex; Future    Tobacco abuse    Class 3 severe obesity due to excess calories without serious comorbidity with body mass index (BMI) of 60 0 to 69 9 in [facility])  -     Comprehensive metabolic panel;  Future    Chronic pain syndrome    JOSEFINA (generalized anxiety disorder)    Primary osteoarthritis involving multiple joints    Obstructive sleep apnea treated with continuous positive airway pressure (CPAP)    Encounter for screening mammogram for malignant neoplasm of breast    Screening for lipid disorders    Screening for thyroid disorder    Screening for diabetes mellitus (DM)  -     Hemoglobin A1C; Future    Screening for deficiency anemia    Localized edema  -     furosemide (LASIX) 20 mg tablet; Take 1 tablet (20 mg total) by mouth daily        No problem-specific Assessment & Plan notes found for this encounter  A/P: Doing well and co-morbidities are stable  Labs will be ordered  Order mammo  Will restart the lasix for the edema  ??starting with lymphedema    No mental or physical restrictions  No evidence of communicable diseases  Continue current treatment and RTC one year for annual and six months for routine           Subjective:      Patient ID: Vaughn Weir is a 36 y o  female  WF presents for a well exam  Doing ok and no c/o's, but edema is back    Remains active w/o difficulty and no falls  Denies depression  No suicidal or violent ideations  Working part time  No recent travel  No fever, chills, or sweats  No unexplained wt change  Denies CP, SOB, palpitations, edema, orthopnea, or PND  No sz or syncope  No changes in bowel or bladder habits  No trouble swallowing  Appetite and sleep are good  Doesn't need to see a DDS, but sees an eye doctor  No change in PMH, PSH, ALL, OH, SH, or Fhx  Currently due for labs and mammo            The following portions of the patient's history were reviewed and updated as appropriate:   She has a past medical history of Kidney stone, Obesity (4/15/2013), Peripheral edema, Primary osteoarthritis involving multiple joints (2020), Sleep apnea, Wears dentures, and Wears glasses  ,  does not have any pertinent problems on file  ,   has a past surgical history that includes Mouth surgery;  section; Tubal ligation (); Back surgery; pr laps total hysterect 250 gm/< w/rmvl tube/ovary (N/A, 10/8/2020); pr cystourethroscopy (N/A, 10/8/2020); and Hysterectomy  ,  family history includes COPD in her family; Depression in her mother; Diverticulitis in her mother; Emphysema in her family; Heart murmur in her mother; Hypertension in her father and mother; Irritable bowel syndrome in her mother; Lung disease in her brother; Multiple sclerosis in her brother; No Known Problems in her daughter, daughter, daughter, maternal aunt, and maternal aunt; Obesity in her father, mother, and paternal grandmother; Spina bifida in her father; Stroke in her maternal grandmother and paternal grandmother  ,   reports that she has been smoking cigarettes  She has a 30 00 pack-year smoking history  She has never used smokeless tobacco  She reports that she does not currently use alcohol  She reports that she does not use drugs  ,  is allergic to clindamycin/lincomycin, levaquin [levofloxacin], and penicillins     Current Outpatient Medications   Medication Sig Dispense Refill   • acetaminophen (TYLENOL) 650 mg CR tablet Take 1 tablet (650 mg total) by mouth every 8 (eight) hours as needed for mild pain 30 tablet 0   • busPIRone (BUSPAR) 10 mg tablet take 1 tablet by mouth three times a day 270 tablet 1   • DULoxetine (CYMBALTA) 60 mg delayed release capsule Take 1 capsule (60 mg total) by mouth daily 90 capsule 3   • furosemide (LASIX) 20 mg tablet Take 1 tablet (20 mg total) by mouth daily 30 tablet 5   • pregabalin (LYRICA) 75 mg capsule Take 1 capsule (75 mg total) by mouth 3 (three) times a day 90 capsule 2     No current facility-administered medications for this visit  Review of Systems   Constitutional: Negative for activity change, chills, diaphoresis, fatigue and fever  HENT: Negative  Eyes: Negative for visual disturbance  Respiratory: Negative for cough, chest tightness, shortness of breath and wheezing  Cardiovascular: Positive for leg swelling  Negative for chest pain and palpitations  Gastrointestinal: Negative for abdominal pain, constipation, diarrhea, nausea and vomiting  Endocrine: Negative for cold intolerance and heat intolerance  Genitourinary: Negative for difficulty urinating, dysuria and frequency  Musculoskeletal: Negative for arthralgias, gait problem and myalgias  Neurological: Negative for dizziness, seizures, syncope, weakness, light-headedness and headaches  Psychiatric/Behavioral: Negative for confusion, dysphoric mood and sleep disturbance  The patient is not nervous/anxious  PHQ-2/9 Depression Screening    Little interest or pleasure in doing things: 2 - more than half the days  Feeling down, depressed, or hopeless: 2 - more than half the days  Trouble falling or staying asleep, or sleeping too much: 2 - more than half the days  Feeling tired or having little energy: 3 - nearly every day  Poor appetite or overeatin - more than half the days  Feeling bad about yourself - or that you are a failure or have let yourself or your family down: 1 - several days  Trouble concentrating on things, such as reading the newspaper or watching television: 0 - not at all  Moving or speaking so slowly that other people could have noticed  Or the opposite - being so fidgety or restless that you have been moving around a lot more than usual: 0 - not at all  Thoughts that you would be better off dead, or of hurting yourself in some way: 0 - not at all  PHQ-2 Score: 4  PHQ-2 Interpretation: POSITIVE depression screen  PHQ-9 Score: 12   PHQ-9 Interpretation: Moderate depression         Objective:  Vitals:    23 1456   BP: 124/68   Pulse: 74   Temp: 98 8 °F (37 1 °C)   SpO2: 98%   Weight: (!) 170 kg (374 lb)   Height: 5' 3" (1 6 m)     Body mass index is 66 25 kg/m²  Physical Exam  Vitals and nursing note reviewed  Constitutional:       General: She is not in acute distress  Appearance: Normal appearance  She is obese  She is not ill-appearing  HENT:      Head: Normocephalic and atraumatic        Mouth/Throat:      Mouth: Mucous membranes are moist  Eyes:      Extraocular Movements: Extraocular movements intact  Conjunctiva/sclera: Conjunctivae normal       Pupils: Pupils are equal, round, and reactive to light  Neck:      Vascular: No carotid bruit  Cardiovascular:      Rate and Rhythm: Normal rate and regular rhythm  Heart sounds: Normal heart sounds  No murmur heard  Pulmonary:      Effort: Pulmonary effort is normal  No respiratory distress  Breath sounds: Normal breath sounds  No wheezing, rhonchi or rales  Abdominal:      General: Bowel sounds are normal  There is no distension  Palpations: Abdomen is soft  Tenderness: There is no abdominal tenderness  Musculoskeletal:      Cervical back: Neck supple  Right lower leg: Edema present  Left lower leg: Edema present  Comments: bilat LE edema 2/4   Neurological:      General: No focal deficit present  Mental Status: She is alert and oriented to person, place, and time  Mental status is at baseline  Psychiatric:         Mood and Affect: Mood normal          Behavior: Behavior normal          Thought Content:  Thought content normal          Judgment: Judgment normal

## 2023-04-07 ENCOUNTER — OFFICE VISIT (OUTPATIENT)
Dept: PAIN MEDICINE | Facility: CLINIC | Age: 43
End: 2023-04-07

## 2023-04-07 VITALS
SYSTOLIC BLOOD PRESSURE: 173 MMHG | HEART RATE: 102 BPM | HEIGHT: 63 IN | WEIGHT: 293 LBS | BODY MASS INDEX: 51.91 KG/M2 | DIASTOLIC BLOOD PRESSURE: 99 MMHG

## 2023-04-07 DIAGNOSIS — G89.29 CHRONIC CERVICAL PAIN: ICD-10-CM

## 2023-04-07 DIAGNOSIS — M54.2 CHRONIC CERVICAL PAIN: ICD-10-CM

## 2023-04-07 DIAGNOSIS — M54.12 CERVICAL RADICULOPATHY: ICD-10-CM

## 2023-04-07 DIAGNOSIS — G89.4 CHRONIC PAIN SYNDROME: Primary | ICD-10-CM

## 2023-04-07 NOTE — PROGRESS NOTES
Assessment:  1  Chronic pain syndrome    2  Chronic cervical pain    3  Cervical radiculopathy        Plan:  While the patient was in the office today, I did have a thorough conversation regarding their chronic pain syndrome, medication management, and treatment plan options  Patient is being seen for a follow-up visit  He was last seen here on 2/22/2023 at which time an MRI of her cervical spine was ordered  She is scheduled to have the MRI done next week  Lyrica was decreased to 75 mg 3 times daily due to dizziness  She reports that the dizziness has resolved with the lower dose of Lyrica  A prescription for Lyrica was sent to her pharmacy  Continue Cymbalta as prescribed by her PCP  The patient will follow-up in 1 month for medication prescription refill and reevaluation  The patient was advised to contact the office should their symptoms worsen in the interim  The patient was agreeable and verbalized an understanding  History of Present Illness: The patient is a 36 y o  female who presents for a follow up office visit in regards to Headache, Shoulder Pain, Arm Pain, and Back Pain  The patient’s current symptoms include plaints of neck pain upper back pain, pain radiates down both upper extremities  Current pain level 7/10  Quality pain is described as burning, sharp, throbbing, cramping, pressure-like, shooting, numb, pins-and-needles  Current pain medications includes: Lyrica 75 mg 3 times daily  PCP prescribes Cymbalta 60 mg daily  The patient reports that this regimen is providing 10-15% pain relief  The patient is reporting no side effects from this pain medication regimen  I have personally reviewed and/or updated the patient's past medical history, past surgical history, family history, social history, current medications, allergies, and vital signs today  Review of Systems  Review of Systems   Constitutional: Negative for unexpected weight change     HENT: Negative for hearing loss  Eyes: Negative for visual disturbance  Respiratory: Negative for shortness of breath  Cardiovascular: Negative for leg swelling  Gastrointestinal: Positive for nausea  Negative for constipation  Endocrine: Negative for polyuria  Genitourinary: Negative for difficulty urinating  Musculoskeletal: Positive for arthralgias, gait problem and myalgias  Negative for joint swelling  Decreased range of motion  Joint stiffness  Swelling - legs & arms  Pain in extremity- arms   Skin: Negative for rash  Neurological: Positive for dizziness  Negative for weakness and headaches  Psychiatric/Behavioral: Negative for decreased concentration  All other systems reviewed and are negative          Past Medical History:   Diagnosis Date   • Kidney stone    • Obesity 4/15/2013   • Peripheral edema    • Primary osteoarthritis involving multiple joints 2020   • Sleep apnea    • Wears dentures    • Wears glasses        Past Surgical History:   Procedure Laterality Date   • BACK SURGERY      lump removed near shoulder on right side   •  SECTION      x3, 2005,,    • HYSTERECTOMY     • MOUTH SURGERY      21 teeth removed   • [place] CYSTOURETHROSCOPY N/A 10/8/2020    Procedure: Zaidata Charley;  Surgeon: Truong Garay MD;  Location: [facility];  Service: Gynecology   • TX LAPS TOTAL HYSTERECT 250 GM/< W/RMVL TUBE/OVARY N/A 10/8/2020    Procedure: ROBOTIC TOTAL HYSTERECTOMY; BILATERAL SALPINGECTOMY;  Surgeon: Truong Garay MD;  Location: [facility];  Service: Gynecology   • TUBAL LIGATION         Family History   Problem Relation Age of Onset   • Hypertension Mother    • Irritable bowel syndrome Mother    • Diverticulitis Mother    • Depression Mother    • Heart murmur Mother    • Obesity Mother    • Hypertension Father    • Spina bifida Father    • Obesity Father    • Multiple sclerosis Brother    • Lung disease Brother    • COPD Family    • Emphysema Family    • No Known "Problems Daughter    • Stroke Maternal Grandmother    • Obesity Paternal Grandmother    • Stroke Paternal Grandmother    • No Known Problems Daughter    • No Known Problems Daughter    • No Known Problems Maternal Aunt    • No Known Problems Maternal Aunt        Social History     Occupational History   • 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   Tobacco Use   • Smoking status: Every Day     Packs/day: 1 00     Years: 30      Pack years: 30      Types: Cigarettes     Last attempt to quit: 5/10/2022     Years since quittin 9   • Smokeless tobacco: Never   Vaping Use   • Vaping Use: Never used   Substance and Sexual Activity   • Alcohol use: Not Currently     Comment: 3x per year will have 1-2 drinks   • Drug use: No   • Sexual activity: Not Currently         Current Outpatient Medications:   •  acetaminophen (TYLENOL) 650 mg CR tablet, Take 1 tablet (650 mg total) by mouth every 8 (eight) hours as needed for mild pain, Disp: 30 tablet, Rfl: 0  •  busPIRone (BUSPAR) 10 mg tablet, take 1 tablet by mouth three times a day, Disp: 270 tablet, Rfl: 1  •  DULoxetine (CYMBALTA) 60 mg delayed release capsule, Take 1 capsule (60 mg total) by mouth daily, Disp: 90 capsule, Rfl: 3  •  furosemide (LASIX) 20 mg tablet, Take 1 tablet (20 mg total) by mouth daily, Disp: 30 tablet, Rfl: 5  •  pregabalin (LYRICA) 75 mg capsule, Take 1 capsule (75 mg total) by mouth 3 (three) times a day, Disp: 90 capsule, Rfl: 2    Allergies   Allergen Reactions   • Clindamycin/Lincomycin      Mouth ulcers severe  But, tolerates azithromycin  • Levaquin [Levofloxacin] Throat Swelling     Facial swelling leading to throat swelling   • Penicillins Anaphylaxis       Physical Exam:    BP (!) 173/99   Pulse 102   Ht 5' 3\" (1 6 m)   Wt (!) 170 kg (374 lb)   LMP 2020   BMI 66 25 kg/m²     Constitutional:normal, well developed, well nourished, alert, in no distress and non-toxic and no overt pain behavior   and obese  Eyes:anicteric  HEENT:grossly " intact  Neck:supple, symmetric, trachea midline and no masses   Pulmonary:even and unlabored  Cardiovascular:No edema or pitting edema present  Skin:Normal without rashes or lesions and well hydrated  Psychiatric:Mood and affect appropriate  Neurologic:Cranial Nerves II-XII grossly intact  Musculoskeletal:normal    Imaging  No orders to display       No orders of the defined types were placed in this encounter

## 2023-05-08 DIAGNOSIS — F41.1 GAD (GENERALIZED ANXIETY DISORDER): ICD-10-CM

## 2023-05-08 DIAGNOSIS — F41.0 PANIC ATTACK: ICD-10-CM

## 2023-05-08 DIAGNOSIS — R07.89 ATYPICAL CHEST PAIN: ICD-10-CM

## 2023-05-09 RX ORDER — BUSPIRONE HYDROCHLORIDE 10 MG/1
TABLET ORAL
Qty: 270 TABLET | Refills: 1 | Status: SHIPPED | OUTPATIENT
Start: 2023-05-09

## 2023-06-13 DIAGNOSIS — M54.2 CHRONIC CERVICAL PAIN: ICD-10-CM

## 2023-06-13 DIAGNOSIS — G89.29 CHRONIC CERVICAL PAIN: ICD-10-CM

## 2023-06-13 DIAGNOSIS — M54.12 CERVICAL RADICULOPATHY: ICD-10-CM

## 2023-06-13 DIAGNOSIS — G89.4 CHRONIC PAIN SYNDROME: ICD-10-CM

## 2023-06-16 RX ORDER — PREGABALIN 75 MG/1
CAPSULE ORAL
Qty: 90 CAPSULE | Refills: 0 | Status: SHIPPED | OUTPATIENT
Start: 2023-06-16 | End: 2023-07-16

## 2023-07-28 ENCOUNTER — OFFICE VISIT (OUTPATIENT)
Dept: URGENT CARE | Facility: CLINIC | Age: 43
End: 2023-07-28
Payer: COMMERCIAL

## 2023-07-28 VITALS
TEMPERATURE: 98 F | SYSTOLIC BLOOD PRESSURE: 148 MMHG | DIASTOLIC BLOOD PRESSURE: 76 MMHG | OXYGEN SATURATION: 96 % | HEART RATE: 83 BPM | RESPIRATION RATE: 18 BRPM

## 2023-07-28 DIAGNOSIS — A69.20 ERYTHEMA MIGRANS (LYME DISEASE): Primary | ICD-10-CM

## 2023-07-28 PROCEDURE — 99213 OFFICE O/P EST LOW 20 MIN: CPT | Performed by: NURSE PRACTITIONER

## 2023-07-28 RX ORDER — TRIAMCINOLONE ACETONIDE 1 MG/G
CREAM TOPICAL 2 TIMES DAILY PRN
Qty: 30 G | Refills: 1 | Status: SHIPPED | OUTPATIENT
Start: 2023-07-28

## 2023-07-28 RX ORDER — DOXYCYCLINE HYCLATE 100 MG/1
100 CAPSULE ORAL EVERY 12 HOURS SCHEDULED
Qty: 42 CAPSULE | Refills: 0 | Status: SHIPPED | OUTPATIENT
Start: 2023-07-28 | End: 2023-07-31 | Stop reason: SDUPTHER

## 2023-07-28 NOTE — PROGRESS NOTES
[facility]        NAME: Kenzie Garcia is a 36 y.o. female  : 1980    MRN: [id number]  DATE: 2023  TIME: 10:48 AM      Assessment and Plan     Erythema migrans (Lyme disease) [A69.20]  1. Erythema migrans (Lyme disease)  doxycycline hyclate (VIBRAMYCIN) 100 mg capsule    triamcinolone (KENALOG) 0.1 % cream            Patient Instructions     Patient Instructions     Take the antibiotic as directed. This medication increases sun sensitivity. Follow-up with your PCP. Lyme Disease   AMBULATORY CARE:   Lyme disease  is a bacterial infection caused by the bite of an infected tick. Common signs and symptoms:   • A red rash that is often round and may look like a target or bull's eye    • Fever, chills, or sore throat    • Weakness and tiredness    • Headache or muscle aches    • Joint pain    • Abdominal pain, nausea, or diarrhea    Call your local emergency number (911 in the [address]) if:   • Your heart is beating faster than usual and you feel dizzy. • You have chest pain or trouble breathing. • You suddenly cannot talk or see well, or you have trouble moving an area of your body. Seek immediate care if:   • You have a headache and a stiff neck. • You have trouble concentrating or thinking clearly. • You have numbness or tingling in your arms or legs, or you have trouble walking. Call your doctor if:   • Your rash grows or spreads to other areas of your body. • You suddenly have trouble falling or staying asleep. • You have new or worsening pain and swelling in your joints. • You have new or worsening weakness and muscle pain. • You have a new tick bite. • You have questions or concerns about your condition or care. Treatment for Lyme disease  includes antibiotics to treat the bacterial infection. Prevent a tick bite:  Ticks live in areas covered by brush and grass. They may even be found in your lawn if you live in certain areas.  Outdoor pets can carry ticks inside the house. Ticks can grab onto you or your clothes when you walk by grass or brush. If you go into areas that contain many trees, tall grasses, and underbrush, do the following:     • Wear light colored pants and a long-sleeved shirt. Tuck your pants into your socks or boots. Tuck in your shirt. Wear sleeves that fit close to the skin at your wrists and neck. This will help prevent ticks from crawling through gaps in your clothing and onto your skin. Wear a hat in areas with trees. • Apply insect repellant on your skin. The insect repellant should contain DEET. Do not put insect repellant on skin that is cut, scratched, or irritated. Always use soap and water to wash the insect repellant off as soon as possible once you are indoors. Do not apply insect repellant on your child's face or hands. • Spray insect repellant onto your clothes. Use permethrin spray. This spray kills ticks that crawl on your clothing. Be sure to spray the tops of your boots, bottom of pant legs, and sleeve cuffs. As soon as possible, wash and dry clothing in hot water and high heat. • Check your and your child's clothing, hair, and skin for ticks. Shower within 2 hours of coming indoors. Carefully check the hairline, armpits, neck, and waist.    • Decrease the risk for ticks in your yard. Ticks like to live in shady, moist areas. Gerald Cat your lawn regularly to keep the grass short. Trim the grass around birdbaths and fences. Cut branches that are overgrown and take them out of the yard. Clear out leaf piles. Arabella Boards firewood in a dry, m[place] area. • Treat pets with tick control products  as directed. This will decrease your risk for a tick bite. Check your pets for ticks. Remove ticks from pets the same way as you remove them from people. Ask your pet's  about the best product to use on your pet. • Remove a tick with tweezers. Wear gloves. Grasp the tick as close to your skin as possible.  Pull the tick straight up and out. Do not touch the tick with your bare hands. Check to make sure you removed the whole tick, including the head. Clean the area with soap and water or rubbing alcohol. Then wash your hands with soap and water. Follow up with your doctor as directed:  Write down your questions so you remember to ask them during your visits. © Copyright Marcie Milan 2022 Information is for End User's use only and may not be sold, redistributed or otherwise used for commercial purposes. The above information is an  only. It is not intended as medical advice for individual conditions or treatments. Talk to your doctor, nurse or pharmacist before following any medical regimen to see if it is safe and effective for you. Bed Bugs   AMBULATORY CARE:   Bed bugs  are small, flat insects that bite your exposed skin and feed on your blood while you sleep. They can spread from person to person. They hide in the folds and seams of bed linens, furniture cracks, and electrical outlets. They are common in areas of frequent travel or buildings with shared walls, such as hotels or apartments. Common symptoms include the following: You may have one or more red and swollen areas that are irritated or itch. These areas may appear right away or several days after you were bitten. The bite marks may be in a straight line or in random areas. They may look like mosquito or flea bites. You may also have swelling, fluid-filled blisters, or open sores from scratching the bites. Call 911 for any of the following:   • You have trouble breathing. • You have swelling in your lips, tongue, or throat. Seek care immediately if:   • You feel lightheaded, dizzy, or faint. • Your heart is beating faster than usual.    • You are vomiting and cannot stop. Contact your healthcare provider if:   • You have very swollen, painful bite marks. • You have a fever.     • You have open sores that are draining pus.    • You have questions or concerns about your condition or care. Treatment for bed bugs  may include medicines to help decrease itching and inflammation. These may be given as a pill, cream, or ointment. Do not scratch the bite marks. Scratching can cause a skin infection. Prevent bed bugs:   • Protect your clothes and luggage when you travel. Inspect them often for bed bugs. Keep your luggage closed tightly when you are not using it. Keep your luggage in the bathroom, or place contents in sealed plastic bags. • Inspect any used items you bring into your home. You may need to fumigate used furniture. Examine cardboard boxes or other items with small cracks where bed bugs could hide. • Remove clutter from the area where you sleep. Place your mattress or box spring in a sealed bag. Seal any cracks or molding in the walls or furniture. • Wash bed linens and clothes in hot, soapy water. Wash the items in water that is at least 130°F (50°C) for 2 hours, or 20°F (-5°C) or colder for 5 days. Dry them in a dryer on the hot setting for at least 20 minutes. Dry cleaning is also effective to get rid of bed bugs. • Tell someone about the bed bugs. This may include a landlord, , or pest control company. Insecticide sprays are used to get rid of bed bugs. Follow up with your doctor as directed:  Write down your questions so you remember to ask them during your visits. © Copyright Creasie Dash 2022 Information is for End User's use only and may not be sold, redistributed or otherwise used for commercial purposes. The above information is an  only. It is not intended as medical advice for individual conditions or treatments. Talk to your doctor, nurse or pharmacist before following any medical regimen to see if it is safe and effective for you. Follow up with PCP in 3-5 days. Proceed to  ER if symptoms worsen.     Chief Complaint     Chief Complaint   Patient presents with   • Rash     Left arm, was bitten by bed bugs, hurts, warm to touch, burns, did see bed bugs, also has bug bites on left leg, took benadryl last night, did not help         History of Present Illness     Was around bed bugs at a friend's house 2 days ago. Has also been outside a lot lately clearing trees and debris. Has scattered bite marks on body--healing, slightly itchy. Presents concerned about a large circular area on the left antecubital that was first noticed 2 days ago and reached current size yesterday--reddened, warm, itchy with a slightly raised crusty outer edge. No tick bites that she is aware of. Review of Systems     Review of Systems   Skin: Positive for color change and rash. All other systems reviewed and are negative.         Current Medications       Current Outpatient Medications:   •  doxycycline hyclate (VIBRAMYCIN) 100 mg capsule, Take 1 capsule (100 mg total) by mouth every 12 (twelve) hours for 21 days, Disp: 42 capsule, Rfl: 0  •  triamcinolone (KENALOG) 0.1 % cream, Apply topically 2 (two) times a day as needed for irritation or rash, Disp: 30 g, Rfl: 1  •  acetaminophen (TYLENOL) 650 mg CR tablet, Take 1 tablet (650 mg total) by mouth every 8 (eight) hours as needed for mild pain (Patient not taking: Reported on 7/28/2023), Disp: 30 tablet, Rfl: 0  •  busPIRone (BUSPAR) 10 mg tablet, take 1 tablet by mouth three times a day (Patient not taking: Reported on 7/28/2023), Disp: 270 tablet, Rfl: 1  •  DULoxetine (CYMBALTA) 60 mg delayed release capsule, Take 1 capsule (60 mg total) by mouth daily (Patient not taking: Reported on 7/28/2023), Disp: 90 capsule, Rfl: 3  •  furosemide (LASIX) 20 mg tablet, Take 1 tablet (20 mg total) by mouth daily (Patient not taking: Reported on 7/28/2023), Disp: 30 tablet, Rfl: 5  •  pregabalin (LYRICA) 75 mg capsule, take 1 capsule by mouth three times a day, Disp: 90 capsule, Rfl: 0    Current Allergies     Allergies as of 07/28/2023 - Reviewed 2023   Allergen Reaction Noted   • Clindamycin/lincomycin  2016   • Levaquin [levofloxacin] Throat Swelling 2018   • Penicillins Anaphylaxis 2016              The following portions of the patient's history were reviewed and updated as appropriate: allergies, current medications, past family history, past medical history, past social history, past surgical history and problem list.     Past Medical History:   Diagnosis Date   • Kidney stone    • Obesity 4/15/2013   • Peripheral edema    • Primary osteoarthritis involving multiple joints 2020   • Sleep apnea    • Wears dentures    • Wears glasses        Past Surgical History:   Procedure Laterality Date   • BACK SURGERY      lump removed near shoulder on right side   •  SECTION      x3, 2005,,    • HYSTERECTOMY     • MOUTH SURGERY      21 teeth removed   • GA CYSTOURETHROSCOPY N/A 10/8/2020    Procedure: CYSTOSCOPY;  Surgeon: Marco Cordero MD;  Location: [facility];  Service: Gynecology   • GA LAPS TOTAL HYSTERECT 250 GM/< W/RMVL TUBE/OVARY N/A 10/8/2020    Procedure: ROBOTIC TOTAL HYSTERECTOMY; BILATERAL SALPINGECTOMY;  Surgeon: Marco Cordero MD;  Location: [facility];  Service: Gynecology   • TUBAL LIGATION         Family History   Problem Relation Age of Onset   • Hypertension Mother    • Irritable bowel syndrome Mother    • Diverticulitis Mother    • Depression Mother    • Heart murmur Mother    • Obesity Mother    • Hypertension Father    • Spina bifida Father    • Obesity Father    • Multiple sclerosis Brother    • Lung disease Brother    • COPD Family    • Emphysema Family    • No Known Problems Daughter    • Stroke Maternal Grandmother    • Obesity Paternal Grandmother    • Stroke Paternal Grandmother    • No Known Problems Daughter    • No Known Problems Daughter    • No Known Problems Maternal Aunt    • No Known Problems Maternal Aunt          Medications have been verified. Objective     /76   Pulse 83   Temp 98 °F (36.7 °C)   Resp 18   LMP 08/25/2020   SpO2 96%   Patient's last menstrual period was 08/25/2020. Physical Exam     Physical Exam  Vitals and nursing note reviewed. Constitutional:       General: She is not in acute distress. Appearance: Normal appearance. She is well-developed. She is not ill-appearing, toxic-appearing or diaphoretic. HENT:      Head: Normocephalic and atraumatic. Eyes:      Pupils: Pupils are equal, round, and reactive to light. Pulmonary:      Effort: Pulmonary effort is normal. No respiratory distress. Abdominal:      General: There is no distension. Palpations: Abdomen is soft. Musculoskeletal:         General: Normal range of motion. Cervical back: Normal range of motion and neck supple. Skin:     General: Skin is warm and dry. Capillary Refill: Capillary refill takes less than 2 seconds. Findings: Erythema and wound (scattered bed bug bites--unremarkable except reddened area LAC (with no bite marks)) present. Neurological:      General: No focal deficit present. Mental Status: She is alert and oriented to person, place, and time. Psychiatric:         Mood and Affect: Mood normal.         Behavior: Behavior normal.         Thought Content:  Thought content normal.         Judgment: Judgment normal.

## 2023-07-28 NOTE — PATIENT INSTRUCTIONS
Take the antibiotic as directed. This medication increases sun sensitivity. Follow-up with your PCP. Lyme Disease   AMBULATORY CARE:   Lyme disease  is a bacterial infection caused by the bite of an infected tick. Common signs and symptoms:   A red rash that is often round and may look like a target or bull's eye    Fever, chills, or sore throat    Weakness and tiredness    Headache or muscle aches    Joint pain    Abdominal pain, nausea, or diarrhea    Call your local emergency number (911 in the [address]) if:   Your heart is beating faster than usual and you feel dizzy. You have chest pain or trouble breathing. You suddenly cannot talk or see well, or you have trouble moving an area of your body. Seek immediate care if:   You have a headache and a stiff neck. You have trouble concentrating or thinking clearly. You have numbness or tingling in your arms or legs, or you have trouble walking. Call your doctor if:   Your rash grows or spreads to other areas of your body. You suddenly have trouble falling or staying asleep. You have new or worsening pain and swelling in your joints. You have new or worsening weakness and muscle pain. You have a new tick bite. You have questions or concerns about your condition or care. Treatment for Lyme disease  includes antibiotics to treat the bacterial infection. Prevent a tick bite:  Ticks live in areas covered by brush and grass. They may even be found in your lawn if you live in certain areas. Outdoor pets can carry ticks inside the house. Ticks can grab onto you or your clothes when you walk by grass or brush. If you go into areas that contain many trees, tall grasses, and underbrush, do the following:     Wear light colored pants and a long-sleeved shirt. Tuck your pants into your socks or boots. Tuck in your shirt. Wear sleeves that fit close to the skin at your wrists and neck.  This will help prevent ticks from crawling through gaps in your clothing and onto your skin. Wear a hat in areas with trees. Apply insect repellant on your skin. The insect repellant should contain DEET. Do not put insect repellant on skin that is cut, scratched, or irritated. Always use soap and water to wash the insect repellant off as soon as possible once you are indoors. Do not apply insect repellant on your child's face or hands. Spray insect repellant onto your clothes. Use permethrin spray. This spray kills ticks that crawl on your clothing. Be sure to spray the tops of your boots, bottom of pant legs, and sleeve cuffs. As soon as possible, wash and dry clothing in hot water and high heat. Check your and your child's clothing, hair, and skin for ticks. Shower within 2 hours of coming indoors. Carefully check the hairline, armpits, neck, and waist.    Decrease the risk for ticks in your yard. Ticks like to live in shady, moist areas. Pooja Conquest your lawn regularly to keep the grass short. Trim the grass around birdbaths and fences. Cut branches that are overgrown and take them out of the yard. Clear out leaf piles. Dondra Destinee firewood in a dry, [place] area. Treat pets with tick control products  as directed. This will decrease your risk for a tick bite. Check your pets for ticks. Remove ticks from pets the same way as you remove them from people. Ask your pet's  about the best product to use on your pet. Remove a tick with tweezers. Wear gloves. Grasp the tick as close to your skin as possible. Pull the tick straight up and out. Do not touch the tick with your bare hands. Check to make sure you removed the whole tick, including the head. Clean the area with soap and water or rubbing alcohol. Then wash your hands with soap and water. Follow up with your doctor as directed:  Write down your questions so you remember to ask them during your visits.    © Copyright Vikas Powell 2022 Information is for End User's use only and may not be sold, redistributed or otherwise used for commercial purposes. The above information is an  only. It is not intended as medical advice for individual conditions or treatments. Talk to your doctor, nurse or pharmacist before following any medical regimen to see if it is safe and effective for you. Bed Bugs   AMBULATORY CARE:   Bed bugs  are small, flat insects that bite your exposed skin and feed on your blood while you sleep. They can spread from person to person. They hide in the folds and seams of bed linens, furniture cracks, and electrical outlets. They are common in areas of frequent travel or buildings with shared walls, such as hotels or apartments. Common symptoms include the following: You may have one or more red and swollen areas that are irritated or itch. These areas may appear right away or several days after you were bitten. The bite marks may be in a straight line or in random areas. They may look like mosquito or flea bites. You may also have swelling, fluid-filled blisters, or open sores from scratching the bites. Call 911 for any of the following: You have trouble breathing. You have swelling in your lips, tongue, or throat. Seek care immediately if:   You feel lightheaded, dizzy, or faint. Your heart is beating faster than usual.    You are vomiting and cannot stop. Contact your healthcare provider if:   You have very swollen, painful bite marks. You have a fever. You have open sores that are draining pus. You have questions or concerns about your condition or care. Treatment for bed bugs  may include medicines to help decrease itching and inflammation. These may be given as a pill, cream, or ointment. Do not scratch the bite marks. Scratching can cause a skin infection. Prevent bed bugs:   Protect your clothes and luggage when you travel. Inspect them often for bed bugs. Keep your luggage closed tightly when you are not using it.  Keep your luggage in the bathroom, or place contents in sealed plastic bags. Inspect any used items you bring into your home. You may need to fumigate used furniture. Examine cardboard boxes or other items with small cracks where bed bugs could hide. Remove clutter from the area where you sleep. Place your mattress or box spring in a sealed bag. Seal any cracks or molding in the walls or furniture. Wash bed linens and clothes in hot, soapy water. Wash the items in water that is at least 130°F (50°C) for 2 hours, or 20°F (-5°C) or colder for 5 days. Dry them in a dryer on the hot setting for at least 20 minutes. Dry cleaning is also effective to get rid of bed bugs. Tell someone about the bed bugs. This may include a landlord, , or pest control company. Insecticide sprays are used to get rid of bed bugs. Follow up with your doctor as directed:  Write down your questions so you remember to ask them during your visits. © Copyright Reji Slater 2022 Information is for End User's use only and may not be sold, redistributed or otherwise used for commercial purposes. The above information is an  only. It is not intended as medical advice for individual conditions or treatments. Talk to your doctor, nurse or pharmacist before following any medical regimen to see if it is safe and effective for you.

## 2023-07-31 ENCOUNTER — OFFICE VISIT (OUTPATIENT)
Dept: INTERNAL MEDICINE CLINIC | Facility: CLINIC | Age: 43
End: 2023-07-31
Payer: COMMERCIAL

## 2023-07-31 ENCOUNTER — APPOINTMENT (OUTPATIENT)
Dept: LAB | Facility: CLINIC | Age: 43
End: 2023-07-31
Payer: COMMERCIAL

## 2023-07-31 VITALS
DIASTOLIC BLOOD PRESSURE: 76 MMHG | HEART RATE: 97 BPM | BODY MASS INDEX: 51.91 KG/M2 | WEIGHT: 293 LBS | OXYGEN SATURATION: 98 % | TEMPERATURE: 96.8 F | HEIGHT: 63 IN | SYSTOLIC BLOOD PRESSURE: 148 MMHG

## 2023-07-31 DIAGNOSIS — A69.20 ERYTHEMA MIGRANS (LYME DISEASE): ICD-10-CM

## 2023-07-31 DIAGNOSIS — S50.862D TICK BITE OF LEFT FOREARM, SUBSEQUENT ENCOUNTER: Primary | ICD-10-CM

## 2023-07-31 DIAGNOSIS — S50.862D TICK BITE OF LEFT FOREARM, SUBSEQUENT ENCOUNTER: ICD-10-CM
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
## 2023-07-31 DIAGNOSIS — S99.929A INJURY OF FOOT, UNSPECIFIED LATERALITY, INITIAL ENCOUNTER: ICD-10-CM

## 2023-07-31 LAB — B BURGDOR IGG+IGM SER QL IA: NEGATIVE

## 2023-07-31 PROCEDURE — 99213 OFFICE O/P EST LOW 20 MIN: CPT | Performed by: INTERNAL MEDICINE

## 2023-07-31 PROCEDURE — 36415 COLL VENOUS BLD VENIPUNCTURE: CPT

## 2023-07-31 PROCEDURE — 86618 LYME DISEASE ANTIBODY: CPT

## 2023-07-31 RX ORDER — DOXYCYCLINE HYCLATE 100 MG/1
100 CAPSULE ORAL EVERY 12 HOURS SCHEDULED
Qty: 42 CAPSULE | Refills: 0
Start: 2023-07-31 | End: 2023-08-21

## 2023-07-31 NOTE — PATIENT INSTRUCTIONS
Lyme Disease   WHAT YOU NEED TO KNOW:   What is Lyme disease? Lyme disease is a bacterial infection caused by the bite of an infected tick. What increases your risk for Lyme disease? You work in a wooded area or area with heavy brush    You travel to or live in areas where ticks are common    You walk, hike, hunt, camp, or fish in a grassy or wooded area    What are the signs and symptoms of Lyme disease? A red rash that is often round and may look like a target or bull's eye    Fever, chills, or sore throat    Weakness and tiredness    Headache or muscle aches    Joint pain    Abdominal pain, nausea, or diarrhea    How is Lyme disease diagnosed? Your healthcare provider will examine you and ask about your symptoms. Tell him or her if you live or work in a grassy or wooded area or have been active outside in these areas. You may need any of the following:  Blood tests  may show the bacteria that cause Lyme disease. A sample of joint fluid  may be tested for the bacteria that cause Lyme disease. How is Lyme disease treated? Antibiotics treat the bacteria infection. How can I prevent a tick bite? Ticks live in areas covered by brush and grass. They may even be found in your lawn if you live in certain areas. Outdoor pets can carry ticks inside the house. Ticks can grab onto you or your clothes when you walk by grass or brush. If you go into areas that contain many trees, tall grasses, and underbrush, do the following:     Wear light colored pants and a long-sleeved shirt. Tuck your pants into your socks or boots. Tuck in your shirt. Wear sleeves that fit close to the skin at your wrists and neck. This will help prevent ticks from crawling through gaps in your clothing and onto your skin. Wear a hat in areas with trees. Apply insect repellant on your skin. The insect repellant should contain DEET. Do not put insect repellant on skin that is cut, scratched, or irritated.  Always use soap and water to wash the insect repellant off as soon as possible once you are indoors. Do not apply insect repellant on your child's face or hands. Spray insect repellant onto your clothes. Use permethrin spray. This spray kills ticks that crawl on your clothing. Be sure to spray the tops of your boots, bottom of pant legs, and sleeve cuffs. As soon as possible, wash and dry clothing in hot water and high heat. Check your and your child's clothing, hair, and skin for ticks. Shower within 2 hours of coming indoors. Carefully check the hairline, armpits, neck, and waist.    Decrease the risk for ticks in your yard. Ticks like to live in shady, moist areas. Milvia Anali your lawn regularly to keep the grass short. Trim the grass around birdbaths and fences. Cut branches that are overgrown and take them out of the yard. Clear out leaf piles. Dortha Seeds firewood in a dry, [place] area. Treat pets with tick control products  as directed. This will decrease your risk for a tick bite. Check your pets for ticks. Remove ticks from pets the same way as you remove them from people. Ask your pet's  about the best product to use on your pet. Remove a tick with tweezers. Wear gloves. Grasp the tick as close to your skin as possible. Pull the tick straight up and out. Do not touch the tick with your bare hands. Check to make sure you removed the whole tick, including the head. Clean the area with soap and water or rubbing alcohol. Then wash your hands with soap and water. Call your local emergency number (911 in the [address]) if:   Your heart is beating faster than usual and you feel dizzy. You have chest pain or trouble breathing. You suddenly cannot talk or see well, or you have trouble moving an area of your body. When should I seek immediate care? You have a headache and a stiff neck. You have trouble concentrating or thinking clearly.     You have numbness or tingling in your arms or legs, or you have trouble walking. When should I call my doctor? Your rash grows or spreads to other areas of your body. You suddenly have trouble falling or staying asleep. You have new or worsening pain and swelling in your joints. You have new or worsening weakness and muscle pain. You have a new tick bite. You have questions or concerns about your condition or care. CARE AGREEMENT:   You have the right to help plan your care. Learn about your health condition and how it may be treated. Discuss treatment options with your healthcare providers to decide what care you want to receive. You always have the right to refuse treatment. The above information is an  only. It is not intended as medical advice for individual conditions or treatments. Talk to your doctor, nurse or pharmacist before following any medical regimen to see if it is safe and effective for you. © Copyright Maile Haas 2022 Information is for End User's use only and may not be sold, redistributed or otherwise used for commercial purposes.

## 2023-07-31 NOTE — PROGRESS NOTES
Assessment/Plan:  Problem List Items Addressed This Visit    None  Visit Diagnoses     Tick bite of left forearm, subsequent encounter    -  Primary    Relevant Orders    Lyme Disease Serology w/Reflex    Injury of foot, unspecified laterality, initial encounter        Erythema migrans (Lyme disease)        Relevant Medications    doxycycline hyclate (VIBRAMYCIN) 100 mg capsule               Diagnoses and all orders for this visit:    Tick bite of left forearm, subsequent encounter  -     Lyme Disease Serology w/Reflex; Future    Injury of foot, unspecified laterality, initial encounter    Erythema migrans (Lyme disease)  -     doxycycline hyclate (VIBRAMYCIN) 100 mg capsule; Take 1 capsule (100 mg total) by mouth every 12 (twelve) hours for 21 days        No problem-specific Assessment & Plan notes found for this encounter. A/P: Doing ok, but would expect more of an improvement by now. Will continue the abx. Will check serum Lyme's. Td up to date. Pt to keep foot wound clean and dry. Needs to wear socks until healed. RTC one week for f/u. Subjective:      Patient ID: Kelvin Rivas is a 36 y.o. female. WF presents for f/u tick bit about six days ago and seen at  about five days ago and started on doxy. Pt asymptomatic otherwise and no fever/chills, joint pain, palpitations, etc. Reports the skin lesion is slightly smaller, but more intense. Reports stepping on a tent peg this AM and has foot pain and bleeding. Last Td was . Tolerating the meds. The following portions of the patient's history were reviewed and updated as appropriate:   She has a past medical history of Kidney stone, Obesity (4/15/2013), Peripheral edema, Primary osteoarthritis involving multiple joints (2020), Sleep apnea, Wears dentures, and Wears glasses. ,  does not have any pertinent problems on file. ,   has a past surgical history that includes Mouth surgery;  section; Tubal ligation ();  Back surgery; pr laps total hysterect 250 gm/< w/rmvl tube/ovary (N/A, 10/8/2020); pr cystourethroscopy (N/A, 10/8/2020); and Hysterectomy. ,  family history includes COPD in her family; Depression in her mother; Diverticulitis in her mother; Emphysema in her family; Heart murmur in her mother; Hypertension in her father and mother; Irritable bowel syndrome in her mother; Lung disease in her brother; Multiple sclerosis in her brother; No Known Problems in her daughter, daughter, daughter, maternal aunt, and maternal aunt; Obesity in her father, mother, and paternal grandmother; Spina bifida in her father; Stroke in her maternal grandmother and paternal grandmother. ,   reports that she has been smoking cigarettes. She has a 30.00 pack-year smoking history. She has never used smokeless tobacco. She reports that she does not currently use alcohol. She reports that she does not use drugs. ,  is allergic to clindamycin/lincomycin, levaquin [levofloxacin], and penicillins. .  Current Outpatient Medications   Medication Sig Dispense Refill   • doxycycline hyclate (VIBRAMYCIN) 100 mg capsule Take 1 capsule (100 mg total) by mouth every 12 (twelve) hours for 21 days 42 capsule 0   • acetaminophen (TYLENOL) 650 mg CR tablet Take 1 tablet (650 mg total) by mouth every 8 (eight) hours as needed for mild pain (Patient not taking: Reported on 7/28/2023) 30 tablet 0   • busPIRone (BUSPAR) 10 mg tablet take 1 tablet by mouth three times a day (Patient not taking: Reported on 7/28/2023) 270 tablet 1   • DULoxetine (CYMBALTA) 60 mg delayed release capsule Take 1 capsule (60 mg total) by mouth daily (Patient not taking: Reported on 7/28/2023) 90 capsule 3   • furosemide (LASIX) 20 mg tablet Take 1 tablet (20 mg total) by mouth daily (Patient not taking: Reported on 7/28/2023) 30 tablet 5   • pregabalin (LYRICA) 75 mg capsule take 1 capsule by mouth three times a day 90 capsule 0   • triamcinolone (KENALOG) 0.1 % cream Apply topically 2 (two) times a day as needed for irritation or rash (Patient not taking: Reported on 7/31/2023) 30 g 1     No current facility-administered medications for this visit. Review of Systems   Constitutional: Negative for activity change, chills, diaphoresis, fatigue and fever. Respiratory: Negative for cough, chest tightness, shortness of breath and wheezing. Cardiovascular: Negative for chest pain, palpitations and leg swelling. Gastrointestinal: Negative for abdominal pain, constipation, diarrhea, nausea and vomiting. Genitourinary: Negative for difficulty urinating, dysuria and frequency. Musculoskeletal: Negative for arthralgias, gait problem and myalgias. Skin: Positive for wound. Neurological: Negative for dizziness, seizures, syncope, weakness, light-headedness and headaches. Psychiatric/Behavioral: Negative for confusion, dysphoric mood and sleep disturbance. The patient is not nervous/anxious. PHQ-2/9 Depression Screening          Objective:  Vitals:    07/31/23 0836   BP: 148/76   BP Location: Left arm   Patient Position: Sitting   Cuff Size: Standard   Pulse: 97   Temp: (!) 96.8 °F (36 °C)   SpO2: 98%   Weight: (!) 162 kg (356 lb 3.2 oz)   Height: 5' 3" (1.6 m)     Body mass index is 63.1 kg/m². Physical Exam  Vitals and nursing note reviewed. Constitutional:       General: She is not in acute distress. Appearance: Normal appearance. She is not ill-appearing. HENT:      Head: Normocephalic and atraumatic. Mouth/Throat:      Mouth: Mucous membranes are moist.   Eyes:      Extraocular Movements: Extraocular movements intact. Conjunctiva/sclera: Conjunctivae normal.      Pupils: Pupils are equal, round, and reactive to light. Cardiovascular:      Rate and Rhythm: Normal rate and regular rhythm. Heart sounds: Normal heart sounds. Pulmonary:      Effort: Pulmonary effort is normal. No respiratory distress. Breath sounds: Normal breath sounds.  No wheezing, rhonchi or rales. Abdominal:      General: Bowel sounds are normal.      Palpations: Abdomen is soft. Tenderness: There is no abdominal tenderness. Musculoskeletal:      Right lower leg: No edema. Left lower leg: No edema. Skin:     Findings: Lesion (Left lateral posterior sole wiht two puncture wounds with slight bleeding. Tenderness noted. No d/c, erythema or induration or fluctuation. ) and rash (see picture. Left antecubital area. ) present. Neurological:      General: No focal deficit present. Mental Status: She is alert and oriented to person, place, and time. Mental status is at baseline. Psychiatric:         Mood and Affect: Mood normal.         Behavior: Behavior normal.         Thought Content:  Thought content normal.         Judgment: Judgment normal.

## 2023-08-07 ENCOUNTER — OFFICE VISIT (OUTPATIENT)
Dept: INTERNAL MEDICINE CLINIC | Facility: CLINIC | Age: 43
End: 2023-08-07
Payer: COMMERCIAL

## 2023-08-07 VITALS
HEIGHT: 63 IN | HEART RATE: 101 BPM | BODY MASS INDEX: 51.91 KG/M2 | DIASTOLIC BLOOD PRESSURE: 74 MMHG | SYSTOLIC BLOOD PRESSURE: 146 MMHG | TEMPERATURE: 97.6 F | WEIGHT: 293 LBS | OXYGEN SATURATION: 98 %

## 2023-08-07 DIAGNOSIS — S99.929A INJURY OF FOOT, UNSPECIFIED LATERALITY, INITIAL ENCOUNTER: ICD-10-CM

## 2023-08-07 DIAGNOSIS — L24.7 IRRITANT CONTACT DERMATITIS DUE TO PLANTS, EXCEPT FOOD: ICD-10-CM

## 2023-08-07 DIAGNOSIS — S50.862D TICK BITE OF LEFT FOREARM, SUBSEQUENT ENCOUNTER: Primary | ICD-10-CM
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
## 2023-08-07 DIAGNOSIS — A69.20 ERYTHEMA MIGRANS (LYME DISEASE): ICD-10-CM

## 2023-08-07 PROCEDURE — 99213 OFFICE O/P EST LOW 20 MIN: CPT | Performed by: INTERNAL MEDICINE

## 2023-08-07 RX ORDER — CLOTRIMAZOLE AND BETAMETHASONE DIPROPIONATE 10; .64 MG/G; MG/G
CREAM TOPICAL 2 TIMES DAILY
Qty: 45 G | Refills: 0 | Status: SHIPPED | OUTPATIENT
Start: 2023-08-07

## 2023-08-07 NOTE — PROGRESS NOTES
Assessment/Plan:  Problem List Items Addressed This Visit    None  Visit Diagnoses     Tick bite of left forearm, subsequent encounter    -  Primary    Relevant Medications    clotrimazole-betamethasone (LOTRISONE) 1-0.05 % cream    Erythema migrans (Lyme disease)        Relevant Medications    clotrimazole-betamethasone (LOTRISONE) 1-0.05 % cream    Injury of foot, unspecified laterality, initial encounter        Irritant contact dermatitis due to plants, except food        Relevant Medications    clotrimazole-betamethasone (LOTRISONE) 1-0.05 % cream           Diagnoses and all orders for this visit:    Tick bite of left forearm, subsequent encounter  -     clotrimazole-betamethasone (LOTRISONE) 1-0.05 % cream; Apply topically 2 (two) times a day    Erythema migrans (Lyme disease)    Injury of foot, unspecified laterality, initial encounter    Irritant contact dermatitis due to plants, except food  -     clotrimazole-betamethasone (LOTRISONE) 1-0.05 % cream; Apply topically 2 (two) times a day        No problem-specific Assessment & Plan notes found for this encounter. A/P: Leg wound is healed. Tolerating the meds and discussed labs. Given the UE lesion and lack of response, ???fungal etiology. Will add 50/50 cream for both. Will complete the abx. Will call in one week for an update. RTC two months for routine. Subjective:      Patient ID: Zaida Willis is a 36 y.o. female. WF RTC for UE tick bite with skin lesion ?indicative of erythema migrans possibly and foot skin injury. Started on abx at the [facility]. Continued on the doxy and Lyme's screen ordered. Lyme screen negative. Tolerating the meds. Reports LE puncture site healed and UE tick bite resolving. . No fever or chills. No pain or joint pain/swelling. Reports poison ivy near the lesion is worse.   No new issues      The following portions of the patient's history were reviewed and updated as appropriate:   She has a past medical history of Kidney stone, Obesity (4/15/2013), Peripheral edema, Primary osteoarthritis involving multiple joints (2020), Sleep apnea, Wears dentures, and Wears glasses. ,  does not have any pertinent problems on file. ,   has a past surgical history that includes Mouth surgery;  section; Tubal ligation (); Back surgery; pr laps total hysterect 250 gm/< w/rmvl tube/ovary (N/A, 10/8/2020); pr cystourethroscopy (N/A, 10/8/2020); and Hysterectomy. ,  family history includes COPD in her family; Depression in her mother; Diverticulitis in her mother; Emphysema in her family; Heart murmur in her mother; Hypertension in her father and mother; Irritable bowel syndrome in her mother; Lung disease in her brother; Multiple sclerosis in her brother; No Known Problems in her daughter, daughter, daughter, maternal aunt, and maternal aunt; Obesity in her father, mother, and paternal grandmother; Spina bifida in her father; Stroke in her maternal grandmother and paternal grandmother. ,   reports that she has been smoking cigarettes. She has a 30.00 pack-year smoking history. She has never used smokeless tobacco. She reports that she does not currently use alcohol. She reports that she does not use drugs. ,  is allergic to clindamycin/lincomycin, levaquin [levofloxacin], and penicillins. .  Current Outpatient Medications   Medication Sig Dispense Refill   • clotrimazole-betamethasone (LOTRISONE) 1-0.05 % cream Apply topically 2 (two) times a day 45 g 0   • doxycycline hyclate (VIBRAMYCIN) 100 mg capsule Take 1 capsule (100 mg total) by mouth every 12 (twelve) hours for 21 days 42 capsule 0   • acetaminophen (TYLENOL) 650 mg CR tablet Take 1 tablet (650 mg total) by mouth every 8 (eight) hours as needed for mild pain (Patient not taking: Reported on 2023) 30 tablet 0   • busPIRone (BUSPAR) 10 mg tablet take 1 tablet by mouth three times a day (Patient not taking: Reported on 2023) 270 tablet 1   • DULoxetine (CYMBALTA) 60 mg delayed release capsule Take 1 capsule (60 mg total) by mouth daily (Patient not taking: Reported on 7/28/2023) 90 capsule 3   • furosemide (LASIX) 20 mg tablet Take 1 tablet (20 mg total) by mouth daily (Patient not taking: Reported on 7/28/2023) 30 tablet 5   • pregabalin (LYRICA) 75 mg capsule take 1 capsule by mouth three times a day 90 capsule 0   • triamcinolone (KENALOG) 0.1 % cream Apply topically 2 (two) times a day as needed for irritation or rash (Patient not taking: Reported on 7/31/2023) 30 g 1     No current facility-administered medications for this visit. Review of Systems   Constitutional: Negative for activity change, chills, diaphoresis, fatigue and fever. Respiratory: Negative for cough, chest tightness, shortness of breath and wheezing. Cardiovascular: Negative for chest pain, palpitations and leg swelling. Gastrointestinal: Negative for abdominal pain, constipation, diarrhea, nausea and vomiting. Genitourinary: Negative for difficulty urinating, dysuria and frequency. Musculoskeletal: Negative for arthralgias, gait problem and myalgias. Skin: Positive for wound. Neurological: Negative for dizziness, seizures, syncope, weakness, light-headedness and headaches. Psychiatric/Behavioral: Negative for confusion, dysphoric mood and sleep disturbance. The patient is not nervous/anxious. PHQ-2/9 Depression Screening          Objective:  Vitals:    08/07/23 0838   BP: 146/74   BP Location: Left arm   Patient Position: Sitting   Cuff Size: Standard   Pulse: 101   Temp: 97.6 °F (36.4 °C)   SpO2: 98%   Weight: (!) 160 kg (353 lb)   Height: 5' 3" (1.6 m)     Body mass index is 62.53 kg/m². Physical Exam  Vitals and nursing note reviewed. Constitutional:       General: She is not in acute distress. Appearance: She is well-developed. She is not diaphoretic. Cardiovascular:      Rate and Rhythm: Normal rate and regular rhythm. Heart sounds: Normal heart sounds.  No murmur heard. No friction rub. No gallop. Pulmonary:      Effort: No respiratory distress. Breath sounds: Normal breath sounds. No wheezing or rales. Abdominal:      General: Bowel sounds are normal.      Palpations: Abdomen is soft. Tenderness: There is no abdominal tenderness. There is no guarding or rebound. Musculoskeletal:         General: No swelling, tenderness or deformity. Normal range of motion. Skin:     Findings: Lesion and rash present. Comments: Left antecubital lesion still same size, but central clearing with raised erythematous rim. Neurological:      Mental Status: She is alert and oriented to person, place, and time. Psychiatric:         Behavior: Behavior normal.         Thought Content:  Thought content normal.         Judgment: Judgment normal.

## 2023-08-11 ENCOUNTER — TELEPHONE (OUTPATIENT)
Dept: INTERNAL MEDICINE CLINIC | Facility: CLINIC | Age: 43
End: 2023-08-11

## 2023-08-11 DIAGNOSIS — B37.31 VULVOVAGINAL CANDIDIASIS: Primary | ICD-10-CM

## 2023-08-11 RX ORDER — FLUCONAZOLE 150 MG/1
150 TABLET ORAL ONCE
Qty: 2 TABLET | Refills: 0 | Status: SHIPPED | OUTPATIENT
Start: 2023-08-11 | End: 2023-08-11

## 2023-08-11 NOTE — TELEPHONE ENCOUNTER
She wants to know if she should stop the doxy and or if you can rx something for the yeast infection.      [address]

## 2023-10-06 ENCOUNTER — OFFICE VISIT (OUTPATIENT)
Dept: INTERNAL MEDICINE CLINIC | Facility: CLINIC | Age: 43
End: 2023-10-06
Payer: COMMERCIAL

## 2023-10-06 VITALS
BODY MASS INDEX: 51.91 KG/M2 | SYSTOLIC BLOOD PRESSURE: 138 MMHG | DIASTOLIC BLOOD PRESSURE: 76 MMHG | HEIGHT: 63 IN | TEMPERATURE: 98.9 F | WEIGHT: 293 LBS | HEART RATE: 83 BPM | OXYGEN SATURATION: 98 %

## 2023-10-06 DIAGNOSIS — G89.4 CHRONIC PAIN SYNDROME: ICD-10-CM

## 2023-10-06 DIAGNOSIS — E78.2 MIXED HYPERLIPIDEMIA: Primary | ICD-10-CM

## 2023-10-06 DIAGNOSIS — M15.9 PRIMARY OSTEOARTHRITIS INVOLVING MULTIPLE JOINTS: ICD-10-CM

## 2023-10-06 DIAGNOSIS — Z23 ENCOUNTER FOR VACCINATION: ICD-10-CM

## 2023-10-06 DIAGNOSIS — Z72.0 TOBACCO ABUSE: ICD-10-CM

## 2023-10-06 DIAGNOSIS — D48.5 NEOPLASM OF UNCERTAIN BEHAVIOR OF SKIN OF LOWER LEG: ICD-10-CM

## 2023-10-06 DIAGNOSIS — E66.01 CLASS 3 SEVERE OBESITY DUE TO EXCESS CALORIES WITHOUT SERIOUS COMORBIDITY WITH BODY MASS INDEX (BMI) OF 60.0 TO 69.9 IN ADULT (HCC): ICD-10-CM

## 2023-10-06 DIAGNOSIS — F41.1 GAD (GENERALIZED ANXIETY DISORDER): ICD-10-CM

## 2023-10-06 DIAGNOSIS — G47.33 OBSTRUCTIVE SLEEP APNEA TREATED WITH CONTINUOUS POSITIVE AIRWAY PRESSURE (CPAP): ICD-10-CM

## 2023-10-06 PROCEDURE — 99214 OFFICE O/P EST MOD 30 MIN: CPT | Performed by: INTERNAL MEDICINE

## 2023-10-06 NOTE — PROGRESS NOTES
BMI Counseling: Body mass index is 62 kg/m². The BMI is above normal. Nutrition recommendations include decreasing portion sizes and encouraging healthy choices of fruits and vegetables. Exercise recommendations include moderate physical activity 150 minutes/week. No pharmacotherapy was ordered. Rationale for BMI follow-up plan is due to patient being overweight or obese. Depression Screening and Follow-up Plan: Patient was screened for depression during today's encounter. They screened negative with a PHQ-2 score of 0. 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       Other    Tobacco abuse    Mixed hyperlipidemia - Primary    Relevant Orders    Comprehensive metabolic panel    LDL cholesterol, direct    Triglycerides    JOSEFINA (generalized anxiety disorder)    Class 3 severe obesity due to excess calories without serious comorbidity with body mass index (BMI) of 60.0 to 69.9 in [facility])    Chronic pain syndrome   Other Visit Diagnoses     Encounter for vaccination               Diagnoses and all orders for this visit:    Mixed hyperlipidemia  -     Comprehensive metabolic panel; Future  -     LDL cholesterol, direct; Future  -     Triglycerides;  Future    Class 3 severe obesity due to excess calories without serious comorbidity with body mass index (BMI) of 60.0 to 69.9 in [facility])    JOSEFINA (generalized anxiety disorder)    Chronic pain syndrome    Primary osteoarthritis involving multiple joints    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 A/P: Doing ok and will check labs. Dicussed vaccines defers all vaccines. Discussed BMI and will give info on diet and exercise. Wean tobacco. Continue current treatment and RTC six months for routine. Subjective:      Patient ID: Arnaldo Churchill is a 39 y.o. female. WF RTC for f/u HLD, DJD, etc. Doing ok and no new issues. Remains active w/o difficulty and no falls. Chronic pain and CHESTER are manageable. Skin ca is in remission. Still smoking. JOSEFINA is controlled. The following portions of the patient's history were reviewed and updated as appropriate:   She has a past medical history of Kidney stone, Obesity (4/15/2013), Peripheral edema, Primary osteoarthritis involving multiple joints (2020), Sleep apnea, Wears dentures, and Wears glasses. ,  does not have any pertinent problems on file. ,   has a past surgical history that includes Mouth surgery;  section; Tubal ligation (); Back surgery; pr laps total hysterect 250 gm/< w/rmvl tube/ovary (N/A, 10/8/2020); pr cystourethroscopy (N/A, 10/8/2020); and Hysterectomy. ,  family history includes COPD in her family; Depression in her mother; Diverticulitis in her mother; Emphysema in her family; Heart murmur in her mother; Hypertension in her father and mother; Irritable bowel syndrome in her mother; Lung disease in her brother; Multiple sclerosis in her brother; No Known Problems in her daughter, daughter, daughter, maternal aunt, and maternal aunt; Obesity in her father, mother, and paternal grandmother; Spina bifida in her father; Stroke in her maternal grandmother and paternal grandmother. ,   reports that she has been smoking cigarettes. She has a 30.00 pack-year smoking history. She has never used smokeless tobacco. She reports that she does not currently use alcohol. She reports that she does not use drugs. ,  is allergic to clindamycin/lincomycin, levaquin [levofloxacin], and penicillins. .  Current Outpatient Medications Medication Sig Dispense Refill   • acetaminophen (TYLENOL) 650 mg CR tablet Take 1 tablet (650 mg total) by mouth every 8 (eight) hours as needed for mild pain (Patient not taking: Reported on 7/28/2023) 30 tablet 0   • busPIRone (BUSPAR) 10 mg tablet take 1 tablet by mouth three times a day (Patient not taking: Reported on 7/28/2023) 270 tablet 1   • clotrimazole-betamethasone (LOTRISONE) 1-0.05 % cream Apply topically 2 (two) times a day 45 g 0   • DULoxetine (CYMBALTA) 60 mg delayed release capsule Take 1 capsule (60 mg total) by mouth daily (Patient not taking: Reported on 7/28/2023) 90 capsule 3   • furosemide (LASIX) 20 mg tablet Take 1 tablet (20 mg total) by mouth daily (Patient not taking: Reported on 7/28/2023) 30 tablet 5   • pregabalin (LYRICA) 75 mg capsule take 1 capsule by mouth three times a day 90 capsule 0   • triamcinolone (KENALOG) 0.1 % cream Apply topically 2 (two) times a day as needed for irritation or rash (Patient not taking: Reported on 7/31/2023) 30 g 1     No current facility-administered medications for this visit. Review of Systems   Constitutional: Negative for activity change, chills, diaphoresis, fatigue and fever. HENT: Negative. Eyes: Negative for visual disturbance. Respiratory: Negative for cough, chest tightness, shortness of breath and wheezing. Cardiovascular: Negative for chest pain, palpitations and leg swelling. Gastrointestinal: Negative for abdominal pain, constipation, diarrhea, nausea and vomiting. Endocrine: Negative for cold intolerance and heat intolerance. Genitourinary: Negative for difficulty urinating, dysuria and frequency. Musculoskeletal: Negative for arthralgias, gait problem and myalgias. Neurological: Negative for dizziness, seizures, syncope, weakness, light-headedness and headaches. Psychiatric/Behavioral: Negative for confusion, dysphoric mood and sleep disturbance. The patient is not nervous/anxious.         PHQ-2/9 Depression Screening    Little interest or pleasure in doing things: 0 - not at all  Feeling down, depressed, or hopeless: 0 - not at all  PHQ-2 Score: 0  PHQ-2 Interpretation: Negative depression screen        Objective:  Vitals:    10/06/23 1520   BP: 138/76   Pulse: 83   Temp: 98.9 °F (37.2 °C)   SpO2: 98%   Weight: (!) 159 kg (350 lb)   Height: 5' 3" (1.6 m)     Body mass index is 62 kg/m². Physical Exam  Vitals and nursing note reviewed. Constitutional:       General: She is not in acute distress. Appearance: Normal appearance. She is not ill-appearing. HENT:      Head: Normocephalic and atraumatic. Mouth/Throat:      Mouth: Mucous membranes are moist.   Eyes:      Extraocular Movements: Extraocular movements intact. Conjunctiva/sclera: Conjunctivae normal.      Pupils: Pupils are equal, round, and reactive to light. Neck:      Vascular: No carotid bruit. Cardiovascular:      Rate and Rhythm: Normal rate and regular rhythm. Heart sounds: Normal heart sounds. No murmur heard. Pulmonary:      Effort: Pulmonary effort is normal. No respiratory distress. Breath sounds: Normal breath sounds. No wheezing, rhonchi or rales. Abdominal:      General: Bowel sounds are normal. There is no distension. Palpations: Abdomen is soft. Tenderness: There is no abdominal tenderness. Musculoskeletal:      Cervical back: Neck supple. Right lower leg: No edema. Left lower leg: No edema. Neurological:      General: No focal deficit present. Mental Status: She is alert and oriented to person, place, and time. Mental status is at baseline. Psychiatric:         Mood and Affect: Mood normal.         Behavior: Behavior normal.         Thought Content: Thought content normal.         Judgment: Judgment normal.         BMI Counseling: Body mass index is 62 kg/m².  The BMI is above normal. Nutrition recommendations include reducing portion sizes, decreasing overall calorie intake, reducing intake of saturated fat and trans fat and reducing intake of cholesterol. Exercise recommendations include moderate aerobic physical activity for 150 minutes/week.

## 2023-10-06 NOTE — PATIENT INSTRUCTIONS
Hyperlipidemia   WHAT YOU NEED TO KNOW:   What is hyperlipidemia? Hyperlipidemia is a high level of lipids (fats) in your blood. These lipids include cholesterol or triglycerides. Lipids are made by your body. They also come from the foods you eat. Your body needs lipids to work properly, but high levels increase your risk for heart disease, heart attack, and stroke. What increases my risk for hyperlipidemia? • Family history of high lipid levels    • Diet high in saturated fats, cholesterol, or calories    • High alcohol intake or smoking    • Lack of regular physical activity    • Medical conditions such as hypothyroidism, obesity, or type 2 diabetes    • Certain medicines, such as blood pressure medicines, hormones, and steroids    How is hyperlipidemia managed and treated? Your healthcare provider may first recommend that you make lifestyle changes to help decrease your lipid levels. Your provider may recommend you work with a team to manage hyperlipidemia. The team may include medical experts such as a dietitian, an exercise or physical therapist, and a behavior therapist. Your family members may be included in helping you create lifestyle changes. You may also need to take medicine to lower your lipid levels. Some of the lifestyle changes you may need to make include the following:  • Maintain a healthy weight. Ask your healthcare provider what a healthy weight is for you. Ask your provider to help you create a weight loss plan, if needed. Weight loss can decrease your cholesterol and triglyceride levels. • Be physically active throughout the day. Physical activity, such as exercise, lowers your cholesterol levels and helps you maintain a healthy weight. Get 30 minutes or more of aerobic exercise 4 to 6 days each week. You can split your exercise into four 10-minute workouts instead of 30 minutes at one time. Examples of aerobic exercises include walking briskly, swimming, or riding a bike.  Work with your healthcare provider to plan the best exercise program for you. Also include strength training at least 2 times each week. Your healthcare providers can help you create a physical activity plan. • Do not smoke. Nicotine and other chemicals in cigarettes and cigars can increase your risk for a heart attack and stroke. Ask your healthcare provider for information if you currently smoke and need help to quit. E-cigarettes or smokeless tobacco still contain nicotine. Talk to your healthcare provider before you use these products. • Eat heart-healthy foods. A dietitian or your provider can give you more information on low-sodium plans or the DASH (Dietary Approaches to Stop Hypertension) eating plan. The DASH plan is low in sodium, processed sugar, unhealthy fats, and total fat. It is high in potassium, calcium, and fiber. It is high in potassium, calcium, and fiber. These can be found in vegetables, fruit, and whole-grain foods. The following are ways to get more heart-healthy foods:         ? Decrease the total amount of fat you eat. Choose lean meats, fat-free or 1% fat milk, and low-fat dairy products, such as yogurt and cheese. Limit or do not eat red meat. Red meats are high in fat and cholesterol. ? Replace unhealthy fats with healthy fats. Unhealthy fats include saturated fat, trans fat, and cholesterol. Choose soft margarines that are low in saturated fat and have little or no trans fat. Monounsaturated fats are healthy fats. These are found in olive oil, canola oil, avocado, and nuts. Polyunsaturated fats are also healthy. These are found in fish, flaxseed, walnuts, and soybeans. ? Eat 5 or more servings of fruits and vegetables every day. They are low in calories and fat, and a good source of essential vitamins. Include dark green, red, and orange vegetables. Examples include spinach, kale, broccoli, and carrots. ? Eat foods high in fiber.   Fiber can help lower your cholesterol levels. Choose whole grain, high-fiber foods. Good choices include whole-wheat breads or cereals, beans, peas, fruits, and vegetables. ? Limit sodium (salt) as directed. Too much sodium can affect your fluid balance and blood pressure. Your healthcare provider will tell you how much sodium and potassium are safe for you to have in a day. Your provider may recommend that you limit sodium to 2,300 mg a day. Your provider or a dietitian can help you find ways to limit sodium. For example, if you add salt while you cook, do not add more salt at the table. Check labels to find low-sodium or no-salt-added foods. Some low-sodium foods use potassium salts for flavor. Too much potassium can also cause health problems. • Ask your healthcare provider if it is okay for you to drink alcohol. Alcohol can increase your cholesterol and triglyceride levels. Your provider can tell you how many drinks are okay to have within 24 hours and within 1 week. A drink of alcohol is 12 ounces of beer, 5 ounces of wine, or 1½ ounces of liquor. Call your local emergency number [phone] in the [address]) or have someone call if:   • You have any of the following signs of a heart attack:      ? Squeezing, pressure, or pain in your chest    ? You may  also have any of the following:     - Discomfort or pain in your back, neck, jaw, stomach, or arm    - Shortness of breath    - Nausea or vomiting    - Lightheadedness or a sudden cold sweat    • You have any of the following signs of a stroke:      ? Numbness or drooping on one side of your face     ? Weakness in an arm or leg    ? Confusion or difficulty speaking    ? Dizziness, a severe headache, or vision loss    When should I call my doctor? • You have questions or concerns about your condition or care. CARE AGREEMENT:   You have the right to help plan your care. Learn about your health condition and how it may be treated.  Discuss treatment options with your healthcare providers to decide what care you want to receive. You always have the right to refuse treatment. The above information is an  only. It is not intended as medical advice for individual conditions or treatments. Talk to your doctor, nurse or pharmacist before following any medical regimen to see if it is safe and effective for you. © Copyright Ricarda Ranks 2023 Information is for End User's use only and may not be sold, redistributed or otherwise used for commercial purposes. Obesity   AMBULATORY CARE:   Obesity  means your body mass index (BMI) is greater than 30. Your healthcare provider will use your age, height, and weight to measure your BMI. The risks of obesity include  many health problems, including injuries or physical disability. • Diabetes (high blood sugar level)    • High blood pressure or high cholesterol    • Heart disease or heart failure    • Stroke    • Gallbladder or liver disease    • Cancer of the colon, breast, prostate, liver, or kidney    • Sleep apnea    • Arthritis or gout    Screening  is done to check for health conditions before you have signs or symptoms. If you are 28to 79years old, your blood sugar level may be checked every 3 years for signs of prediabetes or diabetes. Your healthcare provider will check your blood pressure at each visit. High blood pressure can lead to a stroke or other problems. Your provider may check for signs of heart disease, cancer, or other health problems. Seek care immediately if:   • You have a severe headache, confusion, or difficulty speaking. • You have weakness on one side of your body. • You have chest pain, sweating, or shortness of breath. Call your doctor if:   • You have symptoms of gallbladder or liver disease, such as pain in your upper abdomen. • You have knee or hip pain and discomfort while walking. • You have symptoms of diabetes, such as intense hunger and thirst, and frequent urination.     • You have symptoms of sleep apnea, such as snoring or daytime sleepiness. • You have questions or concerns about your condition or care. Treatment for obesity  focuses on helping you lose weight to improve your health. Even a small decrease in BMI can reduce the risk for many health problems. Your healthcare provider will help you set a weight-loss goal.  • Lifestyle changes  are the first step in treating obesity. These include making healthy food choices and getting regular physical activity. Your healthcare provider may suggest a weight-loss program that involves coaching, education, and therapy. • Medicine  may help you lose weight when it is used with a healthy foods and physical activity. • Surgery  can help you lose weight if you have obesity along with other health problems. Several types of weight-loss surgery are available. Ask your healthcare provider for more information. Tips for safe weight loss:   • Set small, realistic goals. An example of a small goal is to walk for 20 minutes 5 days a week. Anther goal is to lose 5% of your body weight. • Ask for support. Tell friends, family members, and coworkers about your goals. Ask someone to lose weight with you. You may also want to join a weight-loss support group. • Identify foods or triggers that may cause you to overeat. Remove tempting high-calorie foods from your home and workplace. Place a bowl of fresh fruit on your kitchen counter. If stress causes you to eat, find other ways to cope with stress. A counselor or therapist may be able to help you. • Track your daily calories and activity. Write down what you eat and drink. Also write down how many minutes of physical activity you do each day. • Track your weekly weight. Weigh yourself in the morning, before you eat or drink anything but after you use the bathroom. Use the same scale, in the same place, and in similar clothing each time.  Only weigh yourself 1 to 2 times each week, or as directed. You may become discouraged if you weigh yourself every day. Eating changes: You will need to eat 500 to 1,000 fewer calories each day than you currently eat to lose 1 to 2 pounds a week. The following changes will help you cut calories:  • Eat smaller portions. Use small plates, no larger than 9 inches in diameter. Fill your plate half full of fruits and vegetables. Measure your food using measuring cups until you know what a serving size looks like. • Eat 3 meals and 1 or 2 snacks each day. Plan your meals in advance. [place] and eat at home most of the time. Eat slowly. Do not skip meals. Skipping meals can lead to overeating later in the day. This can make it harder for you to lose weight. Talk with a dietitian to help you make a meal plan and schedule that is right for you. • Eat fruits and vegetables at every meal.  They are low in calories and high in fiber, which makes you feel full. Do not add butter, margarine, or cream sauce to vegetables. Use herbs to season steamed vegetables. • Eat less fat and fewer fried foods. Eat more baked or grilled chicken and fish. These protein sources are lower in calories and fat than red meat. Limit fast food. Dress your salads with olive oil and vinegar instead of bottled dressing. • Limit the amount of sugar you eat. Do not drink sugary beverages. Limit alcohol. Activity changes:  Physical activity is good for your body in many ways. It helps you burn calories and build strong muscles. It decreases stress and depression, and improves your mood. It can also help you sleep better. Talk to your healthcare provider before you begin an exercise program.  • Exercise for at least 30 minutes 5 days a week. Start slowly. Set aside time each day for physical activity that you enjoy and that is convenient for you.  It is best to do both weight training and an activity that increases your heart rate, such as walking, bicycling, or swimming. • Find ways to be more active. Do yard work and housecleaning. Walk up the stairs instead of using elevators. Spend your leisure time going to events that require walking, such as outdoor festivals or fairs. This extra physical activity can help you lose weight and keep it off. Follow up with your doctor as directed: You may need to meet with a dietitian. Write down your questions so you remember to ask them during your visits. © Copyright Karthik Wells 2023 Information is for End User's use only and may not be sold, redistributed or otherwise used for commercial purposes. The above information is an  only. It is not intended as medical advice for individual conditions or treatments. Talk to your doctor, nurse or pharmacist before following any medical regimen to see if it is safe and effective for you. Low Fat Diet   AMBULATORY CARE:   A low-fat diet  is an eating plan that is low in total fat, unhealthy fat, and cholesterol. You may need to follow a low-fat diet if you have trouble digesting or absorbing fat. You may also need to follow this diet if you have high cholesterol. You can also lower your cholesterol by increasing the amount of fiber in your diet. Soluble fiber is a type of fiber that helps to decrease cholesterol levels. Different types of fat in food:   • Limit unhealthy fats. A diet that is high in cholesterol, saturated fat, and trans fat may cause unhealthy cholesterol levels. Unhealthy cholesterol levels increase your risk of heart disease. ? Cholesterol:  Limit intake of cholesterol to less than 200 mg per day. Cholesterol is found in meat, eggs, and dairy. ? Saturated fat:  Limit saturated fat to less than 7% of your total daily calories. Ask your dietitian how many calories you need each day. Saturated fat is found in butter, cheese, ice cream, whole milk, and palm oil.  Saturated fat is also found in meat, such as beef, pork, chicken skin, and processed meats. Processed meats include sausage, hot dogs, and bologna. ? Trans fat:  Avoid trans fat as much as possible. Trans fat is used in fried and baked foods. Foods that say trans fat free on the label may still have up to 0.5 grams of trans fat per serving. • Include healthy fats. Replace foods that are high in saturated and trans fat with foods high in healthy fats. This may help to decrease high cholesterol levels. ? Monounsaturated fats: These are found in avocados, nuts, and vegetable oils, such as olive, canola, and sunflower oil. ? Polyunsaturated fats: These can be found in vegetable oils, such as soybean or corn oil. Omega-3 fats can help to decrease the risk of heart disease. Omega-3 fats are found in fish, such as salmon, herring, trout, and tuna. Omega-3 fats can also be found in plant foods, such as walnuts, flaxseed, soybeans, and canola oil. Foods to limit or avoid:   • Grains:      ? Snacks that are made with partially hydrogenated oils, such as chips, regular crackers, and butter-flavored popcorn    ? High-fat baked goods, such as biscuits, croissants, doughnuts, pies, cookies, and pastries    • Dairy:      ? Whole milk, 2% milk, and yogurt and ice cream made with whole milk    ? Half and half creamer, heavy cream, and whipping cream    ? Cheese, cream cheese, and sour cream    • Meats and proteins:      ? High-fat cuts of meat (T-bone steak, regular hamburger, and ribs)    ? Fried meat, poultry (turkey and chicken), and fish    ? Poultry (chicken and turkey) with skin    ? Cold cuts (salami or bologna), hot dogs, malcolm, and sausage    ? Whole eggs and egg yolks    • Vegetables and fruits with added fat:      ? Fried vegetables or vegetables in butter or high-fat sauces, such as cream or cheese sauces    ? Fried fruit or fruit served with butter or cream    • Fats:      ? Butter, stick margarine, and shortening    ?  Coconut, palm oil, and palm kernel oil    Foods to include:       • Grains:      ? Whole-grain breads, cereals, pasta, and brown rice    ? Low-fat crackers and pretzels    • Vegetables and fruits:      ? Fresh, frozen, or canned vegetables (no salt or low-sodium)    ? Fresh, frozen, dried, or canned fruit (canned in light syrup or fruit juice)    ? Avocado    • Low-fat dairy products:      ? Nonfat (skim) or 1% milk    ? Nonfat or low-fat cheese, yogurt, and cottage cheese    • Meats and proteins:      ? Chicken or turkey with no skin    ? Baked or broiled fish    ? Lean beef and pork (loin, round, extra lean hamburger)    ? Beans and peas, unsalted nuts, soy products    ? Egg whites and substitutes    ? Seeds and nuts    • Fats:      ? Unsaturated oil, such as canola, olive, peanut, soybean, or sunflower oil    ? Soft or liquid margarine and vegetable oil spread    ? Low-fat salad dressing  Other ways to decrease fat:   • Read food labels before you buy foods. Choose foods that have less than 30% of calories from fat. Choose low-fat or fat-free dairy products. Remember that fat free does not mean calorie free. These foods still contain calories, and too many calories can lead to weight gain. • Trim fat from meat and avoid fried food. Trim all visible fat from meat before you cook it. Remove the skin from poultry. Do not [place] meat, fish, or poultry. Bake, roast, boil, or broil these foods instead. Avoid fried foods. Eat a baked potato instead of [place] fries. Steam vegetables instead of sautéing them in butter. • Add less fat to foods. Use imitation malcolm bits on salads and baked potatoes instead of regular malcolm bits. Use fat-free or low-fat salad dressings instead of regular dressings. Use low-fat or nonfat butter-flavored topping instead of regular butter or margarine on popcorn and other foods. Ways to decrease fat in recipes:  Replace high-fat ingredients with low-fat or nonfat ones.  This may cause baked goods to be drier than usual. You may need to use nonfat cooking spray on pans to prevent food from sticking. You also may need to change the amount of other ingredients, such as water, in the recipe. Try the following:  • Use low-fat or light margarine instead of regular margarine or shortening. • Use lean ground turkey breast or chicken, or lean ground beef (less than 5% fat) instead of hamburger. • Add 1 teaspoon of canola oil to 8 ounces of skim milk instead of using cream or half and half. • Use grated zucchini, carrots, or apples in breads instead of coconut. • Use blenderized, low-fat cottage cheese, plain tofu, or low-fat ricotta cheese instead of cream cheese. • Use 1 egg white and 1 teaspoon of canola oil, or use ¼ cup (2 ounces) of fat-free egg substitute instead of a whole egg. • Replace half of the oil that is called for in a recipe with applesauce when you bake. Use 3 tablespoons of cocoa powder and 1 tablespoon of canola oil instead of a square of baking chocolate. How to increase fiber:  Eat enough high-fiber foods to get 20 to 30 grams of fiber every day. Slowly increase your fiber intake to avoid stomach cramps, gas, and other problems. • Eat 3 ounces of whole-grain foods each day. An ounce is about 1 slice of bread. Eat whole-grain breads, such as whole-wheat bread. Whole wheat, whole-wheat flour, or other whole grains should be listed as the first ingredient on the food label. Replace white flour with whole-grain flour or use half of each in recipes. Whole-grain flour is heavier than white flour, so you may have to add more yeast or baking powder. • Eat a high-fiber cereal for breakfast.  Oatmeal is a good source of soluble fiber. Look for cereals that have bran or fiber in the name. Choose whole-grain products, such as brown rice, barley, and whole-wheat pasta. • Eat more beans, peas, and lentils. For example, add beans to soups or salads. Eat at least 5 cups of fruits and vegetables each day. Eat fruits and vegetables with the peel because the peel is high in fiber. © Copyright Christian Ellison 2023 Information is for End User's use only and may not be sold, redistributed or otherwise used for commercial purposes. The above information is an  only. It is not intended as medical advice for individual conditions or treatments. Talk to your doctor, nurse or pharmacist before following any medical regimen to see if it is safe and effective for you. Heart Healthy Diet   AMBULATORY CARE:   A heart healthy diet  is an eating plan low in unhealthy fats and sodium (salt). The plan is high in healthy fats and fiber. A heart healthy diet helps improve your cholesterol levels and lowers your risk for heart disease and stroke. A dietitian will teach you how to read and understand food labels. Heart healthy diet guidelines to follow:   • Choose foods that contain healthy fats:      ? Unsaturated fats  include monounsaturated and polyunsaturated fats. Unsaturated fat is found in foods such as soybean, canola, olive, corn, and safflower oils. It is also found in soft tub margarine that is made with liquid vegetable oil. ? Omega-3 fat  is found in certain fish, such as salmon, tuna, and trout, and in walnuts and flaxseed. Eat fish high in omega-3 fats at least 2 times a week. • Limit or do not have unhealthy fats:      ? Cholesterol  is found in animal foods, such as eggs and lobster, and in dairy products made from whole milk. Limit cholesterol to less than 200 mg each day. ? Saturated fat  is found in meats, such as malcolm and hamburger. It is also found in chicken or turkey skin, whole milk, and butter. Limit saturated fat to less than 7% of your total daily calories. ? Trans fat  is found in packaged foods, such as potato chips and cookies. It is also in hard margarine, some fried foods, and shortening. Do not eat foods that contain trans fats.     • Get 20 to 30 grams of fiber each day.  Fruits, vegetables, whole-grain foods, and legumes (cooked beans) are good sources of fiber. • Limit sodium as directed. You may be told to limit sodium, such as to 2,000 mg or less each day. Choose low-sodium or no-salt-added foods. Add little or no salt to food you prepare. Use herbs and spices in place of salt. Include the following in your heart healthy plan:  Ask your dietitian or healthcare provider how many servings to have each day from the following food groups:  • Grains:      ? Whole-wheat breads, cereals, and pastas, and brown rice    ? Low-fat, low-sodium crackers and chips    • Vegetables:      ? Broccoli, green beans, green peas, and spinach    ? Collards, kale, and lima beans    ? Carrots, sweet potatoes, tomatoes, and peppers    ? Canned vegetables with no salt added    • Fruits:      ? Bananas, peaches, pears, and pineapple    ? Grapes, raisins, and dates    ? Oranges, tangerines, grapefruit, orange juice, and grapefruit juice    ? Apricots, mangoes, melons, and papaya    ? Raspberries and strawberries    ? Canned fruit with no added sugar    • Low-fat dairy:      ? Nonfat (skim) milk, 1% milk, and low-fat almond, cashew, or soy milks fortified with calcium    ? Low-fat cheese, regular or frozen yogurt, and cottage cheese    • Meats and proteins:      ? Lean cuts of beef and pork (loin, leg, round), skinless chicken and turkey    ? Legumes, soy products, egg whites, or nuts    Limit or do not include the following in your heart healthy plan:   • Foods and liquids that contain unhealthy fats and oils:      ? Whole or 2% milk, cream cheese, sour cream, or cheese    ? High-fat cuts of beef (T-bone steaks, ribs), chicken or turkey with skin, and organ meats such as liver    ?  Butter, stick margarine, shortening, and cooking oils such as coconut or palm oil    • Foods and liquids high in sodium:      ? Packaged foods, such as frozen dinners, cookies, macaroni and cheese, and cereals with more than 300 mg of sodium per serving    ? Vegetables with added sodium, such as instant potatoes, vegetables with added sauces, or regular canned vegetables    ? Cured or smoked meats, such as hot dogs, malcolm, and sausage    ? High-sodium ketchup, barbecue sauce, salad dressing, pickles, olives, soy sauce, or miso    • Foods and liquids high in sugar:      ? Candy, cake, cookies, pies, or doughnuts    ? Soft drinks (soda), sports drinks, or sweetened tea    ? Canned or dry mixes for cakes, soups, sauces, or gravies    Other healthy heart guidelines:   • Do not smoke. Nicotine and other chemicals in cigarettes and cigars can cause lung and heart damage. Ask your healthcare provider for information if you currently smoke and need help to quit. E-cigarettes or smokeless tobacco still contain nicotine. Talk to your provider before you use these products. • Limit or do not drink alcohol as directed. Alcohol can damage your heart and raise your blood pressure. Your healthcare provider may give you specific daily and weekly limits. The general recommended limit is 1 drink a day for women 21 or older and for men 72 or older. Do not have more than 3 drinks within 24 hours or 7 within a week. The recommended limit is 2 drinks a day for men 24to 59years of age. Do not have more than 4 drinks within 24 hours or 14 within a week. A drink of alcohol is 12 ounces of beer, 5 ounces of wine, or 1½ ounces of liquor. • Maintain a healthy weight. Extra body weight makes your heart work harder. Ask your provider what a healthy weight is for you. He or she can help you create a safe weight loss plan, if needed. • Exercise regularly. Exercise can help you maintain a healthy weight and improve your blood pressure and cholesterol levels. Regular exercise can also decrease your risk for heart problems. Ask your provider about the best exercise plan for you.  Do not start an exercise program without asking your provider. Follow up with your doctor or cardiologist as directed:  Write down your questions so you remember to ask them during your visits. © Copyright [employer] 2023 Information is for End User's use only and may not be sold, redistributed or otherwise used for commercial purposes. The above information is an  only. It is not intended as medical advice for individual conditions or treatments. Talk to your doctor, nurse or pharmacist before following any medical regimen to see if it is safe and effective for you.

## 2023-11-22 ENCOUNTER — APPOINTMENT (OUTPATIENT)
Dept: LAB | Facility: CLINIC | Age: 43
End: 2023-11-22
Payer: COMMERCIAL

## 2023-11-22 DIAGNOSIS — E78.2 MIXED HYPERLIPIDEMIA: ICD-10-CM
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
## 2023-11-22 DIAGNOSIS — S50.862D TICK BITE OF LEFT FOREARM, SUBSEQUENT ENCOUNTER: ICD-10-CM

## 2023-11-22 LAB
ALBUMIN SERPL BCP-MCNC: 4.2 G/DL (ref 3.5–5)
ALP SERPL-CCNC: 87 U/L (ref 34–104)
ALT SERPL W P-5'-P-CCNC: 24 U/L (ref 7–52)
ANION GAP SERPL CALCULATED.3IONS-SCNC: 10 MMOL/L
AST SERPL W P-5'-P-CCNC: 17 U/L (ref 13–39)
B BURGDOR IGG+IGM SER QL IA: NEGATIVE
BILIRUB SERPL-MCNC: 0.4 MG/DL (ref 0.2–1)
BUN SERPL-MCNC: 12 MG/DL (ref 5–25)
CALCIUM SERPL-MCNC: 10.5 MG/DL (ref 8.4–10.2)
CHLORIDE SERPL-SCNC: 103 MMOL/L (ref 96–108)
CO2 SERPL-SCNC: 25 MMOL/L (ref 21–32)
CREAT SERPL-MCNC: 0.66 MG/DL (ref 0.6–1.3)
GFR SERPL CREATININE-BSD FRML MDRD: 107 ML/MIN/1.73SQ M
GLUCOSE P FAST SERPL-MCNC: 91 MG/DL (ref 65–99)
LDLC SERPL DIRECT ASSAY-MCNC: 125 MG/DL (ref 0–100)
POTASSIUM SERPL-SCNC: 4.4 MMOL/L (ref 3.5–5.3)
PROT SERPL-MCNC: 7.1 G/DL (ref 6.4–8.4)
SODIUM SERPL-SCNC: 138 MMOL/L (ref 135–147)
TRIGL SERPL-MCNC: 224 MG/DL

## 2023-11-22 PROCEDURE — 36415 COLL VENOUS BLD VENIPUNCTURE: CPT

## 2023-11-22 PROCEDURE — 84478 ASSAY OF TRIGLYCERIDES: CPT

## 2023-11-22 PROCEDURE — 86618 LYME DISEASE ANTIBODY: CPT

## 2023-11-22 PROCEDURE — 80053 COMPREHEN METABOLIC PANEL: CPT

## 2023-11-22 PROCEDURE — 83721 ASSAY OF BLOOD LIPOPROTEIN: CPT

## 2023-12-23 ENCOUNTER — OFFICE VISIT (OUTPATIENT)
Dept: URGENT CARE | Facility: CLINIC | Age: 43
End: 2023-12-23
Payer: COMMERCIAL

## 2023-12-23 VITALS
RESPIRATION RATE: 20 BRPM | BODY MASS INDEX: 51.91 KG/M2 | HEART RATE: 116 BPM | TEMPERATURE: 99.8 F | WEIGHT: 293 LBS | HEIGHT: 63 IN | SYSTOLIC BLOOD PRESSURE: 148 MMHG | DIASTOLIC BLOOD PRESSURE: 69 MMHG | OXYGEN SATURATION: 98 %

## 2023-12-23 DIAGNOSIS — U07.1 COVID: Primary | ICD-10-CM

## 2023-12-23 LAB
SARS-COV-2 AG UPPER RESP QL IA: POSITIVE
VALID CONTROL: ABNORMAL

## 2023-12-23 PROCEDURE — 99213 OFFICE O/P EST LOW 20 MIN: CPT | Performed by: FAMILY MEDICINE

## 2023-12-23 PROCEDURE — 87811 SARS-COV-2 COVID19 W/OPTIC: CPT | Performed by: PHYSICIAN ASSISTANT

## 2023-12-23 NOTE — PATIENT INSTRUCTIONS
Rapid COVID test positive.  Recommend over-the-counter cough and cold medication, adequate fluid hydration and rest.  Discussed red flag symptoms that warrant return or visit to ER.

## 2023-12-23 NOTE — PROGRESS NOTES
[facility]        NAME: Araceli Connelly is a 43 y.o. female  : 1980    MRN: [id number]  DATE: 2023  TIME: 1:02 PM    Assessment and Plan   COVID [U07.1]  1. COVID  Poct Covid 19 Rapid Antigen Test            Patient Instructions     Patient Instructions   Rapid COVID test positive.  Recommend over-the-counter cough and cold medication, adequate fluid hydration and rest.  Discussed red flag symptoms that warrant return or visit to ER.      Follow up with PCP in 3-5 days.  Proceed to  ER if symptoms worsen.    Chief Complaint     Chief Complaint   Patient presents with    Shortness of Breath      +covid, wants covid test to confirm. Not feeling well since today          History of Present Illness       Patient is a 43-year-old female presenting today with cold-like symptoms x 1 day.  Patient notes yesterday she started with some congestion and bodyaches, notes today she has now having some chills and sensation of shortness of breath.  Notes that her  tested positive for COVID yesterday and has been in close contact with him.  Has not taken any medication relieving factors for her symptoms.  Denies fever, wheezing, lightheadedness, dizziness, hemoptysis.        Review of Systems   Review of Systems   Constitutional:  Positive for chills and fatigue. Negative for fever.   HENT:  Positive for congestion, postnasal drip and sore throat.    Eyes:  Negative for redness and itching.   Respiratory:  Positive for shortness of breath.    Cardiovascular:  Negative for chest pain.   Gastrointestinal:  Negative for diarrhea, nausea and vomiting.   Musculoskeletal:  Positive for myalgias.   Neurological:  Negative for light-headedness and headaches.         Current Medications       Current Outpatient Medications:     acetaminophen (TYLENOL) 650 mg CR tablet, Take 1 tablet (650 mg total) by mouth every 8 (eight) hours as needed for mild pain (Patient not taking: Reported on  2023), Disp: 30 tablet, Rfl: 0    busPIRone (BUSPAR) 10 mg tablet, take 1 tablet by mouth three times a day (Patient not taking: Reported on 2023), Disp: 270 tablet, Rfl: 1    clotrimazole-betamethasone (LOTRISONE) 1-0.05 % cream, Apply topically 2 (two) times a day (Patient not taking: Reported on 2023), Disp: 45 g, Rfl: 0    DULoxetine (CYMBALTA) 60 mg delayed release capsule, Take 1 capsule (60 mg total) by mouth daily (Patient not taking: Reported on 2023), Disp: 90 capsule, Rfl: 3    furosemide (LASIX) 20 mg tablet, Take 1 tablet (20 mg total) by mouth daily (Patient not taking: Reported on 2023), Disp: 30 tablet, Rfl: 5    pregabalin (LYRICA) 75 mg capsule, take 1 capsule by mouth three times a day, Disp: 90 capsule, Rfl: 0    triamcinolone (KENALOG) 0.1 % cream, Apply topically 2 (two) times a day as needed for irritation or rash (Patient not taking: Reported on 2023), Disp: 30 g, Rfl: 1    Current Allergies     Allergies as of 2023 - Reviewed 2023   Allergen Reaction Noted    Clindamycin/lincomycin  2016    Levaquin [levofloxacin] Throat Swelling 2018    Penicillins Anaphylaxis 2016            The following portions of the patient's history were reviewed and updated as appropriate: allergies, current medications, past family history, past medical history, past social history, past surgical history and problem list.     Past Medical History:   Diagnosis Date    Kidney stone     Obesity 4/15/2013    Peripheral edema     Primary osteoarthritis involving multiple joints 2020    Sleep apnea     Wears dentures     Wears glasses        Past Surgical History:   Procedure Laterality Date    BACK SURGERY      lump removed near shoulder on right side     SECTION      x3, 2005,,     HYSTERECTOMY      MOUTH SURGERY      21 teeth removed    MO CYSTOURETHROSCOPY N/A 10/8/2020    Procedure: CYSTOSCOPY;  Surgeon: Марина JUDD MD;   "Location: [facility];  Service: Gynecology    NM LAPS TOTAL HYSTERECT 250 GM/< W/RMVL TUBE/OVARY N/A 10/8/2020    Procedure: ROBOTIC TOTAL HYSTERECTOMY; BILATERAL SALPINGECTOMY;  Surgeon: Марина JUDD MD;  Location: [facility];  Service: Gynecology    TUBAL LIGATION  2012       Family History   Problem Relation Age of Onset    Hypertension Mother     Irritable bowel syndrome Mother     Diverticulitis Mother     Depression Mother     Heart murmur Mother     Obesity Mother     Hypertension Father     Spina bifida Father     Obesity Father     Multiple sclerosis Brother     Lung disease Brother     COPD Family     Emphysema Family     No Known Problems Daughter     Stroke Maternal Grandmother     Obesity Paternal Grandmother     Stroke Paternal Grandmother     No Known Problems Daughter     No Known Problems Daughter     No Known Problems Maternal Aunt     No Known Problems Maternal Aunt          Medications have been verified.        Objective   /69   Pulse (!) 116   Temp 99.8 °F (37.7 °C)   Resp 20   Ht 5' 3\" (1.6 m)   Wt (!) 159 kg (350 lb 6.4 oz)   LMP 08/25/2020   SpO2 98%   BMI 62.07 kg/m²        Physical Exam     Physical Exam  Vitals reviewed.   Constitutional:       General: She is not in acute distress.  HENT:      Head: Normocephalic.      Right Ear: Tympanic membrane, ear canal and external ear normal.      Left Ear: Tympanic membrane, ear canal and external ear normal.      Nose: Congestion present.      Mouth/Throat:      Mouth: Mucous membranes are moist.      Pharynx: Oropharynx is clear.   Eyes:      Conjunctiva/sclera: Conjunctivae normal.   Cardiovascular:      Rate and Rhythm: Regular rhythm. Tachycardia present.      Pulses: Normal pulses.      Heart sounds: Normal heart sounds.   Pulmonary:      Effort: Pulmonary effort is normal.      Breath sounds: Normal breath sounds. No wheezing, rhonchi or rales.   Abdominal:      General: Bowel sounds are normal.   Musculoskeletal:      " Cervical back: Normal range of motion.   Skin:     General: Skin is warm.      Capillary Refill: Capillary refill takes less than 2 seconds.   Neurological:      Mental Status: She is alert.

## 2023-12-27 ENCOUNTER — TELEPHONE (OUTPATIENT)
Dept: INTERNAL MEDICINE CLINIC | Facility: CLINIC | Age: 43
End: 2023-12-27

## 2023-12-27 ENCOUNTER — TELEMEDICINE (OUTPATIENT)
Dept: INTERNAL MEDICINE CLINIC | Facility: CLINIC | Age: 43
End: 2023-12-27
Payer: COMMERCIAL

## 2023-12-27 DIAGNOSIS — E66.01 CLASS 3 SEVERE OBESITY DUE TO EXCESS CALORIES WITHOUT SERIOUS COMORBIDITY WITH BODY MASS INDEX (BMI) OF 60.0 TO 69.9 IN ADULT (HCC): ICD-10-CM

## 2023-12-27 DIAGNOSIS — Z72.0 TOBACCO ABUSE: ICD-10-CM

## 2023-12-27 DIAGNOSIS — U07.1 LAB TEST POSITIVE FOR DETECTION OF COVID-19 VIRUS: Primary | ICD-10-CM

## 2023-12-27 PROCEDURE — 99213 OFFICE O/P EST LOW 20 MIN: CPT | Performed by: INTERNAL MEDICINE

## 2023-12-27 RX ORDER — CODEINE PHOSPHATE/GUAIFENESIN 10-100MG/5
5 LIQUID (ML) ORAL 3 TIMES DAILY PRN
Qty: 120 ML | Refills: 0 | Status: SHIPPED | OUTPATIENT
Start: 2023-12-27

## 2023-12-27 RX ORDER — FLUTICASONE PROPIONATE 50 MCG
1 SPRAY, SUSPENSION (ML) NASAL DAILY
Qty: 16 G | Refills: 0 | Status: SHIPPED | OUTPATIENT
Start: 2023-12-27

## 2023-12-27 RX ORDER — GUAIFENESIN 600 MG/1
600 TABLET, EXTENDED RELEASE ORAL EVERY 12 HOURS SCHEDULED
Qty: 20 TABLET | Refills: 0 | Status: SHIPPED | OUTPATIENT
Start: 2023-12-27

## 2023-12-27 NOTE — PATIENT INSTRUCTIONS
COVID-19 (Coronavirus Disease 2019)   WHAT YOU NEED TO KNOW:   COVID-19 is the disease caused by a coronavirus first discovered in December 2019. The virus can be spread starting 2 to 3 days before symptoms begin. The virus has changed into several new forms (called variants) since it was discovered. A variant may be more easily spread or cause more severe illness than the original form.  DISCHARGE INSTRUCTIONS:   Call your local emergency number (911 in the [place]) if:   You have trouble breathing or shortness of breath at rest.    You have chest pain or pressure that lasts longer than 5 minutes.    You become confused or hard to wake.    Return to the emergency department if:   The skin on your face, fingers, or toes look blue or darker than usual.      Call your doctor if:   You have a fever.    You have questions or concerns about your condition or care.    Medicines:  You may need any of the following:  Decongestants  help reduce nasal congestion and help you breathe more easily. If you take decongestant pills, they may make you feel restless or cause problems with your sleep. Do not use decongestant sprays for more than a few days.    Cough suppressants  help reduce coughing. Ask your healthcare provider which type of cough medicine is best for you.    Acetaminophen  decreases pain and fever. It is available without a doctor's order. Ask how much to take and how often to take it. Follow directions. Read the labels of all other medicines you are using to see if they also contain acetaminophen, or ask your doctor or pharmacist. Acetaminophen can cause liver damage if not taken correctly.    NSAIDs , such as ibuprofen, help decrease swelling, pain, and fever. This medicine is available with or without a doctor's order. NSAIDs can cause stomach bleeding or kidney problems in certain people. If you take blood thinner medicine, always ask your healthcare provider if NSAIDs are safe for you. Always read the medicine  label and follow directions.    Blood thinners  help prevent blood clots. Clots can cause strokes, heart attacks, and death. Many types of blood thinners are available. Your healthcare provider will give you specific instructions for the type you are given. The following are general safety guidelines to follow while you are taking a blood thinner:    Watch for bleeding and bruising. Watch for bleeding from your gums or nose. Watch for blood in your urine and bowel movements. Use a soft washcloth on your skin, and a soft toothbrush to brush your teeth. This can keep your skin and gums from bleeding. If you shave, use an electric shaver. Do not play contact sports.    Tell your dentist and other healthcare providers that you take a blood thinner. Wear a bracelet or necklace that says you take this medicine.    Do not start or stop any other medicines or supplements unless your healthcare provider tells you to. Many medicines and supplements cannot be used with blood thinners.    Take your blood thinner exactly as prescribed by your healthcare provider. Do not skip does or take less than prescribed. Tell your provider right away if you forget to take your blood thinner, or if you take too much.    Take your medicine as directed.  Contact your healthcare provider if you think your medicine is not helping or if you have side effects. Tell your provider if you are allergic to any medicine. Keep a list of the medicines, vitamins, and herbs you take. Include the amounts, and when and why you take them. Bring the list or the pill bottles to follow-up visits. Carry your medicine list with you in case of an emergency.    What you need to know about COVID-19 vaccines:  Healthcare providers recommend vaccination, even if you already had COVID-19.  Get a COVID-19 vaccine as directed.  Vaccination is recommended for everyone 6 months or older. COVID-19 vaccines are given as a shot in 1 to 3 doses as a primary series. This depends  on the vaccine brand and the age of the person who receives it. A booster dose is recommended for everyone 5 years or older after the primary series is complete. A second booster  is recommended for all adults 50 or older and for immunocompromised adolescents. The second booster is also recommended for anyone who got the 1-dose brand of vaccine for the first dose and a booster. Your provider can give you more information on boosters and help you schedule all needed doses.         Continue to protect yourself and others.  You can become infected even after you get the vaccine. You may also be able to pass the virus to others without knowing you are infected.    After you get the vaccine, check local, national, and international travel rules.  You may need to be tested before you travel. Some countries require proof of a negative test before you travel. You may also need to quarantine after you return.    Medicine may be given to prevent infection.  The medicine can be given if you are at high risk for infection and cannot get the vaccine. It can also be given if your immune system does not respond well to the vaccine.    How the 2019 coronavirus spreads:  Close personal contact with an infected person increases your risk for infection. This means being within 6 feet (2 meters) of the person for at least 15 minutes over 24 hours.  The virus travels in droplets that form when a person talks, sings, coughs, or sneezes.  The droplets can also float in the air for minutes or hours. Infection happens when you breathe in the droplets or get them in your eyes or nose.    Person-to-person contact can spread the virus.  For example, a person with the virus on his or her hands can spread it by shaking hands with someone.    The virus can stay on objects and surfaces for hours to days.  You may become infected by touching the object or surface and then touching your eyes or mouth.    Help lower your risk for COVID-19 during an  active outbreak:   Stay home if you are sick or think you may have COVID-19.  It is important to stay home if you are waiting for a testing appointment or for test results.    Wash your hands often throughout the day.  Use soap and water. Rub your soapy hands together, lacing your fingers, for at least 20 seconds. Rinse with warm, running water. Dry your hands with a clean towel or paper towel. Use hand  that contains alcohol if soap and water are not available. Teach children how to wash their hands and use hand .         Cover sneezes and coughs.  Turn your face away and cover your mouth and nose with a tissue. Throw the tissue away. Use the bend of your arm if a tissue is not available. Then wash your hands well with soap and water or use hand . Teach children how to cover a cough or sneeze.    Wear a face covering (mask) when needed.  Use a cloth covering with at least 2 layers. You can also create layers by putting a cloth covering over a disposable non-medical mask. Cover your mouth and your nose.         Try to keep space between you and others when you are out of the house.  Avoid crowds as much as possible. Wear a face covering when you must be around a large group and cannot keep space between you and others.    Clean and disinfect high-touch surfaces and objects often.  Use disinfecting wipes, or make a solution of 4 teaspoons of bleach in 1 quart (4 cups) of water.    Ask about other vaccines you may need.  Get the influenza (flu) vaccine as soon as recommended each year, usually starting in September or October. Get the pneumonia vaccine if recommended. Your healthcare provider can tell you if you should also get other vaccines, and when to get them.       Follow up with your doctor as directed:  Write down your questions so you remember to ask them during your visits.  For more information:   [facility] [employer] Disease Control and Prevention  [address] [place]  [address]  Phone: 9- 706 - [phone]  Web Address: http://www.[facility].gov    © Copyright [employer] 2023 Information is for End User's use only and may not be sold, redistributed or otherwise used for commercial purposes.  The above information is an  only. It is not intended as medical advice for individual conditions or treatments. Talk to your doctor, nurse or pharmacist before following any medical regimen to see if it is safe and effective for you.

## 2023-12-27 NOTE — PROGRESS NOTES
COVID-19 Outpatient Progress Note    Assessment/Plan:    Problem List Items Addressed This Visit        Other    Tobacco abuse    Relevant Medications    guaiFENesin (Mucinex) 600 mg 12 hr tablet    fluticasone (FLONASE) 50 mcg/act nasal spray    guaifenesin-codeine (GUAIFENESIN AC) 100-10 MG/5ML liquid    Class 3 severe obesity due to excess calories without serious comorbidity with body mass index (BMI) of 60.0 to 69.9 in adult (HCC)    Relevant Medications    guaiFENesin (Mucinex) 600 mg 12 hr tablet    fluticasone (FLONASE) 50 mcg/act nasal spray    guaifenesin-codeine (GUAIFENESIN AC) 100-10 MG/5ML liquid   Other Visit Diagnoses     Lab test positive for detection of COVID-19 virus    -  Primary    Relevant Medications    guaiFENesin (Mucinex) 600 mg 12 hr tablet    fluticasone (FLONASE) 50 mcg/act nasal spray    guaifenesin-codeine (GUAIFENESIN AC) 100-10 MG/5ML liquid         Disposition:     Patient with asymptomatic/mild COVID-19: They were recommended to isolate for at least 5 days (day 0 is the day symptoms appeared or the date the specimen was collected for the positive test for people who are asymptomatic). If they are asymptomatic or symptoms are improving with no fevers in the past 24 hours, isolation may be ended followed by 5 days of wearing a high quality mask when around others to minimize risk of infecting others. They should wear a mask through day 10 and a test-based strategy may be used to remove a mask sooner.      Discussed symptom directed medication options with patient. Discussed vitamin D, vitamin C, and/or zinc supplementation with patient.     A/P: Day # 4 since onset of COVID s/s. Covid test at the  positive and no treatment given. Doing better with mainly fatigue, nasal congestion, diarrhea, and slight cough. NO SOB. Pt is high risk due to smoking, wt, and no vaccines. Discussed paxlovid, and defers. Continue isolation/quarantine, increase fluids, PRN motrin/tylenol, and breathing  exercises. Will send in INS and mucinex. RTC as scheduled for f/u.        I have spent a total time of 25 minutes on the day of the encounter for this patient including discussing diagnostic results, discussing prognosis, risks and benefits of treatment options, instructions for management, patient and family education, importance of treatment compliance, risk factor reductions, impressions, counseling/coordination of care, documenting in the medical record, reviewing/ordering tests, medicine, procedures and obtaining or reviewing history.       Encounter provider: Didier Sow DO     Provider located at: [address] [id number]     Recent Visits  No visits were found meeting these conditions.  Showing recent visits within past 7 days and meeting all other requirements  Today's Visits  Date Type Provider Dept   12/27/23 Telemedicine Didier Sow DO [facility]   Showing today's visits and meeting all other requirements  Future Appointments  No visits were found meeting these conditions.  Showing future appointments within next 150 days and meeting all other requirements     This virtual check-in was done via "[employer]" and patient was informed that this is a secure, HIPAA-compliant platform. She agrees to proceed.    Patient agrees to participate in a virtual check in via telephone or video visit instead of presenting to the office to address urgent/immediate medical needs. Patient is aware this is a billable service. She acknowledged consent and understanding of privacy and security of the video platform. The patient has agreed to participate and understands they can discontinue the visit at any time.    After connecting through [employer], the patient was identified by name and date of birth. Araceli Connelly was informed that this was a telemedicine visit and that the exam was being conducted confidentially over secure lines. My  office door was closed. No one else was in the room. Araceli Connelly acknowledged consent and understanding of privacy and security of the telemedicine visit. I informed the patient that I have reviewed her record in Epic and presented the opportunity for her to ask any questions regarding the visit today. The patient agreed to participate.     Verification of patient location:  Patient is located in the following state in which I hold an active license: [place]    Subjective:   Araceli Connelly is a 43 y.o. female who has been screened for COVID-19. Symptom change since last report: unchanged. Patient's symptoms include nasal congestion, rhinorrhea, sore throat, cough, nausea and diarrhea. Patient denies fever, chills, fatigue, anosmia, loss of taste, shortness of breath, chest tightness, abdominal pain, vomiting, myalgias and headaches.     - Date of symptom onset: 12/23/2023  - Date of positive COVID-19 test: 12/23/2023. Type of test: PCR.     COVID-19 vaccination status: Not vaccinated    Araceli has been staying home and has isolated themselves in her home. She is taking care to not share personal items and is cleaning all surfaces that are touched often, like counters, tabletops, and doorknobs using household cleaning sprays or wipes. She is wearing a mask when she leaves her room.     Lab Results   Component Value Date    SARSCOV2 Negative 11/09/2022    SARSCOV2 Not Detected 12/18/2020    SARSCOVAG Positive (A) 12/23/2023       Review of Systems   Constitutional:  Positive for activity change. Negative for chills, diaphoresis, fatigue and fever.   HENT:  Positive for congestion, nosebleeds, postnasal drip, rhinorrhea, sinus pressure and sore throat. Negative for ear discharge, ear pain, facial swelling and sinus pain.         No loss of taste/smell.    Respiratory:  Positive for cough. Negative for chest tightness, shortness of breath and wheezing.    Cardiovascular:  Negative for chest pain, palpitations  and leg swelling.   Gastrointestinal:  Positive for diarrhea and nausea. Negative for abdominal pain, constipation and vomiting.   Genitourinary:  Negative for difficulty urinating, dysuria and frequency.   Musculoskeletal:  Negative for arthralgias, gait problem and myalgias.   Neurological:  Negative for light-headedness and headaches.   Psychiatric/Behavioral:  Negative for confusion. The patient is not nervous/anxious.      Current Outpatient Medications on File Prior to Visit   Medication Sig   • acetaminophen (TYLENOL) 650 mg CR tablet Take 1 tablet (650 mg total) by mouth every 8 (eight) hours as needed for mild pain   • furosemide (LASIX) 20 mg tablet Take 1 tablet (20 mg total) by mouth daily   • busPIRone (BUSPAR) 10 mg tablet take 1 tablet by mouth three times a day (Patient not taking: Reported on 7/28/2023)   • clotrimazole-betamethasone (LOTRISONE) 1-0.05 % cream Apply topically 2 (two) times a day (Patient not taking: Reported on 12/23/2023)   • DULoxetine (CYMBALTA) 60 mg delayed release capsule Take 1 capsule (60 mg total) by mouth daily (Patient not taking: Reported on 7/28/2023)   • pregabalin (LYRICA) 75 mg capsule take 1 capsule by mouth three times a day (Patient not taking: Reported on 12/27/2023)   • triamcinolone (KENALOG) 0.1 % cream Apply topically 2 (two) times a day as needed for irritation or rash (Patient not taking: Reported on 7/31/2023)       Objective:    [facility] 08/25/2020        Physical Exam  Vitals and nursing note reviewed.   Constitutional:       General: She is not in acute distress.     Appearance: Normal appearance. She is not ill-appearing.   HENT:      Head: Normocephalic and atraumatic.      Mouth/Throat:      Mouth: Mucous membranes are moist.   Eyes:      Extraocular Movements: Extraocular movements intact.      Conjunctiva/sclera: Conjunctivae normal.      Pupils: Pupils are equal, round, and reactive to light.   Pulmonary:      Effort: Pulmonary effort is normal. No  respiratory distress.   Neurological:      General: No focal deficit present.      Mental Status: She is alert and oriented to person, place, and time. Mental status is at baseline.   Psychiatric:         Mood and Affect: Mood normal.         Behavior: Behavior normal.         Thought Content: Thought content normal.         Judgment: Judgment normal.       Didier Sow, DO

## 2024-06-12 ENCOUNTER — APPOINTMENT (EMERGENCY)
Dept: CT IMAGING | Facility: HOSPITAL | Age: 44
End: 2024-06-12
Payer: COMMERCIAL

## 2024-06-12 ENCOUNTER — HOSPITAL ENCOUNTER (EMERGENCY)
Facility: HOSPITAL | Age: 44
Discharge: HOME | End: 2024-06-12
Attending: EMERGENCY MEDICINE
Payer: COMMERCIAL

## 2024-06-12 ENCOUNTER — APPOINTMENT (EMERGENCY)
Dept: NON INVASIVE DIAGNOSTICS | Facility: HOSPITAL | Age: 44
End: 2024-06-12
Payer: COMMERCIAL

## 2024-06-12 ENCOUNTER — APPOINTMENT (EMERGENCY)
Dept: RADIOLOGY | Facility: HOSPITAL | Age: 44
End: 2024-06-12
Payer: COMMERCIAL

## 2024-06-12 VITALS
HEIGHT: 63 IN | WEIGHT: 293 LBS | TEMPERATURE: 98.4 F | RESPIRATION RATE: 22 BRPM | HEART RATE: 88 BPM | DIASTOLIC BLOOD PRESSURE: 56 MMHG | BODY MASS INDEX: 51.91 KG/M2 | OXYGEN SATURATION: 95 % | SYSTOLIC BLOOD PRESSURE: 111 MMHG

## 2024-06-12 DIAGNOSIS — R06.02 SOB (SHORTNESS OF BREATH): ICD-10-CM

## 2024-06-12 DIAGNOSIS — J18.9 COMMUNITY ACQUIRED PNEUMONIA OF LEFT LUNG: Primary | ICD-10-CM

## 2024-06-12 LAB
2HR DELTA HS TROPONIN: 0 NG/L
ALBUMIN SERPL BCP-MCNC: 4.1 G/DL (ref 3.5–5)
ALP SERPL-CCNC: 80 U/L (ref 34–104)
ALT SERPL W P-5'-P-CCNC: 19 U/L (ref 7–52)
ANION GAP SERPL CALCULATED.3IONS-SCNC: 9 MMOL/L (ref 4–13)
APTT PPP: 28 SECONDS (ref 23–37)
AST SERPL W P-5'-P-CCNC: 14 U/L (ref 13–39)
ATRIAL RATE: 88 BPM
ATRIAL RATE: 91 BPM
BASOPHILS # BLD AUTO: 0.05 THOUSANDS/ÂΜL (ref 0–0.1)
BASOPHILS NFR BLD AUTO: 1 % (ref 0–1)
BILIRUB SERPL-MCNC: 0.51 MG/DL (ref 0.2–1)
BNP SERPL-MCNC: 10 PG/ML (ref 0–100)
BUN SERPL-MCNC: 11 MG/DL (ref 5–25)
CALCIUM SERPL-MCNC: 9.1 MG/DL (ref 8.4–10.2)
CARDIAC TROPONIN I PNL SERPL HS: 3 NG/L
CARDIAC TROPONIN I PNL SERPL HS: 3 NG/L
CHLORIDE SERPL-SCNC: 103 MMOL/L (ref 96–108)
CO2 SERPL-SCNC: 25 MMOL/L (ref 21–32)
CREAT SERPL-MCNC: 0.72 MG/DL (ref 0.6–1.3)
EOSINOPHIL # BLD AUTO: 0.11 THOUSAND/ÂΜL (ref 0–0.61)
EOSINOPHIL NFR BLD AUTO: 1 % (ref 0–6)
ERYTHROCYTE [DISTWIDTH] IN BLOOD BY AUTOMATED COUNT: 14 % (ref 11.6–15.1)
FLUAV RNA RESP QL NAA+PROBE: NEGATIVE
FLUBV RNA RESP QL NAA+PROBE: NEGATIVE
GFR SERPL CREATININE-BSD FRML MDRD: 102 ML/MIN/1.73SQ M
GLUCOSE SERPL-MCNC: 106 MG/DL (ref 65–140)
HCT VFR BLD AUTO: 44.6 % (ref 34.8–46.1)
HGB BLD-MCNC: 14.5 G/DL (ref 11.5–15.4)
IMM GRANULOCYTES # BLD AUTO: 0.04 THOUSAND/UL (ref 0–0.2)
IMM GRANULOCYTES NFR BLD AUTO: 0 % (ref 0–2)
INR PPP: 1.02 (ref 0.84–1.19)
LYMPHOCYTES # BLD AUTO: 1.52 THOUSANDS/ÂΜL (ref 0.6–4.47)
LYMPHOCYTES NFR BLD AUTO: 15 % (ref 14–44)
MAGNESIUM SERPL-MCNC: 1.8 MG/DL (ref 1.9–2.7)
MCH RBC QN AUTO: 29.9 PG (ref 26.8–34.3)
MCHC RBC AUTO-ENTMCNC: 32.5 G/DL (ref 31.4–37.4)
MCV RBC AUTO: 92 FL (ref 82–98)
MONOCYTES # BLD AUTO: 1.03 THOUSAND/ÂΜL (ref 0.17–1.22)
MONOCYTES NFR BLD AUTO: 10 % (ref 4–12)
NEUTROPHILS # BLD AUTO: 7.35 THOUSANDS/ÂΜL (ref 1.85–7.62)
NEUTS SEG NFR BLD AUTO: 73 % (ref 43–75)
NRBC BLD AUTO-RTO: 0 /100 WBCS
P AXIS: 42 DEGREES
P AXIS: 60 DEGREES
PLATELET # BLD AUTO: 267 THOUSANDS/UL (ref 149–390)
PMV BLD AUTO: 11.8 FL (ref 8.9–12.7)
POTASSIUM SERPL-SCNC: 4.3 MMOL/L (ref 3.5–5.3)
PR INTERVAL: 160 MS
PR INTERVAL: 162 MS
PROT SERPL-MCNC: 6.8 G/DL (ref 6.4–8.4)
PROTHROMBIN TIME: 13.6 SECONDS (ref 11.6–14.5)
QRS AXIS: 34 DEGREES
QRS AXIS: 37 DEGREES
QRSD INTERVAL: 92 MS
QRSD INTERVAL: 96 MS
QT INTERVAL: 362 MS
QT INTERVAL: 374 MS
QTC INTERVAL: 445 MS
QTC INTERVAL: 452 MS
RBC # BLD AUTO: 4.85 MILLION/UL (ref 3.81–5.12)
RSV RNA RESP QL NAA+PROBE: NEGATIVE
SARS-COV-2 RNA RESP QL NAA+PROBE: NEGATIVE
SODIUM SERPL-SCNC: 137 MMOL/L (ref 135–147)
T WAVE AXIS: 42 DEGREES
T WAVE AXIS: 50 DEGREES
VENTRICULAR RATE: 88 BPM
VENTRICULAR RATE: 91 BPM
WBC # BLD AUTO: 10.1 THOUSAND/UL (ref 4.31–10.16)

## 2024-06-12 PROCEDURE — 85025 COMPLETE CBC W/AUTO DIFF WBC: CPT | Performed by: PHYSICIAN ASSISTANT

## 2024-06-12 PROCEDURE — 71275 CT ANGIOGRAPHY CHEST: CPT

## 2024-06-12 PROCEDURE — 93005 ELECTROCARDIOGRAM TRACING: CPT

## 2024-06-12 PROCEDURE — 71045 X-RAY EXAM CHEST 1 VIEW: CPT

## 2024-06-12 PROCEDURE — 83880 ASSAY OF NATRIURETIC PEPTIDE: CPT | Performed by: PHYSICIAN ASSISTANT

## 2024-06-12 PROCEDURE — 0241U HB NFCT DS VIR RESP RNA 4 TRGT: CPT | Performed by: PHYSICIAN ASSISTANT

## 2024-06-12 PROCEDURE — 36415 COLL VENOUS BLD VENIPUNCTURE: CPT | Performed by: PHYSICIAN ASSISTANT

## 2024-06-12 PROCEDURE — 85610 PROTHROMBIN TIME: CPT | Performed by: PHYSICIAN ASSISTANT

## 2024-06-12 PROCEDURE — 84484 ASSAY OF TROPONIN QUANT: CPT | Performed by: PHYSICIAN ASSISTANT

## 2024-06-12 PROCEDURE — 85730 THROMBOPLASTIN TIME PARTIAL: CPT | Performed by: PHYSICIAN ASSISTANT

## 2024-06-12 PROCEDURE — 83735 ASSAY OF MAGNESIUM: CPT | Performed by: PHYSICIAN ASSISTANT

## 2024-06-12 PROCEDURE — 99285 EMERGENCY DEPT VISIT HI MDM: CPT

## 2024-06-12 PROCEDURE — 99285 EMERGENCY DEPT VISIT HI MDM: CPT | Performed by: PHYSICIAN ASSISTANT

## 2024-06-12 PROCEDURE — 93971 EXTREMITY STUDY: CPT | Performed by: SURGERY

## 2024-06-12 PROCEDURE — 96374 THER/PROPH/DIAG INJ IV PUSH: CPT

## 2024-06-12 PROCEDURE — 93010 ELECTROCARDIOGRAM REPORT: CPT | Performed by: INTERNAL MEDICINE

## 2024-06-12 PROCEDURE — 93971 EXTREMITY STUDY: CPT

## 2024-06-12 PROCEDURE — 80053 COMPREHEN METABOLIC PANEL: CPT | Performed by: PHYSICIAN ASSISTANT

## 2024-06-12 RX ORDER — BENZONATATE 100 MG/1
100 CAPSULE ORAL EVERY 8 HOURS
Qty: 21 CAPSULE | Refills: 0 | Status: SHIPPED | OUTPATIENT
Start: 2024-06-12

## 2024-06-12 RX ORDER — BENZONATATE 100 MG/1
100 CAPSULE ORAL ONCE
Status: COMPLETED | OUTPATIENT
Start: 2024-06-12 | End: 2024-06-12

## 2024-06-12 RX ORDER — AZITHROMYCIN 250 MG/1
TABLET, FILM COATED ORAL
Qty: 6 TABLET | Refills: 0 | Status: SHIPPED | OUTPATIENT
Start: 2024-06-12 | End: 2024-06-16

## 2024-06-12 RX ORDER — CEFPODOXIME PROXETIL 200 MG/1
200 TABLET, FILM COATED ORAL 2 TIMES DAILY
Qty: 14 TABLET | Refills: 0 | Status: SHIPPED | OUTPATIENT
Start: 2024-06-12 | End: 2024-06-19

## 2024-06-12 RX ORDER — KETOROLAC TROMETHAMINE 30 MG/ML
15 INJECTION, SOLUTION INTRAMUSCULAR; INTRAVENOUS ONCE
Status: COMPLETED | OUTPATIENT
Start: 2024-06-12 | End: 2024-06-12

## 2024-06-12 RX ADMIN — KETOROLAC TROMETHAMINE 15 MG: 30 INJECTION, SOLUTION INTRAMUSCULAR at 09:51

## 2024-06-12 RX ADMIN — IOHEXOL 100 ML: 350 INJECTION, SOLUTION INTRAVENOUS at 10:16

## 2024-06-12 RX ADMIN — BENZONATATE 100 MG: 100 CAPSULE ORAL at 09:50

## 2024-06-12 NOTE — ED PROVIDER NOTES
History  Chief Complaint   Patient presents with    Shortness of Breath     Pt reports sob x two days; reports she quit smoking two days ago     Patient is a 43-year-old female with a PMHx of anxiety, HLD, morbid obesity, CHESTER (on CPAP) and tobacco abuse, presenting to the ED for evaluation of chest pain and shortness of breath x2 days. Patient states that she started to experience left-sided chest pain and mild shortness of breath 2-3 days ago.  She stopped smoking to see if this would help alleviate her symptoms; however, states that she has continued to have progressively worsening shortness of breath and persistent chest pain.  She also reports a persistent nonproductive cough which started a few days ago and states that she had a low-grade fever last night.  Patient also notes that she has been experiencing pain and swelling in her left lower leg which she attributes to being on her feet all day yesterday.  She denies any recent travel, recent surgery, hemoptysis, exogenous estrogen use or hx of PE/DVT. She reports smoking 1-1.5 packs per day up until 2 days ago.          Prior to Admission Medications   Prescriptions Last Dose Informant Patient Reported? Taking?   DULoxetine (CYMBALTA) 60 mg delayed release capsule   No No   Sig: Take 1 capsule (60 mg total) by mouth daily   Patient not taking: Reported on 2023   acetaminophen (TYLENOL) 650 mg CR tablet   No No   Sig: Take 1 tablet (650 mg total) by mouth every 8 (eight) hours as needed for mild pain   busPIRone (BUSPAR) 10 mg tablet   No No   Sig: take 1 tablet by mouth three times a day   Patient not taking: Reported on 2023   clotrimazole-betamethasone (LOTRISONE) 1-0.05 % cream   No No   Sig: Apply topically 2 (two) times a day   Patient not taking: Reported on 2023   fluticasone (FLONASE) 50 mcg/act nasal spray   No No   Si spray into each nostril daily   furosemide (LASIX) 20 mg tablet   No No   Sig: Take 1 tablet (20 mg total) by  mouth daily   guaiFENesin (Mucinex) 600 mg 12 hr tablet   No No   Sig: Take 1 tablet (600 mg total) by mouth every 12 (twelve) hours   guaifenesin-codeine (GUAIFENESIN AC) 100-10 MG/5ML liquid   No No   Sig: Take 5 mL by mouth 3 (three) times a day as needed for cough   pregabalin (LYRICA) 75 mg capsule   No No   Sig: take 1 capsule by mouth three times a day   Patient not taking: Reported on 2023   triamcinolone (KENALOG) 0.1 % cream   No No   Sig: Apply topically 2 (two) times a day as needed for irritation or rash   Patient not taking: Reported on 2023      Facility-Administered Medications: None       Past Medical History:   Diagnosis Date    Allergic     Anxiety     Depression     Headache(784.0)     Kidney stone     Obesity 04/15/2013    Peripheral edema     Primary osteoarthritis involving multiple joints 2020    Scoliosis     Sleep apnea     Wears dentures     Wears glasses        Past Surgical History:   Procedure Laterality Date    BACK SURGERY      lump removed near shoulder on right side     SECTION      x3, 2005,,     HYSTERECTOMY      MOUTH SURGERY      21 teeth removed    N[place] CYSTOURETHROSCOPY N/A 10/8/2020    Procedure: CYSTOSCOPY;  Surgeon: Марина JUDD MD;  Location: [facility];  Service: Gynecology    NJ LAPS TOTAL HYSTERECT 250 GM/< W/RMVL TUBE/OVARY N/A 10/8/2020    Procedure: ROBOTIC TOTAL HYSTERECTOMY; BILATERAL SALPINGECTOMY;  Surgeon: Марина JUDD MD;  Location: [facility];  Service: Gynecology    TUBAL LIGATION         Family History   Problem Relation Age of Onset    Hypertension Mother     Irritable bowel syndrome Mother     Diverticulitis Mother     Depression Mother     Heart murmur Mother     Obesity Mother     Anxiety disorder Mother     Hypertension Father     Spina bifida Father     Obesity Father     Multiple sclerosis Brother     Lung disease Brother     COPD Family     Emphysema Family     No Known Problems Daughter     Stroke  Maternal Grandmother     Colon cancer Maternal Grandmother     Cancer Maternal Grandmother         Bone cancer, skin cancer,  lung cancer    Glaucoma Maternal Grandmother     Obesity Paternal Grandmother     Stroke Paternal Grandmother     No Known Problems Daughter     No Known Problems Daughter     No Known Problems Maternal Aunt     No Known Problems Maternal Aunt     Breast cancer Paternal Aunt     Breast cancer additional onset Paternal Aunt      I have reviewed and agree with the history as documented.    E-Cigarette/Vaping    E-Cigarette Use Never User      E-Cigarette/Vaping Substances    Nicotine No     THC No     CBD No     Flavoring No     Other No     Unknown No      Social History     Tobacco Use    Smoking status: Every Day     Current packs/day: 1.00     Average packs/day: 1 pack/day for 32.1 years (32.1 ttl pk-yrs)     Types: Cigarettes     Start date: 5/10/1992    Smokeless tobacco: Never   Vaping Use    Vaping status: Never Used   Substance Use Topics    Alcohol use: Not Currently     Comment: 3x per year will have 1-2 drinks    Drug use: No       Review of Systems   Constitutional:  Positive for fever (low-grade, last night). Negative for chills.   HENT:  Negative for congestion, rhinorrhea and sore throat.    Eyes:  Negative for visual disturbance.   Respiratory:  Positive for cough, chest tightness and shortness of breath.    Cardiovascular:  Positive for chest pain and leg swelling. Negative for palpitations.   Gastrointestinal:  Negative for abdominal pain, constipation, diarrhea, nausea and vomiting.   Genitourinary:  Negative for dysuria, flank pain, frequency and hematuria.   Musculoskeletal:  Positive for myalgias (left lower leg pain). Negative for back pain and neck pain.   Skin:  Negative for rash.   Neurological:  Negative for dizziness, syncope, weakness and headaches.   All other systems reviewed and are negative.      Physical Exam  Physical Exam  Vitals and nursing note reviewed.    Constitutional:       General: She is awake.      Appearance: Normal appearance. She is morbidly obese. She is not toxic-appearing or diaphoretic.   HENT:      Head: Normocephalic and atraumatic.      Right Ear: External ear normal.      Left Ear: External ear normal.      Nose: Nose normal.      Mouth/Throat:      Lips: Pink.      Mouth: Mucous membranes are moist.   Eyes:      General: Lids are normal. No scleral icterus.     Conjunctiva/sclera: Conjunctivae normal.      Pupils: Pupils are equal, round, and reactive to light.   Cardiovascular:      Rate and Rhythm: Normal rate and regular rhythm.      Pulses: Normal pulses.           Radial pulses are 2+ on the right side and 2+ on the left side.      Heart sounds: Normal heart sounds, S1 normal and S2 normal.   Pulmonary:      Effort: Pulmonary effort is normal. Tachypnea present. No accessory muscle usage or retractions.      Breath sounds: Normal breath sounds. No stridor. No decreased breath sounds, wheezing, rhonchi or rales.      Comments: Patient was tachypneic after walking back from the waiting room which improved after a few minutes.  She also becomes dyspneic after a coughing fit.  She is able to speak in full sentences once the coughing fit resolves. No barking cough or stridor.  No accessory muscle usage, retractions or increased work of breathing.  No wheezing, rhonchi or rales.  SpO2 95 to 96% on room air upon my initial evaluation.  Abdominal:      General: Abdomen is flat. Bowel sounds are normal. There is no distension.      Palpations: Abdomen is soft.      Tenderness: There is no abdominal tenderness. There is no right CVA tenderness, left CVA tenderness, guarding or rebound.   Musculoskeletal:      Cervical back: Full passive range of motion without pain and neck supple. No signs of trauma. No pain with movement.      Right lower leg: No edema.      Left lower leg: No edema.      Comments: The left lower leg is mildly more swollen than the  right with tenderness along the left posterior calf.  No pitting edema in the lower extremities.  PT/DP pulses 2+ bilaterally. Strength/sensation normal.      Lymphadenopathy:      Cervical: No cervical adenopathy.   Skin:     General: Skin is warm and dry.      Capillary Refill: Capillary refill takes less than 2 seconds.      Coloration: Skin is not cyanotic, jaundiced or pale.   Neurological:      Mental Status: She is alert and oriented to person, place, and time.      GCS: GCS eye subscore is 4. GCS verbal subscore is 5. GCS motor subscore is 6.      Gait: Gait normal.   Psychiatric:         Mood and Affect: Mood normal.         Speech: Speech normal.         Behavior: Behavior is cooperative.         Vital Signs  ED Triage Vitals   Temperature Pulse Respirations Blood Pressure SpO2   06/12/24 0850 06/12/24 0848 06/12/24 0848 06/12/24 0849 06/12/24 0848   98.8 °F (37.1 °C) (!) 108 (!) 32 162/70 95 %      Temp Source Heart Rate Source Patient Position - Orthostatic VS BP Location FiO2 (%)   06/12/24 0850 06/12/24 0848 06/12/24 0848 06/12/24 0848 --   Temporal Monitor Sitting Left arm       Pain Score       06/12/24 0847       6           Vitals:    06/12/24 1002 06/12/24 1032 06/12/24 1041 06/12/24 1220   BP: 138/63 111/56     Pulse: 82 80 82 88   Patient Position - Orthostatic VS:             Visual Acuity      ED Medications  Medications   ketorolac (TORADOL) injection 15 mg (15 mg Intravenous Given 6/12/24 0951)   benzonatate (TESSALON PERLES) capsule 100 mg (100 mg Oral Given 6/12/24 0950)   iohexol (OMNIPAQUE) 350 MG/ML injection (MULTI-DOSE) 100 mL (100 mL Intravenous Given 6/12/24 1016)       Diagnostic Studies  Results Reviewed       Procedure Component Value Units Date/Time    HS Troponin I 2hr [[id number]]  (Normal) Collected: 06/12/24 1046    Lab Status: Final result Specimen: Blood from Arm, Left Updated: 06/12/24 1111     hs TnI 2hr 3 ng/L      Delta 2hr hsTnI 0 ng/L     FLU/RSV/COVID - if FLU/RSV  clinically relevant [[id number]]  (Normal) Collected: 06/12/24 0901    Lab Status: Final result Specimen: Nares from Nose Updated: 06/12/24 0948     SARS-CoV-2 Negative     INFLUENZA A PCR Negative     INFLUENZA B PCR Negative     RSV PCR Negative    Narrative:      FOR PEDIATRIC PATIENTS - copy/paste COVID Guidelines URL to browser: [email or web address]    SARS-CoV-2 assay is a Nucleic Acid Amplification assay intended for the  qualitative detection of nucleic acid from SARS-CoV-2 in nasopharyngeal  swabs. Results are for the presumptive identification of SARS-CoV-2 RNA.    Positive results are indicative of infection with SARS-CoV-2, the virus  causing COVID-19, but do not rule out bacterial infection or co-infection  with other viruses. Laboratories within the [place] and its  territories are required to report all positive results to the appropriate  public health authorities. Negative results do not preclude SARS-CoV-2  infection and should not be used as the sole basis for treatment or other  patient management decisions. Negative results must be combined with  clinical observations, patient history, and epidemiological information.  This test has not been FDA cleared or approved.    This test has been authorized by FDA under an Emergency Use Authorization  (EUA). This test is only authorized for the duration of time the  declaration that circumstances exist justifying the authorization of the  emergency use of an in vitro diagnostic tests for detection of SARS-CoV-2  virus and/or diagnosis of COVID-19 infection under section 564(b)(1) of  the Act, 21 U.S.C. 360bbb-3(b)(1), unless the authorization is terminated  or revoked sooner. The test has been validated but independent review by FDA  and CLIA is pending.    Test performed using [employer] GeneXpert: This RT-PCR assay targets N2,  a region unique to SARS-CoV-2. A conserved region in the E-gene was  chosen  for pan-Sarbecovirus detection which includes SARS-CoV-2.    According to CMS-2020-01-R, this platform meets the definition of high-throughput technology.    HS Troponin 0hr (reflex protocol) [[id number]]  (Normal) Collected: 06/12/24 0858    Lab Status: Final result Specimen: Blood from Arm, Left Updated: 06/12/24 0926     hs TnI 0hr 3 ng/L     B-Type Natriuretic Peptide(BNP) [[id number]]  (Normal) Collected: 06/12/24 0858    Lab Status: Final result Specimen: Blood from Arm, Left Updated: 06/12/24 0925     BNP 10 pg/mL     CBC and differential [[id number]] Collected: 06/12/24 0858    Lab Status: Final result Specimen: Blood from Arm, Left Updated: 06/12/24 0921     WBC 10.10 Thousand/uL      RBC 4.85 Million/uL      Hemoglobin 14.5 g/dL      Hematocrit 44.6 %      MCV 92 fL      MCH 29.9 pg      MCHC 32.5 g/dL      RDW 14.0 %      MPV 11.8 fL      Platelets 267 Thousands/uL      nRBC 0 /100 WBCs      Segmented % 73 %      Immature Grans % 0 %      Lymphocytes % 15 %      Monocytes % 10 %      Eosinophils Relative 1 %      Basophils Relative 1 %      Absolute Neutrophils 7.35 Thousands/µL      Absolute Immature Grans 0.04 Thousand/uL      Absolute Lymphocytes 1.52 Thousands/µL      Absolute Monocytes 1.03 Thousand/µL      Eosinophils Absolute 0.11 Thousand/µL      Basophils Absolute 0.05 Thousands/µL     Comprehensive metabolic panel [[id number]] Collected: 06/12/24 0858    Lab Status: Final result Specimen: Blood from Arm, Left Updated: 06/12/24 0919     Sodium 137 mmol/L      Potassium 4.3 mmol/L      Chloride 103 mmol/L      CO2 25 mmol/L      ANION GAP 9 mmol/L      BUN 11 mg/dL      Creatinine 0.72 mg/dL      Glucose 106 mg/dL      Calcium 9.1 mg/dL      AST 14 U/L      ALT 19 U/L      Alkaline Phosphatase 80 U/L      Total Protein 6.8 g/dL      Albumin 4.1 g/dL      Total Bilirubin 0.51 mg/dL      eGFR 102 ml/min/1.73sq m     Narrative:      National Kidney Disease Foundation guidelines for Chronic Kidney  Disease (CKD):     Stage 1 with normal or high GFR (GFR > 90 mL/min/1.73 square meters)    Stage 2 Mild CKD (GFR = 60-89 mL/min/1.73 square meters)    Stage 3A Moderate CKD (GFR = 45-59 mL/min/1.73 square meters)    Stage 3B Moderate CKD (GFR = 30-44 mL/min/1.73 square meters)    Stage 4 Severe CKD (GFR = 15-29 mL/min/1.73 square meters)    Stage 5 End Stage CKD (GFR <15 mL/min/1.73 square meters)  Note: GFR calculation is accurate only with a steady state creatinine    Magnesium [[id number]]  (Abnormal) Collected: 06/12/24 0858    Lab Status: Final result Specimen: Blood from Arm, Left Updated: 06/12/24 0919     Magnesium 1.8 mg/dL     Protime-INR [[id number]]  (Normal) Collected: 06/12/24 0859    Lab Status: Final result Specimen: Blood from Arm, Left Updated: 06/12/24 0916     Protime 13.6 seconds      INR 1.02    APTT [[id number]]  (Normal) Collected: 06/12/24 0859    Lab Status: Final result Specimen: Blood from Arm, Left Updated: 06/12/24 0916     PTT 28 seconds                    VAS lower limb venous duplex study, unilateral/limited   Final Result by Kali Philippe DO (06/12 1601)      CTA ED chest PE Study   Final Result by Corbin Henriquez MD (06/12 1035)      Left-sided pneumonia.      Limited evaluation of the pulmonary arteries due to inadequate opacification. Within these limitations no large central embolus is identified.         The study was marked in EPIC for immediate notification.                  Workstation performed: [id number]         XR chest 1 view portable   Final Result by Clarence Ellis MD (06/12 0958)      Faint linear right basilar opacity most consistent with atelectasis, infectious infiltrate not entirely excluded.      The study was marked in EPIC for immediate notification.         Workstation performed: [id number]                    Procedures  ECG 12 Lead Documentation Only    Date/Time: 6/12/2024 9:00 AM    Performed by: Pauline Kumar PA-C  Authorized by: Pauline ADAMES  STEVE Kumar    Indications / Diagnosis:  Cp/sob  ECG reviewed by me, the ED Provider: yes    Patient location:  ED  Previous ECG:     Previous ECG:  Unavailable    Comparison to cardiac monitor: Yes    Interpretation:     Interpretation: normal    Rate:     ECG rate:  91    ECG rate assessment: normal    Rhythm:     Rhythm: sinus rhythm    Ectopy:     Ectopy: none    QRS:     QRS axis:  Normal    QRS intervals:  Normal  Conduction:     Conduction: normal    ST segments:     ST segments:  Normal  T waves:     T waves: normal    Comments:      No STEMI.  QT//445  ECG 12 Lead Documentation Only    Date/Time: 6/12/2024 10:44 AM    Performed by: Pauline Kumar PA-C  Authorized by: Pauline Kumar PA-C    Indications / Diagnosis:  Cp/sob - 2 hour repeat  ECG reviewed by me, the ED Provider: yes    Patient location:  ED  Previous ECG:     Previous ECG:  Compared to current    Comparison ECG info:  Initial    Similarity:  No change    Comparison to cardiac monitor: Yes    Interpretation:     Interpretation: normal    Rate:     ECG rate:  88    ECG rate assessment: normal    Rhythm:     Rhythm: sinus rhythm    Ectopy:     Ectopy: none    QRS:     QRS axis:  Normal    QRS intervals:  Normal  Conduction:     Conduction: normal    ST segments:     ST segments:  Normal  T waves:     T waves: normal    Comments:      No STEMI.  QT/Qtc 374/452           ED Course  ED Course as of 06/12/24 2001 Wed Jun 12, 2024   1220 Venous duplex negative for DVT.             HEART Risk Score      Flowsheet Row Most Recent Value   Heart Score Risk Calculator    History 1 Filed at: 06/12/2024 0900   ECG 0 Filed at: 06/12/2024 0900   Age 0 Filed at: 06/12/2024 0900   Risk Factors 2 Filed at: 06/12/2024 0900   Troponin 0 Filed at: 06/12/2024 0900   HEART Score 3 Filed at: 06/12/2024 0900                  PERC Rule for PE      Flowsheet Row Most Recent Value   PERC Rule for PE    Age >=50 0 Filed at: 06/12/2024 0900   HR >=100 1  Filed at: 06/12/2024 0900   O2 Sat on room air < 95% 0 Filed at: 06/12/2024 0900   History of PE or DVT 0 Filed at: 06/12/2024 0900   Recent trauma or surgery 0 Filed at: 06/12/2024 0900   Hemoptysis 0 Filed at: 06/12/2024 0900   Exogenous estrogen 0 Filed at: 06/12/2024 0900   Unilateral leg swelling 1 Filed at: 06/12/2024 0900   PERC Rule for PE Results 2 Filed at: 06/12/2024 0900                SBIRT 20yo+      Flowsheet Row Most Recent Value   Initial Alcohol Screen: US AUDIT-C     1. How often do you have a drink containing alcohol? 0 Filed at: 06/12/2024 0847   2. How many drinks containing alcohol do you have on a typical day you are drinking?  0 Filed at: 06/12/2024 0847   3a. Male UNDER 65: How often do you have five or more drinks on one occasion? 0 Filed at: 06/12/2024 0847   3b. FEMALE Any Age, or MALE 65+: How often do you have 4 or more drinks on one occassion? 0 Filed at: 06/12/2024 0847   Audit-C Score 0 Filed at: 06/12/2024 0847   HOLLI: How many times in the past year have you...    Used an illegal drug or used a prescription medication for non-medical reasons? Never Filed at: 06/12/2024 0847            Wells' Criteria for PE      Flowsheet Row Most Recent Value   Wells' Criteria for PE    Clinical signs and symptoms of DVT 3 Filed at: 06/12/2024 0900   PE is primary diagnosis or equally likely 3 Filed at: 06/12/2024 0900   HR >100 1.5 Filed at: 06/12/2024 0900   Immobilization at least 3 days or Surgery in the previous 4 weeks 0 Filed at: 06/12/2024 0900   Previous, objectively diagnosed PE or DVT 0 Filed at: 06/12/2024 0900   Hemoptysis 0 Filed at: 06/12/2024 0900   Malignancy with treatment within 6 months or palliative 0 Filed at: 06/12/2024 0900   Wells' Criteria Total 7.5 Filed at: 06/12/2024 0900                  Medical Decision Making  Patient is a 43-year-old female with a PMHx of anxiety, HLD, morbid obesity, CHESTER (on CPAP) and tobacco abuse, presenting to the ED for evaluation of chest  pain and shortness of breath x2 days.    DDX including but not limited to: pneumonia, pleural effusion, PE, CHF, PTX, ACS, MI, asthma exacerbation, COPD exacerbation, COVID 19, bronchitis, bronchiolitis, viral illness.     Patient's labs were unremarkable.  COVID/flu/RSV negative.  EKG normal sinus rhythm with no acute ischemic changes and troponin negative x 2, ruling out ACS/MI.  BNP normal and chest x-ray shows no evidence of pulmonary edema or pleural effusion, doubt CHF.  Patient does have a history of smoking; however, no hx of asthma/COPD and no wheezing on exam to suggest asthma/COPD exacerbation.  Patient's Wells score elevated at 7.5 and a CTA was obtained to rule out PE - CTA negative for PE but did show a left-sided pneumonia.  Venous duplex negative for DVT.  Patient feeling improved after cough medication and Toradol and her breathing has significantly improved as well.  She was noted to be tachypneic on arrival which seemed to be associated with exertion and coughing fits.  She had no evidence of retractions, accessory muscle usage, stridor or increased work of breathing.  Her breathing improved at rest and she was able to speak in full sentences. SpO2 95-96% at rest on room air and did not drop below 93% with ambulation.  Patient feels comfortable going home at this time and feels that her breathing is back to baseline.  She was given a prescription for azithromycin and Vantin to treat the pneumonia.  She was given a portable pulse oximeter on her oxygen levels at home.  She was instructed to return to the ED if she experienced any worsening shortness of breath/chest pain, fevers or SpO2 levels <90%.  Advised close follow-up with PCP for reevaluation in the next 1 to 2 days.    The management plan was discussed in detail with the patient at bedside and all questions were answered. Strict ED return instructions were discussed at bedside. Prior to discharge, both verbal and written instructions were  provided. We discussed the signs and symptoms that should prompt the patient to return to the ED. All questions were answered and the patient was comfortable with the plan of care and discharged home. The patient agrees to return to the Emergency Department for concerns and/or progression of illness.     Amount and/or Complexity of Data Reviewed  Labs: ordered.  Radiology: ordered.    Risk  Prescription drug management.             Disposition  Final diagnoses:   Community acquired pneumonia of left lung   SOB (shortness of breath)     Time reflects when diagnosis was documented in both MDM as applicable and the Disposition within this note       Time User Action Codes Description Comment    6/12/2024 11:26 AM Pauline Kumar [J18.9] Community acquired pneumonia of left lung     6/12/2024 11:35 AM Pauline Kumar Add [R06.02] SOB (shortness of breath)           ED Disposition       ED Disposition   Discharge    Condition   Stable    Date/Time   Wed Jun 12, 2024 1220    Comment   Araceli Connelly discharge to home/self care.                   Follow-up Information       Follow up With Specialties Details Why Contact Info Additional Information    Didier Sow,  Internal Medicine Schedule an appointment as soon as possible for a visit   [address] [place] [address]  [phone]       [facility] Emergency Medicine  If symptoms worsen [address] [facility] [address] [phone] [facility], [address] [address]            Discharge Medication List as of 6/12/2024 12:20 PM        START taking these medications    Details   azithromycin (ZITHROMAX) 250 mg tablet Take 2 tablets today then 1 tablet daily x 4 days, Normal      benzonatate (TESSALON PERLES) 100 mg capsule Take 1 capsule (100 mg total) by mouth every 8 (eight) hours, Starting Wed 6/12/2024, Normal       cefpodoxime (VANTIN) 200 mg tablet Take 1 tablet (200 mg total) by mouth 2 (two) times a day for 7 days, Starting Wed 6/12/2024, Until Wed 6/19/2024, Normal           CONTINUE these medications which have NOT CHANGED    Details   acetaminophen (TYLENOL) 650 mg CR tablet Take 1 tablet (650 mg total) by mouth every 8 (eight) hours as needed for mild pain, Starting Thu 10/8/2020, Normal      busPIRone (BUSPAR) 10 mg tablet take 1 tablet by mouth three times a day, Normal      clotrimazole-betamethasone (LOTRISONE) 1-0.05 % cream Apply topically 2 (two) times a day, Starting Mon 8/7/2023, Normal      DULoxetine (CYMBALTA) 60 mg delayed release capsule Take 1 capsule (60 mg total) by mouth daily, Starting Thu 12/1/2022, Normal      fluticasone (FLONASE) 50 mcg/act nasal spray 1 spray into each nostril daily, Starting Wed 12/27/2023, Normal      furosemide (LASIX) 20 mg tablet Take 1 tablet (20 mg total) by mouth daily, Starting Wed 3/22/2023, Normal      guaiFENesin (Mucinex) 600 mg 12 hr tablet Take 1 tablet (600 mg total) by mouth every 12 (twelve) hours, Starting Wed 12/27/2023, Normal      guaifenesin-codeine (GUAIFENESIN AC) 100-10 MG/5ML liquid Take 5 mL by mouth 3 (three) times a day as needed for cough, Starting Wed 12/27/2023, Normal      pregabalin (LYRICA) 75 mg capsule take 1 capsule by mouth three times a day, Normal      triamcinolone (KENALOG) 0.1 % cream Apply topically 2 (two) times a day as needed for irritation or rash, Starting Fri 7/28/2023, Normal             No discharge procedures on file.    PDMP Review         Value Time User    PDMP Reviewed  Yes 12/27/2023  7:46 AM Didier Sow DO            ED Provider  Electronically Signed by             Pauline Kumar PA-C  06/12/24 2002

## (undated) DEVICE — AIRSEAL TUBE SMOKE EVAC LUMENX3 FILTERED

## (undated) DEVICE — LUBRICANT INST ELECTROLUBE ANTISTK WO PAD

## (undated) DEVICE — GLOVE INDICATOR PI UNDERGLOVE SZ 6.5 BLUE

## (undated) DEVICE — PREMIUM DRY TRAY LF: Brand: MEDLINE INDUSTRIES, INC.

## (undated) DEVICE — EXIDINE 4 PCT

## (undated) DEVICE — MEGA SUTURECUT ND: Brand: ENDOWRIST

## (undated) DEVICE — Device: Brand: TISSUE RETRIEVAL SYSTEM

## (undated) DEVICE — 3M™ STERI-STRIP™ REINFORCED ADHESIVE SKIN CLOSURES, R1547, 1/2 IN X 4 IN (12 MM X 100 MM), 6 STRIPS/ENVELOPE: Brand: 3M™ STERI-STRIP™

## (undated) DEVICE — COLUMN DRAPE

## (undated) DEVICE — NEEDLE 25G X 1 1/2

## (undated) DEVICE — GLOVE PI ULTRA TOUCH SZ 6

## (undated) DEVICE — GLOVE PI ULTRA TOUCH SZ.7.0

## (undated) DEVICE — KIT, BETHLEHEM ROBOTIC PROST: Brand: CARDINAL HEALTH

## (undated) DEVICE — 3000CC GUARDIAN II: Brand: GUARDIAN

## (undated) DEVICE — MONOPOLAR CURVED SCISSORS: Brand: ENDOWRIST

## (undated) DEVICE — ARM DRAPE

## (undated) DEVICE — TROCAR PORT ACCESS 12 X120MM W/BLDLS OPTICAL TIP AIRSEAL

## (undated) DEVICE — TRAY FOLEY 16FR URIMETER SILICONE SURESTEP

## (undated) DEVICE — ENDOPATH PNEUMONEEDLE INSUFFLATION NEEDLES WITH LUER LOCK CONNECTORS 120MM: Brand: ENDOPATH

## (undated) DEVICE — SUT MONOCRYL 4-0 PS-2 27 IN Y426H

## (undated) DEVICE — GLOVE INDICATOR PI UNDERGLOVE SZ 9 BLUE

## (undated) DEVICE — GLOVE INDICATOR PI UNDERGLOVE SZ 7 BLUE

## (undated) DEVICE — SYRINGE 50ML LL

## (undated) DEVICE — PROGRASP FORCEPS: Brand: ENDOWRIST

## (undated) DEVICE — Device

## (undated) DEVICE — MARYLAND BIPOLAR FORCEPS: Brand: ENDOWRIST

## (undated) DEVICE — MEDI-VAC YANKAUER SUCTION HANDLE W/STRAIGHT TIP & CONTROL VENT: Brand: CARDINAL HEALTH

## (undated) DEVICE — ASTOUND STANDARD SURGICAL GOWN, XL: Brand: CONVERTORS

## (undated) DEVICE — UTERINE MANIPULATOR RUMI 6.7 X 10 CM

## (undated) DEVICE — SUT VICRYL 2-0 CT-1 36 IN J945H

## (undated) DEVICE — INTENDED FOR TISSUE SEPARATION, AND OTHER PROCEDURES THAT REQUIRE A SHARP SURGICAL BLADE TO PUNCTURE OR CUT.: Brand: BARD-PARKER SAFETY BLADES SIZE 11, STERILE

## (undated) DEVICE — CANNULA SEAL

## (undated) DEVICE — STANDARD SURGICAL GOWN, L: Brand: CONVERTORS

## (undated) DEVICE — ADHESIVE SKIN HIGH VISCOSITY EXOFIN 1ML

## (undated) DEVICE — GLOVE PI ULTRA TOUCH SZ.8.5

## (undated) DEVICE — OCCLUDER COLPO-PNEUMO

## (undated) DEVICE — GLOVE PI ULTRA TOUCH SZ.6.5

## (undated) DEVICE — TIP COVER ACCESSORY

## (undated) DEVICE — CHLORAPREP HI-LITE 26ML ORANGE

## (undated) DEVICE — MAYO STAND COVER: Brand: CONVERTORS

## (undated) DEVICE — SUT STRATAFIX SPIRAL 2-0 CT-1 20 CM SXPD1B400

## (undated) DEVICE — TUBING SUCTION 5MM X 12 FT

## (undated) DEVICE — SUT VICRYL 0 CT-1 27 IN J260H